# Patient Record
Sex: MALE | Race: OTHER | HISPANIC OR LATINO | URBAN - METROPOLITAN AREA
[De-identification: names, ages, dates, MRNs, and addresses within clinical notes are randomized per-mention and may not be internally consistent; named-entity substitution may affect disease eponyms.]

---

## 2018-03-20 VITALS
HEIGHT: 68 IN | WEIGHT: 212.08 LBS | HEART RATE: 93 BPM | OXYGEN SATURATION: 93 % | SYSTOLIC BLOOD PRESSURE: 97 MMHG | TEMPERATURE: 98 F | RESPIRATION RATE: 18 BRPM | DIASTOLIC BLOOD PRESSURE: 55 MMHG

## 2018-03-20 RX ORDER — LISINOPRIL 2.5 MG/1
1 TABLET ORAL
Qty: 0 | Refills: 0 | COMMUNITY

## 2018-03-20 RX ORDER — CHLORHEXIDINE GLUCONATE 213 G/1000ML
1 SOLUTION TOPICAL ONCE
Qty: 0 | Refills: 0 | Status: DISCONTINUED | OUTPATIENT
Start: 2018-03-22 | End: 2018-03-23

## 2018-03-20 NOTE — H&P ADULT - HISTORY OF PRESENT ILLNESS
*Review Meds*  58 y/o Male (CONTRAST ALLERGY, PREMEDICATED WITH DIPHENHYDRAMINE 50 mg PO x 1 NIGHT BEFORE PROCEDURE AND PREDNISONE 50 mg PO AT 6pm, 12am, 6am PRIOR TO PROCEDURE BY DR. WISDOM) with PMHx of GERD, ED, BPH, IDDM, HTN, Hyperlipidemia, EDMUND (not on CPAP), and known CAD with h/o multiple catheterizations and stents, most recently @ [place] on 6/25/14 revealing patent intervention sites of RCA (RPL1), LAD (Mid), LCx (distal), LCx (OM1), LCx (OM3), with two vessel CAD (RCA, LAD) and successful intervention of RCA branch RPDA with MEMO who presented to his cardiologist, Dr. Wisdom, c/o worsening GRAHAM with associated retrosternal tightness, relieved with rest. Pt notes symptoms are similar to chest pain experienced prior to previous PCI’s. He endorses occasional palpitations. He denies dizziness, syncope, LE edema, orthopnea/PND, N/V, diaphoresis, melena, BRBPR, or any other symptoms. Pt underwent subsequent ECHO on 3/1/18 revealing borderline concentric LV hypertrophy with normal systolic function, estimated EF 60-65%, normal mitral valve leaflets and closing motion, trileaflet aortic valve with mild sclerosis, aortic root dilatation, borderline left atrial enlargement, normal right atrium and right ventricle, Grade 1 diastolic dysfunction with trace MR, TR, and pulmonary insufficiency, Positive exercise stress test for myocardial ischemia with 1.5mm horizontal ST depressions in leads II, III, aVF, V4, V5, V6, Stress ECHO on 3/5/18, Pre-exercise ECHO findings: no resting LV segmental wall motion abnormalities, resting global LV systolic function appeared normal with estimated LVEF of 65%. Post-exercise ECHO findings: LV segmental wall motion abnormalities with inferior wall hypokinesis, with augmented contractility and estimated post LVEF of 25%. Pt reports strict compliance with Plavix 75 mg PO QD, but admits he has not taken ASA 81 mg PO in 8 days.  In light of pt’s risk factors, known CAD, and abnormal stress tests, pt is referred to [facility] for left cardiac catheterization with possible intervention if clinically indicated.    Cath Report @ [place] on 6/25/14: Prox RCA mild diffuse diease, RPDA 70-80% obstruction with successful intervention and post-stenosis 0-10%, RPL1 no obstruction with patent intervention site, Left Main mild diffuse disease, Prox LAD <30%, Mid LAD 30-50% with patent intervention site, D1 60-70% with stent jailed, Prox LCx <30%, Distal LCx no obstruction with patent intervention site, OM1 no obstruction with patent intervention site, OM2 with mild diffuse, OM3 no obstruction with patent intervention site, LCx small distal dCX fills via collaterals from RCA 58 y/o Male (CONTRAST ALLERGY, PREMEDICATED WITH DIPHENHYDRAMINE 50 mg PO x 1 NIGHT BEFORE PROCEDURE AND PREDNISONE 50 mg PO AT 6pm, 12am, 6am PRIOR TO PROCEDURE BY DR. WISDOM) with PMHx of GERD, ED, BPH, IDDM, HTN, Hyperlipidemia, EDMUND (not on CPAP), and known CAD with h/o multiple catheterizations and stents, most recently @ [place] on 6/25/14 revealing patent intervention sites of RCA (RPL1), LAD (Mid), LCx (distal), LCx (OM1), LCx (OM3), with two vessel CAD (RCA, LAD) and successful intervention of RCA branch RPDA with MEMO who presented to his cardiologist, Dr. Wisdom, c/o worsening GRAHAM with associated retrosternal tightness, relieved with rest. Pt notes symptoms are similar to chest pain experienced prior to previous PCI’s. He endorses occasional palpitations. He denies dizziness, syncope, LE edema, orthopnea/PND, N/V, diaphoresis, melena, BRBPR, or any other symptoms. Pt underwent subsequent ECHO on 3/1/18 revealing borderline concentric LV hypertrophy with normal systolic function, estimated EF 60-65%, normal mitral valve leaflets and closing motion, trileaflet aortic valve with mild sclerosis, aortic root dilatation, borderline left atrial enlargement, normal right atrium and right ventricle, Grade 1 diastolic dysfunction with trace MR, TR, and pulmonary insufficiency, Positive exercise stress test for myocardial ischemia with 1.5mm horizontal ST depressions in leads II, III, aVF, V4, V5, V6, Stress ECHO on 3/5/18, Pre-exercise ECHO findings: no resting LV segmental wall motion abnormalities, resting global LV systolic function appeared normal with estimated LVEF of 65%. Post-exercise ECHO findings: LV segmental wall motion abnormalities with inferior wall hypokinesis, with augmented contractility and estimated post LVEF of 25%. Pt reports strict compliance with Plavix 75 mg PO QD, but admits he has not taken ASA 81 mg PO in 8 days.  In light of pt’s risk factors, known CAD, and abnormal stress tests, pt is referred to [facility] for left cardiac catheterization with possible intervention if clinically indicated.    Cath Report @ [place] on 6/25/14: Prox RCA mild diffuse diease, RPDA 70-80% obstruction with successful intervention and post-stenosis 0-10%, RPL1 no obstruction with patent intervention site, Left Main mild diffuse disease, Prox LAD <30%, Mid LAD 30-50% with patent intervention site, D1 60-70% with stent jailed, Prox LCx <30%, Distal LCx no obstruction with patent intervention site, OM1 no obstruction with patent intervention site, OM2 with mild diffuse, OM3 no obstruction with patent intervention site, LCx small distal dCX fills via collaterals from RCA

## 2018-03-20 NOTE — H&P ADULT - ASSESSMENT
56 y/o Male (CONTRAST ALLERGY) with PMHx of GERD, ED, BPH, IDDM, HTN, Hyperlipidemia, EDMUND (not on CPAP), and known CAD with h/o multiple catheterizations and stents, most recently @ [place] on 6/25/14 revealing patent intervention sites of RCA (RPL1), LAD (Mid), LCx (distal), LCx (OM1), LCx (OM3), with two vessel CAD (RCA, LAD) and successful intervention of RCA branch RPDA with MEMO who presented to his cardiologist, Dr. Graves, c/o worsening GRAHAM with associated retrosternal tightness and was found to have an abnormal stress tests, pt is referred to [facility] for left cardiac catheterization with possible intervention if clinically indicated.  Of note, pt is on ASA 81mg/Plavix at home. Reports missed doses of ASA, but strict compliance on Plavix. Last dose of both was this AM. Additional ASA 243mg PO x 1 given to complete loading dose.     Pt had a contrast allergy and was given Rx for prednisone 50mg x3, however due to inclement weather he was unable to  the Rx on time and therefore only took prednisone 50mg x 1. Therefore, given SoluCortef 200mg x1 prior to cath and Benadryl 50mg IV ordered on call to cath lab. WBC 12.2, likely in the setting of steroid use as pt afebrile, denies any sx.     Cr today 1.23. Cr on outpt labs from 3/13 1.38. NS @ 125cc/hr. Euvolemic on exam.     Risks & benefits of procedure and alternative therapy have been explained to the patient including but not limited to: allergic reaction, bleeding w/possible need for blood transfusion, infection, renal and vascular compromise, limb damage, arrhythmia, stroke, vessel dissection/perforation, Myocardial infarction, emergent CABG. Informed consent obtained and in chart. Pt refused a  line.

## 2018-03-20 NOTE — H&P ADULT - PMH
CAD (coronary artery disease)    Diabetes mellitus    Heart burn    High cholesterol    Hypertension    Obesity (BMI 30.0-34.9)    Sleep apnea  Not using any Device for 5 years

## 2018-03-20 NOTE — H&P ADULT - NSHPSOCIALHISTORY_GEN_ALL_CORE
Denies tobacco or current illicit drug use. Quit using cocaine in 2010. Drinks socially 1-2 glasses of wine per week.

## 2018-03-20 NOTE — H&P ADULT - NSHPLABSRESULTS_GEN_ALL_CORE
EKG NSR @ 93 BPM with t-wave flattening in V3-V6                            15.0   12.2  )-----------( 211      ( 22 Mar 2018 09:15 )             44.5       03-22    132<L>  |  93<L>  |  22  ----------------------------<  269<H>  4.0   |  25  |  1.23    Ca    9.7      22 Mar 2018 09:15    TPro  7.4  /  Alb  3.9  /  TBili  0.4  /  DBili  x   /  AST  20  /  ALT  32  /  AlkPhos  65  03-22      PT/INR - ( 22 Mar 2018 09:15 )   PT: 12.2 sec;   INR: 1.10          PTT - ( 22 Mar 2018 09:15 )  PTT:30.8 sec    CARDIAC MARKERS ( 22 Mar 2018 09:15 )  x     / x     / 148 U/L / x     / 2.3 ng/mL

## 2018-03-20 NOTE — H&P ADULT - PSH
Finger injury  Left Ring Finger & had surgery ( 1996 )  H/O spinal fusion    S/P angioplasty with stent  2008 & 2014  total of 7 stents  S/P foot surgery  Right  ( 2013 )

## 2018-03-22 ENCOUNTER — INPATIENT (INPATIENT)
Facility: HOSPITAL | Age: 58
LOS: 0 days | Discharge: HOME | DRG: 247 | End: 2018-03-23
Attending: INTERNAL MEDICINE | Admitting: INTERNAL MEDICINE
Payer: COMMERCIAL

## 2018-03-22 DIAGNOSIS — Z98.1 ARTHRODESIS STATUS: Chronic | ICD-10-CM

## 2018-03-22 DIAGNOSIS — Z98.89 OTHER SPECIFIED POSTPROCEDURAL STATES: Chronic | ICD-10-CM

## 2018-03-22 DIAGNOSIS — S69.90XA UNSPECIFIED INJURY OF UNSPECIFIED WRIST, HAND AND FINGER(S), INITIAL ENCOUNTER: Chronic | ICD-10-CM

## 2018-03-22 LAB
ALBUMIN SERPL ELPH-MCNC: 3.9 G/DL — SIGNIFICANT CHANGE UP (ref 3.3–5)
ALP SERPL-CCNC: 65 U/L — SIGNIFICANT CHANGE UP (ref 40–120)
ALT FLD-CCNC: 32 U/L — SIGNIFICANT CHANGE UP (ref 10–45)
ANION GAP SERPL CALC-SCNC: 14 MMOL/L — SIGNIFICANT CHANGE UP (ref 5–17)
APTT BLD: 30.8 SEC — SIGNIFICANT CHANGE UP (ref 27.5–37.4)
AST SERPL-CCNC: 20 U/L — SIGNIFICANT CHANGE UP (ref 10–40)
BASOPHILS NFR BLD AUTO: 0.6 % — SIGNIFICANT CHANGE UP (ref 0–2)
BILIRUB SERPL-MCNC: 0.4 MG/DL — SIGNIFICANT CHANGE UP (ref 0.2–1.2)
BUN SERPL-MCNC: 22 MG/DL — SIGNIFICANT CHANGE UP (ref 7–23)
CALCIUM SERPL-MCNC: 9.7 MG/DL — SIGNIFICANT CHANGE UP (ref 8.4–10.5)
CHLORIDE SERPL-SCNC: 93 MMOL/L — LOW (ref 96–108)
CK MB CFR SERPL CALC: 2.3 NG/ML — SIGNIFICANT CHANGE UP (ref 0–6.7)
CK SERPL-CCNC: 148 U/L — SIGNIFICANT CHANGE UP (ref 30–200)
CO2 SERPL-SCNC: 25 MMOL/L — SIGNIFICANT CHANGE UP (ref 22–31)
CREAT SERPL-MCNC: 1.23 MG/DL — SIGNIFICANT CHANGE UP (ref 0.5–1.3)
EOSINOPHIL NFR BLD AUTO: 1 % — SIGNIFICANT CHANGE UP (ref 0–6)
GLUCOSE BLDC GLUCOMTR-MCNC: 221 MG/DL — HIGH (ref 70–99)
GLUCOSE SERPL-MCNC: 269 MG/DL — HIGH (ref 70–99)
HBA1C BLD-MCNC: 8.8 % — HIGH (ref 4–5.6)
HCT VFR BLD CALC: 44.5 % — SIGNIFICANT CHANGE UP (ref 39–50)
HGB BLD-MCNC: 15 G/DL — SIGNIFICANT CHANGE UP (ref 13–17)
INR BLD: 1.1 — SIGNIFICANT CHANGE UP (ref 0.88–1.16)
LYMPHOCYTES # BLD AUTO: 23.3 % — SIGNIFICANT CHANGE UP (ref 13–44)
MCHC RBC-ENTMCNC: 29.6 PG — SIGNIFICANT CHANGE UP (ref 27–34)
MCHC RBC-ENTMCNC: 33.7 G/DL — SIGNIFICANT CHANGE UP (ref 32–36)
MCV RBC AUTO: 87.8 FL — SIGNIFICANT CHANGE UP (ref 80–100)
MONOCYTES NFR BLD AUTO: 9.3 % — SIGNIFICANT CHANGE UP (ref 2–14)
NEUTROPHILS NFR BLD AUTO: 65.8 % — SIGNIFICANT CHANGE UP (ref 43–77)
PLATELET # BLD AUTO: 211 K/UL — SIGNIFICANT CHANGE UP (ref 150–400)
POTASSIUM SERPL-MCNC: 4 MMOL/L — SIGNIFICANT CHANGE UP (ref 3.5–5.3)
POTASSIUM SERPL-SCNC: 4 MMOL/L — SIGNIFICANT CHANGE UP (ref 3.5–5.3)
PROT SERPL-MCNC: 7.4 G/DL — SIGNIFICANT CHANGE UP (ref 6–8.3)
PROTHROM AB SERPL-ACNC: 12.2 SEC — SIGNIFICANT CHANGE UP (ref 9.8–12.7)
RBC # BLD: 5.07 M/UL — SIGNIFICANT CHANGE UP (ref 4.2–5.8)
RBC # FLD: 12.5 % — SIGNIFICANT CHANGE UP (ref 10.3–16.9)
SODIUM SERPL-SCNC: 132 MMOL/L — LOW (ref 135–145)
WBC # BLD: 12.2 K/UL — HIGH (ref 3.8–10.5)
WBC # FLD AUTO: 12.2 K/UL — HIGH (ref 3.8–10.5)

## 2018-03-22 PROCEDURE — 93458 L HRT ARTERY/VENTRICLE ANGIO: CPT | Mod: 26,XU

## 2018-03-22 PROCEDURE — 93571 IV DOP VEL&/PRESS C FLO 1ST: CPT | Mod: 26,LD

## 2018-03-22 PROCEDURE — 92928 PRQ TCAT PLMT NTRAC ST 1 LES: CPT | Mod: RC

## 2018-03-22 PROCEDURE — 93010 ELECTROCARDIOGRAM REPORT: CPT | Mod: 59

## 2018-03-22 RX ORDER — HYDROCORTISONE 20 MG
200 TABLET ORAL ONCE
Qty: 0 | Refills: 0 | Status: COMPLETED | OUTPATIENT
Start: 2018-03-22 | End: 2018-03-22

## 2018-03-22 RX ORDER — INSULIN GLARGINE 100 [IU]/ML
30 INJECTION, SOLUTION SUBCUTANEOUS AT BEDTIME
Qty: 0 | Refills: 0 | Status: DISCONTINUED | OUTPATIENT
Start: 2018-03-22 | End: 2018-03-23

## 2018-03-22 RX ORDER — INSULIN LISPRO 100/ML
VIAL (ML) SUBCUTANEOUS ONCE
Qty: 0 | Refills: 0 | Status: COMPLETED | OUTPATIENT
Start: 2018-03-22 | End: 2018-03-22

## 2018-03-22 RX ORDER — INSULIN GLARGINE 100 [IU]/ML
50 INJECTION, SOLUTION SUBCUTANEOUS
Qty: 0 | Refills: 0 | COMMUNITY

## 2018-03-22 RX ORDER — ACETAMINOPHEN 500 MG
650 TABLET ORAL EVERY 6 HOURS
Qty: 0 | Refills: 0 | Status: DISCONTINUED | OUTPATIENT
Start: 2018-03-22 | End: 2018-03-23

## 2018-03-22 RX ORDER — METOPROLOL TARTRATE 50 MG
50 TABLET ORAL DAILY
Qty: 0 | Refills: 0 | Status: DISCONTINUED | OUTPATIENT
Start: 2018-03-23 | End: 2018-03-23

## 2018-03-22 RX ORDER — METOPROLOL TARTRATE 50 MG
1 TABLET ORAL
Qty: 0 | Refills: 0 | COMMUNITY

## 2018-03-22 RX ORDER — GLUCAGON INJECTION, SOLUTION 0.5 MG/.1ML
1 INJECTION, SOLUTION SUBCUTANEOUS ONCE
Qty: 0 | Refills: 0 | Status: DISCONTINUED | OUTPATIENT
Start: 2018-03-22 | End: 2018-03-23

## 2018-03-22 RX ORDER — INSULIN ASPART 100 [IU]/ML
30 INJECTION, SOLUTION SUBCUTANEOUS
Qty: 0 | Refills: 0 | COMMUNITY

## 2018-03-22 RX ORDER — GABAPENTIN 400 MG/1
300 CAPSULE ORAL DAILY
Qty: 0 | Refills: 0 | Status: DISCONTINUED | OUTPATIENT
Start: 2018-03-22 | End: 2018-03-23

## 2018-03-22 RX ORDER — ASPIRIN/CALCIUM CARB/MAGNESIUM 324 MG
243 TABLET ORAL ONCE
Qty: 0 | Refills: 0 | Status: COMPLETED | OUTPATIENT
Start: 2018-03-22 | End: 2018-03-22

## 2018-03-22 RX ORDER — DEXTROSE 50 % IN WATER 50 %
25 SYRINGE (ML) INTRAVENOUS ONCE
Qty: 0 | Refills: 0 | Status: DISCONTINUED | OUTPATIENT
Start: 2018-03-22 | End: 2018-03-23

## 2018-03-22 RX ORDER — SITAGLIPTIN AND METFORMIN HYDROCHLORIDE 500; 50 MG/1; MG/1
1 TABLET, FILM COATED ORAL
Qty: 0 | Refills: 0 | COMMUNITY

## 2018-03-22 RX ORDER — OXYCODONE AND ACETAMINOPHEN 5; 325 MG/1; MG/1
1 TABLET ORAL EVERY 6 HOURS
Qty: 0 | Refills: 0 | Status: DISCONTINUED | OUTPATIENT
Start: 2018-03-22 | End: 2018-03-23

## 2018-03-22 RX ORDER — ASPIRIN/CALCIUM CARB/MAGNESIUM 324 MG
81 TABLET ORAL DAILY
Qty: 0 | Refills: 0 | Status: DISCONTINUED | OUTPATIENT
Start: 2018-03-22 | End: 2018-03-23

## 2018-03-22 RX ORDER — SODIUM CHLORIDE 9 MG/ML
1000 INJECTION INTRAMUSCULAR; INTRAVENOUS; SUBCUTANEOUS
Qty: 0 | Refills: 0 | Status: DISCONTINUED | OUTPATIENT
Start: 2018-03-22 | End: 2018-03-23

## 2018-03-22 RX ORDER — DIPHENHYDRAMINE HCL 50 MG
50 CAPSULE ORAL ONCE
Qty: 0 | Refills: 0 | Status: COMPLETED | OUTPATIENT
Start: 2018-03-22 | End: 2018-03-22

## 2018-03-22 RX ORDER — CLOPIDOGREL BISULFATE 75 MG/1
75 TABLET, FILM COATED ORAL DAILY
Qty: 0 | Refills: 0 | Status: DISCONTINUED | OUTPATIENT
Start: 2018-03-23 | End: 2018-03-23

## 2018-03-22 RX ORDER — DEXTROSE 50 % IN WATER 50 %
1 SYRINGE (ML) INTRAVENOUS ONCE
Qty: 0 | Refills: 0 | Status: DISCONTINUED | OUTPATIENT
Start: 2018-03-22 | End: 2018-03-23

## 2018-03-22 RX ORDER — INSULIN LISPRO 100/ML
VIAL (ML) SUBCUTANEOUS
Qty: 0 | Refills: 0 | Status: DISCONTINUED | OUTPATIENT
Start: 2018-03-22 | End: 2018-03-23

## 2018-03-22 RX ORDER — ATORVASTATIN CALCIUM 80 MG/1
40 TABLET, FILM COATED ORAL AT BEDTIME
Qty: 0 | Refills: 0 | Status: DISCONTINUED | OUTPATIENT
Start: 2018-03-22 | End: 2018-03-23

## 2018-03-22 RX ORDER — DEXTROSE 50 % IN WATER 50 %
12.5 SYRINGE (ML) INTRAVENOUS ONCE
Qty: 0 | Refills: 0 | Status: DISCONTINUED | OUTPATIENT
Start: 2018-03-22 | End: 2018-03-23

## 2018-03-22 RX ORDER — GABAPENTIN 400 MG/1
1 CAPSULE ORAL
Qty: 0 | Refills: 0 | COMMUNITY

## 2018-03-22 RX ORDER — INSULIN LISPRO 100/ML
30 VIAL (ML) SUBCUTANEOUS
Qty: 0 | Refills: 0 | Status: DISCONTINUED | OUTPATIENT
Start: 2018-03-22 | End: 2018-03-23

## 2018-03-22 RX ORDER — GABAPENTIN 400 MG/1
600 CAPSULE ORAL AT BEDTIME
Qty: 0 | Refills: 0 | Status: DISCONTINUED | OUTPATIENT
Start: 2018-03-22 | End: 2018-03-23

## 2018-03-22 RX ORDER — SODIUM CHLORIDE 9 MG/ML
1000 INJECTION, SOLUTION INTRAVENOUS
Qty: 0 | Refills: 0 | Status: DISCONTINUED | OUTPATIENT
Start: 2018-03-22 | End: 2018-03-23

## 2018-03-22 RX ORDER — LISINOPRIL 2.5 MG/1
2.5 TABLET ORAL DAILY
Qty: 0 | Refills: 0 | Status: DISCONTINUED | OUTPATIENT
Start: 2018-03-23 | End: 2018-03-23

## 2018-03-22 RX ORDER — LISINOPRIL 2.5 MG/1
1 TABLET ORAL
Qty: 0 | Refills: 0 | COMMUNITY

## 2018-03-22 RX ORDER — FENTANYL CITRATE 50 UG/ML
1 INJECTION INTRAVENOUS
Qty: 0 | Refills: 0 | COMMUNITY

## 2018-03-22 RX ORDER — FENTANYL CITRATE 50 UG/ML
1 INJECTION INTRAVENOUS
Qty: 0 | Refills: 0 | Status: DISCONTINUED | OUTPATIENT
Start: 2018-03-22 | End: 2018-03-23

## 2018-03-22 RX ADMIN — Medication 30 UNIT(S): at 16:57

## 2018-03-22 RX ADMIN — Medication 50 MILLIGRAM(S): at 12:16

## 2018-03-22 RX ADMIN — Medication 4: at 16:57

## 2018-03-22 RX ADMIN — ATORVASTATIN CALCIUM 40 MILLIGRAM(S): 80 TABLET, FILM COATED ORAL at 22:04

## 2018-03-22 RX ADMIN — OXYCODONE AND ACETAMINOPHEN 1 TABLET(S): 5; 325 TABLET ORAL at 14:24

## 2018-03-22 RX ADMIN — Medication 243 MILLIGRAM(S): at 12:16

## 2018-03-22 RX ADMIN — Medication 4: at 22:04

## 2018-03-22 RX ADMIN — Medication 200 MILLIGRAM(S): at 12:16

## 2018-03-22 RX ADMIN — GABAPENTIN 300 MILLIGRAM(S): 400 CAPSULE ORAL at 16:59

## 2018-03-22 RX ADMIN — FENTANYL CITRATE 1 PATCH: 50 INJECTION INTRAVENOUS at 19:08

## 2018-03-22 RX ADMIN — INSULIN GLARGINE 30 UNIT(S): 100 INJECTION, SOLUTION SUBCUTANEOUS at 22:05

## 2018-03-22 RX ADMIN — OXYCODONE AND ACETAMINOPHEN 1 TABLET(S): 5; 325 TABLET ORAL at 17:01

## 2018-03-22 RX ADMIN — SODIUM CHLORIDE 100 MILLILITER(S): 9 INJECTION INTRAMUSCULAR; INTRAVENOUS; SUBCUTANEOUS at 14:24

## 2018-03-22 RX ADMIN — GABAPENTIN 600 MILLIGRAM(S): 400 CAPSULE ORAL at 22:05

## 2018-03-22 NOTE — PROGRESS NOTE ADULT - SUBJECTIVE AND OBJECTIVE BOX
Interventional Cardiology PA Sheath Pull Note    Pt without complaints. VSS      Pre-Sheath Removal:    [X ] Right     [ ] Left          [X ] Groin    [ ] Brachial    [ ] 5Fr      [X ] 6Fr    [ ] 7Fr     [ ] 8Fr    [ ] Arterial  [ ] Venous sheath in place    [ ] Hematoma        [ ] Bleed    Pulses:    [X ] Right     [ ] Left       DP:  [ ]  Doppler   [ ]  Faint    [X ]   1+    [ ] 2+       Hemostasis Achieved with:        20         minutes manual pressure    Vasovagal Reaction: No    Meds Given: None      Post-Sheath Removal:    [X ] Right     [ ] Left          [X ] Groin    [ ] Brachial    [ ] Hematoma        [ ] Bleed       [ ] Bruit    Pulses:    [X ] Right     [ ] Left       DP:  [ ]  Doppler   [ ]  Faint    [X ]   1+    [ ] 2+     A/P:  s/p [ ] Dx Cath      [ ] IVUS/FFR     [X ] PTA       [ ] PTCA/STENT    -Continue bedrest (pt given instructions)  -Continue to monitor

## 2018-03-23 ENCOUNTER — TRANSCRIPTION ENCOUNTER (OUTPATIENT)
Age: 58
End: 2018-03-23

## 2018-03-23 VITALS — TEMPERATURE: 97 F

## 2018-03-23 LAB
ANION GAP SERPL CALC-SCNC: 9 MMOL/L — SIGNIFICANT CHANGE UP (ref 5–17)
BUN SERPL-MCNC: 21 MG/DL — SIGNIFICANT CHANGE UP (ref 7–23)
CALCIUM SERPL-MCNC: 9.3 MG/DL — SIGNIFICANT CHANGE UP (ref 8.4–10.5)
CHLORIDE SERPL-SCNC: 102 MMOL/L — SIGNIFICANT CHANGE UP (ref 96–108)
CO2 SERPL-SCNC: 28 MMOL/L — SIGNIFICANT CHANGE UP (ref 22–31)
CREAT SERPL-MCNC: 1.13 MG/DL — SIGNIFICANT CHANGE UP (ref 0.5–1.3)
GLUCOSE SERPL-MCNC: 164 MG/DL — HIGH (ref 70–99)
HCT VFR BLD CALC: 43.4 % — SIGNIFICANT CHANGE UP (ref 39–50)
HGB BLD-MCNC: 14.7 G/DL — SIGNIFICANT CHANGE UP (ref 13–17)
MAGNESIUM SERPL-MCNC: 1.9 MG/DL — SIGNIFICANT CHANGE UP (ref 1.6–2.6)
MCHC RBC-ENTMCNC: 30.8 PG — SIGNIFICANT CHANGE UP (ref 27–34)
MCHC RBC-ENTMCNC: 33.9 G/DL — SIGNIFICANT CHANGE UP (ref 32–36)
MCV RBC AUTO: 90.8 FL — SIGNIFICANT CHANGE UP (ref 80–100)
PLATELET # BLD AUTO: 171 K/UL — SIGNIFICANT CHANGE UP (ref 150–400)
POTASSIUM SERPL-MCNC: 3.6 MMOL/L — SIGNIFICANT CHANGE UP (ref 3.5–5.3)
POTASSIUM SERPL-SCNC: 3.6 MMOL/L — SIGNIFICANT CHANGE UP (ref 3.5–5.3)
RBC # BLD: 4.78 M/UL — SIGNIFICANT CHANGE UP (ref 4.2–5.8)
RBC # FLD: 12.8 % — SIGNIFICANT CHANGE UP (ref 10.3–16.9)
SODIUM SERPL-SCNC: 139 MMOL/L — SIGNIFICANT CHANGE UP (ref 135–145)
WBC # BLD: 8.7 K/UL — SIGNIFICANT CHANGE UP (ref 3.8–10.5)
WBC # FLD AUTO: 8.7 K/UL — SIGNIFICANT CHANGE UP (ref 3.8–10.5)

## 2018-03-23 PROCEDURE — 93010 ELECTROCARDIOGRAM REPORT: CPT

## 2018-03-23 RX ORDER — ATORVASTATIN CALCIUM 80 MG/1
1 TABLET, FILM COATED ORAL
Qty: 30 | Refills: 3 | OUTPATIENT
Start: 2018-03-23 | End: 2018-07-20

## 2018-03-23 RX ORDER — CLOPIDOGREL BISULFATE 75 MG/1
1 TABLET, FILM COATED ORAL
Qty: 0 | Refills: 0 | COMMUNITY

## 2018-03-23 RX ORDER — ACETAMINOPHEN 500 MG
2 TABLET ORAL
Qty: 0 | Refills: 0 | COMMUNITY

## 2018-03-23 RX ORDER — OXYCODONE AND ACETAMINOPHEN 5; 325 MG/1; MG/1
2 TABLET ORAL ONCE
Qty: 0 | Refills: 0 | Status: DISCONTINUED | OUTPATIENT
Start: 2018-03-23 | End: 2018-03-23

## 2018-03-23 RX ORDER — METOPROLOL TARTRATE 50 MG
1 TABLET ORAL
Qty: 0 | Refills: 11 | DISCHARGE
Start: 2018-03-23 | End: 2019-03-17

## 2018-03-23 RX ORDER — POTASSIUM CHLORIDE 20 MEQ
40 PACKET (EA) ORAL ONCE
Qty: 0 | Refills: 0 | Status: COMPLETED | OUTPATIENT
Start: 2018-03-23 | End: 2018-03-23

## 2018-03-23 RX ORDER — LISINOPRIL 2.5 MG/1
1 TABLET ORAL
Qty: 0 | Refills: 0 | COMMUNITY

## 2018-03-23 RX ORDER — ASPIRIN/CALCIUM CARB/MAGNESIUM 324 MG
1 TABLET ORAL
Qty: 30 | Refills: 11 | OUTPATIENT
Start: 2018-03-23 | End: 2019-03-17

## 2018-03-23 RX ORDER — METOPROLOL TARTRATE 50 MG
1 TABLET ORAL
Qty: 0 | Refills: 0 | COMMUNITY

## 2018-03-23 RX ORDER — ATORVASTATIN CALCIUM 80 MG/1
1 TABLET, FILM COATED ORAL
Qty: 0 | Refills: 0 | COMMUNITY

## 2018-03-23 RX ORDER — CLOPIDOGREL BISULFATE 75 MG/1
1 TABLET, FILM COATED ORAL
Qty: 30 | Refills: 11 | OUTPATIENT
Start: 2018-03-23 | End: 2019-03-17

## 2018-03-23 RX ORDER — METOPROLOL TARTRATE 50 MG
1 TABLET ORAL
Qty: 30 | Refills: 11
Start: 2018-03-23 | End: 2019-03-17

## 2018-03-23 RX ORDER — LISINOPRIL 2.5 MG/1
1 TABLET ORAL
Qty: 30 | Refills: 2 | OUTPATIENT
Start: 2018-03-23 | End: 2018-06-20

## 2018-03-23 RX ORDER — ASPIRIN/CALCIUM CARB/MAGNESIUM 324 MG
1 TABLET ORAL
Qty: 0 | Refills: 0 | COMMUNITY

## 2018-03-23 RX ORDER — SITAGLIPTIN AND METFORMIN HYDROCHLORIDE 500; 50 MG/1; MG/1
1 TABLET, FILM COATED ORAL
Qty: 0 | Refills: 0 | COMMUNITY

## 2018-03-23 RX ADMIN — Medication 40 MILLIEQUIVALENT(S): at 07:55

## 2018-03-23 RX ADMIN — Medication 81 MILLIGRAM(S): at 11:37

## 2018-03-23 RX ADMIN — GABAPENTIN 300 MILLIGRAM(S): 400 CAPSULE ORAL at 11:37

## 2018-03-23 RX ADMIN — CLOPIDOGREL BISULFATE 75 MILLIGRAM(S): 75 TABLET, FILM COATED ORAL at 11:37

## 2018-03-23 RX ADMIN — Medication 30 UNIT(S): at 11:37

## 2018-03-23 RX ADMIN — Medication 50 MILLIGRAM(S): at 06:28

## 2018-03-23 RX ADMIN — Medication 2: at 11:37

## 2018-03-23 RX ADMIN — Medication 30 UNIT(S): at 07:40

## 2018-03-23 RX ADMIN — LISINOPRIL 2.5 MILLIGRAM(S): 2.5 TABLET ORAL at 06:28

## 2018-03-23 NOTE — DISCHARGE NOTE ADULT - CARE PROVIDER_API CALL
Will Loyd), Cardiovascular Disease; Internal Medicine; Interventional Cardiology  [address] [place] [address]  Phone: [phone]  Fax: [phone]

## 2018-03-23 NOTE — DISCHARGE NOTE ADULT - PATIENT PORTAL LINK FT
You can access the [email or web address], offered by [facility], by registering with the following website: [email or web address]

## 2018-03-23 NOTE — DISCHARGE NOTE ADULT - PLAN OF CARE
Continue your home pain regimen and follow up with your outpatient pain specialist You had a Cardiac Catheterization and a stent placed to a blocked artery of your heart. YOU NEED TO CONTINUE ASPIRIN 81MG DAILY AS WELL AS PLAVIX 75MG DAILY AND DO NOT STOP THESE MEDICATIONS UNLESS INSTRUCTED BY YOUR CARDIOLOGIST ONLY You underwent a coronary angiogram and should wait 3 days before returning to ordinary activities. Do not drive for 2 days. Consult your doctor before returning to vigorous activity. You may return to work in 3-5 days. The catheter from your groin was removed. You may shower once the dressing is removed, but avoid baths, hot tubs, or swimming for 5 days to prevent infection. If you notice bleeding from the site, hardening and pain at the site, drainage or redness from the site, coolness/paleness of the extremity, swelling, or fever, please call [phone]. Please continue your aspirin and plavix as prescribed unless otherwise indicated by your cardiologist. All of your prescriptions have been sent to the pharmacy. Please follow up with Dr. Graves in 1-2 weeks. All of your needed prescriptions have been sent electronically to your pharmacy. Continue your Lisinopril 2.5mg daily as well as ToprolXL 50mg daily You have a history of elevated blood pressure and you should continue your blood pressure medications as prescribed. Continue Lipitor 40mg at bedtime Please continue your daily statin to ensure your cardiac stent remains open. Continue your Diabetes medications except for Janumet which you should hold and resume on Monday 3/26/18

## 2018-03-23 NOTE — DISCHARGE NOTE ADULT - CARE PLAN
Principal Discharge DX:	CAD (coronary artery disease)  Goal:	You had a Cardiac Catheterization and a stent placed to a blocked artery of your heart. YOU NEED TO CONTINUE ASPIRIN 81MG DAILY AS WELL AS PLAVIX 75MG DAILY AND DO NOT STOP THESE MEDICATIONS UNLESS INSTRUCTED BY YOUR CARDIOLOGIST ONLY  Assessment and plan of treatment:	You underwent a coronary angiogram and should wait 3 days before returning to ordinary activities. Do not drive for 2 days. Consult your doctor before returning to vigorous activity. You may return to work in 3-5 days. The catheter from your groin was removed. You may shower once the dressing is removed, but avoid baths, hot tubs, or swimming for 5 days to prevent infection. If you notice bleeding from the site, hardening and pain at the site, drainage or redness from the site, coolness/paleness of the extremity, swelling, or fever, please call [phone]. Please continue your aspirin and plavix as prescribed unless otherwise indicated by your cardiologist. All of your prescriptions have been sent to the pharmacy. Please follow up with Dr. Graves in 1-2 weeks. All of your needed prescriptions have been sent electronically to your pharmacy.  Secondary Diagnosis:	Hypertension  Goal:	Continue your Lisinopril 2.5mg daily as well as ToprolXL 50mg daily  Assessment and plan of treatment:	You have a history of elevated blood pressure and you should continue your blood pressure medications as prescribed.  Secondary Diagnosis:	High cholesterol  Goal:	Continue Lipitor 40mg at bedtime  Assessment and plan of treatment:	Please continue your daily statin to ensure your cardiac stent remains open.  Secondary Diagnosis:	Diabetes mellitus  Goal:	Continue your Diabetes medications except for Janumet which you should hold and resume on Monday 3/26/18  Secondary Diagnosis:	H/O spinal fusion  Goal:	Continue your home pain regimen and follow up with your outpatient pain specialist

## 2018-03-23 NOTE — DISCHARGE NOTE ADULT - HOSPITAL COURSE
58 y/o Male (CONTRAST ALLERGY, PREMEDICATED WITH DIPHENHYDRAMINE 50 mg PO x 1 NIGHT BEFORE PROCEDURE AND PREDNISONE 50 mg PO AT 6pm, 12am, 6am PRIOR TO PROCEDURE BY DR. WISDOM) with PMHx of GERD, ED, BPH, IDDM, HTN, Hyperlipidemia, EDMUND (not on CPAP), and known CAD with h/o multiple catheterizations and stents, most recently @ [place] on 6/25/14 revealing patent intervention sites of RCA (RPL1), LAD (Mid), LCx (distal), LCx (OM1), LCx (OM3), with two vessel CAD (RCA, LAD) and successful intervention of RCA branch RPDA with MEMO who presented to his cardiologist, Dr. Wisdom, c/o worsening GRAHAM with associated retrosternal tightness, relieved with rest. Pt notes symptoms are similar to chest pain experienced prior to previous PCI’s. He endorses occasional palpitations. He denies dizziness, syncope, LE edema, orthopnea/PND, N/V, diaphoresis, melena, BRBPR, or any other symptoms. Pt underwent subsequent ECHO on 3/1/18 revealing borderline concentric LV hypertrophy with normal systolic function, estimated EF 60-65%, normal mitral valve leaflets and closing motion, trileaflet aortic valve with mild sclerosis, aortic root dilatation, borderline left atrial enlargement, normal right atrium and right ventricle, Grade 1 diastolic dysfunction with trace MR, TR, and pulmonary insufficiency, Positive exercise stress test for myocardial ischemia with 1.5mm horizontal ST depressions in leads II, III, aVF, V4, V5, V6, Stress ECHO on 3/5/18, Pre-exercise ECHO findings: no resting LV segmental wall motion abnormalities, resting global LV systolic function appeared normal with estimated LVEF of 65%. Post-exercise ECHO findings: LV segmental wall motion abnormalities with inferior wall hypokinesis, with augmented contractility and estimated post LVEF of 25%. Pt reports strict compliance with Plavix 75 mg PO QD, but admits he has not taken ASA 81 mg PO in 8 days. s/p Cardiac Cath 3/22/18 with MEMO RPLS, patent LAD, patent mLAD with D1 70-80% jailed by stent, LCX and OM stents patent, OM1 prox 50-60%, patent PDA stent. R groin sheath removed without complications and stable this AM. Pt d/w Dr. Hamm, K+ 3.6 repleted with kdur 40. Pt to c/w current medication regimen and instructed to f/u with Dr. Wisdom in 1-2 weeks.

## 2018-03-23 NOTE — DISCHARGE NOTE ADULT - MEDICATION SUMMARY - MEDICATIONS TO TAKE
I will START or STAY ON the medications listed below when I get home from the hospital:    Endocet 10 mg-650 mg oral tablet  -- 1 tab(s) by mouth once a day, As Needed  -- Indication: For H/O spinal fusion    fentanyl topical  -- 12 mcg/hr by transdermal patch every 72 hours  -- Indication: For H/O spinal fusion    Aspirin Enteric Coated 81 mg oral delayed release tablet  -- 1 tab(s) by mouth once a day  -- Indication: For Coronary Artery Disease    lisinopril 2.5 mg oral tablet  -- 1 tab(s) by mouth once a day  -- Indication: For Hypertension    gabapentin 300 mg oral capsule  -- 1 cap(s) by mouth once a day  -- Indication: For H/O spinal fusion    Starlix 120 mg oral tablet  -- 1 tab(s) by mouth once a day  -- Indication: For Diabetes    Basaglar KwikPen 100 units/mL subcutaneous solution  -- 50 unit(s) subcutaneous once a day (at bedtime)  -- Indication: For Diabetes    HumaLOG KwikPen 100 units/mL injectable solution  -- 30 unit(s) injectable 3 times a day  -- Indication: For Diabetes    Janumet 50 mg-1000 mg oral tablet  -- 1 tab(s) by mouth once a day  RESUME ON MONDAY 3/26/18  -- Indication: For Diabetes    Lipitor 40 mg oral tablet  -- 1 tab(s) by mouth once a day  -- Indication: For Hyperlipidemia    Plavix 75 mg oral tablet  -- 1 tab(s) by mouth once a day  -- Indication: For Coronary Artery Disease    Toprol-XL 50 mg oral tablet, extended release  -- 1 tab(s) by mouth once a day  -- Indication: For Hypertension

## 2018-03-24 PROCEDURE — 82962 GLUCOSE BLOOD TEST: CPT

## 2018-03-24 PROCEDURE — 82553 CREATINE MB FRACTION: CPT

## 2018-03-24 PROCEDURE — C1889: CPT

## 2018-03-24 PROCEDURE — 80048 BASIC METABOLIC PNL TOTAL CA: CPT

## 2018-03-24 PROCEDURE — 85610 PROTHROMBIN TIME: CPT

## 2018-03-24 PROCEDURE — 80053 COMPREHEN METABOLIC PANEL: CPT

## 2018-03-24 PROCEDURE — C1769: CPT

## 2018-03-24 PROCEDURE — C1725: CPT

## 2018-03-24 PROCEDURE — C1887: CPT

## 2018-03-24 PROCEDURE — C1894: CPT

## 2018-03-24 PROCEDURE — 83735 ASSAY OF MAGNESIUM: CPT

## 2018-03-24 PROCEDURE — 85730 THROMBOPLASTIN TIME PARTIAL: CPT

## 2018-03-24 PROCEDURE — 83036 HEMOGLOBIN GLYCOSYLATED A1C: CPT

## 2018-03-24 PROCEDURE — 36415 COLL VENOUS BLD VENIPUNCTURE: CPT

## 2018-03-24 PROCEDURE — 85027 COMPLETE CBC AUTOMATED: CPT

## 2018-03-24 PROCEDURE — C1874: CPT

## 2018-03-24 PROCEDURE — 93005 ELECTROCARDIOGRAM TRACING: CPT

## 2018-03-24 PROCEDURE — 82550 ASSAY OF CK (CPK): CPT

## 2018-03-24 PROCEDURE — 85025 COMPLETE CBC W/AUTO DIFF WBC: CPT

## 2018-03-27 DIAGNOSIS — E66.9 OBESITY, UNSPECIFIED: ICD-10-CM

## 2018-03-27 DIAGNOSIS — I10 ESSENTIAL (PRIMARY) HYPERTENSION: ICD-10-CM

## 2018-03-27 DIAGNOSIS — N40.0 BENIGN PROSTATIC HYPERPLASIA WITHOUT LOWER URINARY TRACT SYMPTOMS: ICD-10-CM

## 2018-03-27 DIAGNOSIS — E78.5 HYPERLIPIDEMIA, UNSPECIFIED: ICD-10-CM

## 2018-03-27 DIAGNOSIS — K21.9 GASTRO-ESOPHAGEAL REFLUX DISEASE WITHOUT ESOPHAGITIS: ICD-10-CM

## 2018-03-27 DIAGNOSIS — I25.110 ATHEROSCLEROTIC HEART DISEASE OF NATIVE CORONARY ARTERY WITH UNSTABLE ANGINA PECTORIS: ICD-10-CM

## 2018-03-27 DIAGNOSIS — Z98.1 ARTHRODESIS STATUS: ICD-10-CM

## 2018-03-27 DIAGNOSIS — Z79.4 LONG TERM (CURRENT) USE OF INSULIN: ICD-10-CM

## 2018-03-27 DIAGNOSIS — Z91.041 RADIOGRAPHIC DYE ALLERGY STATUS: ICD-10-CM

## 2018-03-27 DIAGNOSIS — I25.10 ATHEROSCLEROTIC HEART DISEASE OF NATIVE CORONARY ARTERY WITHOUT ANGINA PECTORIS: ICD-10-CM

## 2018-03-27 DIAGNOSIS — Z79.02 LONG TERM (CURRENT) USE OF ANTITHROMBOTICS/ANTIPLATELETS: ICD-10-CM

## 2018-03-27 DIAGNOSIS — E11.9 TYPE 2 DIABETES MELLITUS WITHOUT COMPLICATIONS: ICD-10-CM

## 2018-03-27 DIAGNOSIS — G47.33 OBSTRUCTIVE SLEEP APNEA (ADULT) (PEDIATRIC): ICD-10-CM

## 2018-03-27 DIAGNOSIS — Z28.21 IMMUNIZATION NOT CARRIED OUT BECAUSE OF PATIENT REFUSAL: ICD-10-CM

## 2019-02-26 VITALS
OXYGEN SATURATION: 93 % | WEIGHT: 216.05 LBS | HEIGHT: 68 IN | HEART RATE: 70 BPM | DIASTOLIC BLOOD PRESSURE: 67 MMHG | TEMPERATURE: 98 F | SYSTOLIC BLOOD PRESSURE: 97 MMHG | RESPIRATION RATE: 15 BRPM

## 2019-02-26 RX ORDER — CHLORHEXIDINE GLUCONATE 213 G/1000ML
1 SOLUTION TOPICAL ONCE
Qty: 0 | Refills: 0 | Status: DISCONTINUED | OUTPATIENT
Start: 2019-03-07 | End: 2019-03-08

## 2019-02-26 NOTE — H&P ADULT - HISTORY OF PRESENT ILLNESS
**** PT TO BRING IN ALL MEDS     58 y.o Male with CONTRAST ALLERGY (PREMEDICATED WITH DIPHENHYDRAMINE 50 mg PO x 1 NIGHT BEFORE PROCEDURE AND PREDNISONE 50 mg PO AT 6pm, 12am, 6am PRIOR TO PROCEDURE BY DR. WISDOM) PMHx of HTN, Hyperlipidemia, IDDM, GERD, ED, BPH, EDMUND (not on CPAP), Known CAD with multiple prior PCIs, most recently MEMO dRPLS @ [facility] on 03/22/18,  who returned to his Cardiologist Dr. Wisdom c/o recurrent exertional CP and SOB over the past few months. Pt states he has been experiencing sub sternal chest pressure, 7/10 in severity, with accompanying SOB upon ambulation of 1.5 block resolving within minutes of rest.  He denies any palpitations, dizziness, syncope, recent PND/orthopnea, or LE edema.  No recent diagnostic cardiac studies performed.  Nuclear Stress test 02/11/19 was a normal study, demonstrating normal myocardial perfusion images, LVEF of 66%.    In light of pt’s risk factors, Known CAD, above CCS Anginal Class 3 symptoms, and abnormal NST, pt is now referred to [facility] for recommended Cardiac Cath with possible intervention if clinically indicated to r/o progressive CAD.      CATH HX:  Cardiac Cath @ [place] on 6/25/14: Prox RCA mild diffuse diease, RPDA 70-80% obstruction with successful intervention and post-stenosis 0-10%, RPL1 no obstruction with patent intervention site, Left Main mild diffuse disease, Prox LAD <30%, Mid LAD 30-50% with patent intervention site, D1 60-70% with stent jailed, Prox LCx <30%, Distal LCx no obstruction with patent intervention site, OM1 no obstruction with patent intervention site, OM2 with mild diffuse, OM3 no obstruction with patent intervention site, LCx small distal dCX fills via collaterals from RCA    Cardiac Cath @ [facility] on 03/22/18: Normal LM,  pLAD w/ mild diffuse disease, mild ISR mLAD stent, 70-80% Ostial Diag 1 stenosis jailed by stent, patent dLCx stent patent OM stents,  50-60% pOM1, rPDA stent patent, 85% dRPLS, LVEDP of 1 mmHg. No AS.  Successful MEMO dRPLS 58 y.o Male with CONTRAST ALLERGY,   ASPIRIN GI INTOLERANCE, PMHx of HTN, Hyperlipidemia, IDDM, GERD, ED, BPH, EDMUND (not on CPAP), Known CAD with multiple prior PCIs, most recently MEMO dRPLS @ [facility] on 03/22/18,  who returned to his Cardiologist Dr. Graves c/o recurrent exertional CP and SOB over the past few months. Pt states he has been experiencing sub sternal chest pressure, 7/10 in severity, with accompanying SOB upon ambulation of 1.5 block resolving within minutes of rest.  He denies any palpitations, dizziness, syncope, recent PND/orthopnea, or LE edema.  No recent diagnostic cardiac studies performed.  Nuclear Stress test 02/11/19 was a normal study, demonstrating normal myocardial perfusion images, LVEF of 66%.    In light of pt’s risk factors, Known CAD, above CCS Anginal Class 3 symptoms, and abnormal NST, pt is now referred to [facility] for recommended Cardiac Cath with possible intervention if clinically indicated to r/o progressive CAD.      CATH HX:  Cardiac Cath @ [place] on 6/25/14: Prox RCA mild diffuse diease, RPDA 70-80% obstruction with successful intervention and post-stenosis 0-10%, RPL1 no obstruction with patent intervention site, Left Main mild diffuse disease, Prox LAD <30%, Mid LAD 30-50% with patent intervention site, D1 60-70% with stent jailed, Prox LCx <30%, Distal LCx no obstruction with patent intervention site, OM1 no obstruction with patent intervention site, OM2 with mild diffuse, OM3 no obstruction with patent intervention site, LCx small distal dCX fills via collaterals from RCA    Cardiac Cath @ [facility] on 03/22/18: Normal LM,  pLAD w/ mild diffuse disease, mild ISR mLAD stent, 70-80% Ostial Diag 1 stenosis jailed by stent, patent dLCx stent patent OM stents,  50-60% pOM1, rPDA stent patent, 85% dRPLS, LVEDP of 1 mmHg. No AS.  Successful MEMO dRPLS 58 y.o Male with CONTRAST ALLERGY,   ASPIRIN GI INTOLERANCE, PMHx of HTN, Hyperlipidemia, IDDM, GERD, ED, BPH, EDMUND (not on CPAP), Known CAD with multiple prior PCIs, most recently MEMO dRPLS @ [facility] on 03/22/18,  who returned to his Cardiologist Dr. Graves c/o recurrent exertional CP and SOB over the past few months. Pt states he has been experiencing sub sternal chest pressure, 7/10 in severity, with accompanying SOB upon ambulation of 1.5 block resolving within minutes of rest.  He denies any palpitations, dizziness, syncope, recent PND/orthopnea, or LE edema.  No recent diagnostic cardiac studies performed.  Nuclear Stress test 02/11/19 was a normal study, demonstrating normal myocardial perfusion images, LVEF of 66%.    In light of pt’s risk factors, Known CAD, above CCS Anginal Class 3 symptoms, and abnormal NST, pt is now referred to [facility] for recommended Cardiac Cath with possible intervention if clinically indicated to r/o progressive CAD.      CATH HX:  Cardiac Cath @ [place] on 6/25/14: Prox RCA mild diffuse diease, RPDA 70-80% obstruction with successful intervention and post-stenosis 0-10%, RPL1 no obstruction with patent intervention site, Left Main mild diffuse disease, Prox LAD <30%, Mid LAD 30-50% with patent intervention site, D1 60-70% with stent jailed, Prox LCx <30%, Distal LCx no obstruction with patent intervention site, OM1 no obstruction with patent intervention site, OM2 with mild diffuse, OM3 no obstruction with patent intervention site, LCx small distal dCX fills via collaterals from RCA    Cardiac Cath @ [facility] on 03/22/18: Normal LM,  pLAD w/ mild diffuse disease, mild ISR mLAD stent, 70-80% Ostial Diag 1 stenosis jailed by stent, patent dLCx stent patent OM stents,  50-60% pOM1, rPDA stent patent, 85% dRPLS, LVEDP of 1 mmHg. No AS.  Successful MEMO dRPLS

## 2019-02-26 NOTE — H&P ADULT - BP NONINVASIVE DIASTOLIC (MM HG)
I will SWITCH the dose or number of times a day I take the medications listed below when I get home from the hospital:  None
67

## 2019-02-26 NOTE — H&P ADULT - ASSESSMENT
58 y.o Male with CONTRAST ALLERGY, ASPIRIN GI INTOLERANCE   PMHx of HTN, Hyperlipidemia, IDDM, GERD, ED, BPH, EDMUDN (not on CPAP), Known CAD with multiple prior PCIs, most recently MEMO dRPLS @ [facility] on 03/22/18,  who presents for recommended LHC with possible intervention if clinically indicated secondary to  CCS Anginal Class  3 symptoms  in the setting of an abnormal NST.      ASA: III and Mallampati: III    -Precath/Consented  -Contrast Dye Allergy- Solu-cortef 200mg IVP X 1 STAT and Benadryl 50mg IVP X 1 on call to the cath lab.    - IVF Hydration NS @ 75cc/hr.  -Pt reports compliance with his  Plavix, last taken today 03/07/19. Pt  reports GI intolerance with ASA Pt was stressed the importance of being on ASA and was educated on taking ASA on a full stomach. He demonstrated understanding and agreed to take ASA daily.  Pt received ASA 325mg PO X 1 pre-procedure. 58 y.o Male with CONTRAST ALLERGY, ASPIRIN GI INTOLERANCE   PMHx of HTN, Hyperlipidemia, IDDM, GERD, ED, BPH, EDMUND (not on CPAP), Known CAD with multiple prior PCIs, most recently MEMO dRFRANCKS @ [facility] on 03/22/18,  who presents for recommended LHC with possible intervention if clinically indicated secondary to  CCS Anginal Class  3 symptoms  in the setting of an abnormal NST.      ASA: III and Mallampati: III    -Precath/Consented  -Contrast Dye Allergy- Solu-cortef 200mg IVP X 1 STAT and Benadryl 50mg IVP X 1 on call to the cath lab.    - IVF Hydration NS @ 75cc/hr.  -Pt reports compliance with his  Plavix, last taken today 03/07/19. He states he gets GI intolerance with ASA.   Pt was stressed the importance of being on ASA and was educated on taking ASA on a full stomach. He demonstrated understanding and agreed to take ASA daily.  Pt received ASA 325mg PO X 1 pre-procedure.

## 2019-03-07 ENCOUNTER — INPATIENT (INPATIENT)
Facility: HOSPITAL | Age: 59
LOS: 0 days | Discharge: HOME | DRG: 247 | End: 2019-03-08
Attending: INTERNAL MEDICINE | Admitting: INTERNAL MEDICINE
Payer: COMMERCIAL

## 2019-03-07 DIAGNOSIS — Z98.89 OTHER SPECIFIED POSTPROCEDURAL STATES: Chronic | ICD-10-CM

## 2019-03-07 DIAGNOSIS — S69.90XA UNSPECIFIED INJURY OF UNSPECIFIED WRIST, HAND AND FINGER(S), INITIAL ENCOUNTER: Chronic | ICD-10-CM

## 2019-03-07 DIAGNOSIS — Z98.1 ARTHRODESIS STATUS: Chronic | ICD-10-CM

## 2019-03-07 LAB
ALBUMIN SERPL ELPH-MCNC: 4.3 G/DL — SIGNIFICANT CHANGE UP (ref 3.3–5)
ALP SERPL-CCNC: 82 U/L — SIGNIFICANT CHANGE UP (ref 40–120)
ALT FLD-CCNC: 34 U/L — SIGNIFICANT CHANGE UP (ref 10–45)
ANION GAP SERPL CALC-SCNC: 11 MMOL/L — SIGNIFICANT CHANGE UP (ref 5–17)
APTT BLD: 31.9 SEC — SIGNIFICANT CHANGE UP (ref 27.5–36.3)
AST SERPL-CCNC: 23 U/L — SIGNIFICANT CHANGE UP (ref 10–40)
BASOPHILS # BLD AUTO: 0.03 K/UL — SIGNIFICANT CHANGE UP (ref 0–0.2)
BASOPHILS NFR BLD AUTO: 0.3 % — SIGNIFICANT CHANGE UP (ref 0–2)
BILIRUB SERPL-MCNC: 0.6 MG/DL — SIGNIFICANT CHANGE UP (ref 0.2–1.2)
BUN SERPL-MCNC: 11 MG/DL — SIGNIFICANT CHANGE UP (ref 7–23)
CALCIUM SERPL-MCNC: 10.8 MG/DL — HIGH (ref 8.4–10.5)
CHLORIDE SERPL-SCNC: 101 MMOL/L — SIGNIFICANT CHANGE UP (ref 96–108)
CHOLEST SERPL-MCNC: 117 MG/DL — SIGNIFICANT CHANGE UP (ref 10–199)
CK MB CFR SERPL CALC: 1.6 NG/ML — SIGNIFICANT CHANGE UP (ref 0–6.7)
CK SERPL-CCNC: 163 U/L — SIGNIFICANT CHANGE UP (ref 30–200)
CO2 SERPL-SCNC: 28 MMOL/L — SIGNIFICANT CHANGE UP (ref 22–31)
CREAT SERPL-MCNC: 1.1 MG/DL — SIGNIFICANT CHANGE UP (ref 0.5–1.3)
CRP SERPL-MCNC: 0.1 MG/DL — SIGNIFICANT CHANGE UP (ref 0–0.4)
EOSINOPHIL # BLD AUTO: 0.03 K/UL — SIGNIFICANT CHANGE UP (ref 0–0.5)
EOSINOPHIL NFR BLD AUTO: 0.3 % — SIGNIFICANT CHANGE UP (ref 0–6)
GLUCOSE SERPL-MCNC: 192 MG/DL — HIGH (ref 70–99)
HBA1C BLD-MCNC: 8.5 % — HIGH (ref 4–5.6)
HCT VFR BLD CALC: 48.8 % — SIGNIFICANT CHANGE UP (ref 39–50)
HDLC SERPL-MCNC: 38 MG/DL — LOW
HGB BLD-MCNC: 16.5 G/DL — SIGNIFICANT CHANGE UP (ref 13–17)
IMM GRANULOCYTES NFR BLD AUTO: 0.4 % — SIGNIFICANT CHANGE UP (ref 0–1.5)
INR BLD: 1.03 — SIGNIFICANT CHANGE UP (ref 0.88–1.16)
LIPID PNL WITH DIRECT LDL SERPL: 59 MG/DL — SIGNIFICANT CHANGE UP
LYMPHOCYTES # BLD AUTO: 1.39 K/UL — SIGNIFICANT CHANGE UP (ref 1–3.3)
LYMPHOCYTES # BLD AUTO: 14.8 % — SIGNIFICANT CHANGE UP (ref 13–44)
MCHC RBC-ENTMCNC: 30.8 PG — SIGNIFICANT CHANGE UP (ref 27–34)
MCHC RBC-ENTMCNC: 33.8 GM/DL — SIGNIFICANT CHANGE UP (ref 32–36)
MCV RBC AUTO: 91.2 FL — SIGNIFICANT CHANGE UP (ref 80–100)
MONOCYTES # BLD AUTO: 0.17 K/UL — SIGNIFICANT CHANGE UP (ref 0–0.9)
MONOCYTES NFR BLD AUTO: 1.8 % — LOW (ref 2–14)
NEUTROPHILS # BLD AUTO: 7.71 K/UL — HIGH (ref 1.8–7.4)
NEUTROPHILS NFR BLD AUTO: 82.4 % — HIGH (ref 43–77)
NRBC # BLD: 0 /100 WBCS — SIGNIFICANT CHANGE UP (ref 0–0)
PLATELET # BLD AUTO: 190 K/UL — SIGNIFICANT CHANGE UP (ref 150–400)
POTASSIUM SERPL-MCNC: 4.1 MMOL/L — SIGNIFICANT CHANGE UP (ref 3.5–5.3)
POTASSIUM SERPL-SCNC: 4.1 MMOL/L — SIGNIFICANT CHANGE UP (ref 3.5–5.3)
PROT SERPL-MCNC: 8.9 G/DL — HIGH (ref 6–8.3)
PROTHROM AB SERPL-ACNC: 11.7 SEC — SIGNIFICANT CHANGE UP (ref 10–12.9)
RBC # BLD: 5.35 M/UL — SIGNIFICANT CHANGE UP (ref 4.2–5.8)
RBC # FLD: 12.3 % — SIGNIFICANT CHANGE UP (ref 10.3–14.5)
SODIUM SERPL-SCNC: 140 MMOL/L — SIGNIFICANT CHANGE UP (ref 135–145)
TOTAL CHOLESTEROL/HDL RATIO MEASUREMENT: 3.1 RATIO — LOW (ref 3.4–9.6)
TRIGL SERPL-MCNC: 102 MG/DL — SIGNIFICANT CHANGE UP (ref 10–149)
WBC # BLD: 9.37 K/UL — SIGNIFICANT CHANGE UP (ref 3.8–10.5)
WBC # FLD AUTO: 9.37 K/UL — SIGNIFICANT CHANGE UP (ref 3.8–10.5)

## 2019-03-07 PROCEDURE — 93010 ELECTROCARDIOGRAM REPORT: CPT

## 2019-03-07 PROCEDURE — 92928 PRQ TCAT PLMT NTRAC ST 1 LES: CPT | Mod: LD

## 2019-03-07 PROCEDURE — 93458 L HRT ARTERY/VENTRICLE ANGIO: CPT | Mod: 26,XU

## 2019-03-07 RX ORDER — DEXTROSE 50 % IN WATER 50 %
15 SYRINGE (ML) INTRAVENOUS ONCE
Qty: 0 | Refills: 0 | Status: DISCONTINUED | OUTPATIENT
Start: 2019-03-07 | End: 2019-03-08

## 2019-03-07 RX ORDER — ASPIRIN/CALCIUM CARB/MAGNESIUM 324 MG
81 TABLET ORAL DAILY
Qty: 0 | Refills: 0 | Status: DISCONTINUED | OUTPATIENT
Start: 2019-03-08 | End: 2019-03-08

## 2019-03-07 RX ORDER — SITAGLIPTIN AND METFORMIN HYDROCHLORIDE 500; 50 MG/1; MG/1
1 TABLET, FILM COATED ORAL
Qty: 0 | Refills: 0 | COMMUNITY

## 2019-03-07 RX ORDER — NATEGLINIDE 60 MG/1
1 TABLET, COATED ORAL
Qty: 0 | Refills: 0 | COMMUNITY

## 2019-03-07 RX ORDER — INSULIN LISPRO 100/ML
VIAL (ML) SUBCUTANEOUS ONCE
Qty: 0 | Refills: 0 | Status: DISCONTINUED | OUTPATIENT
Start: 2019-03-07 | End: 2019-03-07

## 2019-03-07 RX ORDER — LISINOPRIL 2.5 MG/1
20 TABLET ORAL DAILY
Qty: 0 | Refills: 0 | Status: DISCONTINUED | OUTPATIENT
Start: 2019-03-08 | End: 2019-03-08

## 2019-03-07 RX ORDER — HYDROCHLOROTHIAZIDE 25 MG
25 TABLET ORAL DAILY
Qty: 0 | Refills: 0 | Status: DISCONTINUED | OUTPATIENT
Start: 2019-03-08 | End: 2019-03-08

## 2019-03-07 RX ORDER — GLUCAGON INJECTION, SOLUTION 0.5 MG/.1ML
1 INJECTION, SOLUTION SUBCUTANEOUS ONCE
Qty: 0 | Refills: 0 | Status: DISCONTINUED | OUTPATIENT
Start: 2019-03-07 | End: 2019-03-08

## 2019-03-07 RX ORDER — GABAPENTIN 400 MG/1
300 CAPSULE ORAL
Qty: 0 | Refills: 0 | Status: DISCONTINUED | OUTPATIENT
Start: 2019-03-07 | End: 2019-03-08

## 2019-03-07 RX ORDER — SODIUM CHLORIDE 9 MG/ML
1000 INJECTION, SOLUTION INTRAVENOUS
Qty: 0 | Refills: 0 | Status: DISCONTINUED | OUTPATIENT
Start: 2019-03-07 | End: 2019-03-08

## 2019-03-07 RX ORDER — OXYCODONE AND ACETAMINOPHEN 5; 325 MG/1; MG/1
1 TABLET ORAL EVERY 6 HOURS
Qty: 0 | Refills: 0 | Status: DISCONTINUED | OUTPATIENT
Start: 2019-03-07 | End: 2019-03-08

## 2019-03-07 RX ORDER — INSULIN ASPART 100 [IU]/ML
30 INJECTION, SOLUTION SUBCUTANEOUS
Qty: 0 | Refills: 0 | COMMUNITY

## 2019-03-07 RX ORDER — ISOSORBIDE MONONITRATE 60 MG/1
30 TABLET, EXTENDED RELEASE ORAL DAILY
Qty: 0 | Refills: 0 | Status: DISCONTINUED | OUTPATIENT
Start: 2019-03-07 | End: 2019-03-08

## 2019-03-07 RX ORDER — DEXTROSE 50 % IN WATER 50 %
25 SYRINGE (ML) INTRAVENOUS ONCE
Qty: 0 | Refills: 0 | Status: DISCONTINUED | OUTPATIENT
Start: 2019-03-07 | End: 2019-03-08

## 2019-03-07 RX ORDER — INFLUENZA VIRUS VACCINE 15; 15; 15; 15 UG/.5ML; UG/.5ML; UG/.5ML; UG/.5ML
0.5 SUSPENSION INTRAMUSCULAR ONCE
Qty: 0 | Refills: 0 | Status: DISCONTINUED | OUTPATIENT
Start: 2019-03-07 | End: 2019-03-08

## 2019-03-07 RX ORDER — ASPIRIN/CALCIUM CARB/MAGNESIUM 324 MG
325 TABLET ORAL ONCE
Qty: 0 | Refills: 0 | Status: COMPLETED | OUTPATIENT
Start: 2019-03-07 | End: 2019-03-07

## 2019-03-07 RX ORDER — INSULIN LISPRO 100/ML
30 VIAL (ML) SUBCUTANEOUS
Qty: 0 | Refills: 0 | COMMUNITY

## 2019-03-07 RX ORDER — GABAPENTIN 400 MG/1
1 CAPSULE ORAL
Qty: 0 | Refills: 0 | COMMUNITY

## 2019-03-07 RX ORDER — DIPHENHYDRAMINE HCL 50 MG
50 CAPSULE ORAL ONCE
Qty: 0 | Refills: 0 | Status: COMPLETED | OUTPATIENT
Start: 2019-03-07 | End: 2019-03-07

## 2019-03-07 RX ORDER — HYDROCORTISONE 20 MG
200 TABLET ORAL ONCE
Qty: 0 | Refills: 0 | Status: COMPLETED | OUTPATIENT
Start: 2019-03-07 | End: 2019-03-07

## 2019-03-07 RX ORDER — SODIUM CHLORIDE 9 MG/ML
500 INJECTION INTRAMUSCULAR; INTRAVENOUS; SUBCUTANEOUS
Qty: 0 | Refills: 0 | Status: DISCONTINUED | OUTPATIENT
Start: 2019-03-07 | End: 2019-03-07

## 2019-03-07 RX ORDER — INSULIN ASPART 100 [IU]/ML
20 INJECTION, SOLUTION SUBCUTANEOUS
Qty: 0 | Refills: 0 | COMMUNITY

## 2019-03-07 RX ORDER — INSULIN GLARGINE 100 [IU]/ML
45 INJECTION, SOLUTION SUBCUTANEOUS AT BEDTIME
Qty: 0 | Refills: 0 | Status: DISCONTINUED | OUTPATIENT
Start: 2019-03-07 | End: 2019-03-08

## 2019-03-07 RX ORDER — INSULIN GLARGINE 100 [IU]/ML
50 INJECTION, SOLUTION SUBCUTANEOUS
Qty: 0 | Refills: 0 | COMMUNITY

## 2019-03-07 RX ORDER — CLOPIDOGREL BISULFATE 75 MG/1
75 TABLET, FILM COATED ORAL DAILY
Qty: 0 | Refills: 0 | Status: DISCONTINUED | OUTPATIENT
Start: 2019-03-08 | End: 2019-03-08

## 2019-03-07 RX ORDER — INSULIN LISPRO 100/ML
VIAL (ML) SUBCUTANEOUS
Qty: 0 | Refills: 0 | Status: DISCONTINUED | OUTPATIENT
Start: 2019-03-07 | End: 2019-03-08

## 2019-03-07 RX ORDER — SODIUM CHLORIDE 9 MG/ML
500 INJECTION INTRAMUSCULAR; INTRAVENOUS; SUBCUTANEOUS
Qty: 0 | Refills: 0 | Status: DISCONTINUED | OUTPATIENT
Start: 2019-03-07 | End: 2019-03-08

## 2019-03-07 RX ORDER — METOPROLOL TARTRATE 50 MG
50 TABLET ORAL DAILY
Qty: 0 | Refills: 0 | Status: DISCONTINUED | OUTPATIENT
Start: 2019-03-07 | End: 2019-03-08

## 2019-03-07 RX ORDER — DEXTROSE 50 % IN WATER 50 %
12.5 SYRINGE (ML) INTRAVENOUS ONCE
Qty: 0 | Refills: 0 | Status: DISCONTINUED | OUTPATIENT
Start: 2019-03-07 | End: 2019-03-08

## 2019-03-07 RX ORDER — PANTOPRAZOLE SODIUM 20 MG/1
40 TABLET, DELAYED RELEASE ORAL
Qty: 0 | Refills: 0 | Status: DISCONTINUED | OUTPATIENT
Start: 2019-03-07 | End: 2019-03-08

## 2019-03-07 RX ORDER — ATORVASTATIN CALCIUM 80 MG/1
40 TABLET, FILM COATED ORAL AT BEDTIME
Qty: 0 | Refills: 0 | Status: DISCONTINUED | OUTPATIENT
Start: 2019-03-07 | End: 2019-03-08

## 2019-03-07 RX ORDER — INSULIN ASPART 100 [IU]/ML
10 INJECTION, SOLUTION SUBCUTANEOUS
Qty: 0 | Refills: 0 | COMMUNITY

## 2019-03-07 RX ADMIN — SODIUM CHLORIDE 75 MILLILITER(S): 9 INJECTION INTRAMUSCULAR; INTRAVENOUS; SUBCUTANEOUS at 17:37

## 2019-03-07 RX ADMIN — GABAPENTIN 300 MILLIGRAM(S): 400 CAPSULE ORAL at 23:24

## 2019-03-07 RX ADMIN — Medication 200 MILLIGRAM(S): at 17:32

## 2019-03-07 RX ADMIN — ATORVASTATIN CALCIUM 40 MILLIGRAM(S): 80 TABLET, FILM COATED ORAL at 23:24

## 2019-03-07 RX ADMIN — SODIUM CHLORIDE 75 MILLILITER(S): 9 INJECTION INTRAMUSCULAR; INTRAVENOUS; SUBCUTANEOUS at 20:25

## 2019-03-07 RX ADMIN — Medication 325 MILLIGRAM(S): at 17:33

## 2019-03-07 RX ADMIN — Medication 2: at 20:24

## 2019-03-07 RX ADMIN — Medication 50 MILLIGRAM(S): at 19:46

## 2019-03-07 RX ADMIN — INSULIN GLARGINE 45 UNIT(S): 100 INJECTION, SOLUTION SUBCUTANEOUS at 23:24

## 2019-03-08 ENCOUNTER — TRANSCRIPTION ENCOUNTER (OUTPATIENT)
Age: 59
End: 2019-03-08

## 2019-03-08 VITALS — TEMPERATURE: 98 F

## 2019-03-08 LAB
ANION GAP SERPL CALC-SCNC: 9 MMOL/L — SIGNIFICANT CHANGE UP (ref 5–17)
BUN SERPL-MCNC: 20 MG/DL — SIGNIFICANT CHANGE UP (ref 7–23)
CALCIUM SERPL-MCNC: 9.5 MG/DL — SIGNIFICANT CHANGE UP (ref 8.4–10.5)
CHLORIDE SERPL-SCNC: 102 MMOL/L — SIGNIFICANT CHANGE UP (ref 96–108)
CO2 SERPL-SCNC: 27 MMOL/L — SIGNIFICANT CHANGE UP (ref 22–31)
CREAT SERPL-MCNC: 1.2 MG/DL — SIGNIFICANT CHANGE UP (ref 0.5–1.3)
GLUCOSE SERPL-MCNC: 246 MG/DL — HIGH (ref 70–99)
HCT VFR BLD CALC: 44.6 % — SIGNIFICANT CHANGE UP (ref 39–50)
HGB BLD-MCNC: 15.1 G/DL — SIGNIFICANT CHANGE UP (ref 13–17)
MAGNESIUM SERPL-MCNC: 1.7 MG/DL — SIGNIFICANT CHANGE UP (ref 1.6–2.6)
MCHC RBC-ENTMCNC: 30.7 PG — SIGNIFICANT CHANGE UP (ref 27–34)
MCHC RBC-ENTMCNC: 33.9 GM/DL — SIGNIFICANT CHANGE UP (ref 32–36)
MCV RBC AUTO: 90.7 FL — SIGNIFICANT CHANGE UP (ref 80–100)
NRBC # BLD: 0 /100 WBCS — SIGNIFICANT CHANGE UP (ref 0–0)
PLATELET # BLD AUTO: 167 K/UL — SIGNIFICANT CHANGE UP (ref 150–400)
POTASSIUM SERPL-MCNC: 3.9 MMOL/L — SIGNIFICANT CHANGE UP (ref 3.5–5.3)
POTASSIUM SERPL-SCNC: 3.9 MMOL/L — SIGNIFICANT CHANGE UP (ref 3.5–5.3)
RBC # BLD: 4.92 M/UL — SIGNIFICANT CHANGE UP (ref 4.2–5.8)
RBC # FLD: 12.2 % — SIGNIFICANT CHANGE UP (ref 10.3–14.5)
SODIUM SERPL-SCNC: 138 MMOL/L — SIGNIFICANT CHANGE UP (ref 135–145)
WBC # BLD: 15.23 K/UL — HIGH (ref 3.8–10.5)
WBC # FLD AUTO: 15.23 K/UL — HIGH (ref 3.8–10.5)

## 2019-03-08 PROCEDURE — 99232 SBSQ HOSP IP/OBS MODERATE 35: CPT | Mod: 25

## 2019-03-08 PROCEDURE — 99239 HOSP IP/OBS DSCHRG MGMT >30: CPT

## 2019-03-08 RX ORDER — METFORMIN HYDROCHLORIDE 850 MG/1
1 TABLET ORAL
Qty: 60 | Refills: 3
Start: 2019-03-08 | End: 2019-07-05

## 2019-03-08 RX ORDER — PANTOPRAZOLE SODIUM 20 MG/1
1 TABLET, DELAYED RELEASE ORAL
Qty: 30 | Refills: 3
Start: 2019-03-08 | End: 2019-07-05

## 2019-03-08 RX ORDER — INSULIN GLARGINE 100 [IU]/ML
45 INJECTION, SOLUTION SUBCUTANEOUS
Qty: 0 | Refills: 0 | COMMUNITY

## 2019-03-08 RX ORDER — POTASSIUM CHLORIDE 20 MEQ
20 PACKET (EA) ORAL ONCE
Qty: 0 | Refills: 0 | Status: COMPLETED | OUTPATIENT
Start: 2019-03-08 | End: 2019-03-08

## 2019-03-08 RX ORDER — ISOPROPYL ALCOHOL, BENZOCAINE .7; .06 ML/ML; ML/ML
1 SWAB TOPICAL
Qty: 100 | Refills: 1
Start: 2019-03-08 | End: 2019-04-26

## 2019-03-08 RX ORDER — INSULIN ASPART 100 [IU]/ML
10 INJECTION, SOLUTION SUBCUTANEOUS
Qty: 2 | Refills: 3
Start: 2019-03-08 | End: 2019-07-05

## 2019-03-08 RX ORDER — ATORVASTATIN CALCIUM 80 MG/1
1 TABLET, FILM COATED ORAL
Qty: 0 | Refills: 0 | DISCHARGE
Start: 2019-03-08

## 2019-03-08 RX ORDER — ASPIRIN/CALCIUM CARB/MAGNESIUM 324 MG
1 TABLET ORAL
Qty: 30 | Refills: 11
Start: 2019-03-08 | End: 2020-03-01

## 2019-03-08 RX ORDER — INSULIN ASPART 100 [IU]/ML
10 INJECTION, SUSPENSION SUBCUTANEOUS
Qty: 0 | Refills: 0 | COMMUNITY

## 2019-03-08 RX ORDER — INSULIN ASPART 100 [IU]/ML
20 INJECTION, SUSPENSION SUBCUTANEOUS
Qty: 0 | Refills: 0 | COMMUNITY

## 2019-03-08 RX ORDER — INSULIN LISPRO 100/ML
10 VIAL (ML) SUBCUTANEOUS
Qty: 2 | Refills: 3
Start: 2019-03-08 | End: 2019-07-05

## 2019-03-08 RX ORDER — ENOXAPARIN SODIUM 100 MG/ML
38 INJECTION SUBCUTANEOUS
Qty: 0 | Refills: 3 | DISCHARGE
Start: 2019-03-08 | End: 2019-07-05

## 2019-03-08 RX ORDER — SITAGLIPTIN AND METFORMIN HYDROCHLORIDE 500; 50 MG/1; MG/1
1 TABLET, FILM COATED ORAL
Qty: 0 | Refills: 0 | COMMUNITY

## 2019-03-08 RX ORDER — ENOXAPARIN SODIUM 100 MG/ML
32 INJECTION SUBCUTANEOUS
Qty: 2 | Refills: 3
Start: 2019-03-08 | End: 2019-07-05

## 2019-03-08 RX ORDER — CLOPIDOGREL BISULFATE 75 MG/1
1 TABLET, FILM COATED ORAL
Qty: 30 | Refills: 11
Start: 2019-03-08 | End: 2020-03-01

## 2019-03-08 RX ORDER — ERTUGLIFLOZIN 5 MG/1
1 TABLET, FILM COATED ORAL
Qty: 30 | Refills: 3
Start: 2019-03-08 | End: 2019-07-05

## 2019-03-08 RX ORDER — EMPAGLIFLOZIN 10 MG/1
1 TABLET, FILM COATED ORAL
Qty: 30 | Refills: 3
Start: 2019-03-08 | End: 2019-07-05

## 2019-03-08 RX ORDER — MAGNESIUM OXIDE 400 MG ORAL TABLET 241.3 MG
800 TABLET ORAL ONCE
Qty: 0 | Refills: 0 | Status: COMPLETED | OUTPATIENT
Start: 2019-03-08 | End: 2019-03-08

## 2019-03-08 RX ADMIN — MAGNESIUM OXIDE 400 MG ORAL TABLET 800 MILLIGRAM(S): 241.3 TABLET ORAL at 11:57

## 2019-03-08 RX ADMIN — Medication 2: at 12:07

## 2019-03-08 RX ADMIN — Medication 2: at 08:31

## 2019-03-08 RX ADMIN — OXYCODONE AND ACETAMINOPHEN 1 TABLET(S): 5; 325 TABLET ORAL at 08:31

## 2019-03-08 RX ADMIN — PANTOPRAZOLE SODIUM 40 MILLIGRAM(S): 20 TABLET, DELAYED RELEASE ORAL at 06:02

## 2019-03-08 RX ADMIN — CLOPIDOGREL BISULFATE 75 MILLIGRAM(S): 75 TABLET, FILM COATED ORAL at 12:07

## 2019-03-08 RX ADMIN — Medication 50 MILLIGRAM(S): at 06:02

## 2019-03-08 RX ADMIN — ISOSORBIDE MONONITRATE 30 MILLIGRAM(S): 60 TABLET, EXTENDED RELEASE ORAL at 11:57

## 2019-03-08 RX ADMIN — GABAPENTIN 300 MILLIGRAM(S): 400 CAPSULE ORAL at 06:02

## 2019-03-08 RX ADMIN — Medication 20 MILLIEQUIVALENT(S): at 11:56

## 2019-03-08 RX ADMIN — Medication 81 MILLIGRAM(S): at 12:07

## 2019-03-08 NOTE — DISCHARGE NOTE PROVIDER - HOSPITAL COURSE
58 y.o Male with CONTRAST ALLERGY,   ASPIRIN GI INTOLERANCE, PMHx of HTN, Hyperlipidemia, IDDM, GERD, ED, BPH, EDMUND (not on CPAP), Known CAD with multiple prior PCIs, most recently MEMO dRPLS @ [facility] on 03/22/18,  who returned to his Cardiologist Dr. Graves c/o recurrent exertional CP and SOB over the past few months. Pt states he has been experiencing sub sternal chest pressure, 7/10 in severity, with accompanying SOB upon ambulation of 1.5 block resolving within minutes of rest.  He denies any palpitations, dizziness, syncope, recent PND/orthopnea, or LE edema.  No recent diagnostic cardiac studies performed.  Nuclear Stress test 02/11/19 was a normal study, demonstrating normal myocardial perfusion images, LVEF of 66%. Pt now s/p cardiac cath 3/7/19: PTCA/MEMO dLAD; LMCA: widely patent; LAD: mid LAD stent with 30-50% in stent restenosis, distal LAD with 99% in stent restenosis; LCx: mild intimal hyperplasia of distal LCx stent, OM1 with 60-70% stenosis; RCA: dominant, PDA stent with 30-50% stent; LVEDP: 15 mmHg, LVEF: 60%, mild inferobasal hypokinesis, no Aortic stenosis/mitral regurgitation; L groin PC. Pt. reports ASA intolerance ( mild GI upset); started DAPT with pantoprazole. HgA1c 8.5; pt on lantus 40 units at bedtime; novolog 70/30 20 units at  morning and 10 units at bedtime; as per pharmacy; pt. should not be on lantus with Novolog 70/30; pt. ordered for lantus 40 at bedtime with SSI. Dr Cage consulted for better insulin control. Pt's labs and VSS. 58 y.o Male with CONTRAST ALLERGY,   ASPIRIN GI INTOLERANCE, PMHx of HTN, Hyperlipidemia, IDDM, GERD, ED, BPH, EDMUND (not on CPAP), Known CAD with multiple prior PCIs, most recently MEMO Chapis @ [facility] on 03/22/18,  who returned to his Cardiologist Dr. Graves c/o recurrent exertional CP and SOB over the past few months. Pt states he has been experiencing sub sternal chest pressure, 7/10 in severity, with accompanying SOB upon ambulation of 1.5 block resolving within minutes of rest.  He denies any palpitations, dizziness, syncope, recent PND/orthopnea, or LE edema.  No recent diagnostic cardiac studies performed.  Nuclear Stress test 02/11/19 was a normal study, demonstrating normal myocardial perfusion images, LVEF of 66%. Pt now s/p cardiac cath 3/7/19: PTCA/MEMO dLAD; LMCA: widely patent; LAD: mid LAD stent with 30-50% in stent restenosis, distal LAD with 99% in stent restenosis; LCx: mild intimal hyperplasia of distal LCx stent, OM1 with 60-70% stenosis; RCA: dominant, PDA stent with 30-50% stent; LVEDP: 15 mmHg, LVEF: 60%, mild inferobasal hypokinesis, no Aortic stenosis/mitral regurgitation; L groin PC. Pt. reports ASA intolerance ( mild GI upset); started DAPT with pantoprazole. HgA1c 8.5; pt on lantus 40 units at bedtime; novolog 70/30 20 units at  morning and 10 units at bedtime; as per pharmacy; pt. should not be on lantus with Novolog 70/30; pt. ordered for lantus 40 at bedtime with SSI. Dr Cage consulted for better insulin control. Pt's labs and VSS. Pt had sinus tachycardia to 120s overnight but in the 80s on 3/8/19 AM. Pt endorsing slight shortness of breath but improved since receiving his stent. Pt stable for discharge as per Dr Piper. Pt will follow up with Dr Graves in 1-2 weeks. 58 y.o Male with CONTRAST ALLERGY,   ASPIRIN GI INTOLERANCE, PMHx of HTN, Hyperlipidemia, IDDM, GERD, ED, BPH, EDMUND (not on CPAP), Known CAD with multiple prior PCIs, most recently MEMO Chapis @ [facility] on 03/22/18,  who returned to his Cardiologist Dr. Graves c/o recurrent exertional CP and SOB over the past few months. Pt states he has been experiencing sub sternal chest pressure, 7/10 in severity, with accompanying SOB upon ambulation of 1.5 block resolving within minutes of rest.  He denies any palpitations, dizziness, syncope, recent PND/orthopnea, or LE edema.  No recent diagnostic cardiac studies performed.  Nuclear Stress test 02/11/19 was a normal study, demonstrating normal myocardial perfusion images, LVEF of 66%. Pt now s/p cardiac cath 3/7/19: PTCA/MEMO dLAD; LMCA: widely patent; LAD: mid LAD stent with 30-50% in stent restenosis, distal LAD with 99% in stent restenosis; LCx: mild intimal hyperplasia of distal LCx stent, OM1 with 60-70% stenosis; RCA: dominant, PDA stent with 30-50% stent; LVEDP: 15 mmHg, LVEF: 60%, mild inferobasal hypokinesis, no Aortic stenosis/mitral regurgitation; L groin PC. Pt. reports ASA intolerance ( mild GI upset); started DAPT with pantoprazole. HgA1c 8.5; pt on lantus 40 units at bedtime; novolog 70/30 20 units at  morning and 10 units at bedtime; as per pharmacy; pt. should not be on lantus with Novolog 70/30; pt. ordered for lantus 40 at bedtime with SSI. Dr Cage consulted for better insulin control. Pt's labs and VSS. Pt had sinus tachycardia to 120s overnight but in the 80s on 3/8/19 AM. Pt endorsing slight shortness of breath but improved since receiving his stent. Pt's WBC 15 but pt afebrile, denies cough, malaise, fever/chills. Pt stable for discharge as per Dr Piper. Pt will follow up with Dr Graves in 1-2 weeks. 58 y.o Male with CONTRAST ALLERGY,   ASPIRIN GI INTOLERANCE, PMHx of HTN, Hyperlipidemia, IDDM, GERD, ED, BPH, EDMUND (not on CPAP), Known CAD with multiple prior PCIs, most recently MEMO Chapis @ [facility] on 03/22/18,  who returned to his Cardiologist Dr. Graves c/o recurrent exertional CP and SOB over the past few months. Pt states he has been experiencing sub sternal chest pressure, 7/10 in severity, with accompanying SOB upon ambulation of 1.5 block resolving within minutes of rest.  He denies any palpitations, dizziness, syncope, recent PND/orthopnea, or LE edema.  No recent diagnostic cardiac studies performed.  Nuclear Stress test 02/11/19 was a normal study, demonstrating normal myocardial perfusion images, LVEF of 66%. Pt now s/p cardiac cath 3/7/19: PTCA/MEMO dLAD; LMCA: widely patent; LAD: mid LAD stent with 30-50% in stent restenosis, distal LAD with 99% in stent restenosis; LCx: mild intimal hyperplasia of distal LCx stent, OM1 with 60-70% stenosis; RCA: dominant, PDA stent with 30-50% stent; LVEDP: 15 mmHg, LVEF: 60%, mild inferobasal hypokinesis, no Aortic stenosis/mitral regurgitation; L groin PC. Pt. reports ASA intolerance ( mild GI upset); started DAPT with pantoprazole. HgA1c 8.5; pt on lantus 40 units at bedtime; novolog 70/30 20 units at  morning and 10 units at bedtime; as per pharmacy; pt. should not be on lantus with Novolog 70/30; pt. ordered for lantus 40 at bedtime with SSI. Dr Cage consulted for better insulin control. Pt's labs and VSS. Pt had sinus tachycardia to 120s overnight but in the 80s on 3/8/19 AM. Pt endorsing slight shortness of breath but improved since receiving his stent. Pt's WBC 15 but pt afebrile, denies cough, malaise, fever/chills. Pt stable for discharge as per Dr Piper. Pt will follow up with Dr Graves in 1-2 weeks.  58 y.o Male with CONTRAST ALLERGY,   ASPIRIN GI INTOLERANCE, PMHx of HTN, Hyperlipidemia, IDDM, GERD, ED, BPH, EDMUND (not on CPAP), Known CAD with multiple prior PCIs, most recently MEMO Chapis @ [facility] on 03/22/18,  who returned to his Cardiologist Dr. Graves c/o recurrent exertional CP and SOB over the past few months. Pt states he has been experiencing sub sternal chest pressure, 7/10 in severity, with accompanying SOB upon ambulation of 1.5 block resolving within minutes of rest.  He denies any palpitations, dizziness, syncope, recent PND/orthopnea, or LE edema.  No recent diagnostic cardiac studies performed.  Nuclear Stress test 02/11/19 was a normal study, demonstrating normal myocardial perfusion images, LVEF of 66%. Pt now s/p cardiac cath 3/7/19: PTCA/MEMO dLAD; LMCA: widely patent; LAD: mid LAD stent with 30-50% in stent restenosis, distal LAD with 99% in stent restenosis; LCx: mild intimal hyperplasia of distal LCx stent, OM1 with 60-70% stenosis; RCA: dominant, PDA stent with 30-50% stent; LVEDP: 15 mmHg, LVEF: 60%, mild inferobasal hypokinesis, no Aortic stenosis/mitral regurgitation; L clairein PC. Pt. reports ASA intolerance ( mild GI upset); started DAPT with pantoprazole. HgA1c 8.5; pt on lantus 40 units at bedtime; novolog 70/30 20 units at  morning and 10 units at bedtime; as per pharmacy; pt. should not be on lantus with Novolog 70/30; pt. ordered for lantus 40 at bedtime with SSI. Dr Cage consulted for better insulin control. Pt's labs and VSS. Pt had sinus tachycardia to 120s overnight but in the 80s on 3/8/19 AM. Pt endorsing slight shortness of breath but improved since receiving his stent. Pt's WBC 15 but pt afebrile, denies cough, malaise, fever/chills. Dr Cage consulted and recommends Lantus 32 units daily at bedtime, Admelog 10 units TID (covered by the pt's insurance), Stegatro 5mg daily (covered by insurance, Jardiance not covered), and Metformin 1000mg twice a day. Pt stable for discharge as per Dr Piper. Pt will follow up with Dr Graves in 1-2 weeks.

## 2019-03-08 NOTE — CONSULT NOTE ADULT - ATTENDING COMMENTS
A/P:58y Male with hx of type 2 DM presenting for elective cardiac cath, with mild hyperglycemia.     1.  DM - type 2, uncontrolled, but not severe, complicated.   Pt can continue 32 units lantus at bedtime, 10 units lispro TID with meals, metformin 1000mg twice daily, jardiance 10mg once daily at discharge.   He is advised to d/w initiation of insulin pump therapy and continuous glucose monitoring and GLP-1 use with his outpatient endocrinologist.     Please dilute all medications in NS, not in D5, including infusions when possible.   Please continue consistent carbohydrate diet while pt is eating.  Please obtain nutrition consult  Please ensure pt has correct FSG and insulin injection technique    Pt's fingerstick glucose goal is 100-180    2.  Hyperlipidemia - LDL at goal of < 70, continue atorvastatin and fish oil    3.  Obesity - would consider bariatric referal and GLP-1 agonist therapy.     Will continue to monitor     Pt is advised to follow up with me at discharge.  Please call  to make an appointment.

## 2019-03-08 NOTE — PROGRESS NOTE ADULT - SUBJECTIVE AND OBJECTIVE BOX
INTERVENTIONAL CARDIOLOGY FOLLOW UP NOTE    -Patient seen and examined this am    -No events overnight    -No complaints this am    VASCULAR ACCESS EXAM:    FEMORAL:    2+ right/left femoral pulse    2+ DP/PT pulses.    -left femoral Access site clean, non-tender, without ecchymosis or hematoma.    A/P: 58 y.o Male with CONTRAST ALLERGY,   ASPIRIN GI INTOLERANCE, PMHx of HTN, Hyperlipidemia, IDDM, GERD, ED, BPH, EDMUND (not on CPAP), Known CAD with multiple prior PCIs, most recently MEMO Chapis @ [facility] on 03/22/18 now s/p PCI of dLAD with MEMO via left femoral approach with no evidence of vascular complications post procedure.    -continue with current medications including dual antiplatelet therapy    -discharge instructions as per protocol    -outpatient follow up with primary cardiologist

## 2019-03-08 NOTE — DISCHARGE NOTE PROVIDER - CARE PROVIDER_API CALL
Will Loyd)  Cardiovascular Disease; Internal Medicine; Interventional Cardiology  [address] [place] [address]  Phone: [phone]  Fax: [phone]  Follow Up Time:

## 2019-03-08 NOTE — CONSULT NOTE ADULT - MINUTES
----- Message from Evin Zarate sent at 1/23/2017 10:16 AM EST -----  Regarding: Joseline Mon: [phone]  Mom called to discuss diet changes. Please advise [phone]. 46

## 2019-03-08 NOTE — DISCHARGE NOTE PROVIDER - NSDCCPCAREPLAN_GEN_ALL_CORE_FT
PRINCIPAL DISCHARGE DIAGNOSIS  Problem: Coronary artery disease  Assessment and Plan of Treatment: You have blockages in the blood vessels that give blood and oxygen to your heart. You had a procedure called a CARDIAC CATHETERIZATION and got a drug eluting stent to distal LEFT ANTERIOR DESCENDING coronary artery. IT IS VERY IMPORTANT that you take ASPIRIN 81mg daily and START taking PLAVIX (CLOPIDOGREL) 75mg daily. These medications will help avoid blood clots forming in your stents that could give you a heart attack. Left groin wound care: do not lift objects heavier than 5 lbs for 5 days. You may shower today but do not soak in water for 5 days. Observe for signs of bleeding including bleeding/sudden swelling to your left groin site, new/worsening back pain, or numbness/tingling/pain/coolness to your left leg/foot/toes. If you experience these symptoms call your doctor and go to the nearest ED immediately. Follow up with your cardiologist Dr Graves in 1-2 weeks.      SECONDARY DISCHARGE DIAGNOSES  Problem: Hyperlipidemia  Assessment and Plan of Treatment: Continue taking ATORVASTATIN 40mg daily for cholesterol control.    Problem: Hypertension  Assessment and Plan of Treatment: Continue taking medications for blood pressure    Problem: Diabetes mellitus  Assessment and Plan of Treatment: PRINCIPAL DISCHARGE DIAGNOSIS  Problem: Coronary artery disease  Assessment and Plan of Treatment: You have blockages in the blood vessels that give blood and oxygen to your heart. You had a procedure called a CARDIAC CATHETERIZATION and got a drug eluting stent to distal LEFT ANTERIOR DESCENDING coronary artery. IT IS VERY IMPORTANT that you take ASPIRIN 81mg daily and START taking PLAVIX (CLOPIDOGREL) 75mg daily. These medications will help avoid blood clots forming in your stents that could give you a heart attack. Left groin wound care: do not lift objects heavier than 5 lbs for 5 days. You may shower today but do not soak in water for 5 days. Observe for signs of bleeding including bleeding/sudden swelling to your left groin site, new/worsening back pain, or numbness/tingling/pain/coolness to your left leg/foot/toes. If you experience these symptoms call your doctor and go to the nearest ED immediately. Follow up with your cardiologist Dr Graves in 1-2 weeks.      SECONDARY DISCHARGE DIAGNOSES  Problem: Hyperlipidemia  Assessment and Plan of Treatment: Continue taking ATORVASTATIN 40mg daily for cholesterol control.    Problem: Hypertension  Assessment and Plan of Treatment: Continue taking medications for blood pressure    Problem: Diabetes mellitus  Assessment and Plan of Treatment: THESE ARE THE DIABETES MEDICATIONS YOU SHOULD BE TAKING: LANTUS (long acting insulin) 32 units once a day at bedtime. START taking NOVOLOG (short acting) 10 units three times a day before meals. START taking JARDIANCE 10mg daily. THESE ARE MEDICATIONS that you can start today. START taking METFORMIN 1000mg twice a day on EDGARDO 3/10/19. STOP TAKING JANUMET. STOP taking NOVOLOG 70/30. PRINCIPAL DISCHARGE DIAGNOSIS  Problem: Coronary artery disease  Assessment and Plan of Treatment: You have blockages in the blood vessels that give blood and oxygen to your heart. You had a procedure called a CARDIAC CATHETERIZATION and got a drug eluting stent to distal LEFT ANTERIOR DESCENDING coronary artery. IT IS VERY IMPORTANT that you take ASPIRIN 81mg daily and START taking PLAVIX (CLOPIDOGREL) 75mg daily. These medications will help avoid blood clots forming in your stents that could give you a heart attack. Left groin wound care: do not lift objects heavier than 5 lbs for 5 days. You may shower today but do not soak in water for 5 days. Observe for signs of bleeding including bleeding/sudden swelling to your left groin site, new/worsening back pain, or numbness/tingling/pain/coolness to your left leg/foot/toes. If you experience these symptoms call your doctor and go to the nearest ED immediately. Follow up with your cardiologist Dr Graves in 1-2 weeks.      SECONDARY DISCHARGE DIAGNOSES  Problem: Hyperlipidemia  Assessment and Plan of Treatment: Continue taking ATORVASTATIN 40mg daily for cholesterol control.    Problem: Hypertension  Assessment and Plan of Treatment: Continue taking medications for blood pressure    Problem: Diabetes mellitus  Assessment and Plan of Treatment: THESE ARE THE DIABETES MEDICATIONS YOU SHOULD BE TAKING: LANTUS (long acting insulin) 32 units once a day at bedtime. START taking ADEMELOG (short acting) 10 units three times a day before meals. START taking STELGATRO 5mg daily. THESE ARE MEDICATIONS that you can start today. START taking METFORMIN 1000mg twice a day on EDGARDO 3/10/19. STOP TAKING JANUMET. STOP taking NOVOLOG 70/30.

## 2019-03-08 NOTE — CONSULT NOTE ADULT - SUBJECTIVE AND OBJECTIVE BOX
HPI: 58yMale with hx of CAD presenting for cardiac cath with stent placement.  He endorses 20 year hx of type 2 DM, has been on insulin for three years, most recently basaglar 45 units at bedtime.  About 6 weeks ago, his outpatient endocrinologist added novolog 70/30 20 units AM and 10 units PM. He takes janumet twice daily. He checks his glucose once daily with readings of 110-140.  He does not adhere to a diabetic diet, and he does not exercise.   He denies symptoms of retinopathy, neuropathy, does endorse erectile dysfunction, denies gastroparesis.  he endorses claudication when walking.    He denies hx of DKA/HHS and reports infrequent hypogycemic episodes.   On presentation, his glucose was 192, he was given 45 units lantus at bedtime last night, no pre-meal insulin this morning, and has rec'd moderate dose coverage as needed.    His home regimen for hyperlipidemia includes both atorvastatin and fish oil.     PMH & Surgical Hx:  CAD (coronary artery disease)  Obesity (BMI 30.0-34.9)  High cholesterol  Diabetes mellitus  Hypertension  Sleep apnea    FH:   DM: yes in his father  Thyroid: denies  Autoimmune: denies  Other: denies    SH:  Smoking: denies  Etoh: former  Recreational Drugs: former cocaine user  Social Life: lives w wife, is active in the house but does not work outside the home.     Current Meds:  aspirin enteric coated 81 milliGRAM(s) Oral daily  atorvastatin 40 milliGRAM(s) Oral at bedtime  chlorhexidine 4% Liquid 1 Application(s) Topical once  clopidogrel Tablet 75 milliGRAM(s) Oral daily  dextrose 40% Gel 15 Gram(s) Oral once PRN  dextrose 5%. 1000 milliLiter(s) IV Continuous <Continuous>  dextrose 50% Injectable 12.5 Gram(s) IV Push once  dextrose 50% Injectable 25 Gram(s) IV Push once  dextrose 50% Injectable 25 Gram(s) IV Push once  gabapentin 300 milliGRAM(s) Oral two times a day  glucagon  Injectable 1 milliGRAM(s) IntraMuscular once PRN  hydrochlorothiazide 25 milliGRAM(s) Oral daily  influenza   Vaccine 0.5 milliLiter(s) IntraMuscular once  insulin glargine Injectable (LANTUS) 45 Unit(s) SubCutaneous at bedtime  insulin lispro (HumaLOG) corrective regimen sliding scale   SubCutaneous Before meals and at bedtime  isosorbide   mononitrate ER Tablet (IMDUR) 30 milliGRAM(s) Oral daily  lisinopril 20 milliGRAM(s) Oral daily  metoprolol succinate ER 50 milliGRAM(s) Oral daily  oxyCODONE    5 mG/acetaminophen 325 mG 1 Tablet(s) Oral every 6 hours PRN  pantoprazole    Tablet 40 milliGRAM(s) Oral before breakfast  sodium chloride 0.9%. 500 milliLiter(s) IV Continuous <Continuous>      Allergies:  ibuprofen (Other)  IV Contrast (Other; Rash)      ROS:  Denies the following except as indicated.    General: weight loss/weight gain, decreased appetite, fatigue  Eyes: Blurry vision, double vision, visual changes  ENT: Throat pain, changes in voice,   CV: palpitations, SOB, CP, cough  GI: NVD, difficulty swallowing, abdominal pain  : polyuria, dysuria  Endo: abnormal menses, temperature intolerance, decreased libido  MSK: weakness, joint pain  Skin: rash, dryness, diaphoresis  Heme: Easy bruising,bleeding  Neuro: HA, dizziness, lightheadedness, numbness tingling  Psych: Anxiety, Depression    Vital Signs Last 24 Hrs  T(C): 36.8 (08 Mar 2019 14:37), Max: 37 (08 Mar 2019 10:32)  T(F): 98.3 (08 Mar 2019 14:37), Max: 98.6 (08 Mar 2019 10:32)  HR: 92 (08 Mar 2019 08:30) (79 - 92)  BP: 118/74 (08 Mar 2019 08:30) (110/64 - 118/74)  BP(mean): --  RR: 18 (08 Mar 2019 08:30) (18 - 18)  SpO2: 98% (08 Mar 2019 08:30) (98% - 100%)  Height (cm): 172.72 (03-07 @ 16:49)  Weight (kg): 98 (03-07 @ 16:49)  BMI (kg/m2): 32.9 (03-07 @ 16:49)    Constitutional: wn/wd in NAD.   HEENT: NCAT, MMM, OP clear, EOMI, , no proptosis or lid retraction  Neck: no thyromegaly or palpable thyroid nodules   Respiratory: lungs CTAB.  Cardiovascular: regular rhythm, normal S1 and S2, no audible murmurs, no peripheral edema  GI: soft, NT/ND, no masses/HSM appreciated.  Neurology: no tremors, DTR 2+  Skin: no visible rashes/lesions  Psychiatric: AAO x 3, normal affect/mood.  Ext: radial pulses intact, DP pulses intact, extremities warm, no cyanosis, clubbing or edema.       LABS:                        15.1   15.23 )-----------( 167      ( 08 Mar 2019 05:39 )             44.6     03-08    138  |  102  |  20  ----------------------------<  246<H>  3.9   |  27  |  1.20    Ca    9.5      08 Mar 2019 05:38  Mg     1.7     03-08    TPro  8.9<H>  /  Alb  4.3  /  TBili  0.6  /  DBili  x   /  AST  23  /  ALT  34  /  AlkPhos  82  03-07    PT/INR - ( 07 Mar 2019 16:23 )   PT: 11.7 sec;   INR: 1.03          PTT - ( 07 Mar 2019 16:23 )  PTT:31.9 sec    Hemoglobin A1C, Whole Blood: 8.5 (03-07 @ 16:23)      Cholesterol, Serum: 117 mg/dL (03-07-19 @ 16:23)  HDL Cholesterol, Serum: 38 mg/dL (03-07-19 @ 16:23)  Triglycerides, Serum: 102 mg/dL (03-07-19 @ 16:23)  Direct LDL: 59 mg/dL (03-07-19 @ 16:23)        CAPILLARY BLOOD GLUCOSE      POCT Blood Glucose.: 155 mg/dL (08 Mar 2019 11:29)  POCT Blood Glucose.: 168 mg/dL (08 Mar 2019 08:18)  POCT Blood Glucose.: 200 mg/dL (08 Mar 2019 06:54)  POCT Blood Glucose.: 162 mg/dL (07 Mar 2019 21:29)  POCT Blood Glucose.: 170 mg/dL (07 Mar 2019 19:31)

## 2019-03-14 DIAGNOSIS — I25.110 ATHEROSCLEROTIC HEART DISEASE OF NATIVE CORONARY ARTERY WITH UNSTABLE ANGINA PECTORIS: ICD-10-CM

## 2019-03-14 DIAGNOSIS — Z95.5 PRESENCE OF CORONARY ANGIOPLASTY IMPLANT AND GRAFT: ICD-10-CM

## 2019-03-14 DIAGNOSIS — E66.9 OBESITY, UNSPECIFIED: ICD-10-CM
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
## 2019-03-14 DIAGNOSIS — Z79.4 LONG TERM (CURRENT) USE OF INSULIN: ICD-10-CM

## 2019-03-14 DIAGNOSIS — I10 ESSENTIAL (PRIMARY) HYPERTENSION: ICD-10-CM

## 2019-03-14 DIAGNOSIS — K21.9 GASTRO-ESOPHAGEAL REFLUX DISEASE WITHOUT ESOPHAGITIS: ICD-10-CM

## 2019-03-14 DIAGNOSIS — T82.855A STENOSIS OF CORONARY ARTERY STENT, INITIAL ENCOUNTER: ICD-10-CM

## 2019-03-14 DIAGNOSIS — E11.65 TYPE 2 DIABETES MELLITUS WITH HYPERGLYCEMIA: ICD-10-CM

## 2019-03-14 DIAGNOSIS — Z91.041 RADIOGRAPHIC DYE ALLERGY STATUS: ICD-10-CM

## 2019-03-14 DIAGNOSIS — G47.33 OBSTRUCTIVE SLEEP APNEA (ADULT) (PEDIATRIC): ICD-10-CM

## 2019-03-14 DIAGNOSIS — E78.5 HYPERLIPIDEMIA, UNSPECIFIED: ICD-10-CM

## 2019-03-14 DIAGNOSIS — N40.0 BENIGN PROSTATIC HYPERPLASIA WITHOUT LOWER URINARY TRACT SYMPTOMS: ICD-10-CM

## 2019-08-06 VITALS
HEIGHT: 68 IN | TEMPERATURE: 97 F | RESPIRATION RATE: 16 BRPM | HEART RATE: 88 BPM | SYSTOLIC BLOOD PRESSURE: 103 MMHG | OXYGEN SATURATION: 98 % | DIASTOLIC BLOOD PRESSURE: 62 MMHG | WEIGHT: 216.49 LBS

## 2019-08-06 RX ORDER — CHLORHEXIDINE GLUCONATE 213 G/1000ML
1 SOLUTION TOPICAL ONCE
Refills: 0 | Status: DISCONTINUED | OUTPATIENT
Start: 2019-08-08 | End: 2019-08-09

## 2019-08-06 NOTE — H&P ADULT - ASSESSMENT
59 year old male with CONTRAST ALLERGY with PMHx DMII, HTN, HLD, EDMUND (on CPAP), BPH, hiatal hernia, fatty liver, constipation, CAD (s/p multiple prior PCIs, most recently PTCA/MEMO dLAD 3/7/19; not on ASA 2/2 GI intolerance), who presented to [facility] for recommended cardiac cath with possible intervention in light of pt's risk factors, CCS 3 anginal symptoms and prior history of CAD.    ASA III and Mallampati III    OF NOTE: Pt took his Plavix today 75 mg PO x1. Pt usually does not take ASA 81 at home due to nausea, Dr. Graves is aware but he is agreeable to take it prior to cath.     Pt took 3 doses of Prednisone, the last one at 2 pm.    Risks & benefits of procedure and alternative therapy have been explained to the patient including but not limited to: allergic reaction, bleeding w/possible need for blood transfusion, infection, renal and vascular compromise, limb damage, arrhythmia, stroke, vessel dissection/perforation, Myocardial infarction, emergent CABG. Informed consent obtained and in chart. 59 year old male with CONTRAST ALLERGY with PMHx DMII, HTN, HLD, EDMUND (on CPAP), BPH, hiatal hernia, fatty liver, constipation, CAD (s/p multiple prior PCIs, most recently PTCA/MEMO dLAD 3/7/19; not on ASA 2/2 GI intolerance), who presented to [facility] for recommended cardiac cath with possible intervention in light of pt's risk factors, CCS 3 anginal symptoms and prior history of CAD.    ASA III and Mallampati III    OF NOTE: Pt took his Plavix today 75 mg PO x1. Pt usually does not take ASA 81 at home due to nausea, Dr. Graves is aware but he is agreeable to take it prior to cath.     Pt took 3 doses of Prednisone, the last one at 2 pm.    Pt has leukocytosis, afebrile, no signs of infections, denies fever/chills, no urgency/frequency, probably due to Prednisone intake, Dr. Graves was notified.     Risks & benefits of procedure and alternative therapy have been explained to the patient including but not limited to: allergic reaction, bleeding w/possible need for blood transfusion, infection, renal and vascular compromise, limb damage, arrhythmia, stroke, vessel dissection/perforation, Myocardial infarction, emergent CABG. Informed consent obtained and in chart.

## 2019-08-06 NOTE — H&P ADULT - NSHPSOCIALHISTORY_GEN_ALL_CORE
never smoker, former cocaine abuse (abstinent x9 years), former EtOH abuse (now only 2 beers per month)

## 2019-08-06 NOTE — H&P ADULT - NSICDXFAMILYHX_GEN_ALL_CORE_FT
FAMILY HISTORY:  FH: coronary artery disease, father s/p CABG  FH: type 2 diabetes, father  FHx: myocardial infarction, mother

## 2019-08-06 NOTE — H&P ADULT - HISTORY OF PRESENT ILLNESS
59 year old male with CONTRAST ALLERGY, ASPIRIN GI INTOLERANCE, with PMHx DMII, HTN, HLD,  EDMUND (not on CPAP), BPH, GERD, CAD (s/p multiple prior PCIs, most recently PTCA/MEMO dLAD 3/7/19), who presented to cardiologist Dr. Graves with c/o recurrent retrosternal chest tightness on exertion after walking one block or less, relieved by rest. Also endorses mild leg swelling.     No dizziness, syncope, chest pain at rest, palpitations, orthopnea, PND    Echo 8/4/19: EF 60-65%, G2DD, moderate AV sclerosis, mild LAE, trace MR/TR  Cath 3/7/19:  PTCA/MEMO dLAD; LMCA: widely patent; LAD: mid LAD stent with 30-50% in stent restenosis, distal LAD with 99% in stent restenosis; LCx: mild intimal hyperplasia of distal LCx stent, OM1 with 60-70% stenosis; RCA: dominant, PDA stent with 30-50% stent    In light of pt's risk factors, multiple PCIs, recent stent of distal LAD, CCS class III anginal symptoms, patient is referred for cardiac catheterization with possible intervention if clinically indicated. 59 year old male with CONTRAST ALLERGY, ?ASPIRIN GI INTOLERANCE, with PMHx DMII, HTN, HLD,  EDMUND (not on CPAP), BPH, GERD, CAD (s/p multiple prior PCIs, most recently PTCA/MEMO dLAD 3/7/19), who presented to cardiologist Dr. Graves with c/o recurrent retrosternal chest tightness on exertion after walking one block or less, relieved by rest. Also endorses mild leg swelling.     No dizziness, syncope, chest pain at rest, palpitations, orthopnea, PND    Echo 8/4/19: EF 60-65%, G2DD, moderate AV sclerosis, mild LAE, trace MR/TR  Cath 3/7/19:  PTCA/MEMO dLAD; LMCA: widely patent; LAD: mid LAD stent with 30-50% in stent restenosis, distal LAD with 99% in stent restenosis; LCx: mild intimal hyperplasia of distal LCx stent, OM1 with 60-70% stenosis; RCA: dominant, PDA stent with 30-50% stent    In light of pt's risk factors, multiple PCIs, recent stent of distal LAD, CCS class III anginal symptoms, patient is referred for cardiac catheterization with possible intervention if clinically indicated. 59 year old male with CONTRAST ALLERGY with PMHx DMII, HTN, HLD, EDMUND (on CPAP), BPH, hiatal hernia, fatty liver, constipation, CAD (s/p multiple prior PCIs, most recently PTCA/MEMO dLAD 3/7/19; not on ASA 2/2 GI intolerance), who presented to cardiologist Dr. Graves with c/o recurrent chest discomfort, described as "gas" pain/pressure, on exertion after walking one block or less, associated with fatigue and shortness of breath, relieved by rest after about 2 minutes, for about 1 month. No dizziness, syncope, chest pain at rest, palpitations, orthopnea, PND, leg swelling. Echo 8/4/19: EF 60-65%, G2DD, moderate AV sclerosis, mild LAE, trace MR/TR, Cath 3/7/19:  PTCA/MEMO dLAD; LMCA: widely patent; LAD: mid LAD stent with 30-50% in stent restenosis, distal LAD with 99% in stent restenosis; LCx: mild intimal hyperplasia of distal LCx stent, OM1 with 60-70% stenosis; RCA: dominant, PDA stent with 30-50% stent    In light of pt's risk factors, multiple PCIs, recent stent of distal LAD, and CCS class III anginal symptoms, patient is referred for cardiac catheterization with possible intervention if clinically indicated.     Of note, pt was prescribed prednisone and benadryl by Dr. Graves. He was instructed to take prednisone at 6pm, midnight, and 6AM prior to procedure. ****Of note, pt was prescribed prednisone and benadryl by Dr. Graves. He was instructed to take prednisone at 6pm, midnight, and 6AM prior to procedure.   ****Patient also states he does not take aspirin daily due to GI upset, but would take full dose aspirin prior to cath    59 year old male with CONTRAST ALLERGY with PMHx DMII, HTN, HLD, EDMUND (on CPAP), BPH, hiatal hernia, fatty liver, constipation, CAD (s/p multiple prior PCIs, most recently PTCA/MEMO dLAD 3/7/19; not on ASA 2/2 GI intolerance), who presented to cardiologist Dr. Graves with c/o recurrent chest discomfort, described as "gas" pain/pressure, on exertion after walking one block or less, associated with fatigue and shortness of breath, relieved by rest after about 2 minutes, for about 1 month. No dizziness, syncope, chest pain at rest, palpitations, orthopnea, PND, leg swelling. Echo 8/4/19: EF 60-65%, G2DD, moderate AV sclerosis, mild LAE, trace MR/TR, Cath 3/7/19:  PTCA/MEMO dLAD; LMCA: widely patent; LAD: mid LAD stent with 30-50% in stent restenosis, distal LAD with 99% in stent restenosis; LCx: mild intimal hyperplasia of distal LCx stent, OM1 with 60-70% stenosis; RCA: dominant, PDA stent with 30-50% stent    In light of pt's risk factors, multiple PCIs, recent stent of distal LAD, and CCS class III anginal symptoms, patient is referred for cardiac catheterization with possible intervention if clinically indicated.

## 2019-08-06 NOTE — H&P ADULT - PSYCHIATRIC
Mom reports patent having dry cough with running nose with intermittent wheezing since last week Thursday. Per mom patient  had one vomiting episode on Saturday. No reoccurring episodes of vomiting since Saturday. Denies sob, body aches or chills. Tried mucinex still no relief.   Low grade fever last few days. Mom do not have thermometer. Mom reports patient appetite is normal with normal urine output along with normal bowel movements. Mom advise to bring patient into clinic today for evaluation.  Patient is scheduled at 2pm.  Mom advise if wheezing becomes worsening patient will need to be seen sooner at walk in for evaluation. Mom agree with care plans.     Affect and characteristics of appearance, verbalizations, behaviors are appropriate

## 2019-08-06 NOTE — H&P ADULT - NSICDXPASTSURGICALHX_GEN_ALL_CORE_FT
PAST SURGICAL HISTORY:  Finger injury Left Ring Finger & had surgery ( 1996 )    H/O spinal fusion     S/P angioplasty with stent 2008, 2014, 03/2019    S/P foot surgery Right  ( 2013 )

## 2019-08-07 RX ORDER — ISOSORBIDE MONONITRATE 60 MG/1
1 TABLET, EXTENDED RELEASE ORAL
Qty: 0 | Refills: 0 | DISCHARGE

## 2019-08-07 RX ORDER — INSULIN ASPART 100 [IU]/ML
10 INJECTION, SOLUTION SUBCUTANEOUS
Qty: 0 | Refills: 0 | DISCHARGE

## 2019-08-07 RX ORDER — GABAPENTIN 400 MG/1
1 CAPSULE ORAL
Qty: 0 | Refills: 0 | DISCHARGE

## 2019-08-07 RX ORDER — OMEGA-3 ACID ETHYL ESTERS 1 G
1 CAPSULE ORAL
Qty: 0 | Refills: 0 | DISCHARGE

## 2019-08-08 ENCOUNTER — INPATIENT (INPATIENT)
Facility: HOSPITAL | Age: 59
LOS: 0 days | Discharge: HOME | DRG: 251 | End: 2019-08-09
Attending: INTERNAL MEDICINE | Admitting: INTERNAL MEDICINE
Payer: COMMERCIAL

## 2019-08-08 DIAGNOSIS — Z98.1 ARTHRODESIS STATUS: Chronic | ICD-10-CM

## 2019-08-08 DIAGNOSIS — Z98.89 OTHER SPECIFIED POSTPROCEDURAL STATES: Chronic | ICD-10-CM

## 2019-08-08 DIAGNOSIS — S69.90XA UNSPECIFIED INJURY OF UNSPECIFIED WRIST, HAND AND FINGER(S), INITIAL ENCOUNTER: Chronic | ICD-10-CM

## 2019-08-08 LAB
ALBUMIN SERPL ELPH-MCNC: 4.6 G/DL — SIGNIFICANT CHANGE UP (ref 3.3–5)
ALP SERPL-CCNC: 84 U/L — SIGNIFICANT CHANGE UP (ref 40–120)
ALT FLD-CCNC: 26 U/L — SIGNIFICANT CHANGE UP (ref 10–45)
ANION GAP SERPL CALC-SCNC: 12 MMOL/L — SIGNIFICANT CHANGE UP (ref 5–17)
APTT BLD: 31.1 SEC — SIGNIFICANT CHANGE UP (ref 27.5–36.3)
AST SERPL-CCNC: 20 U/L — SIGNIFICANT CHANGE UP (ref 10–40)
BASOPHILS # BLD AUTO: 0.02 K/UL — SIGNIFICANT CHANGE UP (ref 0–0.2)
BASOPHILS NFR BLD AUTO: 0.1 % — SIGNIFICANT CHANGE UP (ref 0–2)
BILIRUB SERPL-MCNC: 0.5 MG/DL — SIGNIFICANT CHANGE UP (ref 0.2–1.2)
BUN SERPL-MCNC: 20 MG/DL — SIGNIFICANT CHANGE UP (ref 7–23)
CALCIUM SERPL-MCNC: 10.2 MG/DL — SIGNIFICANT CHANGE UP (ref 8.4–10.5)
CHLORIDE SERPL-SCNC: 98 MMOL/L — SIGNIFICANT CHANGE UP (ref 96–108)
CHOLEST SERPL-MCNC: 130 MG/DL — SIGNIFICANT CHANGE UP (ref 10–199)
CK MB CFR SERPL CALC: 3.8 NG/ML — SIGNIFICANT CHANGE UP (ref 0–6.7)
CK SERPL-CCNC: 255 U/L — HIGH (ref 30–200)
CO2 SERPL-SCNC: 26 MMOL/L — SIGNIFICANT CHANGE UP (ref 22–31)
CREAT SERPL-MCNC: 1.09 MG/DL — SIGNIFICANT CHANGE UP (ref 0.5–1.3)
EOSINOPHIL # BLD AUTO: 0 K/UL — SIGNIFICANT CHANGE UP (ref 0–0.5)
EOSINOPHIL NFR BLD AUTO: 0 % — SIGNIFICANT CHANGE UP (ref 0–6)
GLUCOSE BLDC GLUCOMTR-MCNC: 211 MG/DL — HIGH (ref 70–99)
GLUCOSE SERPL-MCNC: 250 MG/DL — HIGH (ref 70–99)
HBA1C BLD-MCNC: 8.4 % — HIGH (ref 4–5.6)
HCT VFR BLD CALC: 48.3 % — SIGNIFICANT CHANGE UP (ref 39–50)
HDLC SERPL-MCNC: 47 MG/DL — SIGNIFICANT CHANGE UP
HGB BLD-MCNC: 16.1 G/DL — SIGNIFICANT CHANGE UP (ref 13–17)
IMM GRANULOCYTES NFR BLD AUTO: 0.3 % — SIGNIFICANT CHANGE UP (ref 0–1.5)
INR BLD: 1.11 — SIGNIFICANT CHANGE UP (ref 0.88–1.16)
LIPID PNL WITH DIRECT LDL SERPL: 73 MG/DL — SIGNIFICANT CHANGE UP
LYMPHOCYTES # BLD AUTO: 1.39 K/UL — SIGNIFICANT CHANGE UP (ref 1–3.3)
LYMPHOCYTES # BLD AUTO: 8.9 % — LOW (ref 13–44)
MCHC RBC-ENTMCNC: 30.5 PG — SIGNIFICANT CHANGE UP (ref 27–34)
MCHC RBC-ENTMCNC: 33.3 GM/DL — SIGNIFICANT CHANGE UP (ref 32–36)
MCV RBC AUTO: 91.5 FL — SIGNIFICANT CHANGE UP (ref 80–100)
MONOCYTES # BLD AUTO: 0.18 K/UL — SIGNIFICANT CHANGE UP (ref 0–0.9)
MONOCYTES NFR BLD AUTO: 1.2 % — LOW (ref 2–14)
NEUTROPHILS # BLD AUTO: 13.92 K/UL — HIGH (ref 1.8–7.4)
NEUTROPHILS NFR BLD AUTO: 89.5 % — HIGH (ref 43–77)
NRBC # BLD: 0 /100 WBCS — SIGNIFICANT CHANGE UP (ref 0–0)
PLATELET # BLD AUTO: 232 K/UL — SIGNIFICANT CHANGE UP (ref 150–400)
POTASSIUM SERPL-MCNC: 3.8 MMOL/L — SIGNIFICANT CHANGE UP (ref 3.5–5.3)
POTASSIUM SERPL-SCNC: 3.8 MMOL/L — SIGNIFICANT CHANGE UP (ref 3.5–5.3)
PROT SERPL-MCNC: 8.9 G/DL — HIGH (ref 6–8.3)
PROTHROM AB SERPL-ACNC: 12.6 SEC — SIGNIFICANT CHANGE UP (ref 10–12.9)
RBC # BLD: 5.28 M/UL — SIGNIFICANT CHANGE UP (ref 4.2–5.8)
RBC # FLD: 12.1 % — SIGNIFICANT CHANGE UP (ref 10.3–14.5)
SODIUM SERPL-SCNC: 136 MMOL/L — SIGNIFICANT CHANGE UP (ref 135–145)
TOTAL CHOLESTEROL/HDL RATIO MEASUREMENT: 2.8 RATIO — LOW (ref 3.4–9.6)
TRIGL SERPL-MCNC: 49 MG/DL — SIGNIFICANT CHANGE UP (ref 10–149)
WBC # BLD: 15.56 K/UL — HIGH (ref 3.8–10.5)
WBC # FLD AUTO: 15.56 K/UL — HIGH (ref 3.8–10.5)

## 2019-08-08 PROCEDURE — 92978 ENDOLUMINL IVUS OCT C 1ST: CPT | Mod: 26,LD

## 2019-08-08 PROCEDURE — 93458 L HRT ARTERY/VENTRICLE ANGIO: CPT | Mod: 26,59

## 2019-08-08 PROCEDURE — 92920 PRQ TRLUML C ANGIOP 1ART&/BR: CPT | Mod: LD

## 2019-08-08 PROCEDURE — 93010 ELECTROCARDIOGRAM REPORT: CPT | Mod: 76

## 2019-08-08 RX ORDER — POTASSIUM CHLORIDE 20 MEQ
40 PACKET (EA) ORAL ONCE
Refills: 0 | Status: COMPLETED | OUTPATIENT
Start: 2019-08-08 | End: 2019-08-08

## 2019-08-08 RX ORDER — GLUCAGON INJECTION, SOLUTION 0.5 MG/.1ML
1 INJECTION, SOLUTION SUBCUTANEOUS ONCE
Refills: 0 | Status: DISCONTINUED | OUTPATIENT
Start: 2019-08-08 | End: 2019-08-08

## 2019-08-08 RX ORDER — SODIUM CHLORIDE 9 MG/ML
250 INJECTION INTRAMUSCULAR; INTRAVENOUS; SUBCUTANEOUS ONCE
Refills: 0 | Status: COMPLETED | OUTPATIENT
Start: 2019-08-08 | End: 2019-08-08

## 2019-08-08 RX ORDER — DEXTROSE 50 % IN WATER 50 %
12.5 SYRINGE (ML) INTRAVENOUS ONCE
Refills: 0 | Status: DISCONTINUED | OUTPATIENT
Start: 2019-08-08 | End: 2019-08-08

## 2019-08-08 RX ORDER — GLUCAGON INJECTION, SOLUTION 0.5 MG/.1ML
1 INJECTION, SOLUTION SUBCUTANEOUS ONCE
Refills: 0 | Status: DISCONTINUED | OUTPATIENT
Start: 2019-08-08 | End: 2019-08-09

## 2019-08-08 RX ORDER — INSULIN LISPRO 100/ML
VIAL (ML) SUBCUTANEOUS ONCE
Refills: 0 | Status: COMPLETED | OUTPATIENT
Start: 2019-08-08 | End: 2019-08-08

## 2019-08-08 RX ORDER — SODIUM CHLORIDE 9 MG/ML
1000 INJECTION, SOLUTION INTRAVENOUS
Refills: 0 | Status: DISCONTINUED | OUTPATIENT
Start: 2019-08-08 | End: 2019-08-08

## 2019-08-08 RX ORDER — INSULIN LISPRO 100/ML
5 VIAL (ML) SUBCUTANEOUS
Refills: 0 | Status: DISCONTINUED | OUTPATIENT
Start: 2019-08-08 | End: 2019-08-09

## 2019-08-08 RX ORDER — DEXTROSE 50 % IN WATER 50 %
25 SYRINGE (ML) INTRAVENOUS ONCE
Refills: 0 | Status: DISCONTINUED | OUTPATIENT
Start: 2019-08-08 | End: 2019-08-09

## 2019-08-08 RX ORDER — INSULIN GLARGINE 100 [IU]/ML
20 INJECTION, SOLUTION SUBCUTANEOUS AT BEDTIME
Refills: 0 | Status: DISCONTINUED | OUTPATIENT
Start: 2019-08-08 | End: 2019-08-09

## 2019-08-08 RX ORDER — DEXTROSE 50 % IN WATER 50 %
15 SYRINGE (ML) INTRAVENOUS ONCE
Refills: 0 | Status: DISCONTINUED | OUTPATIENT
Start: 2019-08-08 | End: 2019-08-08

## 2019-08-08 RX ORDER — LISINOPRIL 2.5 MG/1
20 TABLET ORAL DAILY
Refills: 0 | Status: DISCONTINUED | OUTPATIENT
Start: 2019-08-09 | End: 2019-08-09

## 2019-08-08 RX ORDER — ATORVASTATIN CALCIUM 80 MG/1
40 TABLET, FILM COATED ORAL AT BEDTIME
Refills: 0 | Status: DISCONTINUED | OUTPATIENT
Start: 2019-08-08 | End: 2019-08-09

## 2019-08-08 RX ORDER — SODIUM CHLORIDE 9 MG/ML
1000 INJECTION, SOLUTION INTRAVENOUS
Refills: 0 | Status: DISCONTINUED | OUTPATIENT
Start: 2019-08-08 | End: 2019-08-09

## 2019-08-08 RX ORDER — DEXTROSE 50 % IN WATER 50 %
12.5 SYRINGE (ML) INTRAVENOUS ONCE
Refills: 0 | Status: DISCONTINUED | OUTPATIENT
Start: 2019-08-08 | End: 2019-08-09

## 2019-08-08 RX ORDER — ASPIRIN/CALCIUM CARB/MAGNESIUM 324 MG
81 TABLET ORAL DAILY
Refills: 0 | Status: DISCONTINUED | OUTPATIENT
Start: 2019-08-09 | End: 2019-08-09

## 2019-08-08 RX ORDER — DEXTROSE 50 % IN WATER 50 %
25 SYRINGE (ML) INTRAVENOUS ONCE
Refills: 0 | Status: DISCONTINUED | OUTPATIENT
Start: 2019-08-08 | End: 2019-08-08

## 2019-08-08 RX ORDER — RANOLAZINE 500 MG/1
500 TABLET, FILM COATED, EXTENDED RELEASE ORAL
Refills: 0 | Status: DISCONTINUED | OUTPATIENT
Start: 2019-08-08 | End: 2019-08-09

## 2019-08-08 RX ORDER — ASPIRIN/CALCIUM CARB/MAGNESIUM 324 MG
325 TABLET ORAL ONCE
Refills: 0 | Status: COMPLETED | OUTPATIENT
Start: 2019-08-08 | End: 2019-08-08

## 2019-08-08 RX ORDER — INSULIN LISPRO 100/ML
VIAL (ML) SUBCUTANEOUS
Refills: 0 | Status: DISCONTINUED | OUTPATIENT
Start: 2019-08-08 | End: 2019-08-09

## 2019-08-08 RX ORDER — MORPHINE SULFATE 50 MG/1
2 CAPSULE, EXTENDED RELEASE ORAL ONCE
Refills: 0 | Status: COMPLETED | OUTPATIENT
Start: 2019-08-08 | End: 2019-08-08

## 2019-08-08 RX ORDER — DIPHENHYDRAMINE HCL 50 MG
50 CAPSULE ORAL ONCE
Refills: 0 | Status: DISCONTINUED | OUTPATIENT
Start: 2019-08-08 | End: 2019-08-08

## 2019-08-08 RX ORDER — METOPROLOL TARTRATE 50 MG
50 TABLET ORAL
Refills: 0 | Status: DISCONTINUED | OUTPATIENT
Start: 2019-08-08 | End: 2019-08-09

## 2019-08-08 RX ORDER — SODIUM CHLORIDE 9 MG/ML
500 INJECTION INTRAMUSCULAR; INTRAVENOUS; SUBCUTANEOUS
Refills: 0 | Status: DISCONTINUED | OUTPATIENT
Start: 2019-08-08 | End: 2019-08-08

## 2019-08-08 RX ORDER — SENNA PLUS 8.6 MG/1
1 TABLET ORAL AT BEDTIME
Refills: 0 | Status: DISCONTINUED | OUTPATIENT
Start: 2019-08-08 | End: 2019-08-09

## 2019-08-08 RX ORDER — DEXTROSE 50 % IN WATER 50 %
15 SYRINGE (ML) INTRAVENOUS ONCE
Refills: 0 | Status: DISCONTINUED | OUTPATIENT
Start: 2019-08-08 | End: 2019-08-09

## 2019-08-08 RX ORDER — CLOPIDOGREL BISULFATE 75 MG/1
75 TABLET, FILM COATED ORAL DAILY
Refills: 0 | Status: DISCONTINUED | OUTPATIENT
Start: 2019-08-09 | End: 2019-08-09

## 2019-08-08 RX ORDER — GABAPENTIN 400 MG/1
300 CAPSULE ORAL AT BEDTIME
Refills: 0 | Status: DISCONTINUED | OUTPATIENT
Start: 2019-08-08 | End: 2019-08-09

## 2019-08-08 RX ORDER — SODIUM CHLORIDE 9 MG/ML
500 INJECTION INTRAMUSCULAR; INTRAVENOUS; SUBCUTANEOUS
Refills: 0 | Status: DISCONTINUED | OUTPATIENT
Start: 2019-08-08 | End: 2019-08-09

## 2019-08-08 RX ADMIN — SODIUM CHLORIDE 250 MILLILITER(S): 9 INJECTION INTRAMUSCULAR; INTRAVENOUS; SUBCUTANEOUS at 21:35

## 2019-08-08 RX ADMIN — GABAPENTIN 300 MILLIGRAM(S): 400 CAPSULE ORAL at 22:01

## 2019-08-08 RX ADMIN — Medication 6: at 22:28

## 2019-08-08 RX ADMIN — ATORVASTATIN CALCIUM 40 MILLIGRAM(S): 80 TABLET, FILM COATED ORAL at 22:01

## 2019-08-08 RX ADMIN — SODIUM CHLORIDE 75 MILLILITER(S): 9 INJECTION INTRAMUSCULAR; INTRAVENOUS; SUBCUTANEOUS at 14:20

## 2019-08-08 RX ADMIN — Medication 40 MILLIEQUIVALENT(S): at 14:27

## 2019-08-08 RX ADMIN — SENNA PLUS 1 TABLET(S): 8.6 TABLET ORAL at 22:01

## 2019-08-08 RX ADMIN — Medication 4: at 14:28

## 2019-08-08 RX ADMIN — Medication 5 UNIT(S): at 18:42

## 2019-08-08 RX ADMIN — RANOLAZINE 500 MILLIGRAM(S): 500 TABLET, FILM COATED, EXTENDED RELEASE ORAL at 22:01

## 2019-08-08 RX ADMIN — Medication 325 MILLIGRAM(S): at 14:27

## 2019-08-08 NOTE — PATIENT PROFILE ADULT - INTERPRETER NAME
Doing well. No complaints.  No signs and symptoms of PIH.     07/19/17  0929 07/19/17  0955 07/19/17  1202 07/19/17  1700   BP: 151/94 (!) 139/101 132/96 (!) 133/102   Pulse: 83 78 78 75   Resp: 18      Temp: 98.1 °F (36.7 °C)      TempSrc: Oral      SpO2: Sabi Cochran RN

## 2019-08-08 NOTE — CHART NOTE - NSCHARTNOTEFT_GEN_A_CORE
During procedure pt endorsing 7/10 SSCP, post procedure in holding CP 4/10. VSS, no acute distress, currently sleeping. Repeat EKG non ischemic. Pt given morphine 2 mg IV x1. Will continue to monitor.

## 2019-08-08 NOTE — PROGRESS NOTE ADULT - SUBJECTIVE AND OBJECTIVE BOX
CORONARY ANGIOGRAPHY  8/9/19    Conclusion  1. Coronary Angiography  LM: normal  LAD: mild diffuse disease in proximal, mid and distal stent with ISR subtotaled mid-distal LAD 99% stenosis, severely diffuse LAD disease distal with small vessel, s/p successful PTCA with several balloon inflations of 2.5x10 Angiosculpt balloon followed by 2.5x12 mm NC balloon with residual 20% stenosis and improved CORNEL III flow, with follow up IVUS post PTCA with excellent angiographic results  LCx: patent distal LCx stent with mild ISR, OM1 with 30% ISR mid  RCA: dominant, mild diffuse disease, PDA stent with 50% ISR    2. Left heart cath  LVEF 65%, normal wall motion, LVEDP normal 8 mmHg  no AS, no MR    3. PTCA  s/p successful PTCA of subtotal ISR mid/distal LAD with 2.5x10 mm Angiosculpt balloon with residual 20% stenosis and IVUS guided excellent angiographic results     Recommendations  No recommendations -> Normal findings  Admit to telemetry unit  cont ASA 81 mg po daily, clopidogrel 75 mg po daily  NS at 100 cc/hr x 10 hours  Plan for LAD brachytherapy in 4 weeks  Right radial artery, Terumo band closure

## 2019-08-08 NOTE — CHART NOTE - NSCHARTNOTEFT_GEN_A_CORE
Patient c/o nonradiating midsternal chest pressure 6/10 in severity since intervention, states improved with Morphine IV a few hours ago and has now returned. BP:102/62, HR:88, RR:18, O2 sat: 99% RA. Repeat EKG revealed NSR@86BPM with no acute changes. (unchanged from prior post EKG) Patient given NS 250cc/hr fluid bolus and Morphine 2mg IV x 1 dose. Patient c/o nonradiating midsternal chest pressure 6/10 in severity since intervention, states improved with Morphine IV a few hours ago and has now returned. BP:102/62, HR:88, RR:18, O2 sat: 99% RA. Repeat EKG revealed NSR@86BPM with no acute changes. (unchanged from prior post EKG) Patient given NS 250cc/hr fluid bolus and Morphine 2mg IV x 1 dose. Will continue to monitor closely.

## 2019-08-09 ENCOUNTER — TRANSCRIPTION ENCOUNTER (OUTPATIENT)
Age: 59
End: 2019-08-09

## 2019-08-09 VITALS — TEMPERATURE: 98 F

## 2019-08-09 LAB
ANION GAP SERPL CALC-SCNC: 12 MMOL/L — SIGNIFICANT CHANGE UP (ref 5–17)
BUN SERPL-MCNC: 23 MG/DL — SIGNIFICANT CHANGE UP (ref 7–23)
CALCIUM SERPL-MCNC: 9.6 MG/DL — SIGNIFICANT CHANGE UP (ref 8.4–10.5)
CHLORIDE SERPL-SCNC: 103 MMOL/L — SIGNIFICANT CHANGE UP (ref 96–108)
CO2 SERPL-SCNC: 23 MMOL/L — SIGNIFICANT CHANGE UP (ref 22–31)
CREAT SERPL-MCNC: 1.04 MG/DL — SIGNIFICANT CHANGE UP (ref 0.5–1.3)
GLUCOSE SERPL-MCNC: 215 MG/DL — HIGH (ref 70–99)
HCT VFR BLD CALC: 44.3 % — SIGNIFICANT CHANGE UP (ref 39–50)
HGB BLD-MCNC: 14.7 G/DL — SIGNIFICANT CHANGE UP (ref 13–17)
MAGNESIUM SERPL-MCNC: 2.1 MG/DL — SIGNIFICANT CHANGE UP (ref 1.6–2.6)
MCHC RBC-ENTMCNC: 30.6 PG — SIGNIFICANT CHANGE UP (ref 27–34)
MCHC RBC-ENTMCNC: 33.2 GM/DL — SIGNIFICANT CHANGE UP (ref 32–36)
MCV RBC AUTO: 92.1 FL — SIGNIFICANT CHANGE UP (ref 80–100)
NRBC # BLD: 0 /100 WBCS — SIGNIFICANT CHANGE UP (ref 0–0)
PLATELET # BLD AUTO: 207 K/UL — SIGNIFICANT CHANGE UP (ref 150–400)
POTASSIUM SERPL-MCNC: 4 MMOL/L — SIGNIFICANT CHANGE UP (ref 3.5–5.3)
POTASSIUM SERPL-SCNC: 4 MMOL/L — SIGNIFICANT CHANGE UP (ref 3.5–5.3)
RBC # BLD: 4.81 M/UL — SIGNIFICANT CHANGE UP (ref 4.2–5.8)
RBC # FLD: 12.1 % — SIGNIFICANT CHANGE UP (ref 10.3–14.5)
SODIUM SERPL-SCNC: 138 MMOL/L — SIGNIFICANT CHANGE UP (ref 135–145)
WBC # BLD: 16.02 K/UL — HIGH (ref 3.8–10.5)
WBC # FLD AUTO: 16.02 K/UL — HIGH (ref 3.8–10.5)

## 2019-08-09 PROCEDURE — 99239 HOSP IP/OBS DSCHRG MGMT >30: CPT

## 2019-08-09 RX ORDER — INSULIN GLARGINE 100 [IU]/ML
36 INJECTION, SOLUTION SUBCUTANEOUS
Qty: 0 | Refills: 0 | DISCHARGE
Start: 2019-08-09 | End: 2019-09-07

## 2019-08-09 RX ORDER — EMPAGLIFLOZIN 10 MG/1
1 TABLET, FILM COATED ORAL
Qty: 30 | Refills: 0
Start: 2019-08-09 | End: 2019-09-07

## 2019-08-09 RX ORDER — METOPROLOL TARTRATE 50 MG
25 TABLET ORAL
Refills: 0 | Status: DISCONTINUED | OUTPATIENT
Start: 2019-08-09 | End: 2019-08-09

## 2019-08-09 RX ORDER — INSULIN LISPRO 100/ML
12 VIAL (ML) SUBCUTANEOUS
Qty: 0 | Refills: 0 | DISCHARGE
Start: 2019-08-09 | End: 2019-09-07

## 2019-08-09 RX ORDER — INSULIN LISPRO 100/ML
10 VIAL (ML) SUBCUTANEOUS
Qty: 3 | Refills: 0
Start: 2019-08-09 | End: 2019-09-07

## 2019-08-09 RX ORDER — ASPIRIN/CALCIUM CARB/MAGNESIUM 324 MG
1 TABLET ORAL
Qty: 30 | Refills: 11
Start: 2019-08-09 | End: 2020-08-02

## 2019-08-09 RX ORDER — METOPROLOL TARTRATE 50 MG
1 TABLET ORAL
Qty: 60 | Refills: 3
Start: 2019-08-09 | End: 2019-12-06

## 2019-08-09 RX ORDER — ERTUGLIFLOZIN 5 MG/1
1 TABLET, FILM COATED ORAL
Qty: 30 | Refills: 0
Start: 2019-08-09 | End: 2019-09-07

## 2019-08-09 RX ORDER — ATORVASTATIN CALCIUM 80 MG/1
1 TABLET, FILM COATED ORAL
Qty: 30 | Refills: 2
Start: 2019-08-09 | End: 2019-11-06

## 2019-08-09 RX ORDER — CLOPIDOGREL BISULFATE 75 MG/1
1 TABLET, FILM COATED ORAL
Qty: 30 | Refills: 11
Start: 2019-08-09 | End: 2020-08-02

## 2019-08-09 RX ORDER — INSULIN GLARGINE 100 [IU]/ML
28 INJECTION, SOLUTION SUBCUTANEOUS
Refills: 0 | DISCHARGE
Start: 2019-08-09 | End: 2019-09-07

## 2019-08-09 RX ORDER — INSULIN GLARGINE 100 [IU]/ML
30 INJECTION, SOLUTION SUBCUTANEOUS
Qty: 3 | Refills: 0
Start: 2019-08-09 | End: 2019-09-07

## 2019-08-09 RX ORDER — SITAGLIPTIN AND METFORMIN HYDROCHLORIDE 500; 50 MG/1; MG/1
1 TABLET, FILM COATED ORAL
Qty: 0 | Refills: 0 | DISCHARGE

## 2019-08-09 RX ADMIN — Medication 5 UNIT(S): at 07:39

## 2019-08-09 RX ADMIN — Medication 4: at 07:38

## 2019-08-09 RX ADMIN — RANOLAZINE 500 MILLIGRAM(S): 500 TABLET, FILM COATED, EXTENDED RELEASE ORAL at 06:41

## 2019-08-09 RX ADMIN — INSULIN GLARGINE 20 UNIT(S): 100 INJECTION, SOLUTION SUBCUTANEOUS at 00:29

## 2019-08-09 RX ADMIN — Medication 5 UNIT(S): at 12:23

## 2019-08-09 RX ADMIN — Medication 2: at 12:24

## 2019-08-09 RX ADMIN — CLOPIDOGREL BISULFATE 75 MILLIGRAM(S): 75 TABLET, FILM COATED ORAL at 12:24

## 2019-08-09 RX ADMIN — Medication 81 MILLIGRAM(S): at 12:24

## 2019-08-09 RX ADMIN — Medication 50 MILLIGRAM(S): at 06:41

## 2019-08-09 NOTE — CONSULT NOTE ADULT - SUBJECTIVE AND OBJECTIVE BOX
HPI: 59yMale    PMH & Surgical Hx:CAD I25.10  FH: type 2 diabetes  FH: coronary artery disease  FHx: myocardial infarction  No pertinent family history  Handoff  MEWS Score  Constipation  BPH (benign prostatic hyperplasia)  Fatty liver  Hiatal hernia  Heart burn  CAD (coronary artery disease)  Obesity (BMI 30.0-34.9)  High cholesterol  Diabetes mellitus  Hypertension  Sleep apnea  Coronary artery disease  H/O spinal fusion  S/P foot surgery  Finger injury  S/P angioplasty with stent  Diabetes mellitus      FH:  DM:  Thyroid:  Autoimmune:  Other:    SH:  Smoking:  Etoh:  Recreational Drugs:  Social Life:    Current Meds:  aspirin enteric coated 81 milliGRAM(s) Oral daily  atorvastatin 40 milliGRAM(s) Oral at bedtime  chlorhexidine 4% Liquid 1 Application(s) Topical once  clopidogrel Tablet 75 milliGRAM(s) Oral daily  dextrose 40% Gel 15 Gram(s) Oral once PRN  dextrose 5%. 1000 milliLiter(s) IV Continuous <Continuous>  dextrose 50% Injectable 12.5 Gram(s) IV Push once  dextrose 50% Injectable 25 Gram(s) IV Push once  dextrose 50% Injectable 25 Gram(s) IV Push once  gabapentin 300 milliGRAM(s) Oral at bedtime  glucagon  Injectable 1 milliGRAM(s) IntraMuscular once PRN  insulin glargine Injectable (LANTUS) 20 Unit(s) SubCutaneous at bedtime  insulin lispro (HumaLOG) corrective regimen sliding scale   SubCutaneous Before meals and at bedtime  insulin lispro Injectable (HumaLOG) 5 Unit(s) SubCutaneous three times a day before meals  lisinopril 20 milliGRAM(s) Oral daily  metoprolol tartrate 25 milliGRAM(s) Oral two times a day  ranolazine 500 milliGRAM(s) Oral two times a day  senna 1 Tablet(s) Oral at bedtime  sodium chloride 0.9%. 500 milliLiter(s) IV Continuous <Continuous>      Allergies:  ibuprofen (Other)  IV Contrast (Other; Rash)      ROS:  Denies the following except as indicated.    General: weight loss/weight gain, decreased appetite, fatigue  Eyes: Blurry vision, double vision, visual changes  ENT: Throat pain, changes in voice,   CV: palpitations, SOB, CP, cough  GI: NVD, difficulty swallowing, abdominal pain  : polyuria, dysuria  Endo: abnormal menses, temperature intolerance, decreased libido  MSK: weakness, joint pain  Skin: rash, dryness, diaphoresis  Heme: Easy bruising,bleeding  Neuro: HA, dizziness, lightheadedness, numbness tingling  Psych: Anxiety, Depression    Vital Signs Last 24 Hrs  T(C): 36.8 (09 Aug 2019 13:51), Max: 37.2 (08 Aug 2019 21:39)  T(F): 98.2 (09 Aug 2019 13:51), Max: 98.9 (08 Aug 2019 21:39)  HR: 83 (09 Aug 2019 09:50) (82 - 94)  BP: 113/67 (09 Aug 2019 09:50) (98/62 - 115/64)  BP(mean): --  RR: 18 (09 Aug 2019 09:50) (17 - 20)  SpO2: 98% (09 Aug 2019 06:42) (95% - 100%)  Height (cm): 172.72 (08-08 @ 14:22)  Weight (kg): 98.2 (08-08 @ 14:22)  BMI (kg/m2): 32.9 (08-08 @ 14:22)    Constitutional: wn/wd in NAD.   HEENT: NCAT, MMM, OP clear, EOMI, , no proptosis or lid retraction  Neck: no thyromegaly or palpable thyroid nodules   Respiratory: lungs CTAB.  Cardiovascular: regular rhythm, normal S1 and S2, no audible murmurs  GI: soft, NT/ND, no masses/HSM appreciated.  Neurology: no tremors, DTR 2+  Skin: no visible rashes/lesions  Psychiatric: AAO x 3, normal affect/mood.  Ext: radial pulses intact, DP pulses intact, extremities warm, no cyanosis, clubbing or edema.       LABS:                        14.7   16.02 )-----------( 207      ( 09 Aug 2019 06:29 )             44.3     08-09    138  |  103  |  23  ----------------------------<  215<H>  4.0   |  23  |  1.04    Ca    9.6      09 Aug 2019 06:29  Mg     2.1     08-09    TPro  8.9<H>  /  Alb  4.6  /  TBili  0.5  /  DBili  x   /  AST  20  /  ALT  26  /  AlkPhos  84  08-08    PT/INR - ( 08 Aug 2019 13:38 )   PT: 12.6 sec;   INR: 1.11          PTT - ( 08 Aug 2019 13:38 )  PTT:31.1 sec    Hemoglobin A1C, Whole Blood: 8.4 (08-08 @ 13:38)      Cholesterol, Serum: 130 mg/dL (08-08-19 @ 13:38)  HDL Cholesterol, Serum: 47 mg/dL (08-08-19 @ 13:38)  Triglycerides, Serum: 49 mg/dL (08-08-19 @ 13:38)  Direct LDL: 73 mg/dL (08-08-19 @ 13:38)  Cholesterol, Serum: 117 mg/dL (03-07-19 @ 16:23)  HDL Cholesterol, Serum: 38 mg/dL (03-07-19 @ 16:23)  Triglycerides, Serum: 102 mg/dL (03-07-19 @ 16:23)  Direct LDL: 59 mg/dL (03-07-19 @ 16:23)      CAPILLARY BLOOD GLUCOSE      POCT Blood Glucose.: 200 mg/dL (09 Aug 2019 12:11)  POCT Blood Glucose.: 246 mg/dL (09 Aug 2019 10:46)  POCT Blood Glucose.: 209 mg/dL (09 Aug 2019 06:58)  POCT Blood Glucose.: 286 mg/dL (08 Aug 2019 22:13)

## 2019-08-09 NOTE — DISCHARGE NOTE PROVIDER - HOSPITAL COURSE
Patient is a 59 year old M with contrast allergy, PMH CAD s/p multiple PCIs (most recent 3/7/2019 PTCA/MEMO to dLAD, not on ASA due to GI intolerance), T2DM, HTN, HLD, EDMUND on CPAP, BPH, hiatal hernia, fatty liver, constipation, who presented to his outpatient cardiologist Dr. Graves with recurrent chest discomfort of one month duration, which he described as pressure with "gas" pain, worse with exertion and associated with fatigue and shortness of breath. The pain was relieved by rest. He had a recent echo on 8/4/2019 which showed EF 60-65%, G2DD, moderate AV sclerosis, mild LAE, trace MR/TR. He underwent catheterization due to history of multiple PCIs, recent dLAD stenting, CCS class III anginal symptoms. Catheterization was significant for LAD with 99% stenosis, severe diffuse LAD diease distally, patient underwent successful PTCA of subtotal ISR mid/distal LAD with ballooning, residual 20% stenosis. He is planned for further LAD brachytherapy in 4 weeks. He is discharged on aspirin and plavix with instruction to continue these medications unless told otherwise by cardiologist. He had some chest pain post cath which resolved with small fluid bolus. Of note, due to uncontrolled blood sugars, he was seen by endocrinology and insulin regimen titrated with instruction to follow up outpatient with Dr. Cage after discharge. Patient is a 59 year old M with contrast allergy, PMH CAD s/p multiple PCIs (most recent 3/7/2019 PTCA/MEMO to dLAD, not on ASA due to GI intolerance), T2DM, HTN, HLD, EDMUND on CPAP, BPH, hiatal hernia, fatty liver, constipation, who presented to his outpatient cardiologist Dr. Graves with recurrent chest discomfort of one month duration, which he described as pressure with "gas" pain, worse with exertion and associated with fatigue and shortness of breath. The pain was relieved by rest. He had a recent echo on 8/4/2019 which showed EF 60-65%, G2DD, moderate AV sclerosis, mild LAE, trace MR/TR. He underwent catheterization due to history of multiple PCIs, recent dLAD stenting, CCS class III anginal symptoms. Catheterization was significant for LAD with 99% stenosis, severe diffuse LAD diease distally, patient underwent successful PTCA of subtotal ISR mid/distal LAD with ballooning, residual 20% stenosis. He is planned for further LAD brachytherapy in 4 weeks. He is discharged on aspirin and plavix with instruction to continue these medications unless told otherwise by cardiologist. He had some chest pain post cath which resolved with small fluid bolus. Of note, due to uncontrolled blood sugars, he was seen by endocrinology and insulin regimen titrated with instruction to follow up outpatient with Dr. Cage after discharge.         Patient is planned for return to [facility] for LAD brachytherapy in 5 weeks

## 2019-08-09 NOTE — PROGRESS NOTE ADULT - SUBJECTIVE AND OBJECTIVE BOX
INTERVENTIONAL CARDIOLOGY FOLLOW UP NOTE    -Patient seen and examined this am  -No events overnight  -No complaints this am    T(C): 36.6 (08-09-19 @ 08:53), Max: 37.2 (08-08-19 @ 21:39)  HR: 85 (08-09-19 @ 06:42) (82 - 94)  BP: 113/71 (08-09-19 @ 06:42) (98/62 - 115/64)  RR: 18 (08-09-19 @ 06:42) (17 - 20)  SpO2: 98% (08-09-19 @ 06:42) (95% - 100%)    VASCULAR ACCESS EXAM:     RADIAL:   2+ right radial pulse   Normal capillary refill. No evidence of motor or sensory deficits of digits, hand, wrist or forearm.   -Access site clean, non-tender, without ecchymosis or hematoma.    A/P  S/p PCI via radial approach with no evidence of vascular complications post procedure.     -continue with current medications including dual antiplatelet therapy   -discharge instructions as per protocol   -outpatient follow up with primary cardiologist

## 2019-08-09 NOTE — CONSULT NOTE ADULT - ATTENDING COMMENTS
A/P: 59y Male with hx of DM presenting for    1.  DM    Lispro moderate dose scale with meals and at bedtime.   Continue consistent carb diet, Nutrition consult  dilute all medications in NS instead of D5 when possible.   Ensure correct injection and FSG technique    2.  Hyperlipidemia - LDL goal < 70    3.  Obesity - outpatient medical management.     Pt is advised to follow up with me at discharge.  Please call  to schedule an appointment.

## 2019-08-09 NOTE — DISCHARGE NOTE PROVIDER - NSDCFUADDINST_GEN_ALL_CORE_FT
You have a diagnosis of coronary artery disease and received a ballooning procedure to your left anterior descending coronary artery. You have been started on Aspirin 81mg daily and should continue your Plavix (Clopidogrel) 75mg daily. You MUST continue taking the daily Aspirin and Plavix to ensure your prior stents do not close. DO NOT STOP THESE MEDICATIONS FOR ANY REASON UNLESS OTHERWISE INDICATED BY YOUR CARDIOLOGIST BECAUSE THIS WILL PUT YOU AT RISK FOR A HEART ATTACK. Please make a follow up appointment with your cardiologist within 1-2 weeks of your discharge. All of your prescriptions have been sent electronically to your pharmacy.    You underwent a coronary angiogram and should wait 3 days before returning to ordinary activities. Do not drive for 2 days. Do not lift more than 5 pounds with affected arm for 3 days or more than 10 pounds if groin access for 5 days. After 24hours you may take off the dressing and shower. Wash the site with soap and water. There is no need to put on another bandage. Avoid tub baths, hot tubs or swimming for 5 days to prevent infection.    If you notice Bleeding or hematoma formation (collection of blood under the skin), drainage or redness at the puncture site, acute pain, numbness, decrease in strength, coolness or pale coloration of skin of the leg or hand, please call [phone]. Consult your doctor before returning to vigorous activity.    · If you are a diabetic and you take Metformin: DO NOT TAKE your Metformin for two days. This medication can interact with the contrast used during your procedure therefore we want to ensure the contrast has left your body prior to you restarting your Metformin.    o Make sure you monitor your finger sticks and diet while you are not taking your Metformin!

## 2019-08-09 NOTE — DISCHARGE NOTE PROVIDER - CARE PROVIDER_API CALL
Consuelo Cage; PhD)  Internal Medicine  [address], [address]  [place] [address]  Phone: [phone]  Fax: [phone]  Follow Up Time:     Will Loyd (MD)  Cardiovascular Disease; Internal Medicine; Interventional Cardiology  [address] [place] [id number]  Phone: [phone]  Fax: [phone]  Follow Up Time:

## 2019-08-09 NOTE — DISCHARGE NOTE PROVIDER - NSDCCPCAREPLAN_GEN_ALL_CORE_FT
PRINCIPAL DISCHARGE DIAGNOSIS  Diagnosis: Coronary artery disease  Assessment and Plan of Treatment: You were admitted for chest pain and had a cardiac catheterization, which looks at the arteries of your heart. One of your arteries, the left anterior descending artery, had a blockage. The blockage was removed during the catheterization and you had a ballooning procedure which helped to open up the artery and provide more blood flow to the muscle in your heart. It is very important that you continue to take your medications to keep your stents open that were previously placed in the arteries in your heart. Please follow up with your cardiologist. If you have any chest pain, shortness of breath, dizziness, lightheadedness, or loss of consciousness, please return immediately to the emergency department.      SECONDARY DISCHARGE DIAGNOSES  Diagnosis: Diabetes mellitus  Assessment and Plan of Treatment: You have diabetes, which can make the arteries in your heart build up plaque quicker. it is important to control your blood sugars to help prevent complications of diabetes. Your insulin was adjusted during this hospitalization and you should take your medications as prescribed. Please take 30 units of Basaglar every night, and 10 units of Admelog before each meal. Please take your Jardiance and your Janumet as prescribed ***please do NOT start your Janumet until 8/11***  Please follow up with Dr. Cage, your endocrinologist, within the next 2 weeks. PRINCIPAL DISCHARGE DIAGNOSIS  Diagnosis: Coronary artery disease  Assessment and Plan of Treatment: You were admitted for chest pain and had a cardiac catheterization, which looks at the arteries of your heart. One of your arteries, the left anterior descending artery, had a blockage. The blockage was removed during the catheterization and you had a ballooning procedure which helped to open up the artery and provide more blood flow to the muscle in your heart. It is very important that you continue to take your medications to keep your stents open that were previously placed in the arteries in your heart. Please follow up with your cardiologist. If you have any chest pain, shortness of breath, dizziness, lightheadedness, or loss of consciousness, please return immediately to the emergency department.      SECONDARY DISCHARGE DIAGNOSES  Diagnosis: Diabetes mellitus  Assessment and Plan of Treatment: You have diabetes, which can make the arteries in your heart build up plaque quicker. it is important to control your blood sugars to help prevent complications of diabetes. Your insulin was adjusted during this hospitalization and you should take your medications as prescribed. Please take 30 units of Basaglar every night, and 10 units of Admelog before each meal. Please take your Steglatro and your Janumet as prescribed ***please do NOT start your Janumet until 8/11***  Please follow up with Dr. Cage, your endocrinologist, within the next 2 weeks.

## 2019-08-09 NOTE — DISCHARGE NOTE PROVIDER - NSDCFUADDAPPT_GEN_ALL_CORE_FT
Please schedule an appointment with your cardiologist, Dr. Graves, within the next 1-2 weeks.    Please schedule an appointment with your endocrinologist, Dr. Cage, within the next 2 weeks    You are planned to return to the hospital for further treatment, called brachytherapy, to your coronary arteries in 5 weeks. Please have your cardiologist schedule this further treatment.

## 2019-08-09 NOTE — DISCHARGE NOTE NURSING/CASE MANAGEMENT/SOCIAL WORK - NSDCDPATPORTLINK_GEN_ALL_CORE
You can access the [facility], offered by [facility], by registering with the following website: [email or web address]

## 2019-08-11 RX ORDER — SITAGLIPTIN AND METFORMIN HYDROCHLORIDE 500; 50 MG/1; MG/1
1 TABLET, FILM COATED ORAL
Qty: 60 | Refills: 0
Start: 2019-08-11 | End: 2019-09-09

## 2019-08-14 DIAGNOSIS — E78.5 HYPERLIPIDEMIA, UNSPECIFIED: ICD-10-CM

## 2019-08-14 DIAGNOSIS — Z99.89 DEPENDENCE ON OTHER ENABLING MACHINES AND DEVICES: ICD-10-CM

## 2019-08-14 DIAGNOSIS — Z95.5 PRESENCE OF CORONARY ANGIOPLASTY IMPLANT AND GRAFT: ICD-10-CM

## 2019-08-14 DIAGNOSIS — Z98.1 ARTHRODESIS STATUS: ICD-10-CM

## 2019-08-14 DIAGNOSIS — E78.00 PURE HYPERCHOLESTEROLEMIA, UNSPECIFIED: ICD-10-CM

## 2019-08-14 DIAGNOSIS — Z79.4 LONG TERM (CURRENT) USE OF INSULIN: ICD-10-CM

## 2019-08-14 DIAGNOSIS — Z79.02 LONG TERM (CURRENT) USE OF ANTITHROMBOTICS/ANTIPLATELETS: ICD-10-CM

## 2019-08-14 DIAGNOSIS — T38.0X5A ADVERSE EFFECT OF GLUCOCORTICOIDS AND SYNTHETIC ANALOGUES, INITIAL ENCOUNTER: ICD-10-CM

## 2019-08-14 DIAGNOSIS — E11.9 TYPE 2 DIABETES MELLITUS WITHOUT COMPLICATIONS: ICD-10-CM

## 2019-08-14 DIAGNOSIS — I25.110 ATHEROSCLEROTIC HEART DISEASE OF NATIVE CORONARY ARTERY WITH UNSTABLE ANGINA PECTORIS: ICD-10-CM

## 2019-08-14 DIAGNOSIS — E66.9 OBESITY, UNSPECIFIED: ICD-10-CM

## 2019-08-14 DIAGNOSIS — I10 ESSENTIAL (PRIMARY) HYPERTENSION: ICD-10-CM

## 2019-08-14 DIAGNOSIS — D72.829 ELEVATED WHITE BLOOD CELL COUNT, UNSPECIFIED: ICD-10-CM

## 2019-08-14 DIAGNOSIS — N40.0 BENIGN PROSTATIC HYPERPLASIA WITHOUT LOWER URINARY TRACT SYMPTOMS: ICD-10-CM

## 2019-08-14 DIAGNOSIS — G47.33 OBSTRUCTIVE SLEEP APNEA (ADULT) (PEDIATRIC): ICD-10-CM

## 2019-08-14 DIAGNOSIS — Z91.041 RADIOGRAPHIC DYE ALLERGY STATUS: ICD-10-CM

## 2019-09-04 PROCEDURE — 80048 BASIC METABOLIC PNL TOTAL CA: CPT

## 2019-09-04 PROCEDURE — 36415 COLL VENOUS BLD VENIPUNCTURE: CPT

## 2019-09-04 PROCEDURE — 85025 COMPLETE CBC W/AUTO DIFF WBC: CPT

## 2019-09-04 PROCEDURE — C1769: CPT

## 2019-09-04 PROCEDURE — 93005 ELECTROCARDIOGRAM TRACING: CPT

## 2019-09-04 PROCEDURE — 82962 GLUCOSE BLOOD TEST: CPT

## 2019-09-04 PROCEDURE — 80053 COMPREHEN METABOLIC PANEL: CPT

## 2019-09-04 PROCEDURE — 82553 CREATINE MB FRACTION: CPT

## 2019-09-04 PROCEDURE — 80061 LIPID PANEL: CPT

## 2019-09-04 PROCEDURE — C1887: CPT

## 2019-09-04 PROCEDURE — 83036 HEMOGLOBIN GLYCOSYLATED A1C: CPT

## 2019-09-04 PROCEDURE — C1725: CPT

## 2019-09-04 PROCEDURE — C1894: CPT

## 2019-09-04 PROCEDURE — C1753: CPT

## 2019-09-04 PROCEDURE — 85610 PROTHROMBIN TIME: CPT

## 2019-09-04 PROCEDURE — 85027 COMPLETE CBC AUTOMATED: CPT

## 2019-09-04 PROCEDURE — 83735 ASSAY OF MAGNESIUM: CPT

## 2019-09-04 PROCEDURE — 85730 THROMBOPLASTIN TIME PARTIAL: CPT

## 2019-09-04 PROCEDURE — 94660 CPAP INITIATION&MGMT: CPT

## 2019-09-04 PROCEDURE — 82550 ASSAY OF CK (CPK): CPT

## 2019-09-10 PROBLEM — K44.9 DIAPHRAGMATIC HERNIA WITHOUT OBSTRUCTION OR GANGRENE: Chronic | Status: ACTIVE | Noted: 2019-08-07

## 2019-09-10 PROBLEM — N40.0 BENIGN PROSTATIC HYPERPLASIA WITHOUT LOWER URINARY TRACT SYMPTOMS: Chronic | Status: ACTIVE | Noted: 2019-08-07

## 2019-09-10 PROBLEM — K59.00 CONSTIPATION, UNSPECIFIED: Chronic | Status: ACTIVE | Noted: 2019-08-07

## 2019-09-10 PROBLEM — K76.0 FATTY (CHANGE OF) LIVER, NOT ELSEWHERE CLASSIFIED: Chronic | Status: ACTIVE | Noted: 2019-08-07

## 2019-09-11 RX ORDER — CHLORHEXIDINE GLUCONATE 213 G/1000ML
1 SOLUTION TOPICAL ONCE
Refills: 0 | Status: DISCONTINUED | OUTPATIENT
Start: 2019-09-12 | End: 2019-09-12

## 2019-09-11 NOTE — H&P ADULT - NSHPPHYSICALEXAM_GEN_ALL_CORE
V/S 	BP: 122/75    HR: 90s   RR: 16     T: 97.2   O2: 99% RA   	  GEN: NAD  PULM:  CTA B/L  HEENT: No JVD  CARD: RRR, S1 and S2   ABD: +BS, NT, soft/ND	  EXT: No Edema B/L LE  NEURO: A+Ox3, no focal deficit  PSYCH: Mood appropriate   PULSES: 2+ Radial b/l, 2+ Femoral b/l (no bruit b/l), DP 2+ b/l, PT 1+ B/L  ASA III, Mallampati III

## 2019-09-11 NOTE — H&P ADULT - HISTORY OF PRESENT ILLNESS
SKELETON    60 yo Male with IV contrast allergy, PMHx HTN, Hyperlipidemia, DM type 2 (was referred to Dr Cage after recent admission), EDMUND on CPAP, BPH, hiatial hernia, fatty liver, constipation, CAD s/p multiple PCIs (most recent cardiac cath on .... PTCA of subtotal ISR mid/distal LAD with ballooning, residual 20% stenosis) presents for staged brachytherapy. Prior to most recent cardiac catheterization patient endorsed pressure with "gas" pain, worse with exertion and associated with fatigue and shortness of breath. The pain was relieved by rest. Since recent procedure pt endorsing..... Denies....    Echo 8/4/2019 which showed EF 60-65%, G2DD, moderate AV sclerosis, mild LAE, trace MR/TR.     In light of pt's risk factors, CCS Class 3 anginal symptoms, and known recurrent stenosis pt presents for recommended cardiac catheterization with brachytherapy. Dr Graves prescribed Prednisone 50mg x 3 to be given 6pm, 12am and 6am prior to procedure as well as Benadryl    58 yo Male with IV contrast allergy, PMHx HTN, Hyperlipidemia, DM type 2 (was referred to Dr Cage after recent admission), EDMUND on CPAP, BPH, hiatial hernia, fatty liver, constipation, CAD s/p multiple PCIs (most recent cardiac cath on .... PTCA of subtotal ISR mid/distal LAD with ballooning, residual 20% stenosis) presents for staged brachytherapy. Prior to most recent cardiac catheterization patient endorsed pressure with "gas" pain, worse with exertion and associated with fatigue and shortness of breath. The pain was relieved by rest. Since recent procedure pt endorsing..... Denies....    Echo 8/4/2019 which showed EF 60-65%, G2DD, moderate AV sclerosis, mild LAE, trace MR/TR.     In light of pt's risk factors, CCS Class 3 anginal symptoms, and known recurrent stenosis pt presents for recommended cardiac catheterization with brachytherapy. Dr. Graves prescribed Prednisone 50mg for 3 doses, on arrival reports taking a dose at 6AM and 6PM on 9/11/18 and a dose at 7:30AM on 9/12/19     60 y/o M Ex-Cocaine User (20 yrs ago), Ambulates w/ Cane w/ IV contrast allergy (reports delayed dermatitis/rash/desquamation specifically to hands), w/ PMHX HTN, HLD, DM (seen by Dr. Cage on prior admission), EDMUND on CPAP, Hiatal, CAD s/p several PCIs and h/o ISR, last cardiac cath at [facility] on 8/8/19 PCTA mLAD ISR (residual 20% stenosis), patent dLCX stent, OM1 70-80% ISR, PDA 50% ISR, EF 65%. Since his last procedure pt reports 4-5/10 SS "pressure" like C/P radiating to the back on climbing 2 flights of stairs (CCS Angina Class 3 Sx). Associated symptoms includes dizziness. No diaphoresis, N/V, syncope, palpitations, orthopnea, PND, LE edema. No current SOB. Echo in 8/2019 which showed EF 60-65%.     Due to pts risk factors, CCS Angina  Class 3 Sx, h/o CAD, recurrent ISR, pt is referred for cardiac catheterization w/ brachytherapy.

## 2019-09-11 NOTE — H&P ADULT - ASSESSMENT
60 y/o M Ex-Cocaine User, Ambulates w/ Cane w/ IV contrast allergy w/ PMHX HTN, HLD, DM (seen by Dr. Cage last admission), EDMUND on CPAP, Hiatal, CAD s/p several PCIs and h/o ISR, last cardiac cath at [facility] on 8/8/19 PCTA mLAD ISR (residual 20% stenosis), patent dLCX stent, OM1 70-80% ISR, PDA 50% ISR, EF 65%, w/ CCS Angina Equivalent Class 3 Sx.     Due to pts risk factors, CCS Angina Equivalent Class 3 Sx, h/o CAD, recurrent ISR, pt is referred for cardiac catheterization w/ brachytherapy.     Pt reports Plavix compliance and no missed doses in last 2 weeks. Plavix 75mg taken this AM.  ASA 81mg taken this AM. Additional ASA 243mg PO pre-cath. Pt reports intermittently skipping ASA previously 2nd to GERD. Informed he cannot skip after today, Dr. Graves aware. Protonix 40mg PO pre-cath.   Dr. Graves prescribed Prednisone 50mg for 3 doses, on arrival reports taking a dose at 6AM and 6PM on 9/11/18 and a dose at 7:30AM on 9/12/19     As per Dr. Graves additional Solucortef 100mg IV pre-cath and Benadryl 50mg IV on call to cath lab procedure room.     IV NS@ 75cc/hr pre-cath.  Type of Sedation: Moderate  Candidate for Sedation: Yes  Risks & benefits of procedure and alternative therapy have been explained to the patient including but not limited to: allergic reaction, bleeding w/possible need for blood transfusion, infection, renal and vascular compromise, limb damage, arrhythmia, stroke, vessel dissection/perforation, Myocardial infarction, emergent CABG. Informed consent obtained and in chart

## 2019-09-11 NOTE — H&P ADULT - NSHPLABSRESULTS_GEN_ALL_CORE
15.9   12.55 )-----------( 198      ( 12 Sep 2019 09:47 )             47.3       09-12    136  |  97  |  22  ----------------------------<  273<H>  3.9   |  27  |  1.09    Ca    9.7      12 Sep 2019 09:47    TPro  7.7  /  Alb  4.2  /  TBili  0.5  /  DBili  x   /  AST  21  /  ALT  27  /  AlkPhos  72  09-12      PT/INR - ( 12 Sep 2019 09:47 )   PT: 12.7 sec;   INR: 1.12          PTT - ( 12 Sep 2019 09:47 )  PTT:32.8 sec    CARDIAC MARKERS ( 12 Sep 2019 09:47 )  x     / x     / 193 U/L / x     / 2.2 ng/mL

## 2019-09-11 NOTE — H&P ADULT - NSICDXPASTMEDICALHX_GEN_ALL_CORE_FT
PAST MEDICAL HISTORY:  BPH (benign prostatic hyperplasia)     CAD (coronary artery disease)     Constipation     Diabetes mellitus     Fatty liver     Hiatal hernia with possible GERD    High cholesterol     Hypertension     Obesity (BMI 30.0-34.9)     Sleep apnea on CPAP

## 2019-09-12 ENCOUNTER — INPATIENT (INPATIENT)
Facility: HOSPITAL | Age: 59
LOS: 0 days | Discharge: HOME | DRG: 251 | End: 2019-09-13
Attending: SPECIALIST/TECHNOLOGIST CARDIOVASCULAR | Admitting: SPECIALIST/TECHNOLOGIST CARDIOVASCULAR
Payer: COMMERCIAL

## 2019-09-12 VITALS
TEMPERATURE: 97 F | SYSTOLIC BLOOD PRESSURE: 130 MMHG | OXYGEN SATURATION: 99 % | RESPIRATION RATE: 18 BRPM | HEART RATE: 97 BPM | DIASTOLIC BLOOD PRESSURE: 83 MMHG

## 2019-09-12 DIAGNOSIS — Z98.89 OTHER SPECIFIED POSTPROCEDURAL STATES: Chronic | ICD-10-CM

## 2019-09-12 DIAGNOSIS — Z98.1 ARTHRODESIS STATUS: Chronic | ICD-10-CM

## 2019-09-12 DIAGNOSIS — S69.90XA UNSPECIFIED INJURY OF UNSPECIFIED WRIST, HAND AND FINGER(S), INITIAL ENCOUNTER: Chronic | ICD-10-CM

## 2019-09-12 LAB
ALBUMIN SERPL ELPH-MCNC: 4.2 G/DL — SIGNIFICANT CHANGE UP (ref 3.3–5)
ALP SERPL-CCNC: 72 U/L — SIGNIFICANT CHANGE UP (ref 40–120)
ALT FLD-CCNC: 27 U/L — SIGNIFICANT CHANGE UP (ref 10–45)
ANION GAP SERPL CALC-SCNC: 12 MMOL/L — SIGNIFICANT CHANGE UP (ref 5–17)
APTT BLD: 32.8 SEC — SIGNIFICANT CHANGE UP (ref 27.5–36.3)
AST SERPL-CCNC: 21 U/L — SIGNIFICANT CHANGE UP (ref 10–40)
BASOPHILS # BLD AUTO: 0.01 K/UL — SIGNIFICANT CHANGE UP (ref 0–0.2)
BASOPHILS NFR BLD AUTO: 0.1 % — SIGNIFICANT CHANGE UP (ref 0–2)
BILIRUB SERPL-MCNC: 0.5 MG/DL — SIGNIFICANT CHANGE UP (ref 0.2–1.2)
BUN SERPL-MCNC: 22 MG/DL — SIGNIFICANT CHANGE UP (ref 7–23)
CALCIUM SERPL-MCNC: 9.7 MG/DL — SIGNIFICANT CHANGE UP (ref 8.4–10.5)
CHLORIDE SERPL-SCNC: 97 MMOL/L — SIGNIFICANT CHANGE UP (ref 96–108)
CHOLEST SERPL-MCNC: 100 MG/DL — SIGNIFICANT CHANGE UP (ref 10–199)
CK MB CFR SERPL CALC: 2.2 NG/ML — SIGNIFICANT CHANGE UP (ref 0–6.7)
CK SERPL-CCNC: 193 U/L — SIGNIFICANT CHANGE UP (ref 30–200)
CO2 SERPL-SCNC: 27 MMOL/L — SIGNIFICANT CHANGE UP (ref 22–31)
CREAT SERPL-MCNC: 1.09 MG/DL — SIGNIFICANT CHANGE UP (ref 0.5–1.3)
EOSINOPHIL # BLD AUTO: 0 K/UL — SIGNIFICANT CHANGE UP (ref 0–0.5)
EOSINOPHIL NFR BLD AUTO: 0 % — SIGNIFICANT CHANGE UP (ref 0–6)
GLUCOSE SERPL-MCNC: 273 MG/DL — HIGH (ref 70–99)
HBA1C BLD-MCNC: 7.8 % — HIGH (ref 4–5.6)
HCT VFR BLD CALC: 47.3 % — SIGNIFICANT CHANGE UP (ref 39–50)
HDLC SERPL-MCNC: 40 MG/DL — SIGNIFICANT CHANGE UP
HGB BLD-MCNC: 15.9 G/DL — SIGNIFICANT CHANGE UP (ref 13–17)
IMM GRANULOCYTES NFR BLD AUTO: 0.3 % — SIGNIFICANT CHANGE UP (ref 0–1.5)
INR BLD: 1.12 — SIGNIFICANT CHANGE UP (ref 0.88–1.16)
LIPID PNL WITH DIRECT LDL SERPL: 52 MG/DL — SIGNIFICANT CHANGE UP
LYMPHOCYTES # BLD AUTO: 1.08 K/UL — SIGNIFICANT CHANGE UP (ref 1–3.3)
LYMPHOCYTES # BLD AUTO: 8.6 % — LOW (ref 13–44)
MCHC RBC-ENTMCNC: 30.8 PG — SIGNIFICANT CHANGE UP (ref 27–34)
MCHC RBC-ENTMCNC: 33.6 GM/DL — SIGNIFICANT CHANGE UP (ref 32–36)
MCV RBC AUTO: 91.5 FL — SIGNIFICANT CHANGE UP (ref 80–100)
MONOCYTES # BLD AUTO: 0.32 K/UL — SIGNIFICANT CHANGE UP (ref 0–0.9)
MONOCYTES NFR BLD AUTO: 2.5 % — SIGNIFICANT CHANGE UP (ref 2–14)
NEUTROPHILS # BLD AUTO: 11.1 K/UL — HIGH (ref 1.8–7.4)
NEUTROPHILS NFR BLD AUTO: 88.5 % — HIGH (ref 43–77)
NRBC # BLD: 0 /100 WBCS — SIGNIFICANT CHANGE UP (ref 0–0)
PLATELET # BLD AUTO: 198 K/UL — SIGNIFICANT CHANGE UP (ref 150–400)
POTASSIUM SERPL-MCNC: 3.9 MMOL/L — SIGNIFICANT CHANGE UP (ref 3.5–5.3)
POTASSIUM SERPL-SCNC: 3.9 MMOL/L — SIGNIFICANT CHANGE UP (ref 3.5–5.3)
PROT SERPL-MCNC: 7.7 G/DL — SIGNIFICANT CHANGE UP (ref 6–8.3)
PROTHROM AB SERPL-ACNC: 12.7 SEC — SIGNIFICANT CHANGE UP (ref 10–12.9)
RBC # BLD: 5.17 M/UL — SIGNIFICANT CHANGE UP (ref 4.2–5.8)
RBC # FLD: 12.2 % — SIGNIFICANT CHANGE UP (ref 10.3–14.5)
SODIUM SERPL-SCNC: 136 MMOL/L — SIGNIFICANT CHANGE UP (ref 135–145)
TOTAL CHOLESTEROL/HDL RATIO MEASUREMENT: 2.5 RATIO — LOW (ref 3.4–9.6)
TRIGL SERPL-MCNC: 42 MG/DL — SIGNIFICANT CHANGE UP (ref 10–149)
WBC # BLD: 12.55 K/UL — HIGH (ref 3.8–10.5)
WBC # FLD AUTO: 12.55 K/UL — HIGH (ref 3.8–10.5)

## 2019-09-12 PROCEDURE — 93454 CORONARY ARTERY ANGIO S&I: CPT | Mod: 26,59

## 2019-09-12 PROCEDURE — 77770 HDR RDNCL NTRSTL/ICAV BRCHTX: CPT | Mod: 26

## 2019-09-12 PROCEDURE — 93010 ELECTROCARDIOGRAM REPORT: CPT

## 2019-09-12 PROCEDURE — 77263 THER RADIOLOGY TX PLNG CPLX: CPT

## 2019-09-12 PROCEDURE — 92978 ENDOLUMINL IVUS OCT C 1ST: CPT | Mod: 26,LD

## 2019-09-12 PROCEDURE — 77316 BRACHYTX ISODOSE PLAN SIMPLE: CPT | Mod: 26

## 2019-09-12 PROCEDURE — 77290 THER RAD SIMULAJ FIELD CPLX: CPT | Mod: 26

## 2019-09-12 PROCEDURE — 92924 PRQ TRLUML C ATHRC 1 ART&/BR: CPT | Mod: LD

## 2019-09-12 PROCEDURE — 77470 SPECIAL RADIATION TREATMENT: CPT | Mod: 26

## 2019-09-12 PROCEDURE — 92974 CATH PLACE CARDIO BRACHYTX: CPT

## 2019-09-12 PROCEDURE — 99221 1ST HOSP IP/OBS SF/LOW 40: CPT | Mod: 25

## 2019-09-12 RX ORDER — HYDROCORTISONE 20 MG
100 TABLET ORAL ONCE
Refills: 0 | Status: COMPLETED | OUTPATIENT
Start: 2019-09-12 | End: 2019-09-12

## 2019-09-12 RX ORDER — SODIUM CHLORIDE 9 MG/ML
500 INJECTION INTRAMUSCULAR; INTRAVENOUS; SUBCUTANEOUS
Refills: 0 | Status: DISCONTINUED | OUTPATIENT
Start: 2019-09-12 | End: 2019-09-12

## 2019-09-12 RX ORDER — ASPIRIN/CALCIUM CARB/MAGNESIUM 324 MG
81 TABLET ORAL DAILY
Refills: 0 | Status: DISCONTINUED | OUTPATIENT
Start: 2019-09-13 | End: 2019-09-13

## 2019-09-12 RX ORDER — DEXTROSE 50 % IN WATER 50 %
25 SYRINGE (ML) INTRAVENOUS ONCE
Refills: 0 | Status: DISCONTINUED | OUTPATIENT
Start: 2019-09-12 | End: 2019-09-13

## 2019-09-12 RX ORDER — METOPROLOL TARTRATE 50 MG
5 TABLET ORAL ONCE
Refills: 0 | Status: COMPLETED | OUTPATIENT
Start: 2019-09-12 | End: 2019-09-12

## 2019-09-12 RX ORDER — METOPROLOL TARTRATE 50 MG
25 TABLET ORAL
Refills: 0 | Status: DISCONTINUED | OUTPATIENT
Start: 2019-09-12 | End: 2019-09-13

## 2019-09-12 RX ORDER — DIPHENHYDRAMINE HCL 50 MG
50 CAPSULE ORAL ONCE
Refills: 0 | Status: DISCONTINUED | OUTPATIENT
Start: 2019-09-12 | End: 2019-09-13

## 2019-09-12 RX ORDER — LISINOPRIL 2.5 MG/1
20 TABLET ORAL DAILY
Refills: 0 | Status: DISCONTINUED | OUTPATIENT
Start: 2019-09-13 | End: 2019-09-13

## 2019-09-12 RX ORDER — DEXTROSE 50 % IN WATER 50 %
15 SYRINGE (ML) INTRAVENOUS ONCE
Refills: 0 | Status: DISCONTINUED | OUTPATIENT
Start: 2019-09-12 | End: 2019-09-13

## 2019-09-12 RX ORDER — GLUCAGON INJECTION, SOLUTION 0.5 MG/.1ML
1 INJECTION, SOLUTION SUBCUTANEOUS ONCE
Refills: 0 | Status: DISCONTINUED | OUTPATIENT
Start: 2019-09-12 | End: 2019-09-13

## 2019-09-12 RX ORDER — SODIUM CHLORIDE 9 MG/ML
1000 INJECTION, SOLUTION INTRAVENOUS
Refills: 0 | Status: DISCONTINUED | OUTPATIENT
Start: 2019-09-12 | End: 2019-09-13

## 2019-09-12 RX ORDER — ASPIRIN/CALCIUM CARB/MAGNESIUM 324 MG
243 TABLET ORAL ONCE
Refills: 0 | Status: COMPLETED | OUTPATIENT
Start: 2019-09-12 | End: 2019-09-12

## 2019-09-12 RX ORDER — GABAPENTIN 400 MG/1
300 CAPSULE ORAL AT BEDTIME
Refills: 0 | Status: DISCONTINUED | OUTPATIENT
Start: 2019-09-12 | End: 2019-09-13

## 2019-09-12 RX ORDER — PANTOPRAZOLE SODIUM 20 MG/1
40 TABLET, DELAYED RELEASE ORAL ONCE
Refills: 0 | Status: COMPLETED | OUTPATIENT
Start: 2019-09-12 | End: 2019-09-12

## 2019-09-12 RX ORDER — CLOPIDOGREL BISULFATE 75 MG/1
75 TABLET, FILM COATED ORAL DAILY
Refills: 0 | Status: DISCONTINUED | OUTPATIENT
Start: 2019-09-13 | End: 2019-09-13

## 2019-09-12 RX ORDER — INSULIN LISPRO 100/ML
VIAL (ML) SUBCUTANEOUS
Refills: 0 | Status: DISCONTINUED | OUTPATIENT
Start: 2019-09-12 | End: 2019-09-13

## 2019-09-12 RX ORDER — RANOLAZINE 500 MG/1
500 TABLET, FILM COATED, EXTENDED RELEASE ORAL
Refills: 0 | Status: DISCONTINUED | OUTPATIENT
Start: 2019-09-12 | End: 2019-09-13

## 2019-09-12 RX ORDER — ATORVASTATIN CALCIUM 80 MG/1
40 TABLET, FILM COATED ORAL AT BEDTIME
Refills: 0 | Status: DISCONTINUED | OUTPATIENT
Start: 2019-09-12 | End: 2019-09-13

## 2019-09-12 RX ORDER — SENNA PLUS 8.6 MG/1
1 TABLET ORAL AT BEDTIME
Refills: 0 | Status: DISCONTINUED | OUTPATIENT
Start: 2019-09-12 | End: 2019-09-13

## 2019-09-12 RX ORDER — DEXTROSE 50 % IN WATER 50 %
12.5 SYRINGE (ML) INTRAVENOUS ONCE
Refills: 0 | Status: DISCONTINUED | OUTPATIENT
Start: 2019-09-12 | End: 2019-09-13

## 2019-09-12 RX ORDER — SODIUM CHLORIDE 9 MG/ML
500 INJECTION INTRAMUSCULAR; INTRAVENOUS; SUBCUTANEOUS
Refills: 0 | Status: DISCONTINUED | OUTPATIENT
Start: 2019-09-12 | End: 2019-09-13

## 2019-09-12 RX ORDER — INSULIN LISPRO 100/ML
VIAL (ML) SUBCUTANEOUS ONCE
Refills: 0 | Status: COMPLETED | OUTPATIENT
Start: 2019-09-12 | End: 2019-09-12

## 2019-09-12 RX ORDER — INSULIN GLARGINE 100 [IU]/ML
36 INJECTION, SOLUTION SUBCUTANEOUS AT BEDTIME
Refills: 0 | Status: DISCONTINUED | OUTPATIENT
Start: 2019-09-12 | End: 2019-09-13

## 2019-09-12 RX ORDER — OXYCODONE AND ACETAMINOPHEN 5; 325 MG/1; MG/1
1 TABLET ORAL EVERY 6 HOURS
Refills: 0 | Status: DISCONTINUED | OUTPATIENT
Start: 2019-09-12 | End: 2019-09-13

## 2019-09-12 RX ORDER — INFLUENZA VIRUS VACCINE 15; 15; 15; 15 UG/.5ML; UG/.5ML; UG/.5ML; UG/.5ML
0.5 SUSPENSION INTRAMUSCULAR ONCE
Refills: 0 | Status: DISCONTINUED | OUTPATIENT
Start: 2019-09-12 | End: 2019-09-13

## 2019-09-12 RX ADMIN — Medication 243 MILLIGRAM(S): at 10:26

## 2019-09-12 RX ADMIN — Medication 100 MILLIGRAM(S): at 10:43

## 2019-09-12 RX ADMIN — PANTOPRAZOLE SODIUM 40 MILLIGRAM(S): 20 TABLET, DELAYED RELEASE ORAL at 10:49

## 2019-09-12 RX ADMIN — GABAPENTIN 300 MILLIGRAM(S): 400 CAPSULE ORAL at 21:17

## 2019-09-12 RX ADMIN — Medication 6: at 10:44

## 2019-09-12 RX ADMIN — ATORVASTATIN CALCIUM 40 MILLIGRAM(S): 80 TABLET, FILM COATED ORAL at 21:17

## 2019-09-12 RX ADMIN — INSULIN GLARGINE 36 UNIT(S): 100 INJECTION, SOLUTION SUBCUTANEOUS at 22:10

## 2019-09-12 RX ADMIN — SODIUM CHLORIDE 75 MILLILITER(S): 9 INJECTION INTRAMUSCULAR; INTRAVENOUS; SUBCUTANEOUS at 10:27

## 2019-09-12 RX ADMIN — SODIUM CHLORIDE 100 MILLILITER(S): 9 INJECTION INTRAMUSCULAR; INTRAVENOUS; SUBCUTANEOUS at 17:17

## 2019-09-12 RX ADMIN — Medication 25 MILLIGRAM(S): at 21:18

## 2019-09-12 RX ADMIN — OXYCODONE AND ACETAMINOPHEN 1 TABLET(S): 5; 325 TABLET ORAL at 16:38

## 2019-09-12 RX ADMIN — SENNA PLUS 1 TABLET(S): 8.6 TABLET ORAL at 21:17

## 2019-09-12 RX ADMIN — OXYCODONE AND ACETAMINOPHEN 1 TABLET(S): 5; 325 TABLET ORAL at 17:17

## 2019-09-12 RX ADMIN — Medication 1: at 22:10

## 2019-09-12 RX ADMIN — RANOLAZINE 500 MILLIGRAM(S): 500 TABLET, FILM COATED, EXTENDED RELEASE ORAL at 21:17

## 2019-09-12 RX ADMIN — Medication 5 MILLIGRAM(S): at 17:13

## 2019-09-13 ENCOUNTER — TRANSCRIPTION ENCOUNTER (OUTPATIENT)
Age: 59
End: 2019-09-13

## 2019-09-13 VITALS
DIASTOLIC BLOOD PRESSURE: 66 MMHG | SYSTOLIC BLOOD PRESSURE: 116 MMHG | RESPIRATION RATE: 15 BRPM | HEART RATE: 75 BPM | OXYGEN SATURATION: 98 %

## 2019-09-13 LAB
ANION GAP SERPL CALC-SCNC: 8 MMOL/L — SIGNIFICANT CHANGE UP (ref 5–17)
BUN SERPL-MCNC: 25 MG/DL — HIGH (ref 7–23)
CALCIUM SERPL-MCNC: 8.7 MG/DL — SIGNIFICANT CHANGE UP (ref 8.4–10.5)
CHLORIDE SERPL-SCNC: 105 MMOL/L — SIGNIFICANT CHANGE UP (ref 96–108)
CO2 SERPL-SCNC: 25 MMOL/L — SIGNIFICANT CHANGE UP (ref 22–31)
CREAT SERPL-MCNC: 1.09 MG/DL — SIGNIFICANT CHANGE UP (ref 0.5–1.3)
GLUCOSE SERPL-MCNC: 141 MG/DL — HIGH (ref 70–99)
HCT VFR BLD CALC: 42.4 % — SIGNIFICANT CHANGE UP (ref 39–50)
HGB BLD-MCNC: 14.1 G/DL — SIGNIFICANT CHANGE UP (ref 13–17)
MAGNESIUM SERPL-MCNC: 2.2 MG/DL — SIGNIFICANT CHANGE UP (ref 1.6–2.6)
MCHC RBC-ENTMCNC: 30.5 PG — SIGNIFICANT CHANGE UP (ref 27–34)
MCHC RBC-ENTMCNC: 33.3 GM/DL — SIGNIFICANT CHANGE UP (ref 32–36)
MCV RBC AUTO: 91.6 FL — SIGNIFICANT CHANGE UP (ref 80–100)
NRBC # BLD: 0 /100 WBCS — SIGNIFICANT CHANGE UP (ref 0–0)
PLATELET # BLD AUTO: 179 K/UL — SIGNIFICANT CHANGE UP (ref 150–400)
POTASSIUM SERPL-MCNC: 3.7 MMOL/L — SIGNIFICANT CHANGE UP (ref 3.5–5.3)
POTASSIUM SERPL-SCNC: 3.7 MMOL/L — SIGNIFICANT CHANGE UP (ref 3.5–5.3)
RBC # BLD: 4.63 M/UL — SIGNIFICANT CHANGE UP (ref 4.2–5.8)
RBC # FLD: 12.5 % — SIGNIFICANT CHANGE UP (ref 10.3–14.5)
SODIUM SERPL-SCNC: 138 MMOL/L — SIGNIFICANT CHANGE UP (ref 135–145)
WBC # BLD: 13.18 K/UL — HIGH (ref 3.8–10.5)
WBC # FLD AUTO: 13.18 K/UL — HIGH (ref 3.8–10.5)

## 2019-09-13 PROCEDURE — 99239 HOSP IP/OBS DSCHRG MGMT >30: CPT

## 2019-09-13 PROCEDURE — 93010 ELECTROCARDIOGRAM REPORT: CPT

## 2019-09-13 RX ORDER — POTASSIUM CHLORIDE 20 MEQ
40 PACKET (EA) ORAL ONCE
Refills: 0 | Status: COMPLETED | OUTPATIENT
Start: 2019-09-13 | End: 2019-09-13

## 2019-09-13 RX ORDER — ISOSORBIDE MONONITRATE 60 MG/1
30 TABLET, EXTENDED RELEASE ORAL DAILY
Refills: 0 | Status: DISCONTINUED | OUTPATIENT
Start: 2019-09-13 | End: 2019-09-13

## 2019-09-13 RX ORDER — ISOSORBIDE MONONITRATE 60 MG/1
1 TABLET, EXTENDED RELEASE ORAL
Qty: 30 | Refills: 3
Start: 2019-09-13 | End: 2020-01-10

## 2019-09-13 RX ADMIN — ISOSORBIDE MONONITRATE 30 MILLIGRAM(S): 60 TABLET, EXTENDED RELEASE ORAL at 11:45

## 2019-09-13 RX ADMIN — Medication 40 MILLIEQUIVALENT(S): at 07:58

## 2019-09-13 RX ADMIN — CLOPIDOGREL BISULFATE 75 MILLIGRAM(S): 75 TABLET, FILM COATED ORAL at 11:45

## 2019-09-13 RX ADMIN — Medication 1: at 11:45

## 2019-09-13 RX ADMIN — Medication 81 MILLIGRAM(S): at 11:45

## 2019-09-13 RX ADMIN — Medication 25 MILLIGRAM(S): at 06:54

## 2019-09-13 RX ADMIN — RANOLAZINE 500 MILLIGRAM(S): 500 TABLET, FILM COATED, EXTENDED RELEASE ORAL at 06:54

## 2019-09-13 NOTE — DISCHARGE NOTE NURSING/CASE MANAGEMENT/SOCIAL WORK - PATIENT PORTAL LINK FT
You can access the FollowMyHealth Patient Portal offered by [facility] by registering at the following website: [email or web address]. By joining [employer]’s FollowMyHealth portal, you will also be able to view your health information using other applications (apps) compatible with our system.

## 2019-09-13 NOTE — DISCHARGE NOTE PROVIDER - HOSPITAL COURSE
58 y/o M Ex-Cocaine User (20 yrs ago), Ambulates w/ Cane w/ IV contrast allergy (reports delayed dermatitis/rash/desquamation specifically to hands), w/ PMHX HTN, HLD, DM (seen by Dr. Cage on prior admission), EDMUND on CPAP, Hiatal, CAD s/p several PCIs and h/o ISR, last cardiac cath at [facility] on 8/8/19 PCTA mLAD ISR (residual 20% stenosis), patent dLCX stent, OM1 70-80% ISR, PDA 50% ISR, EF 65%. Since his last procedure pt reports 4-5/10 SS "pressure" like C/P radiating to the back on climbing 2 flights of stairs (CCS Angina Class 3 Sx). Associated symptoms includes dizziness. No diaphoresis, N/V, syncope, palpitations, orthopnea, PND, LE edema. No current SOB. Echo in 8/2019 which showed EF 60-65%. Due to pts risk factors, CCS Angina  Class 3 Sx, h/o CAD, recurrent ISR, pt is referred for cardiac catheterization w/ brachytherapy. Pt now s/p Cardiac Cath 9/12/19 Laser/Brachytherapy/PTCA m/d LAD, EF 65% ECHO,  Angiomax, Failed right groin access site, + hematoma, compressed, Left groin 8F sheath removed without complications. Of note: 10/10 CP intra procedure, given Versed 1mg and Fentanyl 75mg (total). Post procedure endorsing 8/10 pain with Sinus tachycardia 100s treated with Lopressor 5mg IV. EKG unchanged from prior. CP improved. 58 y/o M Ex-Cocaine User (20 yrs ago), Ambulates w/ Cane w/ IV contrast allergy (reports delayed dermatitis/rash/desquamation specifically to hands), w/ PMHX HTN, HLD, DM (seen by Dr. Cage on prior admission), EDMUND on CPAP, Hiatal, CAD s/p several PCIs and h/o ISR, last cardiac cath at [facility] on 8/8/19 PCTA mLAD ISR (residual 20% stenosis), patent dLCX stent, OM1 70-80% ISR, PDA 50% ISR, EF 65%. Since his last procedure pt reports 4-5/10 SS "pressure" like C/P radiating to the back on climbing 2 flights of stairs (CCS Angina Class 3 Sx). Associated symptoms includes dizziness. No diaphoresis, N/V, syncope, palpitations, orthopnea, PND, LE edema. No current SOB. Echo in 8/2019 which showed EF 60-65%. Due to pts risk factors, CCS Angina  Class 3 Sx, h/o CAD, recurrent ISR, pt is referred for cardiac catheterization w/ brachytherapy. Pt now s/p Cardiac Cath 9/12/19 Laser/Brachytherapy/PTCA m/d LAD, EF 65% ECHO,  Angiomax, Failed right groin access site, + hematoma, compressed, Left groin 8F sheath removed without complications. Of note: 10/10 CP intra procedure, given Versed 1mg and Fentanyl 75mg (total). Post procedure endorsing 8/10 pain with Sinus tachycardia 100s treated with Lopressor 5mg IV. EKG unchanged from prior. CP improved to 4/10 on 9/13/19 AM but pt states it could be because he has back pain and did not sleep well overnight. Vital signs are stable. Labs stable. Bilateral groin sites stable. Pulses +2 b/l DPs and PTs. Physical exam otherwise wnl. Pt started on IMDUR (Isosorbide mononitrate) 30mg daily for additional antianginal support. Pt to continue taking Atorvastatin 40mg daily at bedtime for cholesterol control. Pt stable for discharge as per Dr Osman. Pt will follow up with Dr Graves in 1-2 weeks. Pt has 10 refills of Aspirin 81mg daily and Plavix 75mg daily at his [facility]

## 2019-09-13 NOTE — DISCHARGE NOTE PROVIDER - NSDCCPCAREPLAN_GEN_ALL_CORE_FT
PRINCIPAL DISCHARGE DIAGNOSIS  Diagnosis: Coronary artery disease  Assessment and Plan of Treatment: You have blockages in the arteries that give blood and oxygen to your heart. This is called CORONARY ARTERY DISEASE.      SECONDARY DISCHARGE DIAGNOSES  Diagnosis: Diabetes  Assessment and Plan of Treatment:     Diagnosis: Hyperlipidemia  Assessment and Plan of Treatment:     Diagnosis: Hypertension  Assessment and Plan of Treatment: PRINCIPAL DISCHARGE DIAGNOSIS  Diagnosis: Coronary artery disease  Assessment and Plan of Treatment: You have blockages in the arteries that give blood and oxygen to your heart. This is called CORONARY ARTERY DISEASE. You had a procedure called a CARDIAC CATHETERIZATION and received BRACHYTHERAPY to your mid/distal LEFT ANTERIOR DESCENDING heart artery. It is VERY IMPORTANT that you take ASPIRIN 81mg daily and PLAVIX (CLOPIDOGREL) 75mg daily every single day to avoid blood clots forming in your heart arteries that could give you a heart attack. RIGHT AND LEFT GROIN WOUND CARE: do not lift objects heavier than 5 pounds for 5 days. You may shower today but do not soak in water for 5 days. Observe for signs of bleeding including bleeding/sudden swelling to your right or left groin sites, new/worsening lower back pain, or numbness/tingling/pain/coolness to your legs/feet/toes. If you experience these symptoms call your doctor and go to the nearest ED immediately.      SECONDARY DISCHARGE DIAGNOSES  Diagnosis: Diabetes  Assessment and Plan of Treatment: DO NOT take METFORMIN today (Friday 9/13/19) or tomorrow (Saturday 9/14/19). RESTART your METFORMIN on EDGARDO 9/15/19. Take all of your other diabetes medications today.    Diagnosis: Hyperlipidemia  Assessment and Plan of Treatment: Continue taking your ATORVASATIN 40mg daily at bedtime for cholesterol control at home dose    Diagnosis: Hypertension  Assessment and Plan of Treatment: Continue taking your medication for blood pressure control at home dose

## 2019-09-13 NOTE — DISCHARGE NOTE PROVIDER - CARE PROVIDER_API CALL
Will Loyd)  Cardiovascular Disease; Internal Medicine; Interventional Cardiology  [address] [place] [address]  Phone: [phone]  Fax: [phone]  Follow Up Time:

## 2019-09-18 DIAGNOSIS — Z79.82 LONG TERM (CURRENT) USE OF ASPIRIN: ICD-10-CM

## 2019-09-18 DIAGNOSIS — I10 ESSENTIAL (PRIMARY) HYPERTENSION: ICD-10-CM

## 2019-09-18 DIAGNOSIS — E78.5 HYPERLIPIDEMIA, UNSPECIFIED: ICD-10-CM

## 2019-09-18 DIAGNOSIS — G47.33 OBSTRUCTIVE SLEEP APNEA (ADULT) (PEDIATRIC): ICD-10-CM

## 2019-09-18 DIAGNOSIS — Z98.1 ARTHRODESIS STATUS: ICD-10-CM

## 2019-09-18 DIAGNOSIS — E11.9 TYPE 2 DIABETES MELLITUS WITHOUT COMPLICATIONS: ICD-10-CM

## 2019-09-18 DIAGNOSIS — I25.110 ATHEROSCLEROTIC HEART DISEASE OF NATIVE CORONARY ARTERY WITH UNSTABLE ANGINA PECTORIS: ICD-10-CM

## 2019-09-18 DIAGNOSIS — Z95.5 PRESENCE OF CORONARY ANGIOPLASTY IMPLANT AND GRAFT: ICD-10-CM

## 2019-09-18 DIAGNOSIS — Z79.84 LONG TERM (CURRENT) USE OF ORAL HYPOGLYCEMIC DRUGS: ICD-10-CM

## 2019-09-18 DIAGNOSIS — N40.0 BENIGN PROSTATIC HYPERPLASIA WITHOUT LOWER URINARY TRACT SYMPTOMS: ICD-10-CM

## 2019-09-18 DIAGNOSIS — E66.9 OBESITY, UNSPECIFIED: ICD-10-CM

## 2019-10-31 PROCEDURE — 93005 ELECTROCARDIOGRAM TRACING: CPT

## 2019-10-31 PROCEDURE — 77290 THER RAD SIMULAJ FIELD CPLX: CPT

## 2019-10-31 PROCEDURE — 86140 C-REACTIVE PROTEIN: CPT

## 2019-10-31 PROCEDURE — 85610 PROTHROMBIN TIME: CPT

## 2019-10-31 PROCEDURE — 77470 SPECIAL RADIATION TREATMENT: CPT

## 2019-10-31 PROCEDURE — 83735 ASSAY OF MAGNESIUM: CPT

## 2019-10-31 PROCEDURE — 36415 COLL VENOUS BLD VENIPUNCTURE: CPT

## 2019-10-31 PROCEDURE — 80048 BASIC METABOLIC PNL TOTAL CA: CPT

## 2019-10-31 PROCEDURE — C1887: CPT

## 2019-10-31 PROCEDURE — C1717: CPT

## 2019-10-31 PROCEDURE — C1725: CPT

## 2019-10-31 PROCEDURE — 85730 THROMBOPLASTIN TIME PARTIAL: CPT

## 2019-10-31 PROCEDURE — 85025 COMPLETE CBC W/AUTO DIFF WBC: CPT

## 2019-10-31 PROCEDURE — C1769: CPT

## 2019-10-31 PROCEDURE — 85027 COMPLETE CBC AUTOMATED: CPT

## 2019-10-31 PROCEDURE — 82553 CREATINE MB FRACTION: CPT

## 2019-10-31 PROCEDURE — C1894: CPT

## 2019-10-31 PROCEDURE — 80053 COMPREHEN METABOLIC PANEL: CPT

## 2019-10-31 PROCEDURE — C1889: CPT

## 2019-10-31 PROCEDURE — 77770 HDR RDNCL NTRSTL/ICAV BRCHTX: CPT

## 2019-10-31 PROCEDURE — 82550 ASSAY OF CK (CPK): CPT

## 2019-10-31 PROCEDURE — 82962 GLUCOSE BLOOD TEST: CPT

## 2019-10-31 PROCEDURE — C1728: CPT

## 2019-10-31 PROCEDURE — C1760: CPT

## 2019-10-31 PROCEDURE — C1885: CPT

## 2019-10-31 PROCEDURE — C1753: CPT

## 2019-10-31 PROCEDURE — 80061 LIPID PANEL: CPT

## 2019-10-31 PROCEDURE — 83036 HEMOGLOBIN GLYCOSYLATED A1C: CPT

## 2019-10-31 PROCEDURE — 77316 BRACHYTX ISODOSE PLAN SIMPLE: CPT

## 2020-02-17 NOTE — PATIENT PROFILE ADULT. - SOURCE OF INFORMATION, PROFILE
Mr. Karina Gonzales is a 62y/o male with a PMH of ESRD, CKD V, type II DM, CAD, chronic systolic HF, dilated cardiomyopathy, PNA, renal cell carcinoma, Gastroenteritis, HLD, HTN, pancreatic pseudocyst, pleural effusion, nephrectomy, and AV fistula formation; According to the OSH, the patient c/o feeling weak and unwell. Manuela Cantu was recently dx w/ PNA and on Abx therapy.  Per OSH ED note, the wife went to get the patient something to eat and when she returned she found him slumped over his walker and called Leonie Roth presented to the ED at [facility] via EMS after diversion d/t acute AMS and HTN crisis, concern for ICH.  The patient developed SZ like activity then became pulseless in PEA/Asystole.  CPR was initiated and the patient was given 1mg Epinephrine w/ ROSC PTA to the ED.  
 
2/17- still intubated on mechanical ventilation Patient Vitals for the past 24 hrs: 
 Temp Pulse Resp BP SpO2  
02/17/20 1115  98 16 155/82 97 % 02/17/20 1112    122/72   
02/17/20 1108    94/59   
02/17/20 1105    (!) 88/59   
02/17/20 1100  (!) 115 18 (!) 74/60 97 % 02/17/20 1045  (!) 110 15 123/73 98 % 02/17/20 1030  (!) 104 16 132/80 97 % 02/17/20 1015  99 16 145/66 98 % 02/17/20 1010 98.1 °F (36.7 °C) 98 17 155/81 98 % 02/17/20 1002  (!) 102 26  94 % 02/17/20 1000  100 20 111/63 95 % 02/17/20 0900  92 12 142/81 98 % 02/17/20 0800 (!) 101 °F (38.3 °C) 93 14 143/66 97 % 02/17/20 0630  96 16 129/69 97 % 02/17/20 0600  92 15 131/67 97 % 02/17/20 0530  88 15 133/65 97 % 02/17/20 0500  83 15 119/62 96 % 02/17/20 0430  85 13 123/65 97 % 02/17/20 0400 99.6 °F (37.6 °C) 91 15 128/69 96 % 02/17/20 0330  89 15 127/73 97 % 02/17/20 0310  86 15  98 % 02/17/20 0300  84 15 121/67 97 % 02/17/20 0230  79 15 123/61 99 % 02/17/20 0200  79 15 116/62 99 % 02/17/20 0130  84 15 109/64 99 % 02/17/20 0100  85 15 118/60 98 % 02/17/20 0030  83 15 112/60 99 % 02/17/20 0000 100 °F (37.8 °C) 78 15 101/59 100 % 02/16/20 2325  95 15  (!) 82 % 02/16/20 2300  95 15 116/61 96 % 02/16/20 2200  97 15 132/63 99 % 02/16/20 2115  95 15 119/63 98 % 02/16/20 2027  (!) 104 15  98 % 02/16/20 2000 100.4 °F (38 °C) (!) 103 15 105/67 97 % 02/16/20 1800  93 15 114/70 97 % 02/16/20 1700  97 15 142/82 97 % 02/16/20 1600 99 °F (37.2 °C) (!) 109 15 108/56 95 % 02/16/20 1544  (!) 110 15  95 % 02/16/20 1526  (!) 113  97/63   
02/16/20 1500  (!) 122 16 (!) 82/56 95 % 02/16/20 1400  (!) 116 21 (!) 138/97 95 % 02/16/20 1300  (!) 102 15 120/67 96 % 02/16/20 1200 97.7 °F (36.5 °C) 96 17 132/74 94 % Physical exam 
General-intubated, NAD, off sedation Neuro-initiates breaths, weak cough on deep suctioning, pupils reactive, does not withdraw to noxious stimuli Cardiac-RRR Lungs-clear Abdomen-soft, distended, and seemingly nontender Extremities-warm Recent Results (from the past 24 hour(s)) POC EG7 Collection Time: 02/16/20 12:48 PM  
Result Value Ref Range Calcium, ionized (POC) 1.11 (L) 1.12 - 1.32 mmol/L  
 FIO2 (POC) 50 % pH (POC) 7.442 7.35 - 7.45    
 pCO2 (POC) 36.9 35.0 - 45.0 MMHG  
 pO2 (POC) 86 80 - 100 MMHG  
 HCO3 (POC) 25.2 22 - 26 MMOL/L Base excess (POC) 1 mmol/L  
 sO2 (POC) 97 92 - 97 % Site RIGHT RADIAL Device: VENT Mode ASSIST CONTROL Set Rate 15 bpm  
 PEEP/CPAP (POC) 5 cmH2O Allens test (POC) N/A Specimen type (POC) ARTERIAL Total resp. rate 15 GLUCOSE, POC Collection Time: 02/16/20  5:50 PM  
Result Value Ref Range Glucose (POC) 209 (H) 65 - 100 mg/dL Performed by [address] [place] [address], POC Collection Time: 02/16/20 11:59 PM  
Result Value Ref Range Glucose (POC) 201 (H) 65 - 100 mg/dL Performed by Aiden Post METABOLIC PANEL, BASIC Collection Time: 02/17/20  5:06 AM  
Result Value Ref Range  Sodium 130 (L) 136 - 145 mmol/L  
 Potassium 5.7 (H) 3.5 - 5.1 mmol/L Chloride 98 97 - 108 mmol/L  
 CO2 24 21 - 32 mmol/L Anion gap 8 5 - 15 mmol/L Glucose 191 (H) 65 - 100 mg/dL BUN 64 (H) 6 - 20 MG/DL Creatinine 5.55 (H) 0.70 - 1.30 MG/DL  
 BUN/Creatinine ratio 12 12 - 20 GFR est AA 13 (L) >60 ml/min/1.73m2 GFR est non-AA 11 (L) >60 ml/min/1.73m2 Calcium 8.1 (L) 8.5 - 10.1 MG/DL MAGNESIUM Collection Time: 02/17/20  5:06 AM  
Result Value Ref Range Magnesium 2.6 (H) 1.6 - 2.4 mg/dL PHOSPHORUS Collection Time: 02/17/20  5:06 AM  
Result Value Ref Range Phosphorus 3.5 2.6 - 4.7 MG/DL  
CBC W/O DIFF Collection Time: 02/17/20  5:06 AM  
Result Value Ref Range WBC 13.0 (H) 4.1 - 11.1 K/uL  
 RBC 3.23 (L) 4.10 - 5.70 M/uL HGB 7.7 (L) 12.1 - 17.0 g/dL HCT 23.4 (L) 36.6 - 50.3 % MCV 72.4 (L) 80.0 - 99.0 FL  
 MCH 23.8 (L) 26.0 - 34.0 PG  
 MCHC 32.9 30.0 - 36.5 g/dL  
 RDW 16.4 (H) 11.5 - 14.5 % PLATELET 361 609 - 286 K/uL MPV 11.2 8.9 - 12.9 FL  
 NRBC 0.0 0  WBC ABSOLUTE NRBC 0.00 0.00 - 0.01 K/uL HEPATIC FUNCTION PANEL Collection Time: 02/17/20  5:06 AM  
Result Value Ref Range Protein, total 8.7 (H) 6.4 - 8.2 g/dL Albumin 1.6 (L) 3.5 - 5.0 g/dL Globulin 7.1 (H) 2.0 - 4.0 g/dL A-G Ratio 0.2 (L) 1.1 - 2.2 Bilirubin, total 0.5 0.2 - 1.0 MG/DL Bilirubin, direct 0.2 0.0 - 0.2 MG/DL Alk. phosphatase 113 45 - 117 U/L  
 AST (SGOT) 11 (L) 15 - 37 U/L  
 ALT (SGPT) 14 12 - 78 U/L  
GLUCOSE, POC Collection Time: 02/17/20  5:58 AM  
Result Value Ref Range Glucose (POC) 207 (H) 65 - 100 mg/dL Performed by [facility] reviewed Plan Pain control as needed, avoid benzodiazepines, other delirium prevention strategies, MRI done on 8 February showed multiple small bilateral symmetrical infarcts in the deep white matter of the cerebral hemispheres and in the cerebellum consistent with recent hypoxic ischemic injury 
  
 Continue hemodynamic monitoring, map goal greater than 65, SBP goal less than 150, continue midodrine therapy 
  
Continue lung protective strategies on the ventilator, serial chest x-rays and ABGs as indicated, daily breathing trials-at this point in time mental status precludes extubation, if family wants to continue with aggressive medical therapy will need a tracheostomy 
  
Continue tube feeds, chronic diarrhea-getting better per reports, continue Creon, will need a PEG if continuing with aggressive medical therapy 
  
ESRD on HD-getting a session today, f/u renal recommendations, daily BMPs, correct electrolyte derangements as needed 
  
Daily CBCs, continue epoetin 
  
Continue vancomycin for MRSA PNA- d/c cefepime for now- keep trending PCT levels, WBC better, still spiking temps 
  
Keep glucose less than 180 with subcutaneous insulin/dextrose as needed Lines-peripheral IVs 
  
DVT prophylaxis-SCDs, heparin GI prophylaxis-lansoprazole 
  
Critical care time-40-minute patient

## 2020-09-01 ENCOUNTER — OUTPATIENT (OUTPATIENT)
Dept: OUTPATIENT SERVICES | Facility: HOSPITAL | Age: 60
LOS: 1 days | End: 2020-09-01
Payer: MEDICAID

## 2020-09-01 DIAGNOSIS — Z98.89 OTHER SPECIFIED POSTPROCEDURAL STATES: Chronic | ICD-10-CM

## 2020-09-01 DIAGNOSIS — S69.90XA UNSPECIFIED INJURY OF UNSPECIFIED WRIST, HAND AND FINGER(S), INITIAL ENCOUNTER: Chronic | ICD-10-CM

## 2020-09-01 DIAGNOSIS — Z98.1 ARTHRODESIS STATUS: Chronic | ICD-10-CM

## 2020-09-17 ENCOUNTER — INPATIENT (INPATIENT)
Facility: HOSPITAL | Age: 60
LOS: 0 days | Discharge: HOME | DRG: 251 | End: 2020-09-18
Attending: HOSPITALIST | Admitting: HOSPITALIST
Payer: COMMERCIAL

## 2020-09-17 ENCOUNTER — TRANSCRIPTION ENCOUNTER (OUTPATIENT)
Age: 60
End: 2020-09-17

## 2020-09-17 VITALS
HEART RATE: 91 BPM | DIASTOLIC BLOOD PRESSURE: 84 MMHG | SYSTOLIC BLOOD PRESSURE: 136 MMHG | OXYGEN SATURATION: 100 % | TEMPERATURE: 98 F | HEIGHT: 68 IN | RESPIRATION RATE: 16 BRPM | WEIGHT: 199.96 LBS

## 2020-09-17 DIAGNOSIS — Z98.1 ARTHRODESIS STATUS: Chronic | ICD-10-CM

## 2020-09-17 DIAGNOSIS — S69.90XA UNSPECIFIED INJURY OF UNSPECIFIED WRIST, HAND AND FINGER(S), INITIAL ENCOUNTER: Chronic | ICD-10-CM

## 2020-09-17 DIAGNOSIS — Z98.89 OTHER SPECIFIED POSTPROCEDURAL STATES: Chronic | ICD-10-CM

## 2020-09-17 LAB
GLUCOSE BLDC GLUCOMTR-MCNC: 154 MG/DL — HIGH (ref 70–99)
GLUCOSE BLDC GLUCOMTR-MCNC: 187 MG/DL — HIGH (ref 70–99)
GLUCOSE BLDC GLUCOMTR-MCNC: 296 MG/DL — HIGH (ref 70–99)

## 2020-09-17 PROCEDURE — 92978 ENDOLUMINL IVUS OCT C 1ST: CPT | Mod: 26,LD

## 2020-09-17 PROCEDURE — 93010 ELECTROCARDIOGRAM REPORT: CPT

## 2020-09-17 PROCEDURE — 92920 PRQ TRLUML C ANGIOP 1ART&/BR: CPT | Mod: LD

## 2020-09-17 PROCEDURE — 93458 L HRT ARTERY/VENTRICLE ANGIO: CPT | Mod: 26,59

## 2020-09-17 PROCEDURE — 99223 1ST HOSP IP/OBS HIGH 75: CPT

## 2020-09-17 RX ORDER — DIPHENHYDRAMINE HCL 50 MG
25 CAPSULE ORAL ONCE
Refills: 0 | Status: DISCONTINUED | OUTPATIENT
Start: 2020-09-17 | End: 2020-09-18

## 2020-09-17 RX ORDER — HYDROCORTISONE 20 MG
200 TABLET ORAL ONCE
Refills: 0 | Status: COMPLETED | OUTPATIENT
Start: 2020-09-17 | End: 2020-09-17

## 2020-09-17 RX ORDER — INSULIN GLARGINE 100 [IU]/ML
15 INJECTION, SOLUTION SUBCUTANEOUS AT BEDTIME
Refills: 0 | Status: DISCONTINUED | OUTPATIENT
Start: 2020-09-17 | End: 2020-09-18

## 2020-09-17 RX ORDER — METOPROLOL TARTRATE 50 MG
25 TABLET ORAL
Refills: 0 | Status: DISCONTINUED | OUTPATIENT
Start: 2020-09-17 | End: 2020-09-18

## 2020-09-17 RX ORDER — LISINOPRIL 2.5 MG/1
20 TABLET ORAL DAILY
Refills: 0 | Status: DISCONTINUED | OUTPATIENT
Start: 2020-09-18 | End: 2020-09-18

## 2020-09-17 RX ORDER — GLUCAGON INJECTION, SOLUTION 0.5 MG/.1ML
1 INJECTION, SOLUTION SUBCUTANEOUS ONCE
Refills: 0 | Status: DISCONTINUED | OUTPATIENT
Start: 2020-09-17 | End: 2020-09-18

## 2020-09-17 RX ORDER — PANTOPRAZOLE SODIUM 20 MG/1
40 TABLET, DELAYED RELEASE ORAL
Refills: 0 | Status: DISCONTINUED | OUTPATIENT
Start: 2020-09-17 | End: 2020-09-18

## 2020-09-17 RX ORDER — ASPIRIN/CALCIUM CARB/MAGNESIUM 324 MG
81 TABLET ORAL DAILY
Refills: 0 | Status: DISCONTINUED | OUTPATIENT
Start: 2020-09-18 | End: 2020-09-18

## 2020-09-17 RX ORDER — TRAZODONE HCL 50 MG
50 TABLET ORAL AT BEDTIME
Refills: 0 | Status: DISCONTINUED | OUTPATIENT
Start: 2020-09-17 | End: 2020-09-18

## 2020-09-17 RX ORDER — SENNA PLUS 8.6 MG/1
1 TABLET ORAL
Qty: 0 | Refills: 0 | DISCHARGE

## 2020-09-17 RX ORDER — ATORVASTATIN CALCIUM 80 MG/1
40 TABLET, FILM COATED ORAL AT BEDTIME
Refills: 0 | Status: DISCONTINUED | OUTPATIENT
Start: 2020-09-17 | End: 2020-09-18

## 2020-09-17 RX ORDER — RANOLAZINE 500 MG/1
1 TABLET, FILM COATED, EXTENDED RELEASE ORAL
Qty: 0 | Refills: 0 | DISCHARGE

## 2020-09-17 RX ORDER — INSULIN LISPRO 100/ML
VIAL (ML) SUBCUTANEOUS
Refills: 0 | Status: DISCONTINUED | OUTPATIENT
Start: 2020-09-17 | End: 2020-09-18

## 2020-09-17 RX ORDER — DEXTROSE 50 % IN WATER 50 %
15 SYRINGE (ML) INTRAVENOUS ONCE
Refills: 0 | Status: DISCONTINUED | OUTPATIENT
Start: 2020-09-17 | End: 2020-09-18

## 2020-09-17 RX ORDER — INSULIN GLARGINE 100 [IU]/ML
25 INJECTION, SOLUTION SUBCUTANEOUS AT BEDTIME
Refills: 0 | Status: DISCONTINUED | OUTPATIENT
Start: 2020-09-17 | End: 2020-09-17

## 2020-09-17 RX ORDER — CHLORHEXIDINE GLUCONATE 213 G/1000ML
1 SOLUTION TOPICAL ONCE
Refills: 0 | Status: DISCONTINUED | OUTPATIENT
Start: 2020-09-17 | End: 2020-09-18

## 2020-09-17 RX ORDER — POTASSIUM CHLORIDE 20 MEQ
20 PACKET (EA) ORAL ONCE
Refills: 0 | Status: COMPLETED | OUTPATIENT
Start: 2020-09-17 | End: 2020-09-17

## 2020-09-17 RX ORDER — DEXTROSE 50 % IN WATER 50 %
12.5 SYRINGE (ML) INTRAVENOUS ONCE
Refills: 0 | Status: DISCONTINUED | OUTPATIENT
Start: 2020-09-17 | End: 2020-09-18

## 2020-09-17 RX ORDER — SODIUM CHLORIDE 9 MG/ML
1000 INJECTION, SOLUTION INTRAVENOUS
Refills: 0 | Status: DISCONTINUED | OUTPATIENT
Start: 2020-09-17 | End: 2020-09-18

## 2020-09-17 RX ORDER — DEXTROSE 50 % IN WATER 50 %
25 SYRINGE (ML) INTRAVENOUS ONCE
Refills: 0 | Status: DISCONTINUED | OUTPATIENT
Start: 2020-09-17 | End: 2020-09-18

## 2020-09-17 RX ORDER — INSULIN LISPRO 100/ML
8 VIAL (ML) SUBCUTANEOUS
Refills: 0 | Status: DISCONTINUED | OUTPATIENT
Start: 2020-09-17 | End: 2020-09-18

## 2020-09-17 RX ORDER — SODIUM CHLORIDE 9 MG/ML
500 INJECTION INTRAMUSCULAR; INTRAVENOUS; SUBCUTANEOUS
Refills: 0 | Status: DISCONTINUED | OUTPATIENT
Start: 2020-09-17 | End: 2020-09-18

## 2020-09-17 RX ORDER — GABAPENTIN 400 MG/1
300 CAPSULE ORAL AT BEDTIME
Refills: 0 | Status: DISCONTINUED | OUTPATIENT
Start: 2020-09-17 | End: 2020-09-18

## 2020-09-17 RX ORDER — SODIUM CHLORIDE 9 MG/ML
500 INJECTION INTRAMUSCULAR; INTRAVENOUS; SUBCUTANEOUS
Refills: 0 | Status: DISCONTINUED | OUTPATIENT
Start: 2020-09-17 | End: 2020-09-17

## 2020-09-17 RX ORDER — INFLUENZA VIRUS VACCINE 15; 15; 15; 15 UG/.5ML; UG/.5ML; UG/.5ML; UG/.5ML
0.5 SUSPENSION INTRAMUSCULAR ONCE
Refills: 0 | Status: DISCONTINUED | OUTPATIENT
Start: 2020-09-17 | End: 2020-09-18

## 2020-09-17 RX ORDER — CLOPIDOGREL BISULFATE 75 MG/1
75 TABLET, FILM COATED ORAL DAILY
Refills: 0 | Status: DISCONTINUED | OUTPATIENT
Start: 2020-09-18 | End: 2020-09-18

## 2020-09-17 RX ORDER — MORPHINE SULFATE 50 MG/1
2 CAPSULE, EXTENDED RELEASE ORAL ONCE
Refills: 0 | Status: DISCONTINUED | OUTPATIENT
Start: 2020-09-17 | End: 2020-09-17

## 2020-09-17 RX ORDER — LISINOPRIL 2.5 MG/1
1 TABLET ORAL
Qty: 0 | Refills: 0 | DISCHARGE

## 2020-09-17 RX ORDER — HYDROCHLOROTHIAZIDE 25 MG
25 TABLET ORAL DAILY
Refills: 0 | Status: DISCONTINUED | OUTPATIENT
Start: 2020-09-18 | End: 2020-09-18

## 2020-09-17 RX ORDER — ASPIRIN/CALCIUM CARB/MAGNESIUM 324 MG
325 TABLET ORAL ONCE
Refills: 0 | Status: COMPLETED | OUTPATIENT
Start: 2020-09-17 | End: 2020-09-17

## 2020-09-17 RX ADMIN — INSULIN GLARGINE 15 UNIT(S): 100 INJECTION, SOLUTION SUBCUTANEOUS at 21:40

## 2020-09-17 RX ADMIN — GABAPENTIN 300 MILLIGRAM(S): 400 CAPSULE ORAL at 21:31

## 2020-09-17 RX ADMIN — Medication 325 MILLIGRAM(S): at 13:36

## 2020-09-17 RX ADMIN — Medication 10 MILLIGRAM(S): at 21:31

## 2020-09-17 RX ADMIN — Medication 6: at 21:38

## 2020-09-17 RX ADMIN — Medication 20 MILLIEQUIVALENT(S): at 13:40

## 2020-09-17 RX ADMIN — ATORVASTATIN CALCIUM 40 MILLIGRAM(S): 80 TABLET, FILM COATED ORAL at 21:31

## 2020-09-17 RX ADMIN — Medication 25 MILLIGRAM(S): at 21:31

## 2020-09-17 RX ADMIN — MORPHINE SULFATE 2 MILLIGRAM(S): 50 CAPSULE, EXTENDED RELEASE ORAL at 16:32

## 2020-09-17 RX ADMIN — MORPHINE SULFATE 2 MILLIGRAM(S): 50 CAPSULE, EXTENDED RELEASE ORAL at 16:33

## 2020-09-17 RX ADMIN — Medication 50 MILLIGRAM(S): at 21:31

## 2020-09-17 RX ADMIN — SODIUM CHLORIDE 75 MILLILITER(S): 9 INJECTION INTRAMUSCULAR; INTRAVENOUS; SUBCUTANEOUS at 13:36

## 2020-09-17 RX ADMIN — SODIUM CHLORIDE 100 MILLILITER(S): 9 INJECTION INTRAMUSCULAR; INTRAVENOUS; SUBCUTANEOUS at 16:33

## 2020-09-17 NOTE — H&P ADULT - ASSESSMENT
59 y/o male with PMHx of HTN, HLD, BPH, fatty liver disease,  IDDM  EDMUND on CPAP, CAD (multiple PCIs w/ brachytherapy in 9/2019) who In light of patients risk factors, CCS class IV and history of known CAD with ISR in the past the patient is now reffered for cardiac catheterization with possible intervention    ASA:   Mallampati:     H/H 16.3/ 48.2  WBC 14.5  Cr: 0.99  K: 3.9    Patient is a suitable candidate for moderate sediation   Risks & benefits of procedure and alternative therapy have been explained to the patient including but not limited to: allergic reaction, bleeding w/possible need for blood transfusion, infection, renal and vascular compromise, limb damage, arrhythmia, stroke, vessel dissection/perforation, Myocardial infarction, emergent CABG. Informed consent obtained and in chart. 61 y/o male CONTRAST DYE ALLERGY  with PMHx of HTN, HLD, BPH, fatty liver disease,  IDDM  EDMUND on CPAP, CAD (multiple PCIs w/ brachytherapy in 9/2019) who In light of patients risk factors, CCS class IV and history of known CAD with ISR in the past the patient is now reffered for cardiac catheterization with possible intervention    ASA: III  Mallampati: III  EKG: NSR at 91bpm with TWI in II, III, and AVF     H/H 16.3/ 48.2  WBC 14.5  Cr: 0.99  K: 3.9    Patient took prednisone 50mg x 3 dose, 11AM 9/16, 11pm 9/16 and 12pm on 9/17  Patient has also taken 3 doses of benadryl 25mg PO, last dose was at 12:30 pm today      Patient takes plavix 75mg daily, last dose this AM patient not on Aspirin at home, loaded with Aspirin 325mg x 1 prior to procedure    Patient is a suitable candidate for moderate sediation   Risks & benefits of procedure and alternative therapy have been explained to the patient including but not limited to: allergic reaction, bleeding w/possible need for blood transfusion, infection, renal and vascular compromise, limb damage, arrhythmia, stroke, vessel dissection/perforation, Myocardial infarction, emergent CABG. Informed consent obtained and in chart.

## 2020-09-17 NOTE — H&P ADULT - HISTORY OF PRESENT ILLNESS
Pharmacy:  COVID NEG in [facility]    59 y/o male with PMHx of HTN, HLD, BPH, CKD fatty liver disease,  IDDM  EDMUND on CPAP, CAD (multiple PCIs w/ brachytherapy in 9/2019) Who presented to his cardiologist Dr Graves endorsing worsening 8/10 diffuse chest pain with exertion with 1 block associated with dyspnea and epigastric discomfort. The pain started 2 months is described as tightness and radiates into b/l arm heaviness. The pain resolves at rest. The patient endorses the pain has been signicantly worse over the last 2 days, which was concerning to him. The pain is associated with shortness of breath, dizziness, palpitations and fatigue. Patient denies diaphoresis, LE edema, orthopnea, and syncope.  Echocardiogram from MD note: EF 55-60%, Grade 2 diastolic dysfunction, mild aortic sclerosis, mild LA enlargement, trace MR, TR. In light of patients risk factors, CCS class IV and history of known CAD with ISR in the past the patient is now reffered for cardiac catheterization with possible intervention.   Cardiac Cath 9/12/19 Laser/Brachytherapy/PTCA m/d LAD, EF 65%  Cardiac Cath on 8/8/19 PCTA mLAD ISR (residual 20% stenosis), patent dLCX stent, OM1 70-80% ISR, PDA 50% ISR, EF 65% with plan for return for brachytherapy.    Pharmacy:  COVID NEG in [facility]    61 y/o male with PMHx of HTN, HLD, BPH, fatty liver disease,  IDDM  EDMUND on CPAP, CAD (multiple PCIs w/ brachytherapy in 9/2019) Who presented to his cardiologist Dr Graves endorsing worsening 8/10 diffuse chest pain with exertion with 1 block associated with dyspnea and epigastric discomfort. The pain started 2 months is described as tightness and radiates into b/l arm heaviness. The pain resolves at rest. The patient endorses the pain has been signicantly worse over the last 2 days, which was concerning to him. The pain is associated with shortness of breath, dizziness, palpitations and fatigue. Patient denies diaphoresis, LE edema, orthopnea, and syncope.  Echocardiogram from MD note: EF 55-60%, Grade 2 diastolic dysfunction, mild aortic sclerosis, mild LA enlargement, trace MR, TR. In light of patients risk factors, CCS class IV and history of known CAD with ISR in the past the patient is now reffered for cardiac catheterization with possible intervention.   Cardiac Cath 9/12/19 Laser/Brachytherapy/PTCA m/d LAD, EF 65%  Cardiac Cath on 8/8/19 PCTA mLAD ISR (residual 20% stenosis), patent dLCX stent, OM1 70-80% ISR, PDA 50% ISR, EF 65% with plan for return for brachytherapy.    Pharmacy: [facility] [place] [facility]   COVID NEG in [facility]  Cardiologist: Dr. Graves  59 y/o male with CONTRAST DYE ALLERGY with PMHx of HTN, HLD, BPH, fatty liver disease,  IDDM  EDMUND on CPAP, CAD (multiple PCIs w/ brachytherapy in 9/2019) Who presented to his cardiologist Dr Graves endorsing worsening 8/10 diffuse chest pain with exertion with 1 block associated with dyspnea and epigastric discomfort. The pain started 2 months is described as tightness and radiates into b/l arm heaviness. The pain resolves at rest. The patient endorses the pain has been signicantly worse over the last 2 days, which was concerning to him. The pain is associated with shortness of breath, dizziness, palpitations and fatigue. Patient denies diaphoresis, LE edema, orthopnea, and syncope.  Echocardiogram from MD note: EF 55-60%, Grade 2 diastolic dysfunction, mild aortic sclerosis, mild LA enlargement, trace MR, TR. In light of patients risk factors, CCS class IV and history of known CAD with ISR in the past the patient is now reffered for cardiac catheterization with possible intervention.   Cardiac Cath 9/12/19 Laser/Brachytherapy/PTCA m/d LAD, EF 65%  Cardiac Cath on 8/8/19 PCTA mLAD ISR (residual 20% stenosis), patent dLCX stent, OM1 70-80% ISR, PDA 50% ISR, EF 65% with plan for return for brachytherapy.

## 2020-09-18 ENCOUNTER — TRANSCRIPTION ENCOUNTER (OUTPATIENT)
Age: 60
End: 2020-09-18

## 2020-09-18 VITALS — TEMPERATURE: 97 F

## 2020-09-18 LAB
ANION GAP SERPL CALC-SCNC: 10 MMOL/L — SIGNIFICANT CHANGE UP (ref 5–17)
BASOPHILS # BLD AUTO: 0.03 K/UL — SIGNIFICANT CHANGE UP (ref 0–0.2)
BASOPHILS NFR BLD AUTO: 0.3 % — SIGNIFICANT CHANGE UP (ref 0–2)
BUN SERPL-MCNC: 20 MG/DL — SIGNIFICANT CHANGE UP (ref 7–23)
CALCIUM SERPL-MCNC: 9.2 MG/DL — SIGNIFICANT CHANGE UP (ref 8.4–10.5)
CHLORIDE SERPL-SCNC: 104 MMOL/L — SIGNIFICANT CHANGE UP (ref 96–108)
CO2 SERPL-SCNC: 26 MMOL/L — SIGNIFICANT CHANGE UP (ref 22–31)
CREAT SERPL-MCNC: 1.11 MG/DL — SIGNIFICANT CHANGE UP (ref 0.5–1.3)
EOSINOPHIL # BLD AUTO: 0.02 K/UL — SIGNIFICANT CHANGE UP (ref 0–0.5)
EOSINOPHIL NFR BLD AUTO: 0.2 % — SIGNIFICANT CHANGE UP (ref 0–6)
GLUCOSE BLDC GLUCOMTR-MCNC: 127 MG/DL — HIGH (ref 70–99)
GLUCOSE BLDC GLUCOMTR-MCNC: 226 MG/DL — HIGH (ref 70–99)
GLUCOSE SERPL-MCNC: 152 MG/DL — HIGH (ref 70–99)
HCT VFR BLD CALC: 43.5 % — SIGNIFICANT CHANGE UP (ref 39–50)
HGB BLD-MCNC: 14.4 G/DL — SIGNIFICANT CHANGE UP (ref 13–17)
IMM GRANULOCYTES NFR BLD AUTO: 0.5 % — SIGNIFICANT CHANGE UP (ref 0–1.5)
LYMPHOCYTES # BLD AUTO: 1.84 K/UL — SIGNIFICANT CHANGE UP (ref 1–3.3)
LYMPHOCYTES # BLD AUTO: 16.1 % — SIGNIFICANT CHANGE UP (ref 13–44)
MAGNESIUM SERPL-MCNC: 2.3 MG/DL — SIGNIFICANT CHANGE UP (ref 1.6–2.6)
MCHC RBC-ENTMCNC: 30.5 PG — SIGNIFICANT CHANGE UP (ref 27–34)
MCHC RBC-ENTMCNC: 33.1 GM/DL — SIGNIFICANT CHANGE UP (ref 32–36)
MCV RBC AUTO: 92.2 FL — SIGNIFICANT CHANGE UP (ref 80–100)
MONOCYTES # BLD AUTO: 1.13 K/UL — HIGH (ref 0–0.9)
MONOCYTES NFR BLD AUTO: 9.9 % — SIGNIFICANT CHANGE UP (ref 2–14)
NEUTROPHILS # BLD AUTO: 8.37 K/UL — HIGH (ref 1.8–7.4)
NEUTROPHILS NFR BLD AUTO: 73 % — SIGNIFICANT CHANGE UP (ref 43–77)
NRBC # BLD: 0 /100 WBCS — SIGNIFICANT CHANGE UP (ref 0–0)
PLATELET # BLD AUTO: 163 K/UL — SIGNIFICANT CHANGE UP (ref 150–400)
POTASSIUM SERPL-MCNC: 3.7 MMOL/L — SIGNIFICANT CHANGE UP (ref 3.5–5.3)
POTASSIUM SERPL-SCNC: 3.7 MMOL/L — SIGNIFICANT CHANGE UP (ref 3.5–5.3)
RBC # BLD: 4.72 M/UL — SIGNIFICANT CHANGE UP (ref 4.2–5.8)
RBC # FLD: 12.8 % — SIGNIFICANT CHANGE UP (ref 10.3–14.5)
SODIUM SERPL-SCNC: 140 MMOL/L — SIGNIFICANT CHANGE UP (ref 135–145)
WBC # BLD: 11.45 K/UL — HIGH (ref 3.8–10.5)
WBC # FLD AUTO: 11.45 K/UL — HIGH (ref 3.8–10.5)

## 2020-09-18 PROCEDURE — 93010 ELECTROCARDIOGRAM REPORT: CPT

## 2020-09-18 PROCEDURE — 99239 HOSP IP/OBS DSCHRG MGMT >30: CPT

## 2020-09-18 RX ORDER — POTASSIUM CHLORIDE 20 MEQ
40 PACKET (EA) ORAL ONCE
Refills: 0 | Status: COMPLETED | OUTPATIENT
Start: 2020-09-18 | End: 2020-09-18

## 2020-09-18 RX ORDER — ATORVASTATIN CALCIUM 80 MG/1
1 TABLET, FILM COATED ORAL
Qty: 30 | Refills: 6
Start: 2020-09-18 | End: 2021-04-15

## 2020-09-18 RX ORDER — METOPROLOL TARTRATE 50 MG
50 TABLET ORAL DAILY
Refills: 0 | Status: DISCONTINUED | OUTPATIENT
Start: 2020-09-18 | End: 2020-09-18

## 2020-09-18 RX ORDER — ASPIRIN/CALCIUM CARB/MAGNESIUM 324 MG
1 TABLET ORAL
Qty: 30 | Refills: 11
Start: 2020-09-18 | End: 2021-09-12

## 2020-09-18 RX ORDER — CLOPIDOGREL BISULFATE 75 MG/1
1 TABLET, FILM COATED ORAL
Qty: 30 | Refills: 11
Start: 2020-09-18 | End: 2021-09-12

## 2020-09-18 RX ORDER — MORPHINE SULFATE 50 MG/1
1 CAPSULE, EXTENDED RELEASE ORAL ONCE
Refills: 0 | Status: DISCONTINUED | OUTPATIENT
Start: 2020-09-18 | End: 2020-09-18

## 2020-09-18 RX ORDER — METOPROLOL TARTRATE 50 MG
1 TABLET ORAL
Qty: 30 | Refills: 6
Start: 2020-09-18 | End: 2021-04-15

## 2020-09-18 RX ADMIN — Medication 8 UNIT(S): at 08:18

## 2020-09-18 RX ADMIN — PANTOPRAZOLE SODIUM 40 MILLIGRAM(S): 20 TABLET, DELAYED RELEASE ORAL at 05:45

## 2020-09-18 RX ADMIN — MORPHINE SULFATE 1 MILLIGRAM(S): 50 CAPSULE, EXTENDED RELEASE ORAL at 06:19

## 2020-09-18 RX ADMIN — Medication 4: at 12:26

## 2020-09-18 RX ADMIN — Medication 25 MILLIGRAM(S): at 11:17

## 2020-09-18 RX ADMIN — Medication 40 MILLIEQUIVALENT(S): at 12:16

## 2020-09-18 RX ADMIN — Medication 8 UNIT(S): at 12:25

## 2020-09-18 RX ADMIN — CLOPIDOGREL BISULFATE 75 MILLIGRAM(S): 75 TABLET, FILM COATED ORAL at 11:17

## 2020-09-18 RX ADMIN — MORPHINE SULFATE 1 MILLIGRAM(S): 50 CAPSULE, EXTENDED RELEASE ORAL at 06:40

## 2020-09-18 RX ADMIN — Medication 81 MILLIGRAM(S): at 11:17

## 2020-09-18 RX ADMIN — Medication 10 MILLIGRAM(S): at 11:17

## 2020-09-18 NOTE — DISCHARGE NOTE PROVIDER - CARE PROVIDER_API CALL
Will Loyd J  CARDIOVASCULAR DISEASE  [address] [place] [address]  Phone: [phone]  Fax: [phone]  Follow Up Time: 2 weeks

## 2020-09-18 NOTE — PROVIDER CONTACT NOTE (CHANGE IN STATUS NOTIFICATION) - ASSESSMENT
Pt c/o of 3/10 midsternal chest pain, pt describes as "tightness". Pt states that tightness is decreasing. Pt states it is not worse upon palpitation, no radiation, no associated s/sx. Pt's VSS, pt in NSR. R groin site c/d/i

## 2020-09-18 NOTE — PROVIDER CONTACT NOTE (CHANGE IN STATUS NOTIFICATION) - ACTION/TREATMENT ORDERED:
EKG performed, ADEOLA Wright made aware, see new orders, pt given IVP Morphine 1 mg, pt verbalized understanding of plan of care. RN to reassess pain in 15 minutes

## 2020-09-18 NOTE — DISCHARGE NOTE PROVIDER - NSDCCPCAREPLAN_GEN_ALL_CORE_FT
PRINCIPAL DISCHARGE DIAGNOSIS  Diagnosis: CAD (coronary artery disease)  Assessment and Plan of Treatment:       SECONDARY DISCHARGE DIAGNOSES  Diagnosis: HTN (hypertension)  Assessment and Plan of Treatment:     Diagnosis: Hyperlipidemia  Assessment and Plan of Treatment:     Diagnosis: History of BPH  Assessment and Plan of Treatment:     Diagnosis: Diabetes  Assessment and Plan of Treatment:      PRINCIPAL DISCHARGE DIAGNOSIS  Diagnosis: CAD (coronary artery disease)  Assessment and Plan of Treatment: You had successful percutaneous intervention with angiosculpt to your left anterior descending artery in stent restenosis.  --You are recommended to return for brachytherapy of your left anterior descending artery in 5 weeks.  --Continue ASPIRIN 81MG BY MOUTH DAILY AND PLAVIX 75MG BY MOUTH DAILY.  --DO NOT STOP TAKING ASPIRIN OR PLAVIX UNLESS INSTRUCTED BY YOUR CARDIOLOGIST, TO PREVENT STENT CLOSURE AND HEART ATTACK.  -- The catheter from your groin was removed and you should remove the dressing in 24 hours.  -- You may shower once the dressing is removed, but avoid baths, hot tubs, or swimming for 5 days to prevent infection.   -- If you notice bleeding from the site, hardening and pain at the site, drainage or redness from the site, coolness/paleness of the extremity, swelling, or fever, please call [phone].   -- All of your needed prescriptions have been sent electronically to your pharmacy.        SECONDARY DISCHARGE DIAGNOSES  Diagnosis: HTN (hypertension)  Assessment and Plan of Treatment: Continue Metoprolol Tartrate 25mg by mouth twice daily and Lisinopril HCTZ 20/25mg by mouth daily    Diagnosis: Hyperlipidemia  Assessment and Plan of Treatment: Continue Atorvastatin 40mg by mouth daily    Diagnosis: Diabetes  Assessment and Plan of Treatment: Continue home regimen of insulin and Steglatro 5mg by mouth daily. PLEASE HOLD METFORMIN FOR 48 HOURS, RESUME ON SUNDAY 9/20/20.     PRINCIPAL DISCHARGE DIAGNOSIS  Diagnosis: CAD (coronary artery disease)  Assessment and Plan of Treatment: You had successful percutaneous intervention with angiosculpt to your left anterior descending artery in stent restenosis.  --You are recommended to return for brachytherapy of your left anterior descending artery in 5 weeks.  --Continue ASPIRIN 81MG BY MOUTH DAILY AND PLAVIX 75MG BY MOUTH DAILY.  --DO NOT STOP TAKING ASPIRIN OR PLAVIX UNLESS INSTRUCTED BY YOUR CARDIOLOGIST, TO PREVENT STENT CLOSURE AND HEART ATTACK.  -- The catheter from your groin was removed and you should remove the dressing in 24 hours.  -- You may shower once the dressing is removed, but avoid baths, hot tubs, or swimming for 5 days to prevent infection.   -- If you notice bleeding from the site, hardening and pain at the site, drainage or redness from the site, coolness/paleness of the extremity, swelling, or fever, please call [phone].   -- All of your needed prescriptions have been sent electronically to your pharmacy.        SECONDARY DISCHARGE DIAGNOSES  Diagnosis: Diabetes  Assessment and Plan of Treatment: Continue home regimen of insulin and Steglatro 5mg by mouth daily. PLEASE HOLD METFORMIN FOR 48 HOURS, RESUME ON SUNDAY 9/20/20.    Diagnosis: Hyperlipidemia  Assessment and Plan of Treatment: Continue Atorvastatin 40mg by mouth daily    Diagnosis: HTN (hypertension)  Assessment and Plan of Treatment: Your Metoprolol Tartrate 25mg  was changed to Metoprolol Succinate 50mg by mouth daily and Lisinopril HCTZ 20/25mg by mouth daily

## 2020-09-18 NOTE — DISCHARGE NOTE PROVIDER - NSDCMRMEDTOKEN_GEN_ALL_CORE_FT
Admelog 100 units/mL injectable solution: 12 unit(s) injectable 3 times a day  atorvastatin 40 mg oral tablet: 1 tab(s) orally once a day (at bedtime)  Basaglar KwikPen 100 units/mL subcutaneous solution: 22 unit(s) subcutaneous once a day (at bedtime)  busPIRone 10 mg oral tablet: 1 tab(s) orally 2 times a day  clopidogrel 75 mg oral tablet: 1 tab(s) orally once a day  Endocet 5/325 oral tablet: 1 tab(s) orally every 6 hours, As Needed  gabapentin 300 mg oral capsule: 1 cap(s) orally once a day (at bedtime)  lisinopril-hydrochlorothiazide 20 mg-25 mg oral tablet: 1 tab(s) orally once a day  metFORMIN 1000 mg oral tablet: 1 tab(s) orally 2 times a day  metoprolol tartrate 25 mg oral tablet: 1 tab(s) orally 2 times a day  omeprazole 40 mg oral delayed release capsule: 1 cap(s) orally once a day  Steglatro 5 mg oral tablet: 1 tab(s) orally once a day   traZODone 50 mg oral tablet: 1 tab(s) orally once a day (at bedtime)   Admelog 100 units/mL injectable solution: 12 unit(s) injectable 3 times a day  aspirin 81 mg oral delayed release tablet: 1 tab(s) orally once a day  atorvastatin 40 mg oral tablet: 1 tab(s) orally once a day (at bedtime)  Basaglar KwikPen 100 units/mL subcutaneous solution: 22 unit(s) subcutaneous once a day (at bedtime)  busPIRone 10 mg oral tablet: 1 tab(s) orally 2 times a day  clopidogrel 75 mg oral tablet: 1 tab(s) orally once a day  Endocet 5/325 oral tablet: 1 tab(s) orally every 6 hours, As Needed  gabapentin 300 mg oral capsule: 1 cap(s) orally once a day (at bedtime)  lisinopril-hydrochlorothiazide 20 mg-25 mg oral tablet: 1 tab(s) orally once a day  metFORMIN 1000 mg oral tablet: 1 tab(s) orally 2 times a day  omeprazole 40 mg oral delayed release capsule: 1 cap(s) orally once a day  Steglatro 5 mg oral tablet: 1 tab(s) orally once a day   Toprol-XL 50 mg oral tablet, extended release: 1 tab(s) orally once a day   traZODone 50 mg oral tablet: 1 tab(s) orally once a day (at bedtime)

## 2020-09-18 NOTE — DISCHARGE NOTE NURSING/CASE MANAGEMENT/SOCIAL WORK - PATIENT PORTAL LINK FT
You can access the FollowMyHealth Patient Portal offered by [facility] by registering at the following website: [email or web address]. By joining [employer]’s FollowMyHealth portal, you will also be able to view your health information using other applications (apps) compatible with our system.

## 2020-09-18 NOTE — DISCHARGE NOTE PROVIDER - HOSPITAL COURSE
60 yr old M PMHx of HTN, hyperlipidemia, BPH, IDDM, EDMUND on CPAP, CAD (multiple PCIs w/ brachytherapy in 9/2019) who initially presented to his cardiologist Dr Graves endorsing worsening 8/10 diffuse chest pain with exertion with 1 block associated with dyspnea and epigastric discomfort x 2 months.  Echocardiogram from MD note: EF 55-60%, Grade 2 diastolic dysfunction, mild aortic sclerosis, mild LA enlargement, trace MR, TR.   Patient subsequently referred to [facility] and underwent cardiac catheterization 9/17/20 with IVUS guided/Angiosculpt to LAD ISR, patent also noted to have patent LM, diffuse D2 stenosis, 70-80% OM1 lesion, patent pLCx stent, patent pRCA stent, 50% pRPDA lesion, EF:70%, EDP 22mmHg, Right groin PC. Patient recommended to return for brachytherapy of LAD in 5 weeks.    [facility] post procedure. Patient currently asymptomatic, VSS, right groin access site soft, NT, no hematoma, distal DP/PT pulse unchanged from baseline. Labs and telemetry reviewed.   Patient deemed stable for discharge as per Dr. Burgos and to follow-up with Dr. Graves in 1-2 weeks.     61 y/o old M PMHx of HTN, hyperlipidemia, BPH, IDDM, EDMUND on CPAP, CAD (multiple PCIs w/ brachytherapy in 9/2019) who initially presented to his cardiologist Dr Graves endorsing worsening 8/10 diffuse chest pain with exertion with 1 block associated with dyspnea and epigastric discomfort x 2 months.  Echocardiogram from MD note: EF 55-60%, Grade 2 diastolic dysfunction, mild aortic sclerosis, mild LA enlargement, trace MR, TR.   Patient subsequently referred to [facility] and underwent cardiac catheterization 9/17/20 with IVUS guided/Angiosculpt to LAD ISR, patent also noted to have patent LM, diffuse D2 stenosis, 70-80% OM1 lesion, patent pLCx stent, patent pRCA stent, 50% pRPDA lesion, EF:70%, EDP 22mmHg, Right groin PC. Patient recommended to return for brachytherapy of LAD in 5 weeks.  Patient admitted to [facility] for monitoring post procedure. Patient currently asymptomatic, VSS, right groin access site soft, NT, no hematoma, distal DP/PT pulse unchanged from baseline. Labs and telemetry reviewed.  Pt noted with low Potassium 3.7, repleted with Kdur 40 babita orally stat x 1 dose. He is to continue ASA 81mg daily, Plavix 75mg daily, Atorvastatin 40mg daily, HCTZ 25mg daily, Lisinopril 20mg daily,  Lopressor interchanged to Toprol XL 50mg daily.  Patient deemed stable for discharge as per Dr. Burgos and to follow-up with Dr. Graves in 1-2 weeks.

## 2020-09-23 DIAGNOSIS — Z79.84 LONG TERM (CURRENT) USE OF ORAL HYPOGLYCEMIC DRUGS: ICD-10-CM

## 2020-09-23 DIAGNOSIS — Z91.041 RADIOGRAPHIC DYE ALLERGY STATUS: ICD-10-CM

## 2020-09-23 DIAGNOSIS — T82.855A STENOSIS OF CORONARY ARTERY STENT, INITIAL ENCOUNTER: ICD-10-CM

## 2020-09-23 DIAGNOSIS — Z98.1 ARTHRODESIS STATUS: ICD-10-CM

## 2020-09-23 DIAGNOSIS — Z95.5 PRESENCE OF CORONARY ANGIOPLASTY IMPLANT AND GRAFT: ICD-10-CM

## 2020-09-23 DIAGNOSIS — Z82.49 FAMILY HISTORY OF ISCHEMIC HEART DISEASE AND OTHER DISEASES OF THE CIRCULATORY SYSTEM: ICD-10-CM

## 2020-09-23 DIAGNOSIS — I10 ESSENTIAL (PRIMARY) HYPERTENSION: ICD-10-CM

## 2020-09-23 DIAGNOSIS — Z99.89 DEPENDENCE ON OTHER ENABLING MACHINES AND DEVICES: ICD-10-CM
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
## 2020-09-23 DIAGNOSIS — K76.0 FATTY (CHANGE OF) LIVER, NOT ELSEWHERE CLASSIFIED: ICD-10-CM

## 2020-09-23 DIAGNOSIS — E78.5 HYPERLIPIDEMIA, UNSPECIFIED: ICD-10-CM

## 2020-09-23 DIAGNOSIS — Z88.8 ALLERGY STATUS TO OTHER DRUGS, MEDICAMENTS AND BIOLOGICAL SUBSTANCES: ICD-10-CM

## 2020-09-23 DIAGNOSIS — E11.9 TYPE 2 DIABETES MELLITUS WITHOUT COMPLICATIONS: ICD-10-CM

## 2020-09-23 DIAGNOSIS — N40.0 BENIGN PROSTATIC HYPERPLASIA WITHOUT LOWER URINARY TRACT SYMPTOMS: ICD-10-CM

## 2020-09-23 DIAGNOSIS — G47.33 OBSTRUCTIVE SLEEP APNEA (ADULT) (PEDIATRIC): ICD-10-CM

## 2020-09-23 DIAGNOSIS — E66.9 OBESITY, UNSPECIFIED: ICD-10-CM
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
## 2020-09-23 DIAGNOSIS — I25.10 ATHEROSCLEROTIC HEART DISEASE OF NATIVE CORONARY ARTERY WITHOUT ANGINA PECTORIS: ICD-10-CM

## 2020-10-07 PROCEDURE — 83735 ASSAY OF MAGNESIUM: CPT

## 2020-10-07 PROCEDURE — C1725: CPT

## 2020-10-07 PROCEDURE — 80053 COMPREHEN METABOLIC PANEL: CPT

## 2020-10-07 PROCEDURE — 82962 GLUCOSE BLOOD TEST: CPT

## 2020-10-07 PROCEDURE — C1887: CPT

## 2020-10-07 PROCEDURE — C1894: CPT

## 2020-10-07 PROCEDURE — 36415 COLL VENOUS BLD VENIPUNCTURE: CPT

## 2020-10-07 PROCEDURE — 93005 ELECTROCARDIOGRAM TRACING: CPT

## 2020-10-07 PROCEDURE — 83036 HEMOGLOBIN GLYCOSYLATED A1C: CPT

## 2020-10-07 PROCEDURE — 80061 LIPID PANEL: CPT

## 2020-10-07 PROCEDURE — 85025 COMPLETE CBC W/AUTO DIFF WBC: CPT

## 2020-10-07 PROCEDURE — C1760: CPT

## 2020-10-07 PROCEDURE — 85610 PROTHROMBIN TIME: CPT

## 2020-10-07 PROCEDURE — C1753: CPT

## 2020-10-07 PROCEDURE — C1769: CPT

## 2020-10-07 PROCEDURE — 82553 CREATINE MB FRACTION: CPT

## 2020-10-07 PROCEDURE — 80048 BASIC METABOLIC PNL TOTAL CA: CPT

## 2020-10-07 PROCEDURE — 85730 THROMBOPLASTIN TIME PARTIAL: CPT

## 2020-10-07 PROCEDURE — 82550 ASSAY OF CK (CPK): CPT

## 2020-10-15 DIAGNOSIS — Z71.89 OTHER SPECIFIED COUNSELING: ICD-10-CM

## 2020-11-01 ENCOUNTER — OUTPATIENT (OUTPATIENT)
Dept: OUTPATIENT SERVICES | Facility: HOSPITAL | Age: 60
LOS: 1 days | End: 2020-11-01
Payer: MEDICAID

## 2020-11-01 DIAGNOSIS — Z98.1 ARTHRODESIS STATUS: Chronic | ICD-10-CM

## 2020-11-01 DIAGNOSIS — Z98.89 OTHER SPECIFIED POSTPROCEDURAL STATES: Chronic | ICD-10-CM

## 2020-11-01 DIAGNOSIS — S69.90XA UNSPECIFIED INJURY OF UNSPECIFIED WRIST, HAND AND FINGER(S), INITIAL ENCOUNTER: Chronic | ICD-10-CM

## 2020-11-01 PROCEDURE — H0002: CPT

## 2020-12-01 NOTE — PATIENT PROFILE ADULT - BRADEN SENSORY
3 faxed pages of forms was received from Narendra Alexi requesting rationale for Prozac 40 mg (total mg per day) caps. The forms are being held in Nurse Triage awaiting a providers signature.Nataliya Doyle LPN    
Signed forms were returned to this writer and faxed to [facility] at [phone]. The original copies were sent to scanning and copies are held in Psych until scanning is complete.Nataliya Doyle LPN    
(4) no impairment

## 2020-12-16 VITALS
SYSTOLIC BLOOD PRESSURE: 108 MMHG | OXYGEN SATURATION: 98 % | DIASTOLIC BLOOD PRESSURE: 48 MMHG | HEART RATE: 91 BPM | RESPIRATION RATE: 18 BRPM | HEIGHT: 68 IN | TEMPERATURE: 97 F | WEIGHT: 199.96 LBS

## 2020-12-16 NOTE — H&P ADULT - HISTORY OF PRESENT ILLNESS
COVID:   Pharmacy: [facility]   Cardiologist: Dr Graves  Escort:     59 y/o old M CONTRAST ALLERY (PREMEDICATED WITH….) PMHx of HTN, HLD, BPH, DM II, EDMUND on CPAP, CAD (multiple PCIs and brachytherapy last @ [facility] 9/17/2020 with angiosculpt to LAD ISR) who initially presented to his cardiologist Dr Graves 9/2020 endorsing worsening 8/10 diffuse chest pain with exertion with 1 block associated with dyspnea and epigastric discomfort x 2 months.  Echocardiogram from MD note: EF 55-60%, Grade 2 diastolic dysfunction, mild aortic sclerosis, mild LA enlargement, trace MR, TR.   Since the last cath pt endorses feeling….  Pt denies….  Pt reports compliance with aspirin 81 mg QD and plavix 75 mg QD???    In light of patient’s risk factors, known CAD, CCS Angina Class ___ Symptoms patient referred to [facility] for brachytherapy of LAD     cardiac catheterization @ [facility] 9/17/20: IVUS guided/Angiosculpt to LAD ISR, patent also noted to have patent LM, diffuse D2 stenosis, 70-80% OM1 lesion, patent pLCx stent, patent pRCA stent, 50% pRPDA lesion, EF:70%, EDP 22mmHg, Right groin PC. Patient recommended to return for brachytherapy of LAD in 5 weeks.   COVID: 12/14 Negative (results in [facility])   Pharmacy: [facility]   Cardiologist: Dr Graves  Escort:     59 y/o old M CONTRAST ALLERY (PREMEDICATED WITH….) PMHx of HTN, HLD, BPH, DM II, EDMUND on CPAP, CAD (multiple PCIs and brachytherapy last @ [facility] 9/17/2020 with angiosculpt to LAD ISR) who initially presented to his cardiologist Dr Graves 9/2020 endorsing worsening 8/10 diffuse chest pain with exertion with 1 block associated with dyspnea and epigastric discomfort x 2 months.  Echocardiogram from MD note: EF 55-60%, Grade 2 diastolic dysfunction, mild aortic sclerosis, mild LA enlargement, trace MR, TR.   Since the last cath pt endorses feeling….  Pt denies….  Pt reports compliance with aspirin 81 mg QD and plavix 75 mg QD???    In light of patient’s risk factors, known CAD, CCS Angina Class ___ Symptoms patient referred to [facility] for brachytherapy of LAD     cardiac catheterization @ [facility] 9/17/20: IVUS guided/Angiosculpt to LAD ISR, patent also noted to have patent LM, diffuse D2 stenosis, 70-80% OM1 lesion, patent pLCx stent, patent pRCA stent, 50% pRPDA lesion, EF:70%, EDP 22mmHg, Right groin PC. Patient recommended to return for brachytherapy of LAD in 5 weeks.   COVID: 12/14 Negative (results in HIE)   Pharmacy: [facility], confirm meds   Cardiologist: Dr Graves    PREDNISONE 50 MG PO X 3 DOSES E-PRESCRIBED TO PHARMACY, CONFIRM COMPLETED ALL DOSES   59 y/o old M CONTRAST ALLERGY  with PMHx of HTN, HLD, BPH, DM II, EMDUND on CPAP, CAD (multiple PCIs and brachytherapy last @ [facility] 9/17/2020 with angiosculpt to LAD ISR) who initially presented to his cardiologist Dr Graves 9/2020 endorsing worsening 8/10 diffuse chest pain with exertion with 1 block associated with dyspnea and epigastric discomfort x 2 months.  Echocardiogram from MD note: EF 55-60%, Grade 2 diastolic dysfunction, mild aortic sclerosis, mild LA enlargement, trace MR, TR.   Since the last cath pt endorses feeling well and denies CP, SOB, N/V/D, dizziness, syncope, palpitations, LE edema, melena, hematuria, fever, chills, cough, sick contact. Pt reports compliance with aspirin 81 mg QD and plavix 75 mg QD.   In light of patient’s risk factors, known CAD, CCS Angina Class II Symptoms patient referred to [facility] for brachytherapy of LAD   Of note:  pt  spend 25 days and returned from the [place] on 12/11/20.  cardiac catheterization @ [facility] 9/17/20: IVUS guided/Angiosculpt to LAD ISR, patent also noted to have patent LM, diffuse D2 stenosis, 70-80% OM1 lesion, patent pLCx stent, patent pRCA stent, 50% pRPDA lesion, EF:70%, EDP 22mmHg, Right groin PC. Patient recommended to return for brachytherapy of LAD in 5 weeks.   COVID: 12/14 Negative (results in HIE)   Pharmacy: [facility]   Cardiologist: Dr Graves  61 y/o old Male with hx of CONTRAST ALLERGY and PMHx of HTN, HLD, BPH, DM II, EDMUND on CPAP, CAD (multiple PCIs and brachytherapy last @ [facility] 9/17/2020 with angiosculpt to LAD ISR) who initially presented to his cardiologist Dr Graves 9/20 endorsing worsening 8/10 diffuse chest pain with exertion with 1 block associated with dyspnea and epigastric discomfort x 2 months. Echo from MD note: EF 55-60%, Grade 2 diastolic dysfunction, mild aortic sclerosis, mild LA enlargement, trace MR, TR. Since the last cath pt endorses feeling well and denies CP, SOB, N/V/D, dizziness, syncope, palpitations, LE edema, melena, hematuria, fever, chills, cough, sick contact. Pt reports compliance with aspirin 81 mg QD and plavix 75 mg QD.   In light of patient’s risk factors, known CAD, CCS Angina Class II Symptoms patient referred to [facility] for brachytherapy of LAD   Of note:  pt  spend 25 days and returned from the [place] on 12/11/20.  cardiac catheterization @ [facility] 9/17/20: IVUS guided/Angiosculpt to LAD ISR, patent also noted to have patent LM, diffuse D2 stenosis, 70-80% OM1 lesion, patent pLCx stent, patent pRCA stent, 50% pRPDA lesion, EF:70%, EDP 22mmHg, Right groin PC. Patient recommended to return for brachytherapy of LAD in 5 weeks.

## 2020-12-16 NOTE — H&P ADULT - ASSESSMENT
59 y/o old Male with hx of CONTRAST ALLERGY and PMHx of HTN, HLD, BPH, DM II, EDMUND on CPAP, CAD (multiple PCIs and brachytherapy last @ [facility] 9/17/2020 with angiosculpt to LAD ISR) who presents to [facility] for recommended brachytherapy LAD.    -ASA 3, Mallampati 3  -Pt compliant with home ASA 81mg, Plavix 61 y/o old Male with hx of CONTRAST ALLERGY and PMHx of HTN, HLD, BPH, DM II, EDMUND on CPAP, CAD (multiple PCIs and brachytherapy last @ [facility] 9/17/2020 with angiosculpt to LAD ISR) who presents to [facility] for recommended brachytherapy LAD.    -ASA 3, Mallampati 3  -Pt compliant with home ASA 81mg, Plavix 75mg, last dose yesterday. Will give home dose prior to cath  -Pt with hx of Contrast allergy, he is now s/p Prednisone 50mg x 3 (@ 6pm, midnight and 6am) prior to coming to the hospital. Will give Benadryl 50mg IVP on call to cath lab  -IVF Hydration NS @ 75cc/hr  -pre cath consented    Risks & benefits of procedure and alternative therapy have been explained to the patient including but not limited to: allergic reaction, bleeding w/possible need for blood transfusion, infection, renal and vascular compromise, limb damage, arrhythmia, stroke, vessel dissection/perforation, Myocardial infarction, emergent CABG. Informed consent obtained and in chart.    61 y/o old Male with hx of CONTRAST ALLERGY and PMHx of HTN, HLD, BPH, DM II, EDMUND on CPAP, CAD (multiple PCIs and brachytherapy last @ [facility] 9/17/2020 with angiosculpt to LAD ISR) who presents to [facility] for recommended brachytherapy LAD.    -ASA 3, Mallampati 3  -Pt compliant with home ASA 81mg and Plavix 75mg, last dose yesterday. Will give home dose prior to cath  -Pt with hx of Contrast allergy, he is now s/p Prednisone 50mg x 3 (@ 6pm, midnight and 6am) prior to coming to the hospital. Will give Benadryl 50mg IVP on call to cath lab  -IVF Hydration NS @ 75cc/hr  -pre cath consented    Risks & benefits of procedure and alternative therapy have been explained to the patient including but not limited to: allergic reaction, bleeding w/possible need for blood transfusion, infection, renal and vascular compromise, limb damage, arrhythmia, stroke, vessel dissection/perforation, Myocardial infarction, emergent CABG. Informed consent obtained and in chart.

## 2020-12-16 NOTE — H&P ADULT - NSHPSOCIALHISTORY_GEN_ALL_CORE
denies tobacco   former cocaine abuse (last used> 10 years ago), former EtOH abuse (now 2 beers per month)

## 2020-12-16 NOTE — H&P ADULT - NSHPLABSRESULTS_GEN_ALL_CORE
16.5   11.19 )-----------( 204      ( 17 Dec 2020 09:07 )             49.7     137  |  96  |  23  ----------------------------<  234<H>  3.8   |  26  |  1.08    Ca    10.2      17 Dec 2020 09:07    TPro  8.2  /  Alb  4.6  /  TBili  0.5  /  DBili  x   /  AST  21  /  ALT  28  /  AlkPhos  87  12-17      PT/INR - ( 17 Dec 2020 09:07 )   PT: 12.5 sec;   INR: 1.04          PTT - ( 17 Dec 2020 09:07 )  PTT:34.7 sec    CARDIAC MARKERS ( 17 Dec 2020 09:07 )  x     / x     / 169 U/L / x     / x

## 2020-12-17 ENCOUNTER — TRANSCRIPTION ENCOUNTER (OUTPATIENT)
Age: 60
End: 2020-12-17

## 2020-12-17 ENCOUNTER — INPATIENT (INPATIENT)
Facility: HOSPITAL | Age: 60
LOS: 0 days | Discharge: HOME | DRG: 251 | End: 2020-12-18
Attending: INTERNAL MEDICINE | Admitting: INTERNAL MEDICINE
Payer: COMMERCIAL

## 2020-12-17 DIAGNOSIS — Z98.1 ARTHRODESIS STATUS: Chronic | ICD-10-CM

## 2020-12-17 DIAGNOSIS — Z98.89 OTHER SPECIFIED POSTPROCEDURAL STATES: Chronic | ICD-10-CM

## 2020-12-17 DIAGNOSIS — S69.90XA UNSPECIFIED INJURY OF UNSPECIFIED WRIST, HAND AND FINGER(S), INITIAL ENCOUNTER: Chronic | ICD-10-CM

## 2020-12-17 LAB
A1C WITH ESTIMATED AVERAGE GLUCOSE RESULT: 7.2 % — HIGH (ref 4–5.6)
ALBUMIN SERPL ELPH-MCNC: 4.6 G/DL — SIGNIFICANT CHANGE UP (ref 3.3–5)
ALP SERPL-CCNC: 87 U/L — SIGNIFICANT CHANGE UP (ref 40–120)
ALT FLD-CCNC: 28 U/L — SIGNIFICANT CHANGE UP (ref 10–45)
ANION GAP SERPL CALC-SCNC: 15 MMOL/L — SIGNIFICANT CHANGE UP (ref 5–17)
APTT BLD: 34.7 SEC — SIGNIFICANT CHANGE UP (ref 27.5–35.5)
AST SERPL-CCNC: 21 U/L — SIGNIFICANT CHANGE UP (ref 10–40)
BASOPHILS # BLD AUTO: 0.01 K/UL — SIGNIFICANT CHANGE UP (ref 0–0.2)
BASOPHILS NFR BLD AUTO: 0.1 % — SIGNIFICANT CHANGE UP (ref 0–2)
BILIRUB SERPL-MCNC: 0.5 MG/DL — SIGNIFICANT CHANGE UP (ref 0.2–1.2)
BUN SERPL-MCNC: 23 MG/DL — SIGNIFICANT CHANGE UP (ref 7–23)
CALCIUM SERPL-MCNC: 10.2 MG/DL — SIGNIFICANT CHANGE UP (ref 8.4–10.5)
CHLORIDE SERPL-SCNC: 96 MMOL/L — SIGNIFICANT CHANGE UP (ref 96–108)
CHOLEST SERPL-MCNC: 119 MG/DL — SIGNIFICANT CHANGE UP
CK SERPL-CCNC: 169 U/L — SIGNIFICANT CHANGE UP (ref 30–200)
CO2 SERPL-SCNC: 26 MMOL/L — SIGNIFICANT CHANGE UP (ref 22–31)
CREAT SERPL-MCNC: 1.08 MG/DL — SIGNIFICANT CHANGE UP (ref 0.5–1.3)
EOSINOPHIL # BLD AUTO: 0.01 K/UL — SIGNIFICANT CHANGE UP (ref 0–0.5)
EOSINOPHIL NFR BLD AUTO: 0.1 % — SIGNIFICANT CHANGE UP (ref 0–6)
ESTIMATED AVERAGE GLUCOSE: 160 MG/DL — HIGH (ref 68–114)
GLUCOSE BLDC GLUCOMTR-MCNC: 140 MG/DL — HIGH (ref 70–99)
GLUCOSE BLDC GLUCOMTR-MCNC: 185 MG/DL — HIGH (ref 70–99)
GLUCOSE BLDC GLUCOMTR-MCNC: 191 MG/DL — HIGH (ref 70–99)
GLUCOSE BLDC GLUCOMTR-MCNC: 230 MG/DL — HIGH (ref 70–99)
GLUCOSE SERPL-MCNC: 234 MG/DL — HIGH (ref 70–99)
HCT VFR BLD CALC: 49.7 % — SIGNIFICANT CHANGE UP (ref 39–50)
HDLC SERPL-MCNC: 48 MG/DL — SIGNIFICANT CHANGE UP
HGB BLD-MCNC: 16.5 G/DL — SIGNIFICANT CHANGE UP (ref 13–17)
IMM GRANULOCYTES NFR BLD AUTO: 0.6 % — SIGNIFICANT CHANGE UP (ref 0–1.5)
INR BLD: 1.04 — SIGNIFICANT CHANGE UP (ref 0.88–1.16)
LIPID PNL WITH DIRECT LDL SERPL: 64 MG/DL — SIGNIFICANT CHANGE UP
LYMPHOCYTES # BLD AUTO: 0.83 K/UL — LOW (ref 1–3.3)
LYMPHOCYTES # BLD AUTO: 7.4 % — LOW (ref 13–44)
MCHC RBC-ENTMCNC: 30.4 PG — SIGNIFICANT CHANGE UP (ref 27–34)
MCHC RBC-ENTMCNC: 33.2 GM/DL — SIGNIFICANT CHANGE UP (ref 32–36)
MCV RBC AUTO: 91.7 FL — SIGNIFICANT CHANGE UP (ref 80–100)
MONOCYTES # BLD AUTO: 0.12 K/UL — SIGNIFICANT CHANGE UP (ref 0–0.9)
MONOCYTES NFR BLD AUTO: 1.1 % — LOW (ref 2–14)
NEUTROPHILS # BLD AUTO: 10.15 K/UL — HIGH (ref 1.8–7.4)
NEUTROPHILS NFR BLD AUTO: 90.7 % — HIGH (ref 43–77)
NON HDL CHOLESTEROL: 71 MG/DL — SIGNIFICANT CHANGE UP
NRBC # BLD: 0 /100 WBCS — SIGNIFICANT CHANGE UP (ref 0–0)
PLATELET # BLD AUTO: 204 K/UL — SIGNIFICANT CHANGE UP (ref 150–400)
POTASSIUM SERPL-MCNC: 3.8 MMOL/L — SIGNIFICANT CHANGE UP (ref 3.5–5.3)
POTASSIUM SERPL-SCNC: 3.8 MMOL/L — SIGNIFICANT CHANGE UP (ref 3.5–5.3)
PROT SERPL-MCNC: 8.2 G/DL — SIGNIFICANT CHANGE UP (ref 6–8.3)
PROTHROM AB SERPL-ACNC: 12.5 SEC — SIGNIFICANT CHANGE UP (ref 10.6–13.6)
RBC # BLD: 5.42 M/UL — SIGNIFICANT CHANGE UP (ref 4.2–5.8)
RBC # FLD: 12.5 % — SIGNIFICANT CHANGE UP (ref 10.3–14.5)
SODIUM SERPL-SCNC: 137 MMOL/L — SIGNIFICANT CHANGE UP (ref 135–145)
TRIGL SERPL-MCNC: 37 MG/DL — SIGNIFICANT CHANGE UP
WBC # BLD: 11.19 K/UL — HIGH (ref 3.8–10.5)
WBC # FLD AUTO: 11.19 K/UL — HIGH (ref 3.8–10.5)

## 2020-12-17 PROCEDURE — 77470 SPECIAL RADIATION TREATMENT: CPT | Mod: 26

## 2020-12-17 PROCEDURE — 77263 THER RADIOLOGY TX PLNG CPLX: CPT

## 2020-12-17 PROCEDURE — 93454 CORONARY ARTERY ANGIO S&I: CPT | Mod: 26,59

## 2020-12-17 PROCEDURE — 77290 THER RAD SIMULAJ FIELD CPLX: CPT | Mod: 26

## 2020-12-17 PROCEDURE — 92933 PRQ TRLML C ATHRC ST ANGIOP1: CPT | Mod: LD

## 2020-12-17 PROCEDURE — 77770 HDR RDNCL NTRSTL/ICAV BRCHTX: CPT | Mod: 26

## 2020-12-17 PROCEDURE — 99221 1ST HOSP IP/OBS SF/LOW 40: CPT | Mod: 25

## 2020-12-17 PROCEDURE — 77316 BRACHYTX ISODOSE PLAN SIMPLE: CPT | Mod: 26

## 2020-12-17 RX ORDER — INSULIN LISPRO 100/ML
VIAL (ML) SUBCUTANEOUS
Refills: 0 | Status: DISCONTINUED | OUTPATIENT
Start: 2020-12-17 | End: 2020-12-18

## 2020-12-17 RX ORDER — DEXTROSE 50 % IN WATER 50 %
15 SYRINGE (ML) INTRAVENOUS ONCE
Refills: 0 | Status: DISCONTINUED | OUTPATIENT
Start: 2020-12-17 | End: 2020-12-18

## 2020-12-17 RX ORDER — SODIUM CHLORIDE 9 MG/ML
500 INJECTION INTRAMUSCULAR; INTRAVENOUS; SUBCUTANEOUS
Refills: 0 | Status: DISCONTINUED | OUTPATIENT
Start: 2020-12-17 | End: 2020-12-17

## 2020-12-17 RX ORDER — CLOPIDOGREL BISULFATE 75 MG/1
75 TABLET, FILM COATED ORAL DAILY
Refills: 0 | Status: DISCONTINUED | OUTPATIENT
Start: 2020-12-18 | End: 2020-12-18

## 2020-12-17 RX ORDER — DEXTROSE 50 % IN WATER 50 %
25 SYRINGE (ML) INTRAVENOUS ONCE
Refills: 0 | Status: DISCONTINUED | OUTPATIENT
Start: 2020-12-17 | End: 2020-12-18

## 2020-12-17 RX ORDER — ASPIRIN/CALCIUM CARB/MAGNESIUM 324 MG
81 TABLET ORAL ONCE
Refills: 0 | Status: COMPLETED | OUTPATIENT
Start: 2020-12-17 | End: 2020-12-17

## 2020-12-17 RX ORDER — GLUCAGON INJECTION, SOLUTION 0.5 MG/.1ML
1 INJECTION, SOLUTION SUBCUTANEOUS ONCE
Refills: 0 | Status: DISCONTINUED | OUTPATIENT
Start: 2020-12-17 | End: 2020-12-18

## 2020-12-17 RX ORDER — SODIUM CHLORIDE 9 MG/ML
500 INJECTION INTRAMUSCULAR; INTRAVENOUS; SUBCUTANEOUS
Refills: 0 | Status: DISCONTINUED | OUTPATIENT
Start: 2020-12-17 | End: 2020-12-18

## 2020-12-17 RX ORDER — GABAPENTIN 400 MG/1
300 CAPSULE ORAL AT BEDTIME
Refills: 0 | Status: DISCONTINUED | OUTPATIENT
Start: 2020-12-17 | End: 2020-12-18

## 2020-12-17 RX ORDER — INSULIN LISPRO 100/ML
7 VIAL (ML) SUBCUTANEOUS
Refills: 0 | Status: DISCONTINUED | OUTPATIENT
Start: 2020-12-17 | End: 2020-12-18

## 2020-12-17 RX ORDER — OMEPRAZOLE 10 MG/1
1 CAPSULE, DELAYED RELEASE ORAL
Qty: 0 | Refills: 0 | DISCHARGE

## 2020-12-17 RX ORDER — LISINOPRIL 2.5 MG/1
20 TABLET ORAL DAILY
Refills: 0 | Status: DISCONTINUED | OUTPATIENT
Start: 2020-12-18 | End: 2020-12-18

## 2020-12-17 RX ORDER — INFLUENZA VIRUS VACCINE 15; 15; 15; 15 UG/.5ML; UG/.5ML; UG/.5ML; UG/.5ML
0.5 SUSPENSION INTRAMUSCULAR ONCE
Refills: 0 | Status: COMPLETED | OUTPATIENT
Start: 2020-12-17 | End: 2020-12-17

## 2020-12-17 RX ORDER — SODIUM CHLORIDE 9 MG/ML
1000 INJECTION, SOLUTION INTRAVENOUS
Refills: 0 | Status: DISCONTINUED | OUTPATIENT
Start: 2020-12-17 | End: 2020-12-18

## 2020-12-17 RX ORDER — CLOPIDOGREL BISULFATE 75 MG/1
75 TABLET, FILM COATED ORAL ONCE
Refills: 0 | Status: COMPLETED | OUTPATIENT
Start: 2020-12-17 | End: 2020-12-17

## 2020-12-17 RX ORDER — TRAZODONE HCL 50 MG
1 TABLET ORAL
Qty: 0 | Refills: 0 | DISCHARGE

## 2020-12-17 RX ORDER — INSULIN GLARGINE 100 [IU]/ML
20 INJECTION, SOLUTION SUBCUTANEOUS AT BEDTIME
Refills: 0 | Status: DISCONTINUED | OUTPATIENT
Start: 2020-12-17 | End: 2020-12-18

## 2020-12-17 RX ORDER — ASPIRIN/CALCIUM CARB/MAGNESIUM 324 MG
81 TABLET ORAL DAILY
Refills: 0 | Status: DISCONTINUED | OUTPATIENT
Start: 2020-12-18 | End: 2020-12-18

## 2020-12-17 RX ORDER — INSULIN LISPRO 100/ML
7 VIAL (ML) SUBCUTANEOUS
Refills: 0 | Status: DISCONTINUED | OUTPATIENT
Start: 2020-12-18 | End: 2020-12-18

## 2020-12-17 RX ORDER — CHLORHEXIDINE GLUCONATE 213 G/1000ML
1 SOLUTION TOPICAL ONCE
Refills: 0 | Status: DISCONTINUED | OUTPATIENT
Start: 2020-12-17 | End: 2020-12-17

## 2020-12-17 RX ORDER — HYDROCHLOROTHIAZIDE 25 MG
25 TABLET ORAL DAILY
Refills: 0 | Status: DISCONTINUED | OUTPATIENT
Start: 2020-12-18 | End: 2020-12-18

## 2020-12-17 RX ORDER — METOPROLOL TARTRATE 50 MG
50 TABLET ORAL DAILY
Refills: 0 | Status: DISCONTINUED | OUTPATIENT
Start: 2020-12-17 | End: 2020-12-18

## 2020-12-17 RX ORDER — ATORVASTATIN CALCIUM 80 MG/1
40 TABLET, FILM COATED ORAL AT BEDTIME
Refills: 0 | Status: DISCONTINUED | OUTPATIENT
Start: 2020-12-17 | End: 2020-12-18

## 2020-12-17 RX ORDER — DEXTROSE 50 % IN WATER 50 %
12.5 SYRINGE (ML) INTRAVENOUS ONCE
Refills: 0 | Status: DISCONTINUED | OUTPATIENT
Start: 2020-12-17 | End: 2020-12-18

## 2020-12-17 RX ORDER — DIPHENHYDRAMINE HCL 50 MG
50 CAPSULE ORAL ONCE
Refills: 0 | Status: COMPLETED | OUTPATIENT
Start: 2020-12-17 | End: 2020-12-17

## 2020-12-17 RX ADMIN — Medication 50 MILLIGRAM(S): at 18:10

## 2020-12-17 RX ADMIN — SODIUM CHLORIDE 75 MILLILITER(S): 9 INJECTION INTRAMUSCULAR; INTRAVENOUS; SUBCUTANEOUS at 12:05

## 2020-12-17 RX ADMIN — Medication 4: at 18:10

## 2020-12-17 RX ADMIN — SODIUM CHLORIDE 75 MILLILITER(S): 9 INJECTION INTRAMUSCULAR; INTRAVENOUS; SUBCUTANEOUS at 10:18

## 2020-12-17 RX ADMIN — Medication 10 MILLIGRAM(S): at 22:43

## 2020-12-17 RX ADMIN — Medication 81 MILLIGRAM(S): at 09:57

## 2020-12-17 RX ADMIN — ATORVASTATIN CALCIUM 40 MILLIGRAM(S): 80 TABLET, FILM COATED ORAL at 22:43

## 2020-12-17 RX ADMIN — Medication 50 MILLIGRAM(S): at 10:18

## 2020-12-17 RX ADMIN — Medication 2: at 22:43

## 2020-12-17 RX ADMIN — CLOPIDOGREL BISULFATE 75 MILLIGRAM(S): 75 TABLET, FILM COATED ORAL at 09:57

## 2020-12-17 RX ADMIN — GABAPENTIN 300 MILLIGRAM(S): 400 CAPSULE ORAL at 22:43

## 2020-12-17 RX ADMIN — Medication 7 UNIT(S): at 18:10

## 2020-12-17 RX ADMIN — Medication 7 UNIT(S): at 14:28

## 2020-12-17 RX ADMIN — INSULIN GLARGINE 20 UNIT(S): 100 INJECTION, SOLUTION SUBCUTANEOUS at 22:43

## 2020-12-17 NOTE — CHART NOTE - NSCHARTNOTEFT_GEN_A_CORE
Pt complaining of 7/10 substernal chest pain described as "tightness", non-reproducible, non-radiating, which he states started during the procedure as 10/10 and has since improved to 7/10. Of note, pt appears to be resting comfortably in no acute distress. VS HR 92, /72, RR 18, SpO2 97% on 2L NC. Pt denies SOB, palpitations, dizziness, LOC, N/V, and diaphoresis. EKG reveals NSR @96 with no ischemic changes, unchanged from pre-cath EKG. Will continue to monitor. [facility] staff and RN's made aware.

## 2020-12-17 NOTE — DISCHARGE NOTE PROVIDER - NSDCFUADDINST_GEN_ALL_CORE_FT
You underwent a coronary angiogram and should wait 3 days before returning to ordinary activities. Do not drive for 2 days. Consult your doctor before returning to vigorous activity. You may return to work in 3-5 days. The catheter from your groin was removed and you should remove the dressing in 24 hours. You may shower once the dressing is removed, but avoid baths, hot tubs, or swimming for 5 days to prevent infection. If you notice bleeding from the site, hardening and pain at the site, drainage or redness from the site, coolness/paleness of the extremity, swelling, or fever, please call [phone]. Please continue your aspirin and Plavix as prescribed unless otherwise indicated by your cardiologist. All of your prescriptions have been sent to the pharmacy. Please follow up with Dr. Graves in 1-2 weeks. All of your needed prescriptions have been sent

## 2020-12-17 NOTE — DISCHARGE NOTE PROVIDER - NSDCMRMEDTOKEN_GEN_ALL_CORE_FT
Admelog 100 units/mL injectable solution: 12 unit(s) injectable 3 times a day  alfuzosin 10 mg oral tablet, extended release: 1 tab(s) orally once a day  aspirin 81 mg oral delayed release tablet: 1 tab(s) orally once a day  atorvastatin 40 mg oral tablet: 1 tab(s) orally once a day (at bedtime)  Basaglar KwikPen 100 units/mL subcutaneous solution: 22 unit(s) subcutaneous once a day (at bedtime)  busPIRone 10 mg oral tablet: 1 tab(s) orally 2 times a day  clopidogrel 75 mg oral tablet: 1 tab(s) orally once a day  gabapentin 300 mg oral capsule: 1 cap(s) orally once a day (at bedtime)  lisinopril-hydrochlorothiazide 20 mg-25 mg oral tablet: 1 tab(s) orally once a day  metFORMIN 1000 mg oral tablet: 1 tab(s) orally 2 times a day  oxyCODONE 30 mg oral tablet: 1 tab(s) orally every 6 hours, As Needed  predniSONE 50 mg oral tablet: 1 tab(s) orally every 6 hours, first dose 12/16/20 @ 6PM, second dose 12/17/20 @ midnight, 3rd dose 12/17/20 @ 6 AM   Steglatro 5 mg oral tablet: 1 tab(s) orally once a day   Toprol-XL 50 mg oral tablet, extended release: 1 tab(s) orally once a day    Admelog 100 units/mL injectable solution: 12 unit(s) injectable 3 times a day  alfuzosin 10 mg oral tablet, extended release: 1 tab(s) orally once a day  Basaglar KwikPen 100 units/mL subcutaneous solution: 22 unit(s) subcutaneous once a day (at bedtime)  busPIRone 10 mg oral tablet: 1 tab(s) orally 2 times a day  gabapentin 300 mg oral capsule: 1 cap(s) orally once a day (at bedtime)  lisinopril-hydrochlorothiazide 20 mg-25 mg oral tablet: 1 tab(s) orally once a day  metFORMIN 1000 mg oral tablet: 1 tab(s) orally 2 times a day  oxyCODONE 30 mg oral tablet: 1 tab(s) orally every 6 hours, As Needed  Steglatro 5 mg oral tablet: 1 tab(s) orally once a day    Admelog 100 units/mL injectable solution: 12 unit(s) injectable 3 times a day  alfuzosin 10 mg oral tablet, extended release: 1 tab(s) orally once a day  aspirin 81 mg oral delayed release tablet: 1 tab(s) orally once a day  atorvastatin 40 mg oral tablet: 1 tab(s) orally once a day (at bedtime)  Basaglar KwikPen 100 units/mL subcutaneous solution: 22 unit(s) subcutaneous once a day (at bedtime)  busPIRone 10 mg oral tablet: 1 tab(s) orally 2 times a day  clopidogrel 75 mg oral tablet: 1 tab(s) orally once a day  gabapentin 300 mg oral capsule: 1 cap(s) orally once a day (at bedtime)  lisinopril-hydrochlorothiazide 20 mg-25 mg oral tablet: 1 tab(s) orally once a day  metFORMIN 1000 mg oral tablet: 1 tab(s) orally 2 times a day  oxyCODONE 30 mg oral tablet: 1 tab(s) orally every 6 hours, As Needed  Steglatro 5 mg oral tablet: 1 tab(s) orally once a day   Toprol-XL 50 mg oral tablet, extended release: 1 tab(s) orally once a day

## 2020-12-17 NOTE — DISCHARGE NOTE PROVIDER - HOSPITAL COURSE
61 y/o old Male with hx of CONTRAST ALLERGY and PMHx of HTN, HLD, BPH, DM II, EDMUND on CPAP, CAD (multiple PCIs and brachytherapy last @ [facility] 9/17/2020 with angiosculpt to LAD ISR) who initially presented to his cardiologist Dr Graves 9/20 endorsing worsening 8/10 diffuse chest pain with exertion with 1 block associated with dyspnea and epigastric discomfort x 2 months. Echo from MD note: EF 55-60%, Grade 2 diastolic dysfunction, mild aortic sclerosis, mild LA enlargement, trace MR, TR. Since the last cath pt endorses feeling well and denies CP, SOB, N/V/D, dizziness, syncope, palpitations, LE edema, melena, hematuria, fever, chills, cough, sick contact. Pt reports compliance with aspirin 81 mg QD and Plavix 75 mg QD. In light of patient’s risk factors, known CAD, CCS Angina Class II Symptoms patient referred to [facility] for brachytherapy of LAD. Of note:  pt  spend 25 days and returned from the [place] on 12/11/20. cardiac catheterization @ [facility] 9/17/20: IVUS guided/Angiosculpt to LAD ISR, patent also noted to have patent LM, diffuse D2 stenosis, 70-80% OM1 lesion, patent pLCx stent, patent pRCA stent, 50% pRPDA lesion, EF:70%, EDP 22mmHg, Right groin PC. Patient recommended to return for brachytherapy   of LAD in 5 weeks.    Pt is s/p cath 12/17 received brachytherapy to pLAD. R groin angiosealed. Pt s/p cath complaining of 7/10 substernal chest pain described as "tightness", non-reproducible, non-radiating, which he states started during the procedure as 10/10 and has since improved to 7/10. VSS remained stable and EKG unchanged. Overnight, no events. This AM, feels well, denies CP, SOB, n/v/d, LE edema. VSS, no events on tele, labs unremarkable. PE unremarkable. Patient was seen and examined by attending who agrees with above d/c plan. All meds rx to pharmacy and explained to patient. Patient instructed to return if sx worsen including not limited to chest pain, shortness of breath.         61 y/o old Male with hx of CONTRAST ALLERGY and PMHx of HTN, HLD, BPH, DM II, EDMUND on CPAP, CAD (multiple PCIs and brachytherapy last @ [facility] 9/17/2020 with angiosculpt to LAD ISR) who initially presented to his cardiologist Dr Graves 9/20 endorsing worsening 8/10 diffuse chest pain with exertion with 1 block associated with dyspnea and epigastric discomfort x 2 months. Echo from MD note: EF 55-60%, Grade 2 diastolic dysfunction, mild aortic sclerosis, mild LA enlargement, trace MR, TR. Since the last cath pt endorses feeling well and denies CP, SOB, N/V/D, dizziness, syncope, palpitations, LE edema, melena, hematuria, fever, chills, cough, sick contact. Pt reports compliance with aspirin 81 mg QD and Plavix 75 mg QD. In light of patient’s risk factors, known CAD, CCS Angina Class II Symptoms patient referred to [facility] for brachytherapy of LAD. Of note:  pt  spend 25 days and returned from the [place] on 12/11/20. cardiac catheterization @ [facility] 9/17/20: IVUS guided/Angiosculpt to LAD ISR, patent also noted to have patent LM, diffuse D2 stenosis, 70-80% OM1 lesion, patent pLCx stent, patent pRCA stent, 50% pRPDA lesion, EF:70%, EDP 22mmHg, Right groin PC. Patient recommended to return for brachytherapy   of LAD in 5 weeks.    Pt is s/p cath 12/17 received brachytherapy to pLAD. R groin angiosealed. Pt s/p cath complaining of 7/10 substernal chest pain described as "tightness", non-reproducible, non-radiating, which he states started during the procedure as 10/10 and has since improved to 7/10. VSS remained stable and EKG unchanged. Overnight, no events. This AM, feels well, denies CP, SOB, n/v/d, LE edema. VSS, no events on tele, labs K 3.6 repleted with KCL 40meq POx1. PE unremarkable. Patient was seen and examined by attending who agrees with above d/c plan. All meds rx to pharmacy and explained to patient. Patient instructed to return if sx worsen including not limited to chest pain, shortness of breath.         59 y/o old Male with hx of CONTRAST ALLERGY and PMHx of HTN, HLD, BPH, DM II, EDMUND on CPAP, CAD (multiple PCIs and brachytherapy last @ [facility] 9/17/2020 with angiosculpt to LAD ISR) who initially presented to his cardiologist Dr Graves 9/20 endorsing worsening 8/10 diffuse chest pain with exertion with 1 block associated with dyspnea and epigastric discomfort x 2 months. Echo from MD note: EF 55-60%, Grade 2 diastolic dysfunction, mild aortic sclerosis, mild LA enlargement, trace MR, TR. Since the last cath pt endorses feeling well and denies CP, SOB, N/V/D, dizziness, syncope, palpitations, LE edema, melena, hematuria, fever, chills, cough, sick contact. Pt reports compliance with aspirin 81 mg QD and Plavix 75 mg QD. In light of patient’s risk factors, known CAD, CCS Angina Class II Symptoms patient referred to [facility] for brachytherapy of LAD. Of note:  pt  spend 25 days and returned from the [place] on 12/11/20. cardiac catheterization @ [facility] 9/17/20: IVUS guided/Angiosculpt to LAD ISR, patent also noted to have patent LM, diffuse D2 stenosis, 70-80% OM1 lesion, patent pLCx stent, patent pRCA stent, 50% pRPDA lesion, EF:70%, EDP 22mmHg, Right groin PC. Patient recommended to return for brachytherapy   of LAD in 5 weeks.    Pt is s/p cath 12/17 received brachytherapy to pLAD. R groin angiosealed. Pt s/p cath complaining of 7/10 substernal chest pain described as "tightness", non-reproducible, non-radiating, which he states started during the procedure as 10/10 and has since improved to 7/10. VSS remained stable and EKG unchanged. Overnight, no events. This AM, feels well, denies CP, SOB, n/v/d, LE edema. VSS, no events on tele, labs K 3.6 repleted with KCL 40meq POx1, WBC 11.95 in the setting steroid premedication prior to cath for contrast allergy. PE unremarkable. Patient was seen and examined by attending who agrees with above d/c plan. All meds rx to pharmacy and explained to patient. Patient instructed to return if sx worsen including not limited to chest pain, shortness of breath.

## 2020-12-17 NOTE — DISCHARGE NOTE PROVIDER - CARE PROVIDER_API CALL
Will Loyd)  Cardiovascular Disease; Internal Medicine; Interventional Cardiology  [address] [place] [address]  Phone: [phone]  Fax: [phone]  Follow Up Time:

## 2020-12-17 NOTE — DISCHARGE NOTE PROVIDER - NSDCCPCAREPLAN_GEN_ALL_CORE_FT
PRINCIPAL DISCHARGE DIAGNOSIS  Diagnosis: Coronary artery disease  Assessment and Plan of Treatment: You have a diagnosis of coronary artery disease and have been started on daily aspirin and plavix. You have a diagnosis of coronary artery disease and received a stent to your coronary artery. You should continue daily aspirin and Plavix to ensure your stent does not close. DO NOT STOP THESE MEDICATIONS FOR ANY REASON UNLESS OTHERWISE INDICATED BY YOUR CARDIOLOGIST BECAUSE THIS WILL PUT YOU AT RISK FOR A HEART ATTACK. You should refrain from strenuous activity and heavy lifting for 1 week. Please make a follow up appointment with your cardiologist within 1-2 weeks of your discharge. All of your prescriptions have been sent electronically to your pharmacy.   -Please follow up with cardiologist in 1-2 weeks      SECONDARY DISCHARGE DIAGNOSES  Diagnosis: Diabetes  Assessment and Plan of Treatment: You have a diagnosis of diabetes and should continue all of your diabetic medications as prescribed. Please do not take your metformin for 2 days, you may restart on 12/20/2020 to prevent interaction with the contrast you recieved.   -monitor blood sugar with daily finger sticks  -you may continue your other medications as prescribed    Diagnosis: Hyperlipidemia  Assessment and Plan of Treatment: Please continue your daily statin to ensure your cardiac stent remains open.    Diagnosis: Hypertension  Assessment and Plan of Treatment: You have a history of elevated blood pressure and you should continue your blood pressure medications as prescribed.   -monitor blood pressure daily with goal <140/90

## 2020-12-18 ENCOUNTER — TRANSCRIPTION ENCOUNTER (OUTPATIENT)
Age: 60
End: 2020-12-18

## 2020-12-18 VITALS — TEMPERATURE: 98 F

## 2020-12-18 LAB
ANION GAP SERPL CALC-SCNC: 12 MMOL/L — SIGNIFICANT CHANGE UP (ref 5–17)
BASOPHILS # BLD AUTO: 0.04 K/UL — SIGNIFICANT CHANGE UP (ref 0–0.2)
BASOPHILS NFR BLD AUTO: 0.3 % — SIGNIFICANT CHANGE UP (ref 0–2)
BUN SERPL-MCNC: 25 MG/DL — HIGH (ref 7–23)
CALCIUM SERPL-MCNC: 9.7 MG/DL — SIGNIFICANT CHANGE UP (ref 8.4–10.5)
CHLORIDE SERPL-SCNC: 98 MMOL/L — SIGNIFICANT CHANGE UP (ref 96–108)
CO2 SERPL-SCNC: 26 MMOL/L — SIGNIFICANT CHANGE UP (ref 22–31)
CREAT SERPL-MCNC: 1.1 MG/DL — SIGNIFICANT CHANGE UP (ref 0.5–1.3)
EOSINOPHIL # BLD AUTO: 0.14 K/UL — SIGNIFICANT CHANGE UP (ref 0–0.5)
EOSINOPHIL NFR BLD AUTO: 1.2 % — SIGNIFICANT CHANGE UP (ref 0–6)
GLUCOSE BLDC GLUCOMTR-MCNC: 125 MG/DL — HIGH (ref 70–99)
GLUCOSE SERPL-MCNC: 123 MG/DL — HIGH (ref 70–99)
HCT VFR BLD CALC: 48.3 % — SIGNIFICANT CHANGE UP (ref 39–50)
HGB BLD-MCNC: 15.9 G/DL — SIGNIFICANT CHANGE UP (ref 13–17)
IMM GRANULOCYTES NFR BLD AUTO: 0.3 % — SIGNIFICANT CHANGE UP (ref 0–1.5)
LYMPHOCYTES # BLD AUTO: 2.5 K/UL — SIGNIFICANT CHANGE UP (ref 1–3.3)
LYMPHOCYTES # BLD AUTO: 20.9 % — SIGNIFICANT CHANGE UP (ref 13–44)
MAGNESIUM SERPL-MCNC: 2.2 MG/DL — SIGNIFICANT CHANGE UP (ref 1.6–2.6)
MCHC RBC-ENTMCNC: 30.2 PG — SIGNIFICANT CHANGE UP (ref 27–34)
MCHC RBC-ENTMCNC: 32.9 GM/DL — SIGNIFICANT CHANGE UP (ref 32–36)
MCV RBC AUTO: 91.8 FL — SIGNIFICANT CHANGE UP (ref 80–100)
MONOCYTES # BLD AUTO: 1.05 K/UL — HIGH (ref 0–0.9)
MONOCYTES NFR BLD AUTO: 8.8 % — SIGNIFICANT CHANGE UP (ref 2–14)
NEUTROPHILS # BLD AUTO: 8.18 K/UL — HIGH (ref 1.8–7.4)
NEUTROPHILS NFR BLD AUTO: 68.5 % — SIGNIFICANT CHANGE UP (ref 43–77)
NRBC # BLD: 0 /100 WBCS — SIGNIFICANT CHANGE UP (ref 0–0)
PLATELET # BLD AUTO: 186 K/UL — SIGNIFICANT CHANGE UP (ref 150–400)
POTASSIUM SERPL-MCNC: 3.6 MMOL/L — SIGNIFICANT CHANGE UP (ref 3.5–5.3)
POTASSIUM SERPL-SCNC: 3.6 MMOL/L — SIGNIFICANT CHANGE UP (ref 3.5–5.3)
RBC # BLD: 5.26 M/UL — SIGNIFICANT CHANGE UP (ref 4.2–5.8)
RBC # FLD: 12.7 % — SIGNIFICANT CHANGE UP (ref 10.3–14.5)
SODIUM SERPL-SCNC: 136 MMOL/L — SIGNIFICANT CHANGE UP (ref 135–145)
WBC # BLD: 11.95 K/UL — HIGH (ref 3.8–10.5)
WBC # FLD AUTO: 11.95 K/UL — HIGH (ref 3.8–10.5)

## 2020-12-18 PROCEDURE — 99239 HOSP IP/OBS DSCHRG MGMT >30: CPT

## 2020-12-18 RX ORDER — POTASSIUM CHLORIDE 20 MEQ
40 PACKET (EA) ORAL ONCE
Refills: 0 | Status: DISCONTINUED | OUTPATIENT
Start: 2020-12-18 | End: 2020-12-18

## 2020-12-18 RX ORDER — POTASSIUM CHLORIDE 20 MEQ
40 PACKET (EA) ORAL ONCE
Refills: 0 | Status: COMPLETED | OUTPATIENT
Start: 2020-12-18 | End: 2020-12-18

## 2020-12-18 RX ORDER — ASPIRIN/CALCIUM CARB/MAGNESIUM 324 MG
1 TABLET ORAL
Qty: 30 | Refills: 11
Start: 2020-12-18 | End: 2021-12-12

## 2020-12-18 RX ORDER — ATORVASTATIN CALCIUM 80 MG/1
1 TABLET, FILM COATED ORAL
Qty: 30 | Refills: 6
Start: 2020-12-18 | End: 2021-07-15

## 2020-12-18 RX ORDER — METOPROLOL TARTRATE 50 MG
1 TABLET ORAL
Qty: 30 | Refills: 2
Start: 2020-12-18 | End: 2021-03-17

## 2020-12-18 RX ORDER — CLOPIDOGREL BISULFATE 75 MG/1
1 TABLET, FILM COATED ORAL
Qty: 30 | Refills: 11
Start: 2020-12-18 | End: 2021-12-12

## 2020-12-18 RX ADMIN — Medication 7 UNIT(S): at 08:29

## 2020-12-18 RX ADMIN — CLOPIDOGREL BISULFATE 75 MILLIGRAM(S): 75 TABLET, FILM COATED ORAL at 11:25

## 2020-12-18 RX ADMIN — Medication 10 MILLIGRAM(S): at 10:02

## 2020-12-18 RX ADMIN — Medication 40 MILLIEQUIVALENT(S): at 08:43

## 2020-12-18 RX ADMIN — Medication 81 MILLIGRAM(S): at 11:25

## 2020-12-18 RX ADMIN — Medication 50 MILLIGRAM(S): at 05:34

## 2020-12-18 NOTE — DISCHARGE NOTE NURSING/CASE MANAGEMENT/SOCIAL WORK - PATIENT PORTAL LINK FT
You can access the FollowMyHealth Patient Portal offered by [facility] by registering at the following website: [email or web address]. By joining [facility]’s FollowMyHealth portal, you will also be able to view your health information using other applications (apps) compatible with our system.

## 2020-12-18 NOTE — CHART NOTE - NSCHARTNOTEFT_GEN_A_CORE
Indication for admission: Significant Hypertension during procedure requiring nitroglycerin    Conclusions:  One Vessel Coronary Artery Disease  Syntax score low  Frailty score 5    Coronary Angiogram  LMCA: Normal  LAD: 95% instent restenosis of pLAD  LCx: Patent stent in OM1  RCA: Not injected    Left Heart Catheterization deferred    Intervention  - Successful Laser Atherectomy (1.4), Brachytherapy and PTCA of % stenosis with a 3.0 x 15 mm Marion Balloon. 0% residual stenosis and excellent angiographic result with CORNEL 3 flow post intervention.    Rt Common Femoral Access site closed using Perclose closure device achieving adequate hemostasis prior to leaving the cath lab  No procedural complications      Recommendations:  Dual anti-platelet therapy with Aspirin 81mg daily and Plavix 75mg daily for at least 1 year after which Aspirin 81mg daily should be continued indefinitely  Optimal medical therapy as tolerated, including appropriate intensity statin, beta blocker and ACE/ARB if indicated  Risk factor modification and risk reduction strategies with lifestyle changes  Follow up with Dr Graves post discharge

## 2020-12-24 DIAGNOSIS — E66.9 OBESITY, UNSPECIFIED: ICD-10-CM

## 2020-12-24 DIAGNOSIS — K76.0 FATTY (CHANGE OF) LIVER, NOT ELSEWHERE CLASSIFIED: ICD-10-CM

## 2020-12-24 DIAGNOSIS — N40.0 BENIGN PROSTATIC HYPERPLASIA WITHOUT LOWER URINARY TRACT SYMPTOMS: ICD-10-CM

## 2020-12-24 DIAGNOSIS — R07.89 OTHER CHEST PAIN: ICD-10-CM

## 2020-12-24 DIAGNOSIS — Z79.02 LONG TERM (CURRENT) USE OF ANTITHROMBOTICS/ANTIPLATELETS: ICD-10-CM

## 2020-12-24 DIAGNOSIS — I25.10 ATHEROSCLEROTIC HEART DISEASE OF NATIVE CORONARY ARTERY WITHOUT ANGINA PECTORIS: ICD-10-CM

## 2020-12-24 DIAGNOSIS — E78.5 HYPERLIPIDEMIA, UNSPECIFIED: ICD-10-CM

## 2020-12-24 DIAGNOSIS — Z79.52 LONG TERM (CURRENT) USE OF SYSTEMIC STEROIDS: ICD-10-CM

## 2020-12-24 DIAGNOSIS — I97.3 POSTPROCEDURAL HYPERTENSION: ICD-10-CM

## 2020-12-24 DIAGNOSIS — Z91.041 RADIOGRAPHIC DYE ALLERGY STATUS: ICD-10-CM
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
## 2020-12-24 DIAGNOSIS — Z79.4 LONG TERM (CURRENT) USE OF INSULIN: ICD-10-CM

## 2020-12-24 DIAGNOSIS — G47.33 OBSTRUCTIVE SLEEP APNEA (ADULT) (PEDIATRIC): ICD-10-CM
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
## 2020-12-24 DIAGNOSIS — Z79.82 LONG TERM (CURRENT) USE OF ASPIRIN: ICD-10-CM

## 2020-12-24 DIAGNOSIS — T82.855A STENOSIS OF CORONARY ARTERY STENT, INITIAL ENCOUNTER: ICD-10-CM

## 2020-12-24 DIAGNOSIS — E11.9 TYPE 2 DIABETES MELLITUS WITHOUT COMPLICATIONS: ICD-10-CM

## 2020-12-24 DIAGNOSIS — Z98.1 ARTHRODESIS STATUS: ICD-10-CM

## 2020-12-24 DIAGNOSIS — G89.18 OTHER ACUTE POSTPROCEDURAL PAIN: ICD-10-CM

## 2020-12-24 DIAGNOSIS — Z95.5 PRESENCE OF CORONARY ANGIOPLASTY IMPLANT AND GRAFT: ICD-10-CM

## 2020-12-24 DIAGNOSIS — I10 ESSENTIAL (PRIMARY) HYPERTENSION: ICD-10-CM

## 2021-01-15 DIAGNOSIS — Z71.89 OTHER SPECIFIED COUNSELING: ICD-10-CM

## 2021-01-25 PROCEDURE — C1725: CPT

## 2021-01-25 PROCEDURE — 83036 HEMOGLOBIN GLYCOSYLATED A1C: CPT

## 2021-01-25 PROCEDURE — 77290 THER RAD SIMULAJ FIELD CPLX: CPT

## 2021-01-25 PROCEDURE — 77316 BRACHYTX ISODOSE PLAN SIMPLE: CPT

## 2021-01-25 PROCEDURE — 82962 GLUCOSE BLOOD TEST: CPT

## 2021-01-25 PROCEDURE — 83735 ASSAY OF MAGNESIUM: CPT

## 2021-01-25 PROCEDURE — 80053 COMPREHEN METABOLIC PANEL: CPT

## 2021-01-25 PROCEDURE — C1769: CPT

## 2021-01-25 PROCEDURE — C1753: CPT

## 2021-01-25 PROCEDURE — C1885: CPT

## 2021-01-25 PROCEDURE — C1717: CPT

## 2021-01-25 PROCEDURE — C1728: CPT

## 2021-01-25 PROCEDURE — C1894: CPT

## 2021-01-25 PROCEDURE — 36415 COLL VENOUS BLD VENIPUNCTURE: CPT

## 2021-01-25 PROCEDURE — C1760: CPT

## 2021-01-25 PROCEDURE — 85610 PROTHROMBIN TIME: CPT

## 2021-01-25 PROCEDURE — 77770 HDR RDNCL NTRSTL/ICAV BRCHTX: CPT

## 2021-01-25 PROCEDURE — 85025 COMPLETE CBC W/AUTO DIFF WBC: CPT

## 2021-01-25 PROCEDURE — 85730 THROMBOPLASTIN TIME PARTIAL: CPT

## 2021-01-25 PROCEDURE — 80048 BASIC METABOLIC PNL TOTAL CA: CPT

## 2021-01-25 PROCEDURE — 82550 ASSAY OF CK (CPK): CPT

## 2021-01-25 PROCEDURE — 77470 SPECIAL RADIATION TREATMENT: CPT

## 2021-01-25 PROCEDURE — C1887: CPT

## 2021-01-25 PROCEDURE — 77370 RADIATION PHYSICS CONSULT: CPT

## 2021-01-25 PROCEDURE — 80061 LIPID PANEL: CPT

## 2021-05-31 NOTE — H&P ADULT - NECK DETAILS
"[facility] up appointment with Dr. Gan.  She states no concerns.  She follows with Dr. Dee for Med Onc.  /82 (Patient Site: Right Arm, Patient Position: Sitting)   Pulse 84   Resp 16   Ht 5' 1\" (1.549 m)   Wt 164 lb (74.4 kg)   SpO2 97%   BMI 30.99 kg/m  .  Patient met with Dr. Gan, see dictation for details.  Plan, patient will follow up with Dr. Gan in one year.  Invited calls with questions or concerns.  RN time 12 mins  " supple/no JVD

## 2021-08-18 NOTE — PATIENT PROFILE ADULT - NSPROMEDSADMININFO_GEN_A_NUR
no concerns Valtrex Counseling: I discussed with the patient the risks of valacyclovir including but not limited to kidney damage, nausea, vomiting and severe allergy.  The patient understands that if the infection seems to be worsening or is not improving, they are to call.

## 2021-12-01 PROCEDURE — G9005: CPT

## 2022-06-20 NOTE — H&P ADULT - RESPIRATORY
We are committed to providing you the best care possible. If you receive a survey after visiting one of our offices, please take time to share your experience concerning your physician office visit. These surveys are confidential and no health information about you is shared. We are eager to improve for you and we are counting on your feedback to help make that happen. detailed exam

## 2022-08-07 NOTE — DISCHARGE NOTE ADULT - NURSING SECTION COMPLETE
Airway patent, TM normal bilaterally, normal appearing mouth, nose, throat, neck supple with full range of motion, no cervical adenopathy.  no tongue/throat swelling
Patient/Caregiver provided printed discharge information.

## 2022-08-09 ENCOUNTER — TRANSCRIPTION ENCOUNTER (OUTPATIENT)
Age: 62
End: 2022-08-09

## 2022-08-09 PROBLEM — Z00.00 ENCOUNTER FOR PREVENTIVE HEALTH EXAMINATION: Status: ACTIVE | Noted: 2022-08-09

## 2022-08-16 ENCOUNTER — OUTPATIENT (OUTPATIENT)
Dept: OUTPATIENT SERVICES | Facility: HOSPITAL | Age: 62
LOS: 1 days | End: 2022-08-16
Payer: COMMERCIAL

## 2022-08-16 ENCOUNTER — APPOINTMENT (OUTPATIENT)
Dept: CT IMAGING | Facility: HOSPITAL | Age: 62
End: 2022-08-16

## 2022-08-16 DIAGNOSIS — Z98.1 ARTHRODESIS STATUS: Chronic | ICD-10-CM

## 2022-08-16 DIAGNOSIS — S69.90XA UNSPECIFIED INJURY OF UNSPECIFIED WRIST, HAND AND FINGER(S), INITIAL ENCOUNTER: Chronic | ICD-10-CM

## 2022-08-16 DIAGNOSIS — Z98.89 OTHER SPECIFIED POSTPROCEDURAL STATES: Chronic | ICD-10-CM

## 2022-08-16 PROCEDURE — 75574 CT ANGIO HRT W/3D IMAGE: CPT | Mod: 26

## 2022-08-16 PROCEDURE — 82565 ASSAY OF CREATININE: CPT

## 2022-08-16 PROCEDURE — 75574 CT ANGIO HRT W/3D IMAGE: CPT

## 2022-08-23 NOTE — DISCHARGE NOTE NURSING/CASE MANAGEMENT/SOCIAL WORK - NSDCDPATPORTLINK_GEN_ALL_CORE
You can access the [email or web address] Patient Portal, offered by [facility], by registering with the following website: [email or web address] Oris Muscle Flap Text: The defect edges were debeveled with a #15 scalpel blade.  Given that the defect affected the competency of the oral sphincter an obicularis oris muscle flap was deemed most appropriate to restore this competency and normal muscle function.  Using a sterile surgical marker, an appropriate flap was drawn incorporating the defect. The area thus outlined was incised with a #15 scalpel blade.

## 2022-10-18 NOTE — H&P ADULT - RS GEN PE MLT RESP DETAILS PC
respirations non-labored/no rales/no rhonchi/no wheezes Oxybutynin Pregnancy And Lactation Text: This medication is Pregnancy Category B and is considered safe during pregnancy. It is unknown if it is excreted in breast milk.

## 2022-11-04 ENCOUNTER — EMERGENCY (EMERGENCY)
Facility: HOSPITAL | Age: 62
LOS: 1 days | Discharge: HOME | End: 2022-11-04
Attending: EMERGENCY MEDICINE | Admitting: EMERGENCY MEDICINE
Payer: COMMERCIAL

## 2022-11-04 VITALS
HEART RATE: 82 BPM | RESPIRATION RATE: 18 BRPM | TEMPERATURE: 98 F | SYSTOLIC BLOOD PRESSURE: 136 MMHG | DIASTOLIC BLOOD PRESSURE: 84 MMHG | OXYGEN SATURATION: 97 %

## 2022-11-04 VITALS
WEIGHT: 197.98 LBS | HEIGHT: 68 IN | RESPIRATION RATE: 18 BRPM | DIASTOLIC BLOOD PRESSURE: 80 MMHG | TEMPERATURE: 98 F | HEART RATE: 82 BPM | SYSTOLIC BLOOD PRESSURE: 115 MMHG | OXYGEN SATURATION: 97 %

## 2022-11-04 DIAGNOSIS — M79.609 PAIN IN UNSPECIFIED LIMB: ICD-10-CM

## 2022-11-04 DIAGNOSIS — R07.89 OTHER CHEST PAIN: ICD-10-CM

## 2022-11-04 DIAGNOSIS — Z79.82 LONG TERM (CURRENT) USE OF ASPIRIN: ICD-10-CM

## 2022-11-04 DIAGNOSIS — Z20.822 CONTACT WITH AND (SUSPECTED) EXPOSURE TO COVID-19: ICD-10-CM

## 2022-11-04 DIAGNOSIS — I10 ESSENTIAL (PRIMARY) HYPERTENSION: ICD-10-CM

## 2022-11-04 DIAGNOSIS — M54.6 PAIN IN THORACIC SPINE: ICD-10-CM

## 2022-11-04 DIAGNOSIS — N40.0 BENIGN PROSTATIC HYPERPLASIA WITHOUT LOWER URINARY TRACT SYMPTOMS: ICD-10-CM

## 2022-11-04 DIAGNOSIS — Z98.89 OTHER SPECIFIED POSTPROCEDURAL STATES: Chronic | ICD-10-CM

## 2022-11-04 DIAGNOSIS — Z91.041 RADIOGRAPHIC DYE ALLERGY STATUS: ICD-10-CM

## 2022-11-04 DIAGNOSIS — E78.5 HYPERLIPIDEMIA, UNSPECIFIED: ICD-10-CM

## 2022-11-04 DIAGNOSIS — Z79.4 LONG TERM (CURRENT) USE OF INSULIN: ICD-10-CM

## 2022-11-04 DIAGNOSIS — S69.90XA UNSPECIFIED INJURY OF UNSPECIFIED WRIST, HAND AND FINGER(S), INITIAL ENCOUNTER: Chronic | ICD-10-CM

## 2022-11-04 DIAGNOSIS — Z88.6 ALLERGY STATUS TO ANALGESIC AGENT: ICD-10-CM

## 2022-11-04 DIAGNOSIS — Z99.89 DEPENDENCE ON OTHER ENABLING MACHINES AND DEVICES: ICD-10-CM

## 2022-11-04 DIAGNOSIS — E11.9 TYPE 2 DIABETES MELLITUS WITHOUT COMPLICATIONS: ICD-10-CM

## 2022-11-04 DIAGNOSIS — Z79.84 LONG TERM (CURRENT) USE OF ORAL HYPOGLYCEMIC DRUGS: ICD-10-CM

## 2022-11-04 DIAGNOSIS — R06.02 SHORTNESS OF BREATH: ICD-10-CM

## 2022-11-04 DIAGNOSIS — G47.33 OBSTRUCTIVE SLEEP APNEA (ADULT) (PEDIATRIC): ICD-10-CM

## 2022-11-04 DIAGNOSIS — Z98.1 ARTHRODESIS STATUS: Chronic | ICD-10-CM

## 2022-11-04 DIAGNOSIS — I25.10 ATHEROSCLEROTIC HEART DISEASE OF NATIVE CORONARY ARTERY WITHOUT ANGINA PECTORIS: ICD-10-CM

## 2022-11-04 DIAGNOSIS — Z79.02 LONG TERM (CURRENT) USE OF ANTITHROMBOTICS/ANTIPLATELETS: ICD-10-CM

## 2022-11-04 LAB
ALBUMIN SERPL ELPH-MCNC: 4.4 G/DL — SIGNIFICANT CHANGE UP (ref 3.3–5)
ALP SERPL-CCNC: 73 U/L — SIGNIFICANT CHANGE UP (ref 40–120)
ALT FLD-CCNC: 21 U/L — SIGNIFICANT CHANGE UP (ref 10–45)
ANION GAP SERPL CALC-SCNC: 9 MMOL/L — SIGNIFICANT CHANGE UP (ref 5–17)
AST SERPL-CCNC: 20 U/L — SIGNIFICANT CHANGE UP (ref 10–40)
BASOPHILS # BLD AUTO: 0.06 K/UL — SIGNIFICANT CHANGE UP (ref 0–0.2)
BASOPHILS NFR BLD AUTO: 0.8 % — SIGNIFICANT CHANGE UP (ref 0–2)
BILIRUB SERPL-MCNC: 0.5 MG/DL — SIGNIFICANT CHANGE UP (ref 0.2–1.2)
BUN SERPL-MCNC: 19 MG/DL — SIGNIFICANT CHANGE UP (ref 7–23)
CALCIUM SERPL-MCNC: 9.5 MG/DL — SIGNIFICANT CHANGE UP (ref 8.4–10.5)
CHLORIDE SERPL-SCNC: 100 MMOL/L — SIGNIFICANT CHANGE UP (ref 96–108)
CO2 SERPL-SCNC: 28 MMOL/L — SIGNIFICANT CHANGE UP (ref 22–31)
CREAT SERPL-MCNC: 1.23 MG/DL — SIGNIFICANT CHANGE UP (ref 0.5–1.3)
EGFR: 67 ML/MIN/1.73M2 — SIGNIFICANT CHANGE UP
EOSINOPHIL # BLD AUTO: 0.11 K/UL — SIGNIFICANT CHANGE UP (ref 0–0.5)
EOSINOPHIL NFR BLD AUTO: 1.5 % — SIGNIFICANT CHANGE UP (ref 0–6)
FLUAV AG NPH QL: SIGNIFICANT CHANGE UP
FLUBV AG NPH QL: SIGNIFICANT CHANGE UP
GLUCOSE SERPL-MCNC: 131 MG/DL — HIGH (ref 70–99)
HCT VFR BLD CALC: 45.2 % — SIGNIFICANT CHANGE UP (ref 39–50)
HGB BLD-MCNC: 15.2 G/DL — SIGNIFICANT CHANGE UP (ref 13–17)
IMM GRANULOCYTES NFR BLD AUTO: 0.3 % — SIGNIFICANT CHANGE UP (ref 0–0.9)
LYMPHOCYTES # BLD AUTO: 2.07 K/UL — SIGNIFICANT CHANGE UP (ref 1–3.3)
LYMPHOCYTES # BLD AUTO: 28.5 % — SIGNIFICANT CHANGE UP (ref 13–44)
MCHC RBC-ENTMCNC: 30.6 PG — SIGNIFICANT CHANGE UP (ref 27–34)
MCHC RBC-ENTMCNC: 33.6 GM/DL — SIGNIFICANT CHANGE UP (ref 32–36)
MCV RBC AUTO: 91.1 FL — SIGNIFICANT CHANGE UP (ref 80–100)
MONOCYTES # BLD AUTO: 0.74 K/UL — SIGNIFICANT CHANGE UP (ref 0–0.9)
MONOCYTES NFR BLD AUTO: 10.2 % — SIGNIFICANT CHANGE UP (ref 2–14)
NEUTROPHILS # BLD AUTO: 4.26 K/UL — SIGNIFICANT CHANGE UP (ref 1.8–7.4)
NEUTROPHILS NFR BLD AUTO: 58.7 % — SIGNIFICANT CHANGE UP (ref 43–77)
NRBC # BLD: 0 /100 WBCS — SIGNIFICANT CHANGE UP (ref 0–0)
PLATELET # BLD AUTO: 196 K/UL — SIGNIFICANT CHANGE UP (ref 150–400)
POTASSIUM SERPL-MCNC: 3.6 MMOL/L — SIGNIFICANT CHANGE UP (ref 3.5–5.3)
POTASSIUM SERPL-SCNC: 3.6 MMOL/L — SIGNIFICANT CHANGE UP (ref 3.5–5.3)
PROT SERPL-MCNC: 7.7 G/DL — SIGNIFICANT CHANGE UP (ref 6–8.3)
RBC # BLD: 4.96 M/UL — SIGNIFICANT CHANGE UP (ref 4.2–5.8)
RBC # FLD: 12.7 % — SIGNIFICANT CHANGE UP (ref 10.3–14.5)
RSV RNA NPH QL NAA+NON-PROBE: SIGNIFICANT CHANGE UP
SARS-COV-2 RNA SPEC QL NAA+PROBE: NEGATIVE — SIGNIFICANT CHANGE UP
SARS-COV-2 RNA SPEC QL NAA+PROBE: SIGNIFICANT CHANGE UP
SODIUM SERPL-SCNC: 137 MMOL/L — SIGNIFICANT CHANGE UP (ref 135–145)
TROPONIN T SERPL-MCNC: 0.01 NG/ML — SIGNIFICANT CHANGE UP (ref 0–0.01)
TROPONIN T SERPL-MCNC: <0.01 NG/ML — SIGNIFICANT CHANGE UP (ref 0–0.01)
WBC # BLD: 7.26 K/UL — SIGNIFICANT CHANGE UP (ref 3.8–10.5)
WBC # FLD AUTO: 7.26 K/UL — SIGNIFICANT CHANGE UP (ref 3.8–10.5)

## 2022-11-04 PROCEDURE — 71045 X-RAY EXAM CHEST 1 VIEW: CPT | Mod: 26

## 2022-11-04 PROCEDURE — 36415 COLL VENOUS BLD VENIPUNCTURE: CPT

## 2022-11-04 PROCEDURE — 84484 ASSAY OF TROPONIN QUANT: CPT

## 2022-11-04 PROCEDURE — 85025 COMPLETE CBC W/AUTO DIFF WBC: CPT

## 2022-11-04 PROCEDURE — 99285 EMERGENCY DEPT VISIT HI MDM: CPT

## 2022-11-04 PROCEDURE — 99234 HOSP IP/OBS SM DT SF/LOW 45: CPT

## 2022-11-04 PROCEDURE — 80053 COMPREHEN METABOLIC PANEL: CPT

## 2022-11-04 PROCEDURE — 99284 EMERGENCY DEPT VISIT MOD MDM: CPT | Mod: 25

## 2022-11-04 PROCEDURE — 87635 SARS-COV-2 COVID-19 AMP PRB: CPT

## 2022-11-04 PROCEDURE — 87637 SARSCOV2&INF A&B&RSV AMP PRB: CPT

## 2022-11-04 PROCEDURE — 71045 X-RAY EXAM CHEST 1 VIEW: CPT

## 2022-11-04 PROCEDURE — 85379 FIBRIN DEGRADATION QUANT: CPT

## 2022-11-04 RX ORDER — TRAMADOL HYDROCHLORIDE 50 MG/1
50 TABLET ORAL ONCE
Refills: 0 | Status: DISCONTINUED | OUTPATIENT
Start: 2022-11-04 | End: 2022-11-04

## 2022-11-04 RX ADMIN — TRAMADOL HYDROCHLORIDE 50 MILLIGRAM(S): 50 TABLET ORAL at 12:56

## 2022-11-04 RX ADMIN — TRAMADOL HYDROCHLORIDE 50 MILLIGRAM(S): 50 TABLET ORAL at 13:32

## 2022-11-04 NOTE — ED PROVIDER NOTE - PATIENT PORTAL LINK FT
You can access the FollowMyHealth Patient Portal offered by [facility] by registering at the following website: [email or web address]. By joining [facility]’s FollowMyHealth portal, you will also be able to view your health information using other applications (apps) compatible with our system.

## 2022-11-04 NOTE — CONSULT NOTE ADULT - ASSESSMENT
59 y/o old Male with hx of CONTRAST ALLERGY and PMHx of HTN, HLD, BPH, DM II, EDMUND on CPAP, CAD (multiple PCIs and brachytherapy last @ [facility] 12/2020 with brachytherapy to pLAD), who presents with chest pain and back pain.    #Chest pain   Extensive CAD hx, recent CCTA in august 2022 with patent stents (the ones that were able to be visualized). ECG - NSR with non-specific ST-T changes, trops negative x 2.   - unlikely to be an ACS event, does not need further cardiac procedures such as LHC  - given SOB and back pain, consider r/o PE and dissection with CT chest   - c/w DAPT (ASA/plavix), toprol and statin if above unremarkable    Spoke to patient's outpatient cardiologist and attending cardiologist over the phone who agrees with the plan above. Please call back cardiology with any questions or concern.

## 2022-11-04 NOTE — CONSULT NOTE ADULT - ATTENDING COMMENTS
Initial attending contact date   11/4/22   . See fellow note written above for details. I reviewed the fellow documentation. I have personally seen and examined this patient. I reviewed vitals, labs, medications, cardiac studies, and additional imaging. I agree with the above fellow's findings and plans as written above with the following additions/statements.

## 2022-11-04 NOTE — ED PROVIDER NOTE - IV ALTEPLASE EXCL ABS HIDDEN
AMI Week 1 Survey    Flowsheet Row Responses   [facility] facility patient discharged from? [place]   Does the patient have one of the following disease processes/diagnoses(primary or secondary)? Acute MI (STEMI,NSTEMI)   Week 1 attempt successful? No   Unsuccessful attempts Attempt 1          ALONDRA FERNANDEZ - Registered Nurse  
show

## 2022-11-04 NOTE — ED ADULT TRIAGE NOTE - CHIEF COMPLAINT QUOTE
Pt presents to ED c/o chest pain since yesterday. Reports midsternal chest pain, radiating to the back and shortness of breath. hx of 10 stents in the past, 14 cardiac catheterizations. Bilateral BPs done- 115/80 on the R arm and 137/89 on the L arm. 12 lead EKG done.

## 2022-11-04 NOTE — ED PROVIDER NOTE - OBJECTIVE STATEMENT
59 y/o old Male with hx of CONTRAST ALLERGY and PMHx of HTN, HLD, BPH, DM II, EDMUND on CPAP, CAD (multiple PCIs and brachytherapy last @ [facility] 9/17/2020 with angiosculpt to LAD ISR) who initially presented to his cardiologist Dr Graves 9/20 endorsing worsening 8/10 diffuse chest pain with exertion with 1 block associated with dyspnea and epigastric discomfort x 2 months. Echo from MD note: EF 55-60%, Grade 2 diastolic dysfunction, mild aortic sclerosis, mild LA enlargement, trace MR, TR. Since the last cath pt endorses feeling well and denies CP, SOB, N/V/D, dizziness, syncope, palpitations, LE edema, melena, hematuria, fever, chills, cough, sick contact. Pt reports compliance with aspirin 81 mg QD and Plavix 75 mg QD. In light of patient’s risk factors, known CAD, CCS Angina Class II Symptoms patient referred to [facility] for brachytherapy of LAD. Of note:  pt  spend 25 days and returned from the [place] on 12/11/20. cardiac catheterization @ [facility] 9/17/20: IVUS guided/Angiosculpt to LAD ISR, patent also noted to have patent LM, diffuse D2 stenosis, 70-80% OM1 lesion, patent pLCx stent, patent pRCA stent, 50% pRPDA lesion, EF:70%, EDP 22mmHg, Right groin PC. Patient recommended to return for brachytherapy   of LAD in 5 weeks.    Pt is s/p cath 12/17 received brachytherapy to pLAD. R groin angiosealed. Pt s/p cath complaining of 7/10 substernal chest pain described as "tightness", non-reproducible, non-radiating, which he states started during the procedure as 10/10 and has since improved to 7/10. VSS remained stable and EKG unchanged. Overnight, no events. This AM, feels well, denies CP, SOB, n/v/d, LE edema. VSS, no events on tele, labs K 3.6 repleted with KCL 40meq POx1, WBC 11.95 in the setting steroid premedication prior to cath for contrast allergy. PE unremarkable. Patient was seen and examined by attending who agrees with above d/c plan. All meds rx to pharmacy and explained to patient. Patient instructed to return if sx worsen including not limited to chest pain, shortness of breath 59 y/o old Male with hx of CONTRAST ALLERGY and PMHx of HTN, HLD, BPH, DM II, EDMUND on CPAP, CAD (multiple PCIs and brachytherapy last @ [facility] 9/17/2020 with angiosculpt to LAD ISR) presents with upper back pain since yesterday.  Began after his normal 40 minute work, sharp/pressure like and without radiation.  Pain has been waxing and waning and endorses mild SOB (difficulty catching his breath).  Also endorses leg and whole body pain.  Leg pain worse with rest.  Took oxycodone with mild relief of symptoms.  He had concerns due to his cardiac history and presented to the ED.  Reviewed EMR, last visit in 2020 was for chest pain with subsequent cath.  Follows with Dr. Graves in [place], no notes available in [facility]

## 2022-11-04 NOTE — CONSULT NOTE ADULT - SUBJECTIVE AND OBJECTIVE BOX
HPI:  59 y/o old Male with hx of CONTRAST ALLERGY and PMHx of HTN, HLD, BPH, DM II, EDMUND on CPAP, CAD (multiple PCIs and brachytherapy last @ [facility] 12/2020 with brachytherapy to pLAD), who presents with chest pain and back pain. He describes the back pain as the more severe etiology between the two, and that it is unlike his previous times when he's needed a stent (in the past he describes it as a pressure feeling, someone sitting on his chest). Mild SOB, needs to take deep inspiration at times. No radiation to any extremities. Otherwise feels well. His only complaint now is the back pain, the chest pain subsided this morning.       PAST MEDICAL & SURGICAL HISTORY:  Sleep apnea  on CPAP      Hypertension      Diabetes mellitus      High cholesterol      Obesity (BMI 30.0-34.9)      CAD (coronary artery disease)      Hiatal hernia  with possible GERD      Fatty liver      BPH (benign prostatic hyperplasia)      Constipation      S/P angioplasty with stent  2008, 2014, 03/2019      Finger injury  Left Ring Finger &amp; had surgery ( 1996 )      S/P foot surgery  Right  ( 2013 )      H/O spinal fusion      PREVIOUS DIAGNOSTIC TESTING:      FAMILY HISTORY:  FHx: myocardial infarction  mother    FH: coronary artery disease  father s/p CABG    FH: type 2 diabetes  father        Social History:      ALLERGIES/INTOLERANCES:  ibuprofen (Other)  IV Contrast (Other; Rash)    HOME MEDICATIONS:    INPATIENT MEDICATIONS:        REVIEW OF SYSTEMS: See HPI     PHYSICAL EXAM:  Gen: NAD   HEENT: EOMI   Neck: Flat JVP   Cor: Normal s1, s2. RRR.   Abd: soft, non-tender, non-distended  Ext: no edema  Neuro: alert and attentive    T(C): 36.6 (11-04-22 @ 15:44), Max: 36.7 (11-04-22 @ 11:06)  HR: 70 (11-04-22 @ 15:44) (70 - 82)  BP: 109/70 (11-04-22 @ 15:44) (109/70 - 115/80)  RR: 18 (11-04-22 @ 15:44) (18 - 18)  SpO2: 97% (11-04-22 @ 15:44) (97% - 97%)  Wt(kg): --    I&O's Summary      ECG - NSR with non-specific ST-T changes,  	  LABS:                        15.2   7.26  )-----------( 196      ( 04 Nov 2022 11:30 )             45.2     11-04    137  |  100  |  19  ----------------------------<  131<H>  3.6   |  28  |  1.23    Ca    9.5      04 Nov 2022 11:30    TPro  7.7  /  Alb  4.4  /  TBili  0.5  /  DBili  x   /  AST  20  /  ALT  21  /  AlkPhos  73  11-04      Lipid Profile:   HgA1c:   TSH:     CARDIAC MARKERS:          proBNP:     RADIOLOGY:

## 2022-11-04 NOTE — ED ADULT NURSE NOTE - MODE OF DISCHARGE
Ambulatory Purse String (Simple) Text: Given the location of the defect and the characteristics of the surrounding skin a pursestring closure was deemed most appropriate.  Undermining was performed circumfirentially around the surgical defect.  A purstring suture was then placed and tightened.

## 2022-11-04 NOTE — ED PROVIDER NOTE - CLINICAL SUMMARY MEDICAL DECISION MAKING FREE TEXT BOX
hsTnT negative x 2.  Pain improved.  CXR reviewed.  Also reviewed with cardiology.  Not likely acs.  D-dimer ordered and negative.  Conservative management discussed with the patient in detail.  Close PMD follow up encouraged.  Strict ED return instructions discussed in detail and patient given the opportunity to ask any questions about their discharge diagnosis and instructions

## 2023-12-29 NOTE — ED PROVIDER NOTE - NSTIMEPROVIDERCAREINITIATE_GEN_ER
04-Nov-2022 11:18
ICU Vital Signs Last 24 Hrs  T(C): 37 (29 Dec 2023 10:54), Max: 37 (29 Dec 2023 10:54)  T(F): 98.6 (29 Dec 2023 10:54), Max: 98.6 (29 Dec 2023 10:54)  HR: 94 (29 Dec 2023 10:54) (69 - 94)  BP: 112/59 (29 Dec 2023 10:54) (112/59 - 135/55)  BP(mean): --  ABP: --  ABP(mean): --  RR: 18 (29 Dec 2023 10:54) (18 - 18)  SpO2: 99% (29 Dec 2023 10:54) (98% - 99%)    O2 Parameters below as of 29 Dec 2023 10:54  Patient On (Oxygen Delivery Method): room air        T(C): 37 (12-29-23 @ 10:54), Max: 37 (12-29-23 @ 10:54)  HR: 94 (12-29-23 @ 10:54) (69 - 94)  BP: 112/59 (12-29-23 @ 10:54) (112/59 - 135/55)  RR: 18 (12-29-23 @ 10:54) (18 - 18)  SpO2: 99% (12-29-23 @ 10:54) (98% - 99%)    PHYSICAL EXAM:    CONSTITUTIONAL: lying comfortably in bed, no apparent distress  EYES: PERRLA and symmetric, No conjunctival or scleral injection, non-icteric  RESP: CTA b/l, no WRR  CV: RRR, no murmurs  GI: Soft, nontender, nondistended   NEURO: sensation intact in upper and lower extremities b/l to light touch   PSYCH: A+O x 3, mood and affect appropriate
Yes

## 2024-01-29 ENCOUNTER — APPOINTMENT (OUTPATIENT)
Dept: CT IMAGING | Facility: HOSPITAL | Age: 64
End: 2024-01-29

## 2024-01-29 ENCOUNTER — OUTPATIENT (OUTPATIENT)
Dept: OUTPATIENT SERVICES | Facility: HOSPITAL | Age: 64
LOS: 1 days | End: 2024-01-29
Payer: COMMERCIAL

## 2024-01-29 DIAGNOSIS — Z98.89 OTHER SPECIFIED POSTPROCEDURAL STATES: Chronic | ICD-10-CM

## 2024-01-29 DIAGNOSIS — S69.90XA UNSPECIFIED INJURY OF UNSPECIFIED WRIST, HAND AND FINGER(S), INITIAL ENCOUNTER: Chronic | ICD-10-CM

## 2024-01-29 DIAGNOSIS — Z98.1 ARTHRODESIS STATUS: Chronic | ICD-10-CM

## 2024-01-29 PROCEDURE — 71250 CT THORAX DX C-: CPT

## 2024-01-29 PROCEDURE — 71250 CT THORAX DX C-: CPT | Mod: 26

## 2024-04-02 VITALS
OXYGEN SATURATION: 95 % | HEART RATE: 78 BPM | DIASTOLIC BLOOD PRESSURE: 68 MMHG | HEIGHT: 68 IN | SYSTOLIC BLOOD PRESSURE: 101 MMHG | RESPIRATION RATE: 78 BRPM | TEMPERATURE: 98 F | WEIGHT: 203.05 LBS

## 2024-04-02 RX ORDER — DEXTROSE 50 % IN WATER 50 %
25 SYRINGE (ML) INTRAVENOUS ONCE
Refills: 0 | Status: DISCONTINUED | OUTPATIENT
Start: 2024-04-04 | End: 2024-04-05

## 2024-04-02 RX ORDER — DEXTROSE 50 % IN WATER 50 %
12.5 SYRINGE (ML) INTRAVENOUS ONCE
Refills: 0 | Status: DISCONTINUED | OUTPATIENT
Start: 2024-04-04 | End: 2024-04-05

## 2024-04-02 RX ORDER — DEXTROSE 50 % IN WATER 50 %
15 SYRINGE (ML) INTRAVENOUS ONCE
Refills: 0 | Status: DISCONTINUED | OUTPATIENT
Start: 2024-04-04 | End: 2024-04-05

## 2024-04-02 RX ORDER — SODIUM CHLORIDE 9 MG/ML
1000 INJECTION, SOLUTION INTRAVENOUS
Refills: 0 | Status: DISCONTINUED | OUTPATIENT
Start: 2024-04-04 | End: 2024-04-05

## 2024-04-02 RX ORDER — OXYCODONE HYDROCHLORIDE 5 MG/1
1 TABLET ORAL
Qty: 0 | Refills: 0 | DISCHARGE

## 2024-04-02 RX ORDER — ALFUZOSIN HYDROCHLORIDE 10 MG/1
1 TABLET, EXTENDED RELEASE ORAL
Qty: 0 | Refills: 0 | DISCHARGE

## 2024-04-02 RX ORDER — SODIUM CHLORIDE 9 MG/ML
1000 INJECTION INTRAMUSCULAR; INTRAVENOUS; SUBCUTANEOUS
Refills: 0 | Status: DISCONTINUED | OUTPATIENT
Start: 2024-04-04 | End: 2024-04-05

## 2024-04-02 RX ORDER — LISINOPRIL/HYDROCHLOROTHIAZIDE 10-12.5 MG
1 TABLET ORAL
Qty: 0 | Refills: 0 | DISCHARGE

## 2024-04-02 RX ORDER — CHLORHEXIDINE GLUCONATE 213 G/1000ML
1 SOLUTION TOPICAL ONCE
Refills: 0 | Status: DISCONTINUED | OUTPATIENT
Start: 2024-04-04 | End: 2024-04-05

## 2024-04-02 RX ORDER — METFORMIN HYDROCHLORIDE 850 MG/1
1 TABLET ORAL
Qty: 0 | Refills: 0 | DISCHARGE

## 2024-04-02 RX ORDER — GLUCAGON INJECTION, SOLUTION 0.5 MG/.1ML
1 INJECTION, SOLUTION SUBCUTANEOUS ONCE
Refills: 0 | Status: DISCONTINUED | OUTPATIENT
Start: 2024-04-04 | End: 2024-04-05

## 2024-04-02 RX ORDER — GABAPENTIN 400 MG/1
1 CAPSULE ORAL
Qty: 0 | Refills: 0 | DISCHARGE

## 2024-04-02 NOTE — H&P ADULT - ASSESSMENT
63M with hx of CONTRAST ALLERGY and PMHx of HTN, HLD, BPH, DM II, EDMNUD on CPAP, CAD (multiple PCIs and brachytherapy) who initially presented to outpatient cardiologist, Star Sanchez, with complaints of GRAHAM walking 3 blocks which is relieved by rest and worse w/ inclines. Patient also endorses B/L leg swelling, prolong standing, B/L calf discomfort on exertion, relieved by rest and decreased on ambulation, and brief palpitations. Denies CP, SOB at rest, orthopnea, PND, abd pain, N/V/D, hematuria, hematochezia, BRBPR, melena, fever, chills, cough, recent travel, or sick contacts. EST 03/25/24: ischemia at rich-septal wall TTE 02/01/24: LVEF 65%, mild DD, mild TR, trace [place].       - EKG:  NSR @ 77 bpm   - ASA:   II        Mallampati: III   - H/H stable: 16.4/48.1        Platelets/Coags stable. Cr: 1.12  - Patient denies hematochieza, hematuria, easy bruising, or signs of bleeding.   - Patientgiven Plavix 75 mg PO x 1 (states compliance with Plavix 75 mg daily). Patient to be loaded with Aspirin 325 mg PO x 1 on the table if needed   - Patient started on IV NS @  250 cc x 1 and started on IV NS 75 cc/hr x 2 hours   - Hgb a1c:      Started on: MISS   - Patient has a contrast allergy, given Solucortef 200 mg IVP x 1 and benadryl 50 mg IVP x 1 on call to cath lab   - Patient is a suitable candidate for moderate sedation.     Risks & benefits of procedure and alternative therapy have been explained to the patient including but not limited to: allergic reaction, bleeding w/possible need for blood transfusion, infection, renal and vascular compromise, limb damage, arrhythmia, stroke, vessel dissection/perforation, Myocardial infarction, emergent CABG. Informed consent obtained and in chart.    63M with hx of CONTRAST ALLERGY and PMHx of HTN, HLD, BPH, DM II, EDMUND on CPAP, CAD (multiple PCIs and brachytherapy) who initially presented to outpatient cardiologist, Star Sanchez, with complaints of GRAHAM walking 3 blocks which is relieved by rest and worse w/ inclines. Patient also endorses B/L leg swelling, prolong standing, B/L calf discomfort on exertion, relieved by rest and decreased on ambulation, and brief palpitations. Denies CP, SOB at rest, orthopnea, PND, abd pain, N/V/D, hematuria, hematochezia, BRBPR, melena, fever, chills, cough, recent travel, or sick contacts. EST 03/25/24: ischemia at rich-septal wall TTE 02/01/24: LVEF 65%, mild DD, mild TR, trace [place].       - EKG:  NSR @ 77 bpm   - ASA:   II        Mallampati: III   - H/H stable: 16.4/48.1        Platelets/Coags stable. Cr: 1.12  - Patient denies hematochieza, hematuria, easy bruising, or signs of bleeding.   - Patientgiven Plavix 75 mg PO x 1 (states compliance with Plavix 75 mg daily). Patient to be loaded with Aspirin 325 mg IVP x 1 with Pantoprazole 40 mg IVP x 1   - K: 3.5, supplemented with KCL 60 mEq PO x 1  - Patient started on IV NS @  250 cc x 1 and started on IV NS 75 cc/hr x 2 hours   - Hgb a1c: 9.7     Started on: MISS   - Patient has a contrast allergy, given Solu-Cortef 200 mg IVP x 1 and benadryl 50 mg IVP x 1 on call to cath lab   - Patient is a suitable candidate for moderate sedation.     Risks & benefits of procedure and alternative therapy have been explained to the patient including but not limited to: allergic reaction, bleeding w/possible need for blood transfusion, infection, renal and vascular compromise, limb damage, arrhythmia, stroke, vessel dissection/perforation, Myocardial infarction, emergent CABG. Informed consent obtained and in chart.    63M with hx of CONTRAST ALLERGY and PMHx of HTN, HLD, BPH, DM II, EDMUND on CPAP, CAD (multiple PCIs and brachytherapy) who initially presented to outpatient cardiologist, Star Sanchez, with complaints of GRAHAM walking 3 blocks which is relieved by rest and worse w/ inclines. Patient also endorses B/L leg swelling, prolong standing, B/L calf discomfort on exertion, relieved by rest and decreased on ambulation, and brief palpitations. Denies CP, SOB at rest, orthopnea, PND, abd pain, N/V/D, hematuria, hematochezia, BRBPR, melena, fever, chills, cough, recent travel, or sick contacts. EST 03/25/24: ischemia at rich-septal wall TTE 02/01/24: LVEF 65%, mild DD, mild TR, trace [place].       - EKG:  NSR @ 77 bpm   - ASA:   II        Mallampati: III   - H/H stable: 16.4/48.1        Platelets/Coags stable. Cr: 1.12  - Patient denies hematochieza, hematuria, easy bruising, or signs of bleeding.   - Patientgiven Plavix 75 mg PO x 1 (states compliance with Plavix 75 mg daily). Patient to be loaded with Aspirin 325 mg IVP x 1 with Pantoprazole 40 mg IVP x 1   - K: 3.5, supplemented with KCL 60 mEq PO x 1  - Patient started on IV NS @  250 cc x 1 and started on IV NS 75 cc/hr x 2 hours   - Hgb a1c: 7.7     Started on: MISS   - Patient has a contrast allergy, given Solu-Cortef 200 mg IVP x 1 and benadryl 50 mg IVP x 1 on call to cath lab   - Patient is a suitable candidate for moderate sedation.     Risks & benefits of procedure and alternative therapy have been explained to the patient including but not limited to: allergic reaction, bleeding w/possible need for blood transfusion, infection, renal and vascular compromise, limb damage, arrhythmia, stroke, vessel dissection/perforation, Myocardial infarction, emergent CABG. Informed consent obtained and in chart.

## 2024-04-02 NOTE — H&P ADULT - HISTORY OF PRESENT ILLNESS
Cardiologist: Dr. Graves   Pharmacy:   Escort:     63M with hx of CONTRAST ALLERGY and PMHx of HTN, HLD, BPH, DM II, EDMUND on CPAP, CAD (multiple PCIs and brachytherapy) who initially presented to outpatient cardiologist, Star Sanchez, with complaints of GRAHAM walking 3 blocks which is relieved by rest and worse w/ inclines. Patient also endorses B/L leg swelling, prolong standing, B/L calf discomfort on exertion, relieved by rest and decreased on ambulation, and brief palpitations. Denies CP, SOB at rest, orthopnea, PND, abd pain, N/V/D, hematuria, hematochezia, BRBPR, melena, fever, chills, cough, recent travel, or sick contacts. EST 03/25/24: ischemia at rich-septal wall TTE 02/01/24: LVEF 65%, mild DD, mild TR, trace [place].     light of patient’s risk factors, known CAD, CCS Angina Class _____ symptoms patient referred to [facility] for recommended cardiac catheterization with possible intervention if clinically indicated.     Prior Cardiac Studies   [facility] 12/17/20: laser arthrectomy/brachytherapy/PTCA pLAD 95%, LM normal, OM1 patent stent,     Cardiologist: Dr. Graves   Pharmacy:   Escort:     63M with hx of CONTRAST ALLERGY and PMHx of HTN, HLD, BPH, DM II, EDMUND on CPAP, CAD (multiple PCIs and brachytherapy) who initially presented to outpatient cardiologist, Star Sanchez, with complaints of GRAHAM walking 3 blocks which is relieved by rest and worse w/ inclines. Patient also endorses B/L leg swelling, prolong standing, B/L calf discomfort on exertion, relieved by rest and decreased on ambulation, and brief palpitations. Denies CP, SOB at rest, orthopnea, PND, abd pain, N/V/D, hematuria, hematochezia, BRBPR, melena, fever, chills, cough, recent travel, or sick contacts. EST 03/25/24: ischemia at rich-septal wall TTE 02/01/24: LVEF 65%, mild DD, mild TR, trace [place].     light of patient’s risk factors, known CAD, CCS Angina Class _____ symptoms patient referred to [facility] for recommended cardiac catheterization with possible intervention if clinically indicated.     Prior Cardiac Studies   [facility] 08/16/22: LM <25%, pLAD, OM1, and RPL stents cannot be evaluated due to motion, mild-moderate D1, and prox-distal RCA diffusely diseased w/ mild-moderate stenoses   [facility] 12/17/20: laser arthrectomy/brachytherapy/PTCA pLAD 95%, LM normal, OM1 patent stent, RCA not injected    Cardiologist: Dr. Graves   Pharmacy:   Escort:     , Plavix 75, /75  Contrast allergy - solucortef 200 mg IVP and benadryl 50 mg IVP ordered    63M with hx of CONTRAST ALLERGY and PMHx of HTN, HLD, BPH, DM II, EDMUND on CPAP, CAD (multiple PCIs and brachytherapy) who initially presented to outpatient cardiologist, Star Sanchez, with complaints of GRAHAM walking 3 blocks which is relieved by rest and worse w/ inclines. Patient also endorses B/L leg swelling, prolong standing, B/L calf discomfort on exertion, relieved by rest and decreased on ambulation, and brief palpitations. Denies CP, SOB at rest, orthopnea, PND, abd pain, N/V/D, hematuria, hematochezia, BRBPR, melena, fever, chills, cough, recent travel, or sick contacts. EST 03/25/24: ischemia at rich-septal wall TTE 02/01/24: LVEF 65%, mild DD, mild TR, trace [place].     light of patient’s risk factors, known CAD, CCS Angina Class _____ symptoms patient referred to [facility] for recommended cardiac catheterization with possible intervention if clinically indicated.     Prior Cardiac Studies   [facility] 08/16/22: LM <25%, pLAD, OM1, and RPL stents cannot be evaluated due to motion, mild-moderate D1, and prox-distal RCA diffusely diseased w/ mild-moderate stenoses   [facility] 12/17/20: laser arthrectomy/brachytherapy/PTCA pLAD 95%, LM normal, OM1 patent stent, RCA not injected    Cardiologist: Dr. Graves   Pharmacy: [facility]   Escort:  sister- in- law     63M with hx of CONTRAST ALLERGY and PMHx of HTN, HLD, BPH, DM II, EDMUND on CPAP, CAD (multiple PCIs and brachytherapy) who initially presented to outpatient cardiologist, Star Sanchez, with complaints of GRAHAM walking 3 blocks which is relieved by rest and worse w/ inclines. Patient also endorses B/L leg swelling, prolong standing, B/L calf discomfort on exertion, relieved by rest and decreased on ambulation, and brief palpitations. Denies CP, SOB at rest, orthopnea, PND, abd pain, N/V/D, hematuria, hematochezia, BRBPR, melena, fever, chills, cough, recent travel, or sick contacts. EST 03/25/24: ischemia at rich-septal wall TTE 02/01/24: LVEF 65%, mild DD, mild TR, trace [place].     light of patient’s risk factors, known CAD, CCS Angina Class III symptoms patient referred to [facility] for recommended cardiac catheterization with possible intervention if clinically indicated.     Prior Cardiac Studies   [facility] 08/16/22: LM <25%, pLAD, OM1, and RPL stents cannot be evaluated due to motion, mild-moderate D1, and prox-distal RCA diffusely diseased w/ mild-moderate stenoses   [facility] 12/17/20: laser arthrectomy/brachytherapy/PTCA pLAD 95%, LM normal, OM1 patent stent, RCA not injected    Cardiologist: Dr. Graves   Pharmacy: [facility]   Escort:  sister- in- law     63M with hx of CONTRAST ALLERGY and PMHx of HTN, HLD, BPH, DM II, EDMUND on CPAP, CAD (multiple PCIs and brachytherapy) who initially presented to outpatient cardiologist, Star Sanchez, with complaints of GRAHAM walking 3 blocks which is relieved by rest and worse w/ inclines. Patient also endorses B/L leg swelling, prolong standing, B/L calf discomfort on exertion, relieved by rest and decreased on ambulation, and brief palpitations. Denies CP, SOB at rest, orthopnea, PND, abd pain, N/V/D, hematuria, hematochezia, BRBPR, melena, fever, chills, cough, recent travel, or sick contacts. EST 03/25/24: ischemia at rich-septal wall TTE 02/01/24: LVEF 65%, mild DD, mild TR, trace [place].     light of patient’s risk factors, known CAD, CCS Angina Class III symptoms patient referred to [facility] for recommended cardiac catheterization with possible intervention if clinically indicated.     Prior Cardiac Studies   [facility] 08/16/22: LM <25%, pLAD, OM1, and RPL stents cannot be evaluated due to motion, mild-moderate D1, and prox-distal RCA diffusely diseased w/ mild-moderate stenoses   [facility] 12/17/20: laser arthrectomy/brachytherapy/PTCA pLAD 95%, LM normal, OM1 patent stent, RCA not injected

## 2024-04-04 ENCOUNTER — TRANSCRIPTION ENCOUNTER (OUTPATIENT)
Age: 64
End: 2024-04-04

## 2024-04-04 ENCOUNTER — INPATIENT (INPATIENT)
Facility: HOSPITAL | Age: 64
LOS: 0 days | Discharge: HOME | DRG: 322 | End: 2024-04-05
Attending: STUDENT IN AN ORGANIZED HEALTH CARE EDUCATION/TRAINING PROGRAM | Admitting: INTERNAL MEDICINE
Payer: COMMERCIAL

## 2024-04-04 DIAGNOSIS — Z98.1 ARTHRODESIS STATUS: Chronic | ICD-10-CM

## 2024-04-04 DIAGNOSIS — Z98.89 OTHER SPECIFIED POSTPROCEDURAL STATES: Chronic | ICD-10-CM

## 2024-04-04 DIAGNOSIS — S69.90XA UNSPECIFIED INJURY OF UNSPECIFIED WRIST, HAND AND FINGER(S), INITIAL ENCOUNTER: Chronic | ICD-10-CM

## 2024-04-04 LAB
A1C WITH ESTIMATED AVERAGE GLUCOSE RESULT: 7.7 % — HIGH (ref 4–5.6)
ALBUMIN SERPL ELPH-MCNC: 4.6 G/DL — SIGNIFICANT CHANGE UP (ref 3.3–5)
ALP SERPL-CCNC: 86 U/L — SIGNIFICANT CHANGE UP (ref 40–120)
ALT FLD-CCNC: 34 U/L — SIGNIFICANT CHANGE UP (ref 10–45)
ANION GAP SERPL CALC-SCNC: 12 MMOL/L — SIGNIFICANT CHANGE UP (ref 5–17)
APTT BLD: 33.3 SEC — SIGNIFICANT CHANGE UP (ref 24.5–35.6)
AST SERPL-CCNC: 23 U/L — SIGNIFICANT CHANGE UP (ref 10–40)
BASOPHILS # BLD AUTO: 0.09 K/UL — SIGNIFICANT CHANGE UP (ref 0–0.2)
BASOPHILS NFR BLD AUTO: 1.3 % — SIGNIFICANT CHANGE UP (ref 0–2)
BILIRUB SERPL-MCNC: 0.4 MG/DL — SIGNIFICANT CHANGE UP (ref 0.2–1.2)
BUN SERPL-MCNC: 22 MG/DL — SIGNIFICANT CHANGE UP (ref 7–23)
CALCIUM SERPL-MCNC: 10.1 MG/DL — SIGNIFICANT CHANGE UP (ref 8.4–10.5)
CHLORIDE SERPL-SCNC: 101 MMOL/L — SIGNIFICANT CHANGE UP (ref 96–108)
CHOLEST SERPL-MCNC: 127 MG/DL — SIGNIFICANT CHANGE UP
CK MB CFR SERPL CALC: 2.2 NG/ML — SIGNIFICANT CHANGE UP (ref 0–6.7)
CK SERPL-CCNC: 164 U/L — SIGNIFICANT CHANGE UP (ref 30–200)
CO2 SERPL-SCNC: 26 MMOL/L — SIGNIFICANT CHANGE UP (ref 22–31)
CREAT SERPL-MCNC: 1.12 MG/DL — SIGNIFICANT CHANGE UP (ref 0.5–1.3)
EGFR: 74 ML/MIN/1.73M2 — SIGNIFICANT CHANGE UP
EOSINOPHIL # BLD AUTO: 0.17 K/UL — SIGNIFICANT CHANGE UP (ref 0–0.5)
EOSINOPHIL NFR BLD AUTO: 2.5 % — SIGNIFICANT CHANGE UP (ref 0–6)
ESTIMATED AVERAGE GLUCOSE: 174 MG/DL — HIGH (ref 68–114)
GLUCOSE BLDC GLUCOMTR-MCNC: 119 MG/DL — HIGH (ref 70–99)
GLUCOSE BLDC GLUCOMTR-MCNC: 130 MG/DL — HIGH (ref 70–99)
GLUCOSE BLDC GLUCOMTR-MCNC: 162 MG/DL — HIGH (ref 70–99)
GLUCOSE SERPL-MCNC: 144 MG/DL — HIGH (ref 70–99)
HCT VFR BLD CALC: 48.1 % — SIGNIFICANT CHANGE UP (ref 39–50)
HDLC SERPL-MCNC: 40 MG/DL — LOW
HGB BLD-MCNC: 16.4 G/DL — SIGNIFICANT CHANGE UP (ref 13–17)
IMM GRANULOCYTES NFR BLD AUTO: 0.3 % — SIGNIFICANT CHANGE UP (ref 0–0.9)
INR BLD: 0.92 — SIGNIFICANT CHANGE UP (ref 0.85–1.18)
LIPID PNL WITH DIRECT LDL SERPL: 70 MG/DL — SIGNIFICANT CHANGE UP
LYMPHOCYTES # BLD AUTO: 2.28 K/UL — SIGNIFICANT CHANGE UP (ref 1–3.3)
LYMPHOCYTES # BLD AUTO: 33.3 % — SIGNIFICANT CHANGE UP (ref 13–44)
MAGNESIUM SERPL-MCNC: 2 MG/DL — SIGNIFICANT CHANGE UP (ref 1.6–2.6)
MCHC RBC-ENTMCNC: 30.6 PG — SIGNIFICANT CHANGE UP (ref 27–34)
MCHC RBC-ENTMCNC: 34.1 GM/DL — SIGNIFICANT CHANGE UP (ref 32–36)
MCV RBC AUTO: 89.7 FL — SIGNIFICANT CHANGE UP (ref 80–100)
MONOCYTES # BLD AUTO: 0.66 K/UL — SIGNIFICANT CHANGE UP (ref 0–0.9)
MONOCYTES NFR BLD AUTO: 9.6 % — SIGNIFICANT CHANGE UP (ref 2–14)
NEUTROPHILS # BLD AUTO: 3.63 K/UL — SIGNIFICANT CHANGE UP (ref 1.8–7.4)
NEUTROPHILS NFR BLD AUTO: 53 % — SIGNIFICANT CHANGE UP (ref 43–77)
NON HDL CHOLESTEROL: 87 MG/DL — SIGNIFICANT CHANGE UP
NRBC # BLD: 0 /100 WBCS — SIGNIFICANT CHANGE UP (ref 0–0)
PLATELET # BLD AUTO: 184 K/UL — SIGNIFICANT CHANGE UP (ref 150–400)
POTASSIUM SERPL-MCNC: 3.5 MMOL/L — SIGNIFICANT CHANGE UP (ref 3.5–5.3)
POTASSIUM SERPL-SCNC: 3.5 MMOL/L — SIGNIFICANT CHANGE UP (ref 3.5–5.3)
PROT SERPL-MCNC: 8.3 G/DL — SIGNIFICANT CHANGE UP (ref 6–8.3)
PROTHROM AB SERPL-ACNC: 10.5 SEC — SIGNIFICANT CHANGE UP (ref 9.5–13)
RBC # BLD: 5.36 M/UL — SIGNIFICANT CHANGE UP (ref 4.2–5.8)
RBC # FLD: 12.3 % — SIGNIFICANT CHANGE UP (ref 10.3–14.5)
SODIUM SERPL-SCNC: 139 MMOL/L — SIGNIFICANT CHANGE UP (ref 135–145)
TRIGL SERPL-MCNC: 87 MG/DL — SIGNIFICANT CHANGE UP
WBC # BLD: 6.85 K/UL — SIGNIFICANT CHANGE UP (ref 3.8–10.5)
WBC # FLD AUTO: 6.85 K/UL — SIGNIFICANT CHANGE UP (ref 3.8–10.5)

## 2024-04-04 PROCEDURE — 93010 ELECTROCARDIOGRAM REPORT: CPT

## 2024-04-04 PROCEDURE — 99152 MOD SED SAME PHYS/QHP 5/>YRS: CPT

## 2024-04-04 PROCEDURE — 92978 ENDOLUMINL IVUS OCT C 1ST: CPT | Mod: 26,LC

## 2024-04-04 PROCEDURE — 92928 PRQ TCAT PLMT NTRAC ST 1 LES: CPT | Mod: LC

## 2024-04-04 PROCEDURE — 93458 L HRT ARTERY/VENTRICLE ANGIO: CPT | Mod: 26,59

## 2024-04-04 RX ORDER — SODIUM CHLORIDE 9 MG/ML
1000 INJECTION INTRAMUSCULAR; INTRAVENOUS; SUBCUTANEOUS
Refills: 0 | Status: DISCONTINUED | OUTPATIENT
Start: 2024-04-04 | End: 2024-04-05

## 2024-04-04 RX ORDER — ASPIRIN/CALCIUM CARB/MAGNESIUM 324 MG
325 TABLET ORAL ONCE
Refills: 0 | Status: COMPLETED | OUTPATIENT
Start: 2024-04-04 | End: 2024-04-04

## 2024-04-04 RX ORDER — HYDROCORTISONE 20 MG
200 TABLET ORAL ONCE
Refills: 0 | Status: COMPLETED | OUTPATIENT
Start: 2024-04-04 | End: 2024-04-04

## 2024-04-04 RX ORDER — LISINOPRIL 2.5 MG/1
1 TABLET ORAL
Refills: 0 | DISCHARGE

## 2024-04-04 RX ORDER — GABAPENTIN 400 MG/1
1 CAPSULE ORAL
Refills: 0 | DISCHARGE

## 2024-04-04 RX ORDER — ASPIRIN/CALCIUM CARB/MAGNESIUM 324 MG
81 TABLET ORAL EVERY 24 HOURS
Refills: 0 | Status: DISCONTINUED | OUTPATIENT
Start: 2024-04-05 | End: 2024-04-05

## 2024-04-04 RX ORDER — SODIUM CHLORIDE 9 MG/ML
250 INJECTION INTRAMUSCULAR; INTRAVENOUS; SUBCUTANEOUS ONCE
Refills: 0 | Status: COMPLETED | OUTPATIENT
Start: 2024-04-04 | End: 2024-04-04

## 2024-04-04 RX ORDER — CLOPIDOGREL BISULFATE 75 MG/1
75 TABLET, FILM COATED ORAL DAILY
Refills: 0 | Status: DISCONTINUED | OUTPATIENT
Start: 2024-04-05 | End: 2024-04-05

## 2024-04-04 RX ORDER — DIPHENHYDRAMINE HCL 50 MG
50 CAPSULE ORAL ONCE
Refills: 0 | Status: COMPLETED | OUTPATIENT
Start: 2024-04-04 | End: 2024-04-04

## 2024-04-04 RX ORDER — POTASSIUM CHLORIDE 20 MEQ
20 PACKET (EA) ORAL ONCE
Refills: 0 | Status: COMPLETED | OUTPATIENT
Start: 2024-04-04 | End: 2024-04-04

## 2024-04-04 RX ORDER — METOPROLOL TARTRATE 50 MG
1 TABLET ORAL
Refills: 0 | DISCHARGE

## 2024-04-04 RX ORDER — HYDROCHLOROTHIAZIDE 25 MG
1 TABLET ORAL
Refills: 0 | DISCHARGE

## 2024-04-04 RX ORDER — FAMOTIDINE 10 MG/ML
20 INJECTION INTRAVENOUS ONCE
Refills: 0 | Status: COMPLETED | OUTPATIENT
Start: 2024-04-04 | End: 2024-04-04

## 2024-04-04 RX ORDER — SEMAGLUTIDE 0.68 MG/ML
2 INJECTION, SOLUTION SUBCUTANEOUS
Refills: 0 | DISCHARGE

## 2024-04-04 RX ORDER — POTASSIUM CHLORIDE 20 MEQ
40 PACKET (EA) ORAL ONCE
Refills: 0 | Status: COMPLETED | OUTPATIENT
Start: 2024-04-04 | End: 2024-04-04

## 2024-04-04 RX ORDER — INSULIN LISPRO 100/ML
VIAL (ML) SUBCUTANEOUS
Refills: 0 | Status: DISCONTINUED | OUTPATIENT
Start: 2024-04-04 | End: 2024-04-05

## 2024-04-04 RX ORDER — DEXTROSE 10 % IN WATER 10 %
125 INTRAVENOUS SOLUTION INTRAVENOUS ONCE
Refills: 0 | Status: DISCONTINUED | OUTPATIENT
Start: 2024-04-04 | End: 2024-04-05

## 2024-04-04 RX ORDER — INSULIN GLARGINE 100 [IU]/ML
0 INJECTION, SOLUTION SUBCUTANEOUS
Refills: 0 | DISCHARGE

## 2024-04-04 RX ORDER — LISINOPRIL 2.5 MG/1
2.5 TABLET ORAL DAILY
Refills: 0 | Status: DISCONTINUED | OUTPATIENT
Start: 2024-04-05 | End: 2024-04-05

## 2024-04-04 RX ORDER — METOPROLOL TARTRATE 50 MG
50 TABLET ORAL DAILY
Refills: 0 | Status: DISCONTINUED | OUTPATIENT
Start: 2024-04-05 | End: 2024-04-05

## 2024-04-04 RX ORDER — ASPIRIN/CALCIUM CARB/MAGNESIUM 324 MG
325 TABLET ORAL ONCE
Refills: 0 | Status: DISCONTINUED | OUTPATIENT
Start: 2024-04-04 | End: 2024-04-04

## 2024-04-04 RX ORDER — ATORVASTATIN CALCIUM 80 MG/1
40 TABLET, FILM COATED ORAL AT BEDTIME
Refills: 0 | Status: DISCONTINUED | OUTPATIENT
Start: 2024-04-04 | End: 2024-04-05

## 2024-04-04 RX ORDER — MORPHINE SULFATE 50 MG/1
2 CAPSULE, EXTENDED RELEASE ORAL ONCE
Refills: 0 | Status: DISCONTINUED | OUTPATIENT
Start: 2024-04-04 | End: 2024-04-04

## 2024-04-04 RX ORDER — CLOPIDOGREL BISULFATE 75 MG/1
75 TABLET, FILM COATED ORAL DAILY
Refills: 0 | Status: DISCONTINUED | OUTPATIENT
Start: 2024-04-04 | End: 2024-04-04

## 2024-04-04 RX ORDER — HYDROCORTISONE 20 MG
200 TABLET ORAL ONCE
Refills: 0 | Status: DISCONTINUED | OUTPATIENT
Start: 2024-04-04 | End: 2024-04-04

## 2024-04-04 RX ORDER — PANTOPRAZOLE SODIUM 20 MG/1
40 TABLET, DELAYED RELEASE ORAL ONCE
Refills: 0 | Status: DISCONTINUED | OUTPATIENT
Start: 2024-04-04 | End: 2024-04-04

## 2024-04-04 RX ADMIN — Medication 20 MILLIEQUIVALENT(S): at 15:31

## 2024-04-04 RX ADMIN — SODIUM CHLORIDE 220 MILLILITER(S): 9 INJECTION INTRAMUSCULAR; INTRAVENOUS; SUBCUTANEOUS at 22:06

## 2024-04-04 RX ADMIN — Medication 325 MILLIGRAM(S): at 16:06

## 2024-04-04 RX ADMIN — SODIUM CHLORIDE 75 MILLILITER(S): 9 INJECTION INTRAMUSCULAR; INTRAVENOUS; SUBCUTANEOUS at 15:27

## 2024-04-04 RX ADMIN — SODIUM CHLORIDE 220 MILLILITER(S): 9 INJECTION INTRAMUSCULAR; INTRAVENOUS; SUBCUTANEOUS at 19:57

## 2024-04-04 RX ADMIN — Medication 40 MILLIEQUIVALENT(S): at 15:31

## 2024-04-04 RX ADMIN — ATORVASTATIN CALCIUM 40 MILLIGRAM(S): 80 TABLET, FILM COATED ORAL at 22:07

## 2024-04-04 RX ADMIN — Medication 200 MILLIGRAM(S): at 15:27

## 2024-04-04 RX ADMIN — CLOPIDOGREL BISULFATE 75 MILLIGRAM(S): 75 TABLET, FILM COATED ORAL at 15:26

## 2024-04-04 RX ADMIN — MORPHINE SULFATE 2 MILLIGRAM(S): 50 CAPSULE, EXTENDED RELEASE ORAL at 19:58

## 2024-04-04 RX ADMIN — SODIUM CHLORIDE 500 MILLILITER(S): 9 INJECTION INTRAMUSCULAR; INTRAVENOUS; SUBCUTANEOUS at 15:27

## 2024-04-04 RX ADMIN — FAMOTIDINE 20 MILLIGRAM(S): 10 INJECTION INTRAVENOUS at 16:06

## 2024-04-04 RX ADMIN — Medication 2: at 22:06

## 2024-04-05 ENCOUNTER — TRANSCRIPTION ENCOUNTER (OUTPATIENT)
Age: 64
End: 2024-04-05

## 2024-04-05 VITALS
DIASTOLIC BLOOD PRESSURE: 88 MMHG | HEART RATE: 100 BPM | SYSTOLIC BLOOD PRESSURE: 120 MMHG | RESPIRATION RATE: 20 BRPM | OXYGEN SATURATION: 96 %

## 2024-04-05 LAB
A1C WITH ESTIMATED AVERAGE GLUCOSE RESULT: 7.6 % — HIGH (ref 4–5.6)
ALBUMIN SERPL ELPH-MCNC: 3.7 G/DL — SIGNIFICANT CHANGE UP (ref 3.3–5)
ALP SERPL-CCNC: 76 U/L — SIGNIFICANT CHANGE UP (ref 40–120)
ALT FLD-CCNC: 32 U/L — SIGNIFICANT CHANGE UP (ref 10–45)
ANION GAP SERPL CALC-SCNC: 9 MMOL/L — SIGNIFICANT CHANGE UP (ref 5–17)
AST SERPL-CCNC: 22 U/L — SIGNIFICANT CHANGE UP (ref 10–40)
BILIRUB SERPL-MCNC: 0.5 MG/DL — SIGNIFICANT CHANGE UP (ref 0.2–1.2)
BUN SERPL-MCNC: 23 MG/DL — SIGNIFICANT CHANGE UP (ref 7–23)
CALCIUM SERPL-MCNC: 9.7 MG/DL — SIGNIFICANT CHANGE UP (ref 8.4–10.5)
CHLORIDE SERPL-SCNC: 104 MMOL/L — SIGNIFICANT CHANGE UP (ref 96–108)
CO2 SERPL-SCNC: 26 MMOL/L — SIGNIFICANT CHANGE UP (ref 22–31)
CREAT SERPL-MCNC: 1.15 MG/DL — SIGNIFICANT CHANGE UP (ref 0.5–1.3)
EGFR: 72 ML/MIN/1.73M2 — SIGNIFICANT CHANGE UP
ESTIMATED AVERAGE GLUCOSE: 171 MG/DL — HIGH (ref 68–114)
GLUCOSE BLDC GLUCOMTR-MCNC: 145 MG/DL — HIGH (ref 70–99)
GLUCOSE BLDC GLUCOMTR-MCNC: 162 MG/DL — HIGH (ref 70–99)
GLUCOSE SERPL-MCNC: 178 MG/DL — HIGH (ref 70–99)
HCT VFR BLD CALC: 46.1 % — SIGNIFICANT CHANGE UP (ref 39–50)
HGB BLD-MCNC: 15.2 G/DL — SIGNIFICANT CHANGE UP (ref 13–17)
MAGNESIUM SERPL-MCNC: 2 MG/DL — SIGNIFICANT CHANGE UP (ref 1.6–2.6)
MCHC RBC-ENTMCNC: 30.6 PG — SIGNIFICANT CHANGE UP (ref 27–34)
MCHC RBC-ENTMCNC: 33 GM/DL — SIGNIFICANT CHANGE UP (ref 32–36)
MCV RBC AUTO: 92.9 FL — SIGNIFICANT CHANGE UP (ref 80–100)
NRBC # BLD: 0 /100 WBCS — SIGNIFICANT CHANGE UP (ref 0–0)
PLATELET # BLD AUTO: 187 K/UL — SIGNIFICANT CHANGE UP (ref 150–400)
POTASSIUM SERPL-MCNC: 3.8 MMOL/L — SIGNIFICANT CHANGE UP (ref 3.5–5.3)
POTASSIUM SERPL-SCNC: 3.8 MMOL/L — SIGNIFICANT CHANGE UP (ref 3.5–5.3)
PROT SERPL-MCNC: 6.7 G/DL — SIGNIFICANT CHANGE UP (ref 6–8.3)
RBC # BLD: 4.96 M/UL — SIGNIFICANT CHANGE UP (ref 4.2–5.8)
RBC # FLD: 12 % — SIGNIFICANT CHANGE UP (ref 10.3–14.5)
SODIUM SERPL-SCNC: 139 MMOL/L — SIGNIFICANT CHANGE UP (ref 135–145)
WBC # BLD: 9.06 K/UL — SIGNIFICANT CHANGE UP (ref 3.8–10.5)
WBC # FLD AUTO: 9.06 K/UL — SIGNIFICANT CHANGE UP (ref 3.8–10.5)

## 2024-04-05 PROCEDURE — 80053 COMPREHEN METABOLIC PANEL: CPT

## 2024-04-05 PROCEDURE — 85025 COMPLETE CBC W/AUTO DIFF WBC: CPT

## 2024-04-05 PROCEDURE — 85610 PROTHROMBIN TIME: CPT

## 2024-04-05 PROCEDURE — 83036 HEMOGLOBIN GLYCOSYLATED A1C: CPT

## 2024-04-05 PROCEDURE — 80061 LIPID PANEL: CPT

## 2024-04-05 PROCEDURE — 85027 COMPLETE CBC AUTOMATED: CPT

## 2024-04-05 PROCEDURE — 93005 ELECTROCARDIOGRAM TRACING: CPT

## 2024-04-05 PROCEDURE — C1753: CPT

## 2024-04-05 PROCEDURE — 82550 ASSAY OF CK (CPK): CPT

## 2024-04-05 PROCEDURE — C1769: CPT

## 2024-04-05 PROCEDURE — 85730 THROMBOPLASTIN TIME PARTIAL: CPT

## 2024-04-05 PROCEDURE — C1894: CPT

## 2024-04-05 PROCEDURE — 99239 HOSP IP/OBS DSCHRG MGMT >30: CPT

## 2024-04-05 PROCEDURE — C1725: CPT

## 2024-04-05 PROCEDURE — 85347 COAGULATION TIME ACTIVATED: CPT

## 2024-04-05 PROCEDURE — C1874: CPT

## 2024-04-05 PROCEDURE — 36415 COLL VENOUS BLD VENIPUNCTURE: CPT

## 2024-04-05 PROCEDURE — 83735 ASSAY OF MAGNESIUM: CPT

## 2024-04-05 PROCEDURE — C1887: CPT

## 2024-04-05 PROCEDURE — 82962 GLUCOSE BLOOD TEST: CPT

## 2024-04-05 PROCEDURE — 82553 CREATINE MB FRACTION: CPT

## 2024-04-05 RX ORDER — ASPIRIN/CALCIUM CARB/MAGNESIUM 324 MG
1 TABLET ORAL
Qty: 30 | Refills: 11
Start: 2024-04-05 | End: 2025-03-30

## 2024-04-05 RX ORDER — CLOPIDOGREL BISULFATE 75 MG/1
1 TABLET, FILM COATED ORAL
Qty: 30 | Refills: 11
Start: 2024-04-05 | End: 2025-03-30

## 2024-04-05 RX ORDER — POTASSIUM CHLORIDE 20 MEQ
20 PACKET (EA) ORAL ONCE
Refills: 0 | Status: COMPLETED | OUTPATIENT
Start: 2024-04-05 | End: 2024-04-05

## 2024-04-05 RX ADMIN — CLOPIDOGREL BISULFATE 75 MILLIGRAM(S): 75 TABLET, FILM COATED ORAL at 12:16

## 2024-04-05 RX ADMIN — Medication 20 MILLIEQUIVALENT(S): at 12:15

## 2024-04-05 RX ADMIN — Medication 81 MILLIGRAM(S): at 06:10

## 2024-04-05 RX ADMIN — Medication 2: at 07:08

## 2024-04-05 NOTE — DISCHARGE NOTE PROVIDER - NSDCFUADDINST_GEN_ALL_CORE_FT
- SEEK IMMEDIATE MEDICAL CARE IF YOU HAVE ANY OF THE FOLLOWING SYMPTOMS: worsening chest pain, racing heart beat, unexplained jaw/neck/back pain, severe abdominal pain, shortness of breath, dizziness or lightheadedness, fainting, sweaty or clammy skin, vomiting, or coughing up blood. These symptoms may represent a serious problem that is an emergency. Do not wait to see if the symptoms will go away. Get medical help right away. Call 911 and do not drive yourself to the hospital.  - Aspirin and Plavix can put you at increased risk of bleeding; please avoid NSAIDS (such as Motrin, Advil, Ibuprofen, Naproxen, or Aleve, as these medications may further your risk of bleeding  - We recommend a Mediterranean diet: Some suggestions include continue incorporating 2 or more servings per day of vegetables, fruits, and whole grains. Increase intake of fish and legumes/beans to 2 or more servings per week. Aim to increase intake of healthy fats, such as olive oil and avocados, and have a handful of nuts/seeds most days. Reduce red/processed meat consumption to 2 or fewer times per week.

## 2024-04-05 NOTE — DISCHARGE NOTE NURSING/CASE MANAGEMENT/SOCIAL WORK - PATIENT PORTAL LINK FT
You can access the FollowMyHealth Patient Portal offered by [facility] by registering at the following website: [email or web address]. By joining [facility]’s FollowMyHealth portal, you will also be able to view your health information using other applications (apps) compatible with our system.

## 2024-04-05 NOTE — PATIENT PROFILE ADULT - HISTORY OF COVID-19 VACCINATION
Vaccine status unknown 57F, no formal pphx, admited for disorganization.  Unclear etiology.    Pt in behavioral control this AM but remains oddly related and vague.  Continue tx plan as ordered.

## 2024-04-05 NOTE — DISCHARGE NOTE PROVIDER - HOSPITAL COURSE
**INCOMPLETE**     63M with hx of CONTRAST ALLERGY and PMHx of HTN, HLD, BPH, DM II, EDMUND on CPAP, CAD (multiple PCIs and brachytherapy) who initially presented to outpatient cardiologist, Star Sanchez, with complaints of GRAHAM walking 3 blocks which is relieved by rest and worse w/ inclines. Patient also endorses B/L leg swelling, prolong standing, B/L calf discomfort on exertion, relieved by rest and decreased on ambulation, and brief palpitations. Patient now presents to [facility] for recommended cardiac cath with possible intervention if clinically indicated in light of pt's risk factors, known CAD, CCS Angina Class III symptoms patient referred to [facility] for recommended cardiac catheterization with possible intervention if clinically indicated.     Pt now s/p cardiac cath 04/04/24:   S/p Cardiac Cath: IVUS/Scoreflex/MEMO [id number]% ,  moderate in- stent restenosis, LM mild, p/m LAD stents patent mild in-stent restenosis, D1 moderate diffuse disease 50-70%, LCx mild diffuse disease, dLCx patent stent, RCA mild diffuse disease, RPDA patent stent with mild in-stent restenosis. EDP 18 mmHg. Access: R Radial     Pt admitted overnight for observation and telemetry monitoring. Pt seen and examined at bedside this AM without any complaints or events overnight, VSS, labs and telemetry reviewed and pt stable for discharge as discussed with Dr. Cheatham. Pt has received appropriate discharge instructions, including medication regimen, access site management and follow up with Dr. Graves in 1-2 weeks.    Discharge medications: ASA 81mg QD, Plavix 75mg QD, Atorvastatin 40mg qhs, Toprol- XL 50mg BID, Lisinopril 2.5mg QD, HCTZ 12.5mg QD  Home medications: Basaglar 28u SQ QHS, Ozempic 2mg SQ once weekly      Cardiac Rehab (Post PCI): Education on benefits of Cardiac Rehab provided to patient: Yes, Referral and Prescription Given for Cardiac Rehab: Yes, Pt given list of locations & instructed to contact their insurance company to review list of participating providers.  Pt discharge copies detail cardiovascular history, medication testing/treatments OR has created a patient portal account and instructed to provider their records at their 1st appointment.  CVD (GLP-1 receptor agonist, SGLT2 inhibitor) meds discussed w/ patients and encouraged to discuss further with PMD or endo at next visit.   63M with hx of CONTRAST ALLERGY and PMHx of HTN, HLD, BPH, DM II, EDMUND on CPAP, CAD (multiple PCIs and brachytherapy) who initially presented to outpatient cardiologist, Star Sanchez, with complaints of GRAHAM walking 3 blocks which is relieved by rest and worse w/ inclines. Patient also endorses B/L leg swelling, prolong standing, B/L calf discomfort on exertion, relieved by rest and decreased on ambulation, and brief palpitations. Patient now presents to [facility] for recommended cardiac cath with possible intervention if clinically indicated in light of pt's risk factors, known CAD, CCS Angina Class III symptoms patient referred to [facility] for recommended cardiac catheterization with possible intervention if clinically indicated.     Pt now s/p cardiac cath 04/04/24:   S/p Cardiac Cath: IVUS/Scoreflex/MEMO [id number]% ,  moderate in- stent restenosis, LM mild, p/m LAD stents patent mild in-stent restenosis, D1 moderate diffuse disease 50-70%, LCx mild diffuse disease, dLCx patent stent, RCA mild diffuse disease, RPDA patent stent with mild in-stent restenosis. EDP 18 mmHg. Access: R Radial.     Patient had 7 beat NSVT overnight asymptomatic.     Pt admitted overnight for observation and telemetry monitoring. Pt seen and examined at bedside this AM without any complaints or events overnight, VSS, labs and telemetry reviewed as stated above. Pt stable for discharge as discussed with Dr. Cheatham. Pt has received appropriate discharge instructions, including medication regimen, access site management and follow up with Dr. Graves in 1-2 weeks.    Discharge medications: ASA 81mg QD, Plavix 75mg QD, Atorvastatin 40mg qhs, Toprol- XL 50mg BID, Lisinopril 2.5mg QD, HCTZ 12.5mg QD  Home medications: Basaglar 28u SQ QHS, Ozempic 2mg SQ once weekly    Cardiac Rehab (Post PCI): Education on benefits of Cardiac Rehab provided to patient: Yes, Referral and Prescription Given for Cardiac Rehab: Yes, Pt given list of locations & instructed to contact their insurance company to review list of participating providers.  Pt discharge copies detail cardiovascular history, medication testing/treatments OR has created a patient portal account and instructed to provider their records at their 1st appointment.  CVD (GLP-1 receptor agonist, SGLT2 inhibitor) meds discussed w/ patients and encouraged to discuss further with PMD or endo at next visit.

## 2024-04-05 NOTE — DISCHARGE NOTE PROVIDER - NSDCCPCAREPLAN_GEN_ALL_CORE_FT
PRINCIPAL DISCHARGE DIAGNOSIS  Diagnosis: CAD (coronary artery disease)  Assessment and Plan of Treatment: - You have a diagnosis of coronary artery disease and underwent a cardiac catheterization. You received a stent to a branch of your left circumflex coronary artery.   -  Please continue taking Aspirin 81mg daily and Plavix (Clopidogrel) 75mg daily.   - The procedure was done through the right wrist. Please avoid any heavy lifting  (no more than 3 to 5 lbs) or strenuous activity for five days. If you develop any swelling, bleeding, hardening of the skin (hematoma formation), acute pain, numbness/tingling  in your arm or leg please contact your doctor immediately or call our 24/7 line: [phone].   NEVER MISS A DOSE OF ASPIRIN OR PLAVIX; IF YOU DO, YOU ARE AT RISK OF YOUR STENT CLOSING AND HAVING A HEART ATTACK. DO NOT STOP THESE TWO MEDICATIONS UNLESS INSTRUCTED TO DO SO BY YOUR CARDIOLOGIST.    - We have provided you with a prescription for cardiac rehab which is medically supervised exercise program for your heart and has been shown to improve the quantity and quality of life of people with heart disease like yours. You should attend cardiac rehab 3 times per week for 12 weeks. We have provided you with a list of nearby facilities. Please call your insurance carrier to determine which of these facilities are covered under your plan. Please bring this prescription with you to your follow up appointment with your cardiologist who can then further assist you to enroll into a cardiac rehab program.   - Please make a follow up appointment with your cardiologist within 1-2 weeks of your discharge. All of your prescriptions have been sent electronically to your pharmacy.        SECONDARY DISCHARGE DIAGNOSES  Diagnosis: HLD (hyperlipidemia)  Assessment and Plan of Treatment: Please continue Atorvastatin 40mg at bedtime to keep your cholesterol low. High cholesterol contributes to heart disease.       PRINCIPAL DISCHARGE DIAGNOSIS  Diagnosis: CAD (coronary artery disease)  Assessment and Plan of Treatment: - You have a diagnosis of coronary artery disease and underwent a cardiac catheterization. You received a stent to a branch of your left circumflex coronary artery.   -  Please continue taking Aspirin 81mg daily and Plavix (Clopidogrel) 75mg daily.   - The procedure was done through the right wrist. Please avoid any heavy lifting  (no more than 3 to 5 lbs) or strenuous activity for five days. If you develop any swelling, bleeding, hardening of the skin (hematoma formation), acute pain, numbness/tingling  in your arm or leg please contact your doctor immediately or call our 24/7 line: [phone].   NEVER MISS A DOSE OF ASPIRIN OR PLAVIX; IF YOU DO, YOU ARE AT RISK OF YOUR STENT CLOSING AND HAVING A HEART ATTACK. DO NOT STOP THESE TWO MEDICATIONS UNLESS INSTRUCTED TO DO SO BY YOUR CARDIOLOGIST.    - We have provided you with a prescription for cardiac rehab which is medically supervised exercise program for your heart and has been shown to improve the quantity and quality of life of people with heart disease like yours. You should attend cardiac rehab 3 times per week for 12 weeks. We have provided you with a list of nearby facilities. Please call your insurance carrier to determine which of these facilities are covered under your plan. Please bring this prescription with you to your follow up appointment with your cardiologist who can then further assist you to enroll into a cardiac rehab program.   - Please make a follow up appointment with your cardiologist within 1-2 weeks of your discharge. All of your prescriptions have been sent electronically to your pharmacy.        SECONDARY DISCHARGE DIAGNOSES  Diagnosis: HLD (hyperlipidemia)  Assessment and Plan of Treatment: Please continue Atorvastatin 40mg at bedtime to keep your cholesterol low. High cholesterol contributes to heart disease.      Diagnosis: DM (diabetes mellitus)  Assessment and Plan of Treatment: - Your Hemoglobin A1c is 7.7%  and your goal A1c is less than 7.0%. This number measures your average blood sugar level over the last three months.  - Please continue Basaglar and Ozempic as listed  for diabetes. Maintain a low carbohydrate, low sugar diet, exercise, monitor your fingerstick blood sugars regarly and follow up with your Endocrinologist/Primary Care Doctor.       PRINCIPAL DISCHARGE DIAGNOSIS  Diagnosis: CAD (coronary artery disease)  Assessment and Plan of Treatment: - You have a diagnosis of coronary artery disease and underwent a cardiac catheterization. You received a stent to a branch of your left circumflex coronary artery.   -  Please continue taking Aspirin 81mg daily and Plavix (Clopidogrel) 75mg daily.   - The procedure was done through the right wrist. Please avoid any heavy lifting  (no more than 3 to 5 lbs) or strenuous activity for five days. If you develop any swelling, bleeding, hardening of the skin (hematoma formation), acute pain, numbness/tingling  in your arm or leg please contact your doctor immediately or call our 24/7 line: [phone].   NEVER MISS A DOSE OF ASPIRIN OR PLAVIX; IF YOU DO, YOU ARE AT RISK OF YOUR STENT CLOSING AND HAVING A HEART ATTACK. DO NOT STOP THESE TWO MEDICATIONS UNLESS INSTRUCTED TO DO SO BY YOUR CARDIOLOGIST.    - We have provided you with a prescription for cardiac rehab which is medically supervised exercise program for your heart and has been shown to improve the quantity and quality of life of people with heart disease like yours. You should attend cardiac rehab 3 times per week for 12 weeks. We have provided you with a list of nearby facilities. Please call your insurance carrier to determine which of these facilities are covered under your plan. Please bring this prescription with you to your follow up appointment with your cardiologist who can then further assist you to enroll into a cardiac rehab program.   - Please make a follow up appointment with your cardiologist, Dr. Graves, within 1-2 weeks of your discharge. All of your prescriptions have been sent electronically to your pharmacy.        SECONDARY DISCHARGE DIAGNOSES  Diagnosis: HLD (hyperlipidemia)  Assessment and Plan of Treatment: Please continue Atorvastatin 40mg at bedtime to keep your cholesterol low. High cholesterol contributes to heart disease.      Diagnosis: DM (diabetes mellitus)  Assessment and Plan of Treatment: - Your Hemoglobin A1c is 7.7%  and your goal A1c is less than 7.0%. This number measures your average blood sugar level over the last three months.  - Please continue Basaglar and Ozempic as listed  for diabetes. Maintain a low carbohydrate, low sugar diet, exercise, monitor your fingerstick blood sugars regarly and follow up with your Endocrinologist/Primary Care Doctor.

## 2024-04-05 NOTE — PATIENT PROFILE ADULT - FUNCTIONAL ASSESSMENT - BASIC MOBILITY 6.
4-calculated by average/Not able to assess (calculate score using [facility] averaging method)

## 2024-04-05 NOTE — DISCHARGE NOTE PROVIDER - CARE PROVIDER_API CALL
Will Hurtado  Interventional Cardiology  [address] [place] [id number]  Phone: [phone]  Fax: [phone]  Follow Up Time: 1 week

## 2024-04-05 NOTE — DISCHARGE NOTE PROVIDER - NSDCMRMEDTOKEN_GEN_ALL_CORE_FT
atorvastatin 40 mg oral tablet: 1 tab(s) orally once a day (at bedtime)  Basaglar KwikPen 100 units/mL subcutaneous solution: 28 unit(s) subcutaneous once a day (at bedtime)  clopidogrel 75 mg oral tablet: 1 tab(s) orally once a day  hydroCHLOROthiazide 12.5 mg oral tablet: 1 tab(s) orally once a day  lisinopril 2.5 mg oral tablet: 1 tab(s) orally once a day  Ozempic 8 mg/3 mL (2 mg dose) subcutaneous solution: 2 milligram(s) subcutaneously once a week  Toprol-XL 50 mg oral tablet, extended release: 1 tab(s) orally 2 times a day   Aspirin EC 81 mg oral delayed release tablet: 1 tab(s) orally once a day  atorvastatin 40 mg oral tablet: 1 tab(s) orally once a day (at bedtime)  Basaglar KwikPen 100 units/mL subcutaneous solution: 28 unit(s) subcutaneous once a day (at bedtime)  cardiac rehab, 3x/week x 12 weeks for diagnosis CAD. Cardiologist Dr. Graves: cardiac rehab, 3x/week x 12 weeks for diagnosis CAD. Cardiologist Dr. Graves  clopidogrel 75 mg oral tablet: 1 tab(s) orally once a day  hydroCHLOROthiazide 12.5 mg oral tablet: 1 tab(s) orally once a day  lisinopril 2.5 mg oral tablet: 1 tab(s) orally once a day  Ozempic 8 mg/3 mL (2 mg dose) subcutaneous solution: 2 milligram(s) subcutaneously once a week  Toprol-XL 50 mg oral tablet, extended release: 1 tab(s) orally 2 times a day

## 2024-04-08 LAB
ISTAT ACTK (ACTIVATED CLOTTING TIME KAOLIN): 266 SEC — HIGH (ref 74–137)
ISTAT ACTK (ACTIVATED CLOTTING TIME KAOLIN): 298 SEC — HIGH (ref 74–137)

## 2024-04-11 DIAGNOSIS — I10 ESSENTIAL (PRIMARY) HYPERTENSION: ICD-10-CM

## 2024-04-11 DIAGNOSIS — K59.00 CONSTIPATION, UNSPECIFIED: ICD-10-CM

## 2024-04-11 DIAGNOSIS — N40.0 BENIGN PROSTATIC HYPERPLASIA WITHOUT LOWER URINARY TRACT SYMPTOMS: ICD-10-CM

## 2024-04-11 DIAGNOSIS — I47.20 VENTRICULAR TACHYCARDIA, UNSPECIFIED: ICD-10-CM

## 2024-04-11 DIAGNOSIS — Z91.041 RADIOGRAPHIC DYE ALLERGY STATUS: ICD-10-CM

## 2024-04-11 DIAGNOSIS — Z83.3 FAMILY HISTORY OF DIABETES MELLITUS: ICD-10-CM

## 2024-04-11 DIAGNOSIS — G47.33 OBSTRUCTIVE SLEEP APNEA (ADULT) (PEDIATRIC): ICD-10-CM

## 2024-04-11 DIAGNOSIS — Z79.4 LONG TERM (CURRENT) USE OF INSULIN: ICD-10-CM
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
## 2024-04-11 DIAGNOSIS — K21.9 GASTRO-ESOPHAGEAL REFLUX DISEASE WITHOUT ESOPHAGITIS: ICD-10-CM

## 2024-04-11 DIAGNOSIS — Z99.89 DEPENDENCE ON OTHER ENABLING MACHINES AND DEVICES: ICD-10-CM

## 2024-04-11 DIAGNOSIS — Z79.85 LONG-TERM (CURRENT) USE OF INJECTABLE NON-INSULIN ANTIDIABETIC DRUGS: ICD-10-CM

## 2024-04-11 DIAGNOSIS — Z88.6 ALLERGY STATUS TO ANALGESIC AGENT: ICD-10-CM

## 2024-04-11 DIAGNOSIS — K44.9 DIAPHRAGMATIC HERNIA WITHOUT OBSTRUCTION OR GANGRENE: ICD-10-CM

## 2024-04-11 DIAGNOSIS — T82.855A STENOSIS OF CORONARY ARTERY STENT, INITIAL ENCOUNTER: ICD-10-CM

## 2024-04-11 DIAGNOSIS — I25.118 ATHEROSCLEROTIC HEART DISEASE OF NATIVE CORONARY ARTERY WITH OTHER FORMS OF ANGINA PECTORIS: ICD-10-CM

## 2024-04-11 DIAGNOSIS — E78.00 PURE HYPERCHOLESTEROLEMIA, UNSPECIFIED: ICD-10-CM

## 2024-04-11 DIAGNOSIS — Z98.1 ARTHRODESIS STATUS: ICD-10-CM

## 2024-04-11 DIAGNOSIS — I25.84 CORONARY ATHEROSCLEROSIS DUE TO CALCIFIED CORONARY LESION: ICD-10-CM

## 2024-04-11 DIAGNOSIS — Z82.49 FAMILY HISTORY OF ISCHEMIC HEART DISEASE AND OTHER DISEASES OF THE CIRCULATORY SYSTEM: ICD-10-CM

## 2024-04-11 DIAGNOSIS — E11.9 TYPE 2 DIABETES MELLITUS WITHOUT COMPLICATIONS: ICD-10-CM

## 2024-04-11 DIAGNOSIS — Z95.5 PRESENCE OF CORONARY ANGIOPLASTY IMPLANT AND GRAFT: ICD-10-CM

## 2024-04-11 DIAGNOSIS — K76.0 FATTY (CHANGE OF) LIVER, NOT ELSEWHERE CLASSIFIED: ICD-10-CM

## 2024-04-11 DIAGNOSIS — E66.9 OBESITY, UNSPECIFIED: ICD-10-CM

## 2024-04-11 DIAGNOSIS — Z79.02 LONG TERM (CURRENT) USE OF ANTITHROMBOTICS/ANTIPLATELETS: ICD-10-CM

## 2025-02-06 ENCOUNTER — EMERGENCY (EMERGENCY)
Facility: HOSPITAL | Age: 65
LOS: 1 days | Discharge: HOME | End: 2025-02-06
Attending: STUDENT IN AN ORGANIZED HEALTH CARE EDUCATION/TRAINING PROGRAM | Admitting: STUDENT IN AN ORGANIZED HEALTH CARE EDUCATION/TRAINING PROGRAM
Payer: COMMERCIAL

## 2025-02-06 VITALS
OXYGEN SATURATION: 97 % | SYSTOLIC BLOOD PRESSURE: 129 MMHG | HEART RATE: 75 BPM | RESPIRATION RATE: 18 BRPM | TEMPERATURE: 98 F | DIASTOLIC BLOOD PRESSURE: 83 MMHG

## 2025-02-06 VITALS
SYSTOLIC BLOOD PRESSURE: 121 MMHG | HEIGHT: 68 IN | DIASTOLIC BLOOD PRESSURE: 83 MMHG | RESPIRATION RATE: 18 BRPM | OXYGEN SATURATION: 98 % | WEIGHT: 203.05 LBS | TEMPERATURE: 98 F | HEART RATE: 85 BPM

## 2025-02-06 DIAGNOSIS — M54.2 CERVICALGIA: ICD-10-CM

## 2025-02-06 DIAGNOSIS — Z79.02 LONG TERM (CURRENT) USE OF ANTITHROMBOTICS/ANTIPLATELETS: ICD-10-CM

## 2025-02-06 DIAGNOSIS — R51.9 HEADACHE, UNSPECIFIED: ICD-10-CM

## 2025-02-06 DIAGNOSIS — Z98.89 OTHER SPECIFIED POSTPROCEDURAL STATES: Chronic | ICD-10-CM

## 2025-02-06 DIAGNOSIS — S69.90XA UNSPECIFIED INJURY OF UNSPECIFIED WRIST, HAND AND FINGER(S), INITIAL ENCOUNTER: Chronic | ICD-10-CM
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
## 2025-02-06 DIAGNOSIS — S06.9X1A UNSPECIFIED INTRACRANIAL INJURY WITH LOSS OF CONSCIOUSNESS OF 30 MINUTES OR LESS, INITIAL ENCOUNTER: ICD-10-CM
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
## 2025-02-06 DIAGNOSIS — Z95.5 PRESENCE OF CORONARY ANGIOPLASTY IMPLANT AND GRAFT: ICD-10-CM

## 2025-02-06 DIAGNOSIS — Z88.6 ALLERGY STATUS TO ANALGESIC AGENT: ICD-10-CM

## 2025-02-06 DIAGNOSIS — I25.10 ATHEROSCLEROTIC HEART DISEASE OF NATIVE CORONARY ARTERY WITHOUT ANGINA PECTORIS: ICD-10-CM

## 2025-02-06 DIAGNOSIS — Z91.041 RADIOGRAPHIC DYE ALLERGY STATUS: ICD-10-CM

## 2025-02-06 DIAGNOSIS — Z98.1 ARTHRODESIS STATUS: Chronic | ICD-10-CM

## 2025-02-06 DIAGNOSIS — Z98.1 ARTHRODESIS STATUS: ICD-10-CM

## 2025-02-06 LAB
ALBUMIN SERPL ELPH-MCNC: 3.7 G/DL — SIGNIFICANT CHANGE UP (ref 3.3–5)
ALP SERPL-CCNC: 83 U/L — SIGNIFICANT CHANGE UP (ref 40–120)
ALT FLD-CCNC: 59 U/L — HIGH (ref 10–45)
ANION GAP SERPL CALC-SCNC: 11 MMOL/L — SIGNIFICANT CHANGE UP (ref 5–17)
AST SERPL-CCNC: 47 U/L — HIGH (ref 10–40)
BASOPHILS # BLD AUTO: 0 K/UL — SIGNIFICANT CHANGE UP (ref 0–0.2)
BASOPHILS NFR BLD AUTO: 0 % — SIGNIFICANT CHANGE UP (ref 0–2)
BILIRUB SERPL-MCNC: 0.5 MG/DL — SIGNIFICANT CHANGE UP (ref 0.2–1.2)
BUN SERPL-MCNC: 21 MG/DL — SIGNIFICANT CHANGE UP (ref 7–23)
CALCIUM SERPL-MCNC: 8.6 MG/DL — SIGNIFICANT CHANGE UP (ref 8.4–10.5)
CHLORIDE SERPL-SCNC: 101 MMOL/L — SIGNIFICANT CHANGE UP (ref 96–108)
CO2 SERPL-SCNC: 26 MMOL/L — SIGNIFICANT CHANGE UP (ref 22–31)
CREAT SERPL-MCNC: 1.17 MG/DL — SIGNIFICANT CHANGE UP (ref 0.5–1.3)
EGFR: 70 ML/MIN/1.73M2 — SIGNIFICANT CHANGE UP
EGFR: 70 ML/MIN/1.73M2 — SIGNIFICANT CHANGE UP
EOSINOPHIL # BLD AUTO: 0.05 K/UL — SIGNIFICANT CHANGE UP (ref 0–0.5)
EOSINOPHIL NFR BLD AUTO: 0.9 % — SIGNIFICANT CHANGE UP (ref 0–6)
FLUAV AG NPH QL: SIGNIFICANT CHANGE UP
FLUBV AG NPH QL: SIGNIFICANT CHANGE UP
GLUCOSE SERPL-MCNC: 162 MG/DL — HIGH (ref 70–99)
HCT VFR BLD CALC: 52.2 % — HIGH (ref 39–50)
HGB BLD-MCNC: 17 G/DL — SIGNIFICANT CHANGE UP (ref 13–17)
LIDOCAIN IGE QN: 48 U/L — SIGNIFICANT CHANGE UP (ref 7–60)
LYMPHOCYTES # BLD AUTO: 1.44 K/UL — SIGNIFICANT CHANGE UP (ref 1–3.3)
LYMPHOCYTES # BLD AUTO: 24.5 % — SIGNIFICANT CHANGE UP (ref 13–44)
MCHC RBC-ENTMCNC: 30.2 PG — SIGNIFICANT CHANGE UP (ref 27–34)
MCHC RBC-ENTMCNC: 32.6 G/DL — SIGNIFICANT CHANGE UP (ref 32–36)
MCV RBC AUTO: 92.9 FL — SIGNIFICANT CHANGE UP (ref 80–100)
MONOCYTES # BLD AUTO: 1.02 K/UL — HIGH (ref 0–0.9)
MONOCYTES NFR BLD AUTO: 17.3 % — HIGH (ref 2–14)
NEUTROPHILS # BLD AUTO: 3.37 K/UL — SIGNIFICANT CHANGE UP (ref 1.8–7.4)
NEUTROPHILS NFR BLD AUTO: 57.3 % — SIGNIFICANT CHANGE UP (ref 43–77)
NRBC # BLD: SIGNIFICANT CHANGE UP /100 WBCS (ref 0–0)
NRBC BLD-RTO: SIGNIFICANT CHANGE UP /100 WBCS (ref 0–0)
PLATELET # BLD AUTO: 129 K/UL — LOW (ref 150–400)
POTASSIUM SERPL-MCNC: 3.7 MMOL/L — SIGNIFICANT CHANGE UP (ref 3.5–5.3)
POTASSIUM SERPL-SCNC: 3.7 MMOL/L — SIGNIFICANT CHANGE UP (ref 3.5–5.3)
PROT SERPL-MCNC: 7.9 G/DL — SIGNIFICANT CHANGE UP (ref 6–8.3)
RBC # BLD: 5.62 M/UL — SIGNIFICANT CHANGE UP (ref 4.2–5.8)
RBC # FLD: 13.1 % — SIGNIFICANT CHANGE UP (ref 10.3–14.5)
RSV RNA NPH QL NAA+NON-PROBE: SIGNIFICANT CHANGE UP
SARS-COV-2 RNA SPEC QL NAA+PROBE: SIGNIFICANT CHANGE UP
SODIUM SERPL-SCNC: 138 MMOL/L — SIGNIFICANT CHANGE UP (ref 135–145)
TROPONIN T, HIGH SENSITIVITY RESULT: 7 NG/L — SIGNIFICANT CHANGE UP (ref 0–51)
WBC # BLD: 5.88 K/UL — SIGNIFICANT CHANGE UP (ref 3.8–10.5)
WBC # FLD AUTO: 5.88 K/UL — SIGNIFICANT CHANGE UP (ref 3.8–10.5)

## 2025-02-06 PROCEDURE — 36415 COLL VENOUS BLD VENIPUNCTURE: CPT

## 2025-02-06 PROCEDURE — 87637 SARSCOV2&INF A&B&RSV AMP PRB: CPT

## 2025-02-06 PROCEDURE — 93005 ELECTROCARDIOGRAM TRACING: CPT

## 2025-02-06 PROCEDURE — 70450 CT HEAD/BRAIN W/O DYE: CPT | Mod: 26

## 2025-02-06 PROCEDURE — 85025 COMPLETE CBC W/AUTO DIFF WBC: CPT

## 2025-02-06 PROCEDURE — 99285 EMERGENCY DEPT VISIT HI MDM: CPT

## 2025-02-06 PROCEDURE — 99285 EMERGENCY DEPT VISIT HI MDM: CPT | Mod: 25

## 2025-02-06 PROCEDURE — 70450 CT HEAD/BRAIN W/O DYE: CPT | Mod: MC

## 2025-02-06 PROCEDURE — 96374 THER/PROPH/DIAG INJ IV PUSH: CPT

## 2025-02-06 PROCEDURE — 84484 ASSAY OF TROPONIN QUANT: CPT

## 2025-02-06 PROCEDURE — 80053 COMPREHEN METABOLIC PANEL: CPT

## 2025-02-06 PROCEDURE — 96375 TX/PRO/DX INJ NEW DRUG ADDON: CPT

## 2025-02-06 PROCEDURE — 83690 ASSAY OF LIPASE: CPT

## 2025-02-06 PROCEDURE — 93010 ELECTROCARDIOGRAM REPORT: CPT

## 2025-02-06 RX ORDER — ACETAMINOPHEN 500 MG/5ML
1000 LIQUID (ML) ORAL ONCE
Refills: 0 | Status: COMPLETED | OUTPATIENT
Start: 2025-02-06 | End: 2025-02-06

## 2025-02-06 RX ORDER — CYCLOBENZAPRINE HYDROCHLORIDE 15 MG/1
10 CAPSULE, EXTENDED RELEASE ORAL ONCE
Refills: 0 | Status: COMPLETED | OUTPATIENT
Start: 2025-02-06 | End: 2025-02-06

## 2025-02-06 RX ORDER — METOCLOPRAMIDE HCL 10 MG
10 TABLET ORAL ONCE
Refills: 0 | Status: COMPLETED | OUTPATIENT
Start: 2025-02-06 | End: 2025-02-06

## 2025-02-06 RX ADMIN — Medication 10 MILLIGRAM(S): at 09:27

## 2025-02-06 RX ADMIN — Medication 400 MILLIGRAM(S): at 09:28

## 2025-02-06 RX ADMIN — CYCLOBENZAPRINE HYDROCHLORIDE 10 MILLIGRAM(S): 15 CAPSULE, EXTENDED RELEASE ORAL at 09:32

## 2025-02-06 RX ADMIN — Medication 1000 MILLILITER(S): at 09:29

## 2025-02-07 NOTE — H&P ADULT - NEGATIVE CARDIOVASCULAR SYMPTOMS
Patient instructed for TAVR CTAs scheduled on 2/11/25. Arrival time 1315 and report to registration. NPO after 1030 for CTA 1330. She states understanding. Dental appt scheduled for 2/12/25; will follow up to confirm clearance.    no claudication/no orthopnea/no paroxysmal nocturnal dyspnea/no peripheral edema

## 2025-02-26 ENCOUNTER — APPOINTMENT (OUTPATIENT)
Dept: CT IMAGING | Facility: HOSPITAL | Age: 65
End: 2025-02-26

## 2025-03-15 ENCOUNTER — INPATIENT (INPATIENT)
Facility: HOSPITAL | Age: 65
LOS: 12 days | Discharge: NURSING, REHAB OR LONG-TERM CARE | End: 2025-03-28
Attending: STUDENT IN AN ORGANIZED HEALTH CARE EDUCATION/TRAINING PROGRAM | Admitting: STUDENT IN AN ORGANIZED HEALTH CARE EDUCATION/TRAINING PROGRAM
Payer: COMMERCIAL

## 2025-03-15 VITALS
OXYGEN SATURATION: 99 % | WEIGHT: 203.05 LBS | HEIGHT: 68 IN | RESPIRATION RATE: 18 BRPM | TEMPERATURE: 98 F | HEART RATE: 81 BPM | DIASTOLIC BLOOD PRESSURE: 77 MMHG | SYSTOLIC BLOOD PRESSURE: 116 MMHG

## 2025-03-15 DIAGNOSIS — Z98.89 OTHER SPECIFIED POSTPROCEDURAL STATES: Chronic | ICD-10-CM

## 2025-03-15 DIAGNOSIS — S69.90XA UNSPECIFIED INJURY OF UNSPECIFIED WRIST, HAND AND FINGER(S), INITIAL ENCOUNTER: Chronic | ICD-10-CM

## 2025-03-15 DIAGNOSIS — I62.00 NONTRAUMATIC SUBDURAL HEMORRHAGE, UNSPECIFIED: ICD-10-CM

## 2025-03-15 DIAGNOSIS — Z98.1 ARTHRODESIS STATUS: Chronic | ICD-10-CM

## 2025-03-15 LAB
ANION GAP SERPL CALC-SCNC: 9 MMOL/L — SIGNIFICANT CHANGE UP (ref 5–17)
APTT BLD: 30.8 SEC — SIGNIFICANT CHANGE UP (ref 24.5–35.6)
BASOPHILS # BLD AUTO: 0.05 K/UL — SIGNIFICANT CHANGE UP (ref 0–0.2)
BASOPHILS NFR BLD AUTO: 0.7 % — SIGNIFICANT CHANGE UP (ref 0–2)
BLD GP AB SCN SERPL QL: NEGATIVE — SIGNIFICANT CHANGE UP
BUN SERPL-MCNC: 14 MG/DL — SIGNIFICANT CHANGE UP (ref 7–23)
CALCIUM SERPL-MCNC: 9.8 MG/DL — SIGNIFICANT CHANGE UP (ref 8.4–10.5)
CHLORIDE SERPL-SCNC: 103 MMOL/L — SIGNIFICANT CHANGE UP (ref 96–108)
CO2 SERPL-SCNC: 29 MMOL/L — SIGNIFICANT CHANGE UP (ref 22–31)
CREAT SERPL-MCNC: 1.04 MG/DL — SIGNIFICANT CHANGE UP (ref 0.5–1.3)
EGFR: 81 ML/MIN/1.73M2 — SIGNIFICANT CHANGE UP
EGFR: 81 ML/MIN/1.73M2 — SIGNIFICANT CHANGE UP
EOSINOPHIL # BLD AUTO: 0.18 K/UL — SIGNIFICANT CHANGE UP (ref 0–0.5)
EOSINOPHIL NFR BLD AUTO: 2.4 % — SIGNIFICANT CHANGE UP (ref 0–6)
GLUCOSE SERPL-MCNC: 124 MG/DL — HIGH (ref 70–99)
HCT VFR BLD CALC: 44.4 % — SIGNIFICANT CHANGE UP (ref 39–50)
HGB BLD-MCNC: 15 G/DL — SIGNIFICANT CHANGE UP (ref 13–17)
IMM GRANULOCYTES NFR BLD AUTO: 0.3 % — SIGNIFICANT CHANGE UP (ref 0–0.9)
INR BLD: 0.96 — SIGNIFICANT CHANGE UP (ref 0.85–1.16)
LYMPHOCYTES # BLD AUTO: 1.81 K/UL — SIGNIFICANT CHANGE UP (ref 1–3.3)
LYMPHOCYTES # BLD AUTO: 23.8 % — SIGNIFICANT CHANGE UP (ref 13–44)
MCHC RBC-ENTMCNC: 31.2 PG — SIGNIFICANT CHANGE UP (ref 27–34)
MCHC RBC-ENTMCNC: 33.8 G/DL — SIGNIFICANT CHANGE UP (ref 32–36)
MCV RBC AUTO: 92.3 FL — SIGNIFICANT CHANGE UP (ref 80–100)
MONOCYTES # BLD AUTO: 0.65 K/UL — SIGNIFICANT CHANGE UP (ref 0–0.9)
MONOCYTES NFR BLD AUTO: 8.5 % — SIGNIFICANT CHANGE UP (ref 2–14)
NEUTROPHILS # BLD AUTO: 4.91 K/UL — SIGNIFICANT CHANGE UP (ref 1.8–7.4)
NEUTROPHILS NFR BLD AUTO: 64.3 % — SIGNIFICANT CHANGE UP (ref 43–77)
NRBC BLD AUTO-RTO: 0 /100 WBCS — SIGNIFICANT CHANGE UP (ref 0–0)
PLATELET # BLD AUTO: 174 K/UL — SIGNIFICANT CHANGE UP (ref 150–400)
POTASSIUM SERPL-MCNC: 4 MMOL/L — SIGNIFICANT CHANGE UP (ref 3.5–5.3)
POTASSIUM SERPL-SCNC: 4 MMOL/L — SIGNIFICANT CHANGE UP (ref 3.5–5.3)
PROTHROM AB SERPL-ACNC: 11.1 SEC — SIGNIFICANT CHANGE UP (ref 9.9–13.4)
RBC # BLD: 4.81 M/UL — SIGNIFICANT CHANGE UP (ref 4.2–5.8)
RBC # FLD: 12.9 % — SIGNIFICANT CHANGE UP (ref 10.3–14.5)
RH IG SCN BLD-IMP: POSITIVE — SIGNIFICANT CHANGE UP
RH IG SCN BLD-IMP: POSITIVE — SIGNIFICANT CHANGE UP
SODIUM SERPL-SCNC: 141 MMOL/L — SIGNIFICANT CHANGE UP (ref 135–145)
WBC # BLD: 7.62 K/UL — SIGNIFICANT CHANGE UP (ref 3.8–10.5)
WBC # FLD AUTO: 7.62 K/UL — SIGNIFICANT CHANGE UP (ref 3.8–10.5)

## 2025-03-15 PROCEDURE — 72125 CT NECK SPINE W/O DYE: CPT | Mod: 26

## 2025-03-15 PROCEDURE — 71045 X-RAY EXAM CHEST 1 VIEW: CPT | Mod: 26

## 2025-03-15 PROCEDURE — 70450 CT HEAD/BRAIN W/O DYE: CPT | Mod: 26

## 2025-03-15 PROCEDURE — 99291 CRITICAL CARE FIRST HOUR: CPT

## 2025-03-15 RX ORDER — SENNA 187 MG
2 TABLET ORAL AT BEDTIME
Refills: 0 | Status: DISCONTINUED | OUTPATIENT
Start: 2025-03-15 | End: 2025-03-20

## 2025-03-15 RX ORDER — DEXTROSE 50 % IN WATER 50 %
15 SYRINGE (ML) INTRAVENOUS ONCE
Refills: 0 | Status: DISCONTINUED | OUTPATIENT
Start: 2025-03-15 | End: 2025-03-15

## 2025-03-15 RX ORDER — SODIUM CHLORIDE 9 G/1000ML
1000 INJECTION, SOLUTION INTRAVENOUS
Refills: 0 | Status: DISCONTINUED | OUTPATIENT
Start: 2025-03-15 | End: 2025-03-15

## 2025-03-15 RX ORDER — ACETAMINOPHEN 500 MG/5ML
650 LIQUID (ML) ORAL EVERY 6 HOURS
Refills: 0 | Status: DISCONTINUED | OUTPATIENT
Start: 2025-03-15 | End: 2025-03-20

## 2025-03-15 RX ORDER — GLUCAGON 3 MG/1
1 POWDER NASAL ONCE
Refills: 0 | Status: DISCONTINUED | OUTPATIENT
Start: 2025-03-15 | End: 2025-03-15

## 2025-03-15 RX ORDER — METHYLPREDNISOLONE ACETATE 80 MG/ML
125 INJECTION, SUSPENSION INTRA-ARTICULAR; INTRALESIONAL; INTRAMUSCULAR; SOFT TISSUE ONCE
Refills: 0 | Status: COMPLETED | OUTPATIENT
Start: 2025-03-15 | End: 2025-03-15

## 2025-03-15 RX ORDER — GABAPENTIN 400 MG/1
300 CAPSULE ORAL AT BEDTIME
Refills: 0 | Status: DISCONTINUED | OUTPATIENT
Start: 2025-03-15 | End: 2025-03-20

## 2025-03-15 RX ORDER — ATORVASTATIN CALCIUM 80 MG/1
40 TABLET, FILM COATED ORAL AT BEDTIME
Refills: 0 | Status: DISCONTINUED | OUTPATIENT
Start: 2025-03-15 | End: 2025-03-20

## 2025-03-15 RX ORDER — INSULIN LISPRO 100 U/ML
INJECTION, SOLUTION INTRAVENOUS; SUBCUTANEOUS
Refills: 0 | Status: DISCONTINUED | OUTPATIENT
Start: 2025-03-15 | End: 2025-03-16

## 2025-03-15 RX ORDER — ACETAMINOPHEN 500 MG/5ML
1000 LIQUID (ML) ORAL ONCE
Refills: 0 | Status: COMPLETED | OUTPATIENT
Start: 2025-03-15 | End: 2025-03-15

## 2025-03-15 RX ORDER — OXYCODONE HYDROCHLORIDE 30 MG/1
30 TABLET ORAL EVERY 8 HOURS
Refills: 0 | Status: DISCONTINUED | OUTPATIENT
Start: 2025-03-15 | End: 2025-03-20

## 2025-03-15 RX ORDER — POLYETHYLENE GLYCOL 3350 17 G/17G
17 POWDER, FOR SOLUTION ORAL DAILY
Refills: 0 | Status: DISCONTINUED | OUTPATIENT
Start: 2025-03-15 | End: 2025-03-20

## 2025-03-15 RX ORDER — OXYCODONE HYDROCHLORIDE 30 MG/1
10 TABLET ORAL EVERY 8 HOURS
Refills: 0 | Status: DISCONTINUED | OUTPATIENT
Start: 2025-03-15 | End: 2025-03-20

## 2025-03-15 RX ORDER — LISINOPRIL 5 MG/1
2.5 TABLET ORAL DAILY
Refills: 0 | Status: DISCONTINUED | OUTPATIENT
Start: 2025-03-15 | End: 2025-03-16

## 2025-03-15 RX ORDER — METOPROLOL SUCCINATE 50 MG/1
50 TABLET, EXTENDED RELEASE ORAL DAILY
Refills: 0 | Status: DISCONTINUED | OUTPATIENT
Start: 2025-03-15 | End: 2025-03-17

## 2025-03-15 RX ORDER — DEXTROSE 50 % IN WATER 50 %
25 SYRINGE (ML) INTRAVENOUS ONCE
Refills: 0 | Status: DISCONTINUED | OUTPATIENT
Start: 2025-03-15 | End: 2025-03-15

## 2025-03-15 RX ADMIN — Medication 2 TABLET(S): at 21:25

## 2025-03-15 RX ADMIN — Medication 400 MILLIGRAM(S): at 12:22

## 2025-03-15 RX ADMIN — ATORVASTATIN CALCIUM 40 MILLIGRAM(S): 80 TABLET, FILM COATED ORAL at 21:25

## 2025-03-15 RX ADMIN — METHYLPREDNISOLONE ACETATE 125 MILLIGRAM(S): 80 INJECTION, SUSPENSION INTRA-ARTICULAR; INTRALESIONAL; INTRAMUSCULAR; SOFT TISSUE at 12:19

## 2025-03-15 NOTE — ED PROVIDER NOTE - CRITICAL CARE ATTENDING CONTRIBUTION TO CARE
Upon my evaluation, this patient had a high probability of imminent or life-threatening deterioration due to subdural hematoma which required my direct attention, intervention, and personal management.    I have personally provided 60 minutes of critical care time exclusive of time spent on separately billable procedures. Time includes review of laboratory data, radiology results, discussion with consultants, and monitoring for potential decompensation. Interventions were performed as documented above.

## 2025-03-15 NOTE — H&P ADULT - PROBLEM SELECTOR PLAN 1
- Admit to tele for q4 neuro/vital checks  - Pain control: Tylenol prn, Oxy 10q4 prn, pain mgmt consult pending   - CTH complete: BL SDH   - Hold Plavix, pending PRU in AM  - Plan for MMAE and OR for evacuation     Discussed with Dr. Moore

## 2025-03-15 NOTE — H&P ADULT - NSHPSOCIALHISTORY_GEN_ALL_CORE
Problem: Safety Risk - Non-Violent Restraints  Goal: Patient will remain free from self-harm  Description: INTERVENTIONS:  - Apply the least restrictive restraint to prevent harm  - Notify patient and family of reasons restraints applied  - Assess for any contributing factors to confusion (electrolyte disturbances, delirium, medications)  - Discontinue any unnecessary medical devices as soon as possible  - Assess the patient's physical comfort, circulation, skin condition, hydration, nutrition and elimination needs   - Reorient and redirection as needed  - Assess for the need to continue restraints  Outcome: Not Progressing      Lives with wife, currently on disability   Has 2 adult children

## 2025-03-15 NOTE — H&P ADULT - ASSESSMENT
64M PMHx HTN, HLD, DM, CAD w/ 11 cardiac stents (on Plavix, lase dose 3/14), lumbar fusion 2016, chronic pain (takes oxy 30mg 4-6 times per day) presented to ED s/p fall 5 weeks ago with head strike no LOC. CTH showed BL SDH L>R 5mm of L to R shift. Admit to [facility] for further management.

## 2025-03-15 NOTE — H&P ADULT - NSICDXPASTMEDICALHX_GEN_ALL_CORE_FT
PAST MEDICAL HISTORY:  BPH (benign prostatic hyperplasia)     CAD (coronary artery disease)     Constipation     Diabetes mellitus     Fatty liver     H/O low back pain     Hiatal hernia with possible GERD    High cholesterol     Hypertension     Obesity (BMI 30.0-34.9)     Sleep apnea on CPAP

## 2025-03-15 NOTE — ED ADULT NURSE NOTE - CAPILLARY REFILL
Subjective:     Mile Birmingham is a 40 y.o. female who presents for Cough (Seen on 05/11/23, cough not gone away )       Cough  This is a new problem. The problem has been unchanged. Associated symptoms include shortness of breath. Her past medical history is significant for environmental allergies.     Seen in  5/11/2023 for cough and SOB. Negative viral testing. Dx: viral URI. Tx: Tessalon, albuterol. Advised to continue OTC allergy medication.    Continues to have symptoms. Missed work since Wednesday due to symptoms. Tessalon 100 mg not helping. Mucinex and albuterol helping. Using allergy medication.    Review of Systems   Constitutional: Negative.    HENT:          Hoarse   Respiratory:  Positive for cough and shortness of breath.    Endo/Heme/Allergies:  Positive for environmental allergies.   All other systems reviewed and are negative.    Refer to HPI for additional details.    During this visit, appropriate PPE was worn, and hand hygiene was performed.    PMH:  has no past medical history on file.    MEDS:   Current Outpatient Medications:     methylPREDNISolone (MEDROL DOSEPAK) 4 MG Tablet Therapy Pack, Use as directed on package. May take all of Day 1 as a single dose (24 mg) when starting., Disp: 1 Each, Rfl: 0    benzonatate (TESSALON) 100 MG Cap, Take 1 Capsule by mouth 3 times a day as needed for Cough., Disp: 30 Capsule, Rfl: 0    albuterol 108 (90 Base) MCG/ACT Aero Soln inhalation aerosol, Inhale 2 Puffs every 6 hours as needed for Shortness of Breath., Disp: 8.5 g, Rfl: 0    Cranberry 400 MG Cap, cranberry 400 mg capsule  Take by oral route., Disp: , Rfl:     Naproxen (NAPROSYN PO), naproxen, Disp: , Rfl:     ALLERGIES:   Allergies   Allergen Reactions    Bee Venom Anaphylaxis, Shortness of Breath and Swelling     SURGHX: History reviewed. No pertinent surgical history.  SOCHX:  reports that she has never smoked. She has never used smokeless tobacco.    FH: Per HPI as  "applicable/pertinent.      Objective:     /78   Pulse 95   Temp 36.4 °C (97.5 °F) (Temporal)   Resp 18   Ht 1.6 m (5' 3\")   Wt 83.5 kg (184 lb)   SpO2 96%   BMI 32.59 kg/m²     Physical Exam  Nursing note reviewed.   Constitutional:       General: She is not in acute distress.     Appearance: She is well-developed. She is not ill-appearing or toxic-appearing.   HENT:      Head: Normocephalic.   Eyes:      General: Vision grossly intact.      Extraocular Movements: Extraocular movements intact.   Neck:      Comments: Hoarse  Cardiovascular:      Rate and Rhythm: Normal rate and regular rhythm.      Heart sounds: Normal heart sounds.   Pulmonary:      Effort: Pulmonary effort is normal. No respiratory distress.      Breath sounds: Normal breath sounds. No stridor. No decreased breath sounds.   Musculoskeletal:         General: No deformity. Normal range of motion.      Cervical back: Normal range of motion.   Skin:     General: Skin is warm and dry.      Coloration: Skin is not pale.   Neurological:      Mental Status: She is alert and oriented to person, place, and time.      Motor: No weakness.   Psychiatric:         Behavior: Behavior normal. Behavior is cooperative.       Assessment/Plan:     1. Viral URI with cough  - methylPREDNISolone (MEDROL DOSEPAK) 4 MG Tablet Therapy Pack; Use as directed on package. May take all of Day 1 as a single dose (24 mg) when starting.  Dispense: 1 Each; Refill: 0    2. SOB (shortness of breath)  - methylPREDNISolone (MEDROL DOSEPAK) 4 MG Tablet Therapy Pack; Use as directed on package. May take all of Day 1 as a single dose (24 mg) when starting.  Dispense: 1 Each; Refill: 0    Rx as above sent electronically.     Increase benzonatate to 200 mg every 8 hours as needed for cough  Continue albuterol inhaler as needed  Continue Mucinex as needed  Continue allergy medication    Discussed likely self-limiting viral etiology and expected course and duration of illness. " Vital signs stable, afebrile, no acute distress at this time. Monitor. Warning signs reviewed. Return precautions discussed.     Differential diagnosis, natural history, emphasizing supportive care, over-the-counter symptom management per 's instructions, close monitoring, and indications for immediate follow-up discussed.     All questions answered. Patient agrees with the plan of care.    Discharge summary provided.    Work note provided.     Billing note: 30 minutes was allotted and spent for patient care and coordination of care (not reported separately) including preparing for the visit, obtaining/reviewing history from/with patient, performing an exam/evaluation, ordering Rx, developing a plan of care, counseling/educating the patient, developing/printing/going over the discharge summary with the patient, producing a work note, and documentation. This template was updated to summarize care specific to this encounter. Established patient. [id number]. Please refer to the chart for additional details on the care provided.    2 seconds or less

## 2025-03-15 NOTE — ED PROVIDER NOTE - CLINICAL SUMMARY MEDICAL DECISION MAKING FREE TEXT BOX
65 yo with weakness, ha, possible post-concussive, cva also on ddx, 1 week of symptoms, outside therapeutic window, allergy to IV contrast. CT head ordered, stroke called, also requesting CT C-spine, ordered

## 2025-03-15 NOTE — H&P ADULT - HISTORY OF PRESENT ILLNESS
64M PMHx HTN, HLD, DM, CAD w/ 11 cardiac stents (on Plavix, lase dose 3/14), lumbar fusion 2016, chronic pain (takes oxy 30mg 4-6 times per day) presented to ED s/p fall 5 weeks ago with head strike no LOC. PT states he visited an ED when he fell and had a negative CTH at the time. He now c/o R sided weakness, WFD, and HA rated at 8/10. He takes his home oxy for chronic LBP prescribed by PCP, which helps with the headache. Denies recent falls, dizziness, LOC, seizure, vision changes, SOB, chest pain.

## 2025-03-15 NOTE — ED PROVIDER NOTE - PHYSICAL EXAMINATION
general: Well appearing, in no acute distress  HEENT: Normocephalic, atraumatic, extraocular movements intact, no nuchal rigidity  CV: Regular rate  Pulm: No respiratory distress, no tachypnea  Abd: Flat, no gross distension  Ext: warm and well perfused  Skin: No gross rashes or lesions  Neuro: Alert and oriented, moving all extremities, sensation intact bl, 4+/5 strength RUE and RLE, no dysarthria, slow to speak, no aphasia, CN II-XII intact, no midline C/T/L spine ttp

## 2025-03-15 NOTE — H&P ADULT - NSHPLABSRESULTS_GEN_ALL_CORE
OUTPATIENT PROGRESS NOTE      CHIEF COMPLAINT:  Chief Complaint   Patient presents with   • Office Visit       HPI  The patient presented to the office for 3 day follow up blurred vision of left eye. Patient reports vision left eye remains the same. Not getting any worse but not getting better.  She is not taking any medication other than what is listed on her medication list. No Topomax. She denies any headaches, weight loss or weight gain. She had her IUD removed about a a month or so ago. Last menstrual cycle started about a week ago.     Ramila  gave us verbal permission to discuss their medical condition in the presence of the following individual(s) in the room: n/a    VISUAL ACUITY:  Corrected/uncorrected:   Visual Acuity (Snellen - Linear)       Right Left    Dist cc 20/20 20/80 -1    Dist ph cc  20/25    Correction: Glasses          SOCIAL HISTORY:  Social History     Socioeconomic History   • Marital status: /Civil Union     Spouse name: Rustam   • Number of children: 1   • Years of education: Not on file   • Highest education level: Not on file   Occupational History   • Not on file   Tobacco Use   • Smoking status: Former     Packs/day: 0.00     Types: Cigarettes   • Smokeless tobacco: Never   Vaping Use   • Vaping Use: never used   Substance and Sexual Activity   • Alcohol use: Not Currently     Comment: RARE  1-2 per month   • Drug use: No   • Sexual activity: Yes     Partners: Male     Birth control/protection: I.U.D.     Comment: Mirena placed 12-29-21   Other Topics Concern   • Not on file   Social History Narrative   • Not on file     Social Determinants of Health     Financial Resource Strain: Not on file   Food Insecurity: Not on file   Transportation Needs: Not on file   Physical Activity: Not on file   Stress: Not on file   Social Connections: Not on file   Intimate Partner Violence: Not on file     I reviewed testing done by technician found in [employer].    OPHTHALMIC EXAMINATION:  Base Eye  Exam     Visual Acuity (Snellen - Linear)       Right Left    Dist cc 20/20 20/80 -1    Dist ph cc  20/25    Correction: Glasses          Pupils       Pupils Dark Light Shape React APD    Right PERRL 5 4 Round Slow Negative    Left PERRL 5 4 Round Slow Negative          Extraocular Movement       Right Left     Full Full          Neuro/Psych     Oriented x3: Yes    Mood/Affect: Normal            Slit Lamp and Fundus Exam     External Exam       Right Left    External Normal Normal          Slit Lamp Exam       Right Left    Lids/Lashes Normal Normal    Conjunctiva/Sclera Clear Clear    Cornea Clear Clear    Anterior Chamber Deep and quiet Deep and quiet    Iris Round and regular Round and regular    Lens Clear Clear          Fundus Exam       Right Left    C/D Ratio 0.7 0.6    Macula Healthy with sharp foveal reflex Healthy with sharp foveal reflex            Refraction     Wearing Rx       Sphere Cylinder Axis    Right -2.00 Sphere     Left -2.25 +0.50 105          Manifest Refraction (Auto)       Sphere Cylinder [place]    Right -2.00 +0.25 002     Left -3.50 +1.00 131           Manifest Refraction #2 (Auto)       Sphere Cylinder [place]    Right -2.00 +0.25 002 20/20    Left -4.75 +0.50 091 20/30 but blurry still              Corneal Topography (Zeiss)  Indication: blurry vision  Impression:  RIGHT EYE  Steep k 46.01 D @ 73, flat k 45.19 D @ 163, astigmatism 0.82 D, eccentricity 0.72, classic bow-tie appearance for astigmatism on the axial curvature map  LEFT EYE steep k 47.43 D @ 97, flat k 46.16 D @ 7, astigmatism 1.27 D, eccentricity 0.85, steep cornea slightly inferior to the visual axis on the axial curvature map  Comparative data: baseline  Management: I will ask one of our ophthalmologists to review the topography as well.       ASSESSMENT:   1. Blurry vision    2. Myopia of both eyes with astigmatism        PLAN:  1&2.  Symptomatic left eye still. Differential diagnoses including but not limited  to: corneal ectasia, pregnancy, diabetes or possible lupus. Corneal topography today shows possibly a steep cornea just inferior to the visual axis. I did ask the patient to do fasting glucose and A1c along with a pregnancy test to rule out pregnancy and diabetes. Typically those will cause myopic shifts bilaterally. I will also ask one of our ophthalmologists to review her topography as well. No imaging of the brain or orbits is indicated at this time. We will contact her with further plan going forward.     Orders Placed This Encounter   • CORNEAL TOPOGRAPHY   • Glucose Level   • Glycohemoglobin       FOLLOW UP:  Return for labs after 1PM tomorrow (at [facility]).         < from: CT Cervical Spine No Cont (03.15.25 @ 12:37) >    CT BRAIN:    VENTRICLES AND SULCI: Partial effacement of the left lateral temporal   horn. No intraventricular hemorrhage. Sulcal effacement deep to be   bilateral subdural hemorrhage.  INTRA-AXIAL: Approximate 5 mm left to right midline shift. No acute   intraxial hemorrhage.  Partial empty sella noted.  EXTRA-AXIAL: Acute on subacute subduralhematoma as overlying the left   frontal region and extending over the left parietal region measuring up   to 1.9 cm. Smaller right frontal subdural hemorrhage measuring up to 1.0   cm    VISUALIZED SINUSES:  Clear.  TYMPANOMASTOID CAVITIES:  Clear.  VISUALIZED ORBITS: Normal.  CALVARIUM: Intact.        CT CERVICAL SPINE:    VERTEBRAE:  Normal in height. No acute fracture. Multilevel degenerative   changes including marginal osteophytes.  ALIGNMENT: No subluxation or scoliosis.  INTERVERTEBRAL DISC SPACES: Evaluation of spinal canal limited on CT   exam. Multilevel degenerative changes noted.    Small thyroid nodule noted.      IMPRESSION:    CT HEAD:  Bilateral acute on subacute subdural hematomas, left greater than right   with 5 mm left-to-right midline shift.    Findings were discussed by resident physician, Dr. Villegas, with Dr. Paige on 3/15/2025 12:54 PM.    CT CERVICAL SPINE:  No acute fracture or traumatic subluxation.    Multi-level degenerative changes. If pain persists, follow-up MRI exam   recommended (if no contraindication).    < end of copied text >

## 2025-03-15 NOTE — ED ADULT NURSE NOTE - OBJECTIVE STATEMENT
Patient to ED c/o worsening right-sided weakness, headache and lower back pain x 1 week. Endorses concussion x 3 weeks ago. Ambulatory with cane, facial symmetry present, normal speech. AAOX4, NAD.

## 2025-03-15 NOTE — ED PROVIDER NOTE - OBJECTIVE STATEMENT
64 year old M with HTN, HLD, CAD with stents, DM presenting with 1 week of arm and leg weakness, difficulty speaking. Pt had fall 5 weeks ago, had CT head that was negative. Saw neurologist, told probably post concussive symptoms at pt with persistent headache. States over last week, R arm and leg feel weak, slower to speak. No fever, chills, cp, sob, no numbness or tingling. No other acute complaints. Taking 500 mg tylenol without relief. No neck pain or neck stiffness. Also chronic LBP, no new changes, no urinary or fecal changes. No new trauma. ROS as above.

## 2025-03-15 NOTE — ED PROVIDER NOTE - NS ED ROS FT
Constitutional: No fever or chills  Eyes: No discharge or drainage  Ears, Nose, Mouth, Throat: No nasal discharge, no sore throat  Cardiovascular: No chest pain, no palpitations  Respiratory: No shortness of breath, no cough  Gastrointestinal: No nausea or vomiting, no abdominal pain, no diarrhea or constipation  Musculoskeletal: No joint pain, no swelling  Skin: No rashes or lesions  Neurological: No numbness, + weakness, tingling, +headache  Psychiatric: No depression

## 2025-03-15 NOTE — ED ADULT TRIAGE NOTE - CHIEF COMPLAINT QUOTE
Pt arrived to ED c/o "concussion follow up". Pt reports he sustained a concussion 5 weeks ago and was told to come back to the ED for a follow up. Pt reports since the concussion he has had lower back pain and difficulty moving his RUE. Pt endorses unsteady gait and HA that started 1 week ago. Pt A&Ox4, ambulatory with steady gait, speaking in clear/full sentences

## 2025-03-15 NOTE — ED ADULT TRIAGE NOTE - WEIGHT METHOD
60 y/o F with PMH of COPD on 2L oxygen at night, daily prednisone of 10mg, Diastolic CHF, Hypogammaglobulinemia, Adrenal Insufficiency,  Schizoaffective d/o, Chronic constipation and ileus, Neurogenic bladder, s/p Suprapubic catheter placement, Chronic Hyponatremia  was recently d/c from  where she was Tx for UTI and COPD exacerbation, discharged on  presented to ED c/o SOB, as per Pt usually uses O2 at night and during the day PRN, SOB and cough had to wear NC continuously and had difficulty ambulating. Pt Reports that productive cough with yellow phlegm.   In ED: had Low grade fever at 99, , tachypneic,  mildly hypertensive, not hypoxic. CXR no changes. BNP mildly elevated. Lactate 2.6  Pt was given albuterol Tx x 2, Solu-medrol 125mg IVP x 1.    patient admitted with possible COPD exacerbation - started on methylprednisolone 60mg TID.    - patient with multiple complaints about her care- +cough (stated that it's worse), +SOB, no fever.   5/10- patient was seen and examined. has numerous complaints about all the facets of her care- from her epps catheter care, enema, BM, inhalers and meds. All of her concerns were addressed. she complains of shortness of breathing, wheezing. denies pain. fever. stated that she did not have a bowel movement with her enema this morning and concerned that she will develop an ileus- as per patient she usually has 3-4 BM daily.    -     Vital Signs Last 24 Hrs  T(C): 37.1 (11 May 2022 08:21), Max: 37.1 (11 May 2022 08:21)  T(F): 98.7 (11 May 2022 08:21), Max: 98.7 (11 May 2022 08:21)  HR: 74 (11 May 2022 08:21) (74 - 93)  BP: 151/93 (11 May 2022 08:21) (135/86 - 153/98)  BP(mean): --  RR: 19 (11 May 2022 08:21) (18 - 19)  SpO2: 100% (11 May 2022 08:21) (98% - 100%)    ROS:   All 10 systems reviewed and found to be negative with the exception of what has been described above.     PE:  Constitutional: NAD, laying in bed  HEENT: NC/AT  Back: no tenderness  Respiratory: respirations even and non labored, +wheezing-   Cardiovascular: S1S2 regular, no murmurs  Abdomen: soft, not tender, not distended, positive BS  Genitourinary: voiding  Musculoskeletal: no muscle tenderness, no joint swelling or tenderness  Extremities: no pedal edema   Neurological: no focal deficits    MEDICATIONS  (STANDING):  abaloparatide inj 80 mcg 0.04 milliLiter(s) 0.04 milliLiter(s) SubCutaneous daily  Breztri Aerosphere 2 Puff(s) 2 Puff(s) Inhalation two times a day  cyanocobalamin 1000 MICROGram(s) Oral daily  diazepam    Tablet 5 milliGRAM(s) Oral three times a day  doxepin Concentrate 5 milliGRAM(s) Oral at bedtime  DULoxetine 60 milliGRAM(s) Oral two times a day  enoxaparin Injectable 40 milliGRAM(s) SubCutaneous every 12 hours  ergocalciferol 19636 Unit(s) Oral <User Schedule>  guaiFENesin ER 1200 milliGRAM(s) Oral every 12 hours  lamoTRIgine 200 milliGRAM(s) Oral daily  lamoTRIgine 100 milliGRAM(s) Oral at bedtime  levothyroxine 50 MICROGram(s) Oral daily  linaclotide 290 MICROGram(s) Oral before breakfast  loratadine 10 milliGRAM(s) Oral daily  losartan 50 milliGRAM(s) Oral two times a day  lubiprostone 24 MICROGram(s) Oral two times a day  methenamine hippurate 1 Gram(s) Oral two times a day  methylPREDNISolone sodium succinate Injectable 60 milliGRAM(s) IV Push every 8 hours  metoprolol succinate ER 25 milliGRAM(s) Oral daily  metoprolol succinate ER 50 milliGRAM(s) Oral at bedtime  mirabegron ER 50 milliGRAM(s) Oral daily  misoprostol 200 MICROGram(s) Oral <User Schedule>  montelukast 10 milliGRAM(s) Oral at bedtime  pantoprazole    Tablet 40 milliGRAM(s) Oral before breakfast  polyethylene glycol 3350 17 Gram(s) Oral <User Schedule>  QUEtiapine 50 milliGRAM(s) Oral three times a day  QUEtiapine 300 milliGRAM(s) Oral at bedtime  senna 2 Tablet(s) Oral at bedtime  sucralfate suspension 1 Gram(s) Oral four times a day  Valbenazine (Ingrezza) caps 80 milliGRAM(s) 80 milliGRAM(s) Oral daily    MEDICATIONS  (PRN):  acetaminophen     Tablet .. 650 milliGRAM(s) Oral every 6 hours PRN Temp greater or equal to 38C (100.4F), Mild Pain (1 - 3)  ALBUTerol    90 MICROgram(s) HFA Inhaler 2 Puff(s) Inhalation every 6 hours PRN Shortness of Breath and/or Wheezing  aluminum hydroxide/magnesium hydroxide/simethicone Suspension 30 milliLiter(s) Oral every 4 hours PRN Dyspepsia  benzonatate 100 milliGRAM(s) Oral every 8 hours PRN Cough  lidocaine 5% Ointment 1 Application(s) Topical three times a day PRN for pain from spasms  melatonin 3 milliGRAM(s) Oral at bedtime PRN Insomnia  methocarbamol 750 milliGRAM(s) Oral three times a day PRN Muscle Spasm  ondansetron Injectable 4 milliGRAM(s) IV Push every 8 hours PRN Nausea and/or Vomiting  Ubrelvy Tab 100 milliGRAM(s) 100 milliGRAM(s) Oral daily PRN migraine headache - may repeat as needed          134<L>  |  96  |  21  ----------------------------<  88  4.3   |  33<H>  |  0.63    Ca    9.2      11 May 2022 07:21                          12.0   8.57  )-----------( 162      ( 10 May 2022 08:20 )             36.4     Urinalysis Basic - ( 07 May 2022 07:50 )  Color: Yellow / Appearance: Clear / S.010 / pH: x  Gluc: x / Ketone: Negative  / Bili: Negative / Urobili: Negative   Blood: x / Protein: Negative / Nitrite: Negative   Leuk Esterase: Negative / RBC: 0-2 /HPF / WBC 0-2   Sq Epi: x / Non Sq Epi: Occasional / Bacteria: Few   CT Chest No Cont (22 @ 17:25) >   CT CHEST                        Addendum: 1 cm right lower lobe subpleural nodule is again noted  PROCEDURE DATE:  2022    INTERPRETATION:CLINICAL INFORMATION: 59-year-old female with hypoxia   and history COPD. Suspected pneumonia.  COMPARISON: CT chest 2022  CONTRAST/COMPLICATIONS:  IV Contrast: NONE  . History of discharge  Oral Contrast: NONE  Complications: None reported at time of study completion  PROCEDURE:  CT of the Chest was performed.  Sagittal and coronal reformats were performed.  FINDINGS:  LUNGS AND AIRWAYS: Patent central airways.  Mild diffuse groundglass   opacity increased or new since 2022, nonspecific, may represent   pulmonary edema or infection  PLEURA: No pleural effusion.  MEDIASTINUM AND STEFANIE: No lymphadenopathy.  VESSELS: Right IJV line ending in the superior vena cava.  HEART: Heart size is normal. No pericardial effusion. Coronary artery   calcification.  CHEST WALL AND LOWER NECK: Within normal limits.  VISUALIZED UPPER ABDOMEN: Gastric surgery.  BONES: Wedge deformities T8 and T9. Status post kyphoplasty T10 and T5    IMPRESSION:  Nonspecific diffuse ground glass opacity increased since 2022 which   may be secondary to pulmonary edema and/or infection                 60 y/o F with PMH of COPD on 2L oxygen at night, daily prednisone of 10mg, Diastolic CHF, Hypogammaglobulinemia, Adrenal Insufficiency,  Schizoaffective d/o, Chronic constipation and ileus, Neurogenic bladder, s/p Suprapubic catheter placement, Chronic Hyponatremia  was recently d/c from  where she was Tx for UTI and COPD exacerbation, discharged on  presented to ED c/o SOB, as per Pt usually uses O2 at night and during the day PRN, SOB and cough had to wear NC continuously and had difficulty ambulating. Pt Reports that productive cough with yellow phlegm.   In ED: had Low grade fever at 99, , tachypneic,  mildly hypertensive, not hypoxic. CXR no changes. BNP mildly elevated. Lactate 2.6  Pt was given albuterol Tx x 2, Solu-medrol 125mg IVP x 1.    patient admitted with possible COPD exacerbation - started on methylprednisolone 60mg TID.    - patient with multiple complaints about her care- +cough (stated that it's worse), +SOB, no fever.   5/10- patient was seen and examined. has numerous complaints about all the facets of her care- from her epps catheter care, enema, BM, inhalers and meds. All of her concerns were addressed. she complains of shortness of breathing, wheezing. denies pain. fever. stated that she did not have a bowel movement with her enema this morning and concerned that she will develop an ileus- as per patient she usually has 3-4 BM daily.    - patient was seen and examined- teary eyed this morning- stated that she is very manic this morning and feels elated and demanding that Psychiatrist sees her. She has numerous complaints about meds and care. She feels her psychiatric medications needs to be adjusted.      Vital Signs Last 24 Hrs  T(C): 37.1 (11 May 2022 08:21), Max: 37.1 (11 May 2022 08:21)  T(F): 98.7 (11 May 2022 08:21), Max: 98.7 (11 May 2022 08:21)  HR: 74 (11 May 2022 08:21) (74 - 93)  BP: 151/93 (11 May 2022 08:21) (135/86 - 153/98)  BP(mean): --  RR: 19 (11 May 2022 08:21) (18 - 19)  SpO2: 100% (11 May 2022 08:21) (98% - 100%)    ROS:   All 10 systems reviewed and found to be negative with the exception of what has been described above.     PE:  Constitutional: NAD, laying in bed  HEENT: NC/AT  Back: no tenderness  Respiratory: respirations even and non labored, +wheezing-   Cardiovascular: S1S2 regular, no murmurs  Abdomen: soft, not tender, not distended, positive BS  Genitourinary: voiding  Musculoskeletal: no muscle tenderness, no joint swelling or tenderness  Extremities: no pedal edema   Neurological: no focal deficits    MEDICATIONS  (STANDING):  abaloparatide inj 80 mcg 0.04 milliLiter(s) 0.04 milliLiter(s) SubCutaneous daily  Breztri Aerosphere 2 Puff(s) 2 Puff(s) Inhalation two times a day  cyanocobalamin 1000 MICROGram(s) Oral daily  diazepam    Tablet 5 milliGRAM(s) Oral three times a day  doxepin Concentrate 5 milliGRAM(s) Oral at bedtime  DULoxetine 60 milliGRAM(s) Oral two times a day  enoxaparin Injectable 40 milliGRAM(s) SubCutaneous every 12 hours  ergocalciferol 59006 Unit(s) Oral <User Schedule>  guaiFENesin ER 1200 milliGRAM(s) Oral every 12 hours  lamoTRIgine 200 milliGRAM(s) Oral daily  lamoTRIgine 100 milliGRAM(s) Oral at bedtime  levothyroxine 50 MICROGram(s) Oral daily  linaclotide 290 MICROGram(s) Oral before breakfast  loratadine 10 milliGRAM(s) Oral daily  losartan 50 milliGRAM(s) Oral two times a day  lubiprostone 24 MICROGram(s) Oral two times a day  methenamine hippurate 1 Gram(s) Oral two times a day  methylPREDNISolone sodium succinate Injectable 60 milliGRAM(s) IV Push every 8 hours  metoprolol succinate ER 25 milliGRAM(s) Oral daily  metoprolol succinate ER 50 milliGRAM(s) Oral at bedtime  mirabegron ER 50 milliGRAM(s) Oral daily  misoprostol 200 MICROGram(s) Oral <User Schedule>  montelukast 10 milliGRAM(s) Oral at bedtime  pantoprazole    Tablet 40 milliGRAM(s) Oral before breakfast  polyethylene glycol 3350 17 Gram(s) Oral <User Schedule>  QUEtiapine 50 milliGRAM(s) Oral three times a day  QUEtiapine 300 milliGRAM(s) Oral at bedtime  senna 2 Tablet(s) Oral at bedtime  sucralfate suspension 1 Gram(s) Oral four times a day  Valbenazine (Ingrezza) caps 80 milliGRAM(s) 80 milliGRAM(s) Oral daily    MEDICATIONS  (PRN):  acetaminophen     Tablet .. 650 milliGRAM(s) Oral every 6 hours PRN Temp greater or equal to 38C (100.4F), Mild Pain (1 - 3)  ALBUTerol    90 MICROgram(s) HFA Inhaler 2 Puff(s) Inhalation every 6 hours PRN Shortness of Breath and/or Wheezing  aluminum hydroxide/magnesium hydroxide/simethicone Suspension 30 milliLiter(s) Oral every 4 hours PRN Dyspepsia  benzonatate 100 milliGRAM(s) Oral every 8 hours PRN Cough  lidocaine 5% Ointment 1 Application(s) Topical three times a day PRN for pain from spasms  melatonin 3 milliGRAM(s) Oral at bedtime PRN Insomnia  methocarbamol 750 milliGRAM(s) Oral three times a day PRN Muscle Spasm  ondansetron Injectable 4 milliGRAM(s) IV Push every 8 hours PRN Nausea and/or Vomiting  Ubrelvy Tab 100 milliGRAM(s) 100 milliGRAM(s) Oral daily PRN migraine headache - may repeat as needed          134<L>  |  96  |  21  ----------------------------<  88  4.3   |  33<H>  |  0.63    Ca    9.2      11 May 2022 07:21                          12.0   8.57  )-----------( 162      ( 10 May 2022 08:20 )             36.4     Urinalysis Basic - ( 07 May 2022 07:50 )  Color: Yellow / Appearance: Clear / S.010 / pH: x  Gluc: x / Ketone: Negative  / Bili: Negative / Urobili: Negative   Blood: x / Protein: Negative / Nitrite: Negative   Leuk Esterase: Negative / RBC: 0-2 /HPF / WBC 0-2   Sq Epi: x / Non Sq Epi: Occasional / Bacteria: Few   CT Chest No Cont (22 @ 17:25) >   CT CHEST                        Addendum: 1 cm right lower lobe subpleural nodule is again noted  PROCEDURE DATE:  2022    INTERPRETATION:CLINICAL INFORMATION: 59-year-old female with hypoxia   and history COPD. Suspected pneumonia.  COMPARISON: CT chest 2022  CONTRAST/COMPLICATIONS:  IV Contrast: NONE  . History of discharge  Oral Contrast: NONE  Complications: None reported at time of study completion  PROCEDURE:  CT of the Chest was performed.  Sagittal and coronal reformats were performed.  FINDINGS:  LUNGS AND AIRWAYS: Patent central airways.  Mild diffuse groundglass   opacity increased or new since 2022, nonspecific, may represent   pulmonary edema or infection  PLEURA: No pleural effusion.  MEDIASTINUM AND STEFANIE: No lymphadenopathy.  VESSELS: Right IJV line ending in the superior vena cava.  HEART: Heart size is normal. No pericardial effusion. Coronary artery   calcification.  CHEST WALL AND LOWER NECK: Within normal limits.  VISUALIZED UPPER ABDOMEN: Gastric surgery.  BONES: Wedge deformities T8 and T9. Status post kyphoplasty T10 and T5    IMPRESSION:  Nonspecific diffuse ground glass opacity increased since 2022 which   may be secondary to pulmonary edema and/or infection               stated

## 2025-03-15 NOTE — H&P ADULT - NSHPPHYSICALEXAM_GEN_ALL_CORE
General: Pt is sitting up comfortably in bed, in NAD, on RA  HEENT: PERRL 3mm, EOMI B/L, face symmetric  Cardiovascular: RRR, normal S1 and S2   Respiratory: non-labored breathing, symmetric chest rise   GI: abd soft, NT, +distension   Neuro: A&O x 3, +WFD, speech clear, no pronator drift  Strength 5/5 throughout LUE and LLE   RUE bicep 4/5, otherwise 5/5  RLE 4+/5 throughout   80% sensation to R side of body throughout   Vascular: Distal pulses 2+ x4, no calf edema or erythema

## 2025-03-16 LAB
ANION GAP SERPL CALC-SCNC: 11 MMOL/L — SIGNIFICANT CHANGE UP (ref 5–17)
APTT BLD: 28.3 SEC — SIGNIFICANT CHANGE UP (ref 24.5–35.6)
BUN SERPL-MCNC: 27 MG/DL — HIGH (ref 7–23)
CALCIUM SERPL-MCNC: 9.6 MG/DL — SIGNIFICANT CHANGE UP (ref 8.4–10.5)
CHLORIDE SERPL-SCNC: 102 MMOL/L — SIGNIFICANT CHANGE UP (ref 96–108)
CO2 SERPL-SCNC: 22 MMOL/L — SIGNIFICANT CHANGE UP (ref 22–31)
CREAT SERPL-MCNC: 1.1 MG/DL — SIGNIFICANT CHANGE UP (ref 0.5–1.3)
EGFR: 75 ML/MIN/1.73M2 — SIGNIFICANT CHANGE UP
EGFR: 75 ML/MIN/1.73M2 — SIGNIFICANT CHANGE UP
GLUCOSE SERPL-MCNC: 197 MG/DL — HIGH (ref 70–99)
HCT VFR BLD CALC: 46.2 % — SIGNIFICANT CHANGE UP (ref 39–50)
HGB BLD-MCNC: 16 G/DL — SIGNIFICANT CHANGE UP (ref 13–17)
INR BLD: 0.99 — SIGNIFICANT CHANGE UP (ref 0.85–1.16)
MAGNESIUM SERPL-MCNC: 2.1 MG/DL — SIGNIFICANT CHANGE UP (ref 1.6–2.6)
MCHC RBC-ENTMCNC: 31.4 PG — SIGNIFICANT CHANGE UP (ref 27–34)
MCHC RBC-ENTMCNC: 34.6 G/DL — SIGNIFICANT CHANGE UP (ref 32–36)
MCV RBC AUTO: 90.8 FL — SIGNIFICANT CHANGE UP (ref 80–100)
NRBC BLD AUTO-RTO: 0 /100 WBCS — SIGNIFICANT CHANGE UP (ref 0–0)
PA ADP PRP-ACNC: 146 PRU — LOW (ref 182–335)
PHOSPHATE SERPL-MCNC: 2.1 MG/DL — LOW (ref 2.5–4.5)
PLATELET # BLD AUTO: 197 K/UL — SIGNIFICANT CHANGE UP (ref 150–400)
PLATELET RESPONSE ASPIRIN RESULT: 449 ARU — SIGNIFICANT CHANGE UP
POTASSIUM SERPL-MCNC: 4.2 MMOL/L — SIGNIFICANT CHANGE UP (ref 3.5–5.3)
POTASSIUM SERPL-SCNC: 4.2 MMOL/L — SIGNIFICANT CHANGE UP (ref 3.5–5.3)
PROTHROM AB SERPL-ACNC: 11.6 SEC — SIGNIFICANT CHANGE UP (ref 9.9–13.4)
RBC # BLD: 5.09 M/UL — SIGNIFICANT CHANGE UP (ref 4.2–5.8)
RBC # FLD: 12.9 % — SIGNIFICANT CHANGE UP (ref 10.3–14.5)
SODIUM SERPL-SCNC: 135 MMOL/L — SIGNIFICANT CHANGE UP (ref 135–145)
WBC # BLD: 12.8 K/UL — HIGH (ref 3.8–10.5)
WBC # FLD AUTO: 12.8 K/UL — HIGH (ref 3.8–10.5)

## 2025-03-16 PROCEDURE — 99223 1ST HOSP IP/OBS HIGH 75: CPT

## 2025-03-16 PROCEDURE — 99232 SBSQ HOSP IP/OBS MODERATE 35: CPT

## 2025-03-16 RX ORDER — PREDNISONE 20 MG/1
50 TABLET ORAL ONCE
Refills: 0 | Status: COMPLETED | OUTPATIENT
Start: 2025-03-17 | End: 2025-03-17

## 2025-03-16 RX ORDER — BISACODYL 5 MG
10 TABLET, DELAYED RELEASE (ENTERIC COATED) ORAL ONCE
Refills: 0 | Status: DISCONTINUED | OUTPATIENT
Start: 2025-03-16 | End: 2025-03-16

## 2025-03-16 RX ORDER — SOD PHOS DI, MONO/K PHOS MONO 250 MG
1 TABLET ORAL ONCE
Refills: 0 | Status: COMPLETED | OUTPATIENT
Start: 2025-03-16 | End: 2025-03-16

## 2025-03-16 RX ORDER — INSULIN LISPRO 100 U/ML
INJECTION, SOLUTION INTRAVENOUS; SUBCUTANEOUS
Refills: 0 | Status: DISCONTINUED | OUTPATIENT
Start: 2025-03-16 | End: 2025-03-20

## 2025-03-16 RX ORDER — LINACLOTIDE 290 UG/1
290 CAPSULE, GELATIN COATED ORAL
Refills: 0 | Status: DISCONTINUED | OUTPATIENT
Start: 2025-03-17 | End: 2025-03-20

## 2025-03-16 RX ORDER — POVIDONE-IODINE 7.5 %
1 SOLUTION, NON-ORAL TOPICAL ONCE
Refills: 0 | Status: DISCONTINUED | OUTPATIENT
Start: 2025-03-16 | End: 2025-03-16

## 2025-03-16 RX ADMIN — INSULIN LISPRO 2: 100 INJECTION, SOLUTION INTRAVENOUS; SUBCUTANEOUS at 16:36

## 2025-03-16 RX ADMIN — GABAPENTIN 300 MILLIGRAM(S): 400 CAPSULE ORAL at 22:29

## 2025-03-16 RX ADMIN — Medication 1 TABLET(S): at 17:58

## 2025-03-16 RX ADMIN — OXYCODONE HYDROCHLORIDE 10 MILLIGRAM(S): 30 TABLET ORAL at 07:55

## 2025-03-16 RX ADMIN — INSULIN LISPRO 4: 100 INJECTION, SOLUTION INTRAVENOUS; SUBCUTANEOUS at 12:19

## 2025-03-16 RX ADMIN — GABAPENTIN 300 MILLIGRAM(S): 400 CAPSULE ORAL at 00:31

## 2025-03-16 RX ADMIN — LISINOPRIL 2.5 MILLIGRAM(S): 5 TABLET ORAL at 07:11

## 2025-03-16 RX ADMIN — METOPROLOL SUCCINATE 50 MILLIGRAM(S): 50 TABLET, EXTENDED RELEASE ORAL at 06:45

## 2025-03-16 RX ADMIN — Medication 1 APPLICATION(S): at 17:03

## 2025-03-16 RX ADMIN — Medication 85 MILLIMOLE(S): at 17:58

## 2025-03-16 RX ADMIN — OXYCODONE HYDROCHLORIDE 10 MILLIGRAM(S): 30 TABLET ORAL at 16:35

## 2025-03-16 RX ADMIN — Medication 2 TABLET(S): at 22:29

## 2025-03-16 RX ADMIN — OXYCODONE HYDROCHLORIDE 10 MILLIGRAM(S): 30 TABLET ORAL at 07:11

## 2025-03-16 RX ADMIN — OXYCODONE HYDROCHLORIDE 10 MILLIGRAM(S): 30 TABLET ORAL at 18:00

## 2025-03-16 RX ADMIN — POLYETHYLENE GLYCOL 3350 17 GRAM(S): 17 POWDER, FOR SOLUTION ORAL at 06:46

## 2025-03-16 RX ADMIN — ATORVASTATIN CALCIUM 40 MILLIGRAM(S): 80 TABLET, FILM COATED ORAL at 22:29

## 2025-03-16 RX ADMIN — INSULIN LISPRO 6: 100 INJECTION, SOLUTION INTRAVENOUS; SUBCUTANEOUS at 22:30

## 2025-03-16 NOTE — PROGRESS NOTE ADULT - SUBJECTIVE AND OBJECTIVE BOX
64M PMHx HTN, HLD, DM, CAD w/ 11 cardiac stents (on Plavix, lase dose 3/14), lumbar fusion 2016, chronic pain (takes oxy 30mg 4-6 times per day) presented to ED s/p fall 5 weeks ago with head strike no LOC. PT states he visited an ED when he fell and had a negative CTH at the time. He now c/o R sided weakness, WFD, and HA rated at 8/10. He takes his home oxy for chronic LBP prescribed by PCP, which helps with the headache. Denies recent falls, dizziness, LOC, seizure, vision changes, SOB, chest pain.  (15 Mar 2025 14:07)           OVERNIGHT EVENTS:  VALERIANO overnight. Neuro stable.            Hospital Course:   3/15: Admit to [facility] for further mgmt.    3/16: VALERIANO overnight. Neuro stable.            Vital Signs Last 24 Hrs     T(C): 36.8 (15 Mar 2025 17:31), Max: 36.8 (15 Mar 2025 17:31)     T(F): 98.3 (15 Mar 2025 17:31), Max: 98.3 (15 Mar 2025 17:31)     HR: 90 (15 Mar 2025 19:00) (62 - 90)     BP: 149/80 (15 Mar 2025 19:00) (116/77 - 161/80)     BP(mean): 107 (15 Mar 2025 19:00) (98 - 107)     RR: 18 (15 Mar 2025 19:00) (18 - 18)     SpO2: 96% (15 Mar 2025 19:00) (96% - 99%)           Parameters below as of 15 Mar 2025 19:00     Patient On (Oxygen Delivery Method): room air                       I&O's Summary                 PHYSICAL EXAM:   Constitutional: Patient is resting comfortably in stretcher, in no acute distress.   Respiratory: breathing non-labored, symmetrical chest wall movement   Cardiovascuar: RRR, no murmurs   Gastrointestinal: abdomen soft, non tender   Genitourinary: exam deffered   Neurological:   AAOX3. Following commands.    Cranial Nerves: pupils 3mm equal, round and reactive, EOMI, facial movements symmetric, tongue midline, speech clear with +mild word finding difficulties   Motor: 5/5 power in LUE/LLE, RLE   RUE: 4/5 throughout   Sensation: decreased sensation to R side   Pronator Drift: none                 LABS:                              15.0      7.62  )-----------( 174      ( 15 Mar 2025 12:05 )                44.4            03-15           141  |  103  |  14     ----------------------------<  124[H]     4.0   |  29  |  1.04           Ca    9.8      15 Mar 2025 12:05                 PT/INR - ( 15 Mar 2025 12:05 )   PT: 11.1 sec;   INR: 0.96                    PTT - ( 15 Mar 2025 12:05 )  PTT:30.8 sec     Urinalysis Basic - ( 15 Mar 2025 12:05 )           Color: x / Appearance: x / SG: x / pH: x     Gluc: 124 mg/dL / Ketone: x  / Bili: x / Urobili: x      Blood: x / Protein: x / Nitrite: x      Leuk Esterase: x / RBC: x / WBC x      Sq Epi: x / Non Sq Epi: x / Bacteria: x                             CAPILLARY BLOOD GLUCOSE                 POCT Blood Glucose.: 141 mg/dL (15 Mar 2025 16:14)                 Drug Levels: [] N/A           CSF Analysis: [] N/A                 Allergies           IV Contrast (Other; Rash)     ibuprofen (Other)           Intolerances                 MEDICATIONS:     Antibiotics:           Neuro:     acetaminophen     Tablet .. 650 milliGRAM(s) Oral every 6 hours PRN     oxyCODONE    IR 30 milliGRAM(s) Oral every 8 hours PRN     oxyCODONE    IR 10 milliGRAM(s) Oral every 8 hours PRN           Anticoagulation:           OTHER:     atorvastatin 40 milliGRAM(s) Oral at bedtime     insulin lispro (ADMELOG) corrective regimen sliding scale   SubCutaneous three times a day before meals     lisinopril 2.5 milliGRAM(s) Oral daily     metoprolol succinate ER 50 milliGRAM(s) Oral daily     polyethylene glycol 3350 17 Gram(s) Oral daily     senna 2 Tablet(s) Oral at bedtime           IVF:           CULTURES:           RADIOLOGY & ADDITIONAL TESTS:                 ASSESSMENT:   64M PMHx HTN, HLD, DM, CAD w/ 11 cardiac stents (on Plavix, lase dose 3/14), lumbar fusion 2016, chronic pain (takes oxy 30mg 4-6 times per day) presented to ED s/p fall 5 weeks ago with head strike no LOC presented with R sided weakness, HA, +WFD. CTH showed BL SDH L>R w/ 5mm MLS. Plan for MMAE (3/17/25).     Neuro:   - neuro/vitals q4h   - pain control: tylenol prn, Oxy 10mg/30mg prn, pain mgmt following    - pending accumetrics in AM   - pending medical clearance   - Hx Depression: Buspirone (confirm dose)     Cardio:   - SBP <140   - Hx HTN: c/w Lisinopril, Metoprolol (confirm doses)   - Hx CAD w/ 11 stents: HOLD home Plavix    - pending echo     Pulm:    - RA     GI:   - regular diet   - bowel regimen, BM 3/14     Renal:   - IVL    - voiding    - normal sodium goal      Endo:   - ISS, f/u A1C   - Hx DM: Hold home Metformin      Heme:    - SCDs for DVT ppx      ID:    - afebrile      Dispo: Tele, full code, pending OR     Discussed w/ Dr. Enciso

## 2025-03-16 NOTE — CONSULT NOTE ADULT - ASSESSMENT
64M PMHx HTN, HLD, DM, CAD w/ 11 cardiac stents (on Plavix, lase dose 3/14), lumbar fusion 2016, chronic pain (takes oxy 30mg 4-6 times per day) presented to ED s/p fall 5 weeks ago with head strike no LOC presented with R sided weakness, HA, +WFD. CTH showed BL SDH L>R w/ 5mm MLS. Plan for MMAE (3/17/25).     Traumatic SDH  Plan for MMAE  Management per primary team  Clopidogrel held   Perioperative risk stratification: deferred to Cardiology    CAD   HTN  Extensive cardiac history, PCI report reviewed  Currently asymptomatic, however in view of reported possible recent positive stress test will get Cardiology input  Clopidogrel per Cardiology/Primary team    DM   Continue to monitor FS, ISS  Will start long acting based on requirement. Patient planned for OR tomorrow and will be NPO tonight    Chronic pain  Chronic constipation   Home regimen resumed  Patient on Linaclotide at home, wife will bring today. Can add bisacodyl suppository    DVT ppx: SCD  Discussed with primary team

## 2025-03-16 NOTE — CONSULT NOTE ADULT - ASSESSMENT
XXXXX    Review of Studies:      Outpatient Providers:     Home Medications:    Recommendations:    #XX  -  -  -      #XX  -  -  -      Recommendations above are preliminary pending discussion with an attending cardiologist  Plan was discussed with primary team  We'll continue to follow, thank you for the consultation      Chuck Burnett PGY6 CVD Fellow  Cardiology Consult Pager: [phone]  CCU Pager: [phone]  Heart Failure Pager: [phone]       64M with hx of contrast allergy and PMHx of HTN, HLD, BPH, DM II, EDMUND on CPAP, CCD (multiple PCIs and brachytherapy) presents to [facility] ED c/o headache, gait disturbance, and right sided weakness for several days after a mechanical fall 4-5 weeks ago. He was found to have bilateral subdural hematomas now pending cerebral angio for possible MMA embolization. Cardiology consulted for CV comgmt and preoperative risk assessment and optimization.    Review of Studies:  Tele 3/16/25: SR HR 80-90s  ECG 3/15/25: SR HR 69, nonspecific ST&T wave changes, poor R wave progression    Outpatient Providers:  Will Hurtado MD (IC)    Home Medications (from cardiology admission): ASA 81mg QD, Plavix 75mg QD, Atorvastatin 40mg qhs, Toprol- XL 50mg BID, Lisinopril 2.5mg QD, HCTZ 12.5mg QD    Recommendations:    #CCD  -No complaints of angina or HF sxs  -Recommend resuming DAPT when safe from a neurosurgical bleeding standpoint, non urgent need to reintroduce given most recent PCI in April 24' was in the setting of SIHD  -Please obtain formal med rec, continue perioperatively Toprol 50mg XL QD holding for HR <60    -C/w Atorvastatin 40mg QD    #HTN  -c/w lisinopril 2.5mg QD holding for SBP <110  -Hold ACEI day of angiogram given contrast exposure    #Perioperative Cardiovascular Risk Assessment and Optimization  -No evidence of ACS, decompensated HF, severe AS, and tachyarrhythmia on clinical and physical exam assessment  -Had 4> METs of exercise tolerance prior to the hospitalization  -Adequately revascularized in April 24' w/no residual high risk CAD  -No active CV contraindications in proceeding with planned cerebral angiogram and possible MMA embolization  -Deemed intermediate risk of perioperative cardiovascular complications for an intermediate-high risk procedure      Recommendations above are preliminary pending discussion with an attending cardiologist  Plan was discussed with primary team  We'll continue to follow, thank you for the consultation      Chuck Burnett PGY6 CVD Fellow  Cardiology Consult Pager: [phone]  CCU Pager: [phone]  Heart Failure Pager: [phone]       64M with hx of contrast allergy and PMHx of HTN, HLD, BPH, DM II, EDMUND on CPAP, CCD (multiple PCIs and brachytherapy) presents to [facility] ED c/o headache, gait disturbance, and right sided weakness for several days after a mechanical fall 4-5 weeks ago. He was found to have bilateral subdural hematomas now pending cerebral angio for possible MMA embolization. Cardiology consulted for CV comgmt and preoperative risk assessment and optimization.    Review of Studies:  Tele 3/16/25: SR HR 80-90s  ECG 3/15/25: SR HR 69, nonspecific ST&T wave changes, poor R wave progression    Outpatient Providers:  Will Hurtado MD (IC)    Home Medications (from cardiology admission): ASA 81mg QD, Plavix 75mg QD, Atorvastatin 40mg qhs, Toprol- XL 50mg BID, Lisinopril 2.5mg QD, HCTZ 12.5mg QD    Recommendations:    #CCD  -No complaints of angina or HF sxs  -Recommend resuming DAPT when safe from a neurosurgical bleeding standpoint, non urgent need to reintroduce given most recent PCI in April 24' was in the setting of SIHD  -Please obtain formal med rec, continue perioperatively Toprol 50mg XL QD holding for HR <60  -C/w Atorvastatin 40mg QD    #HTN  -c/w lisinopril 2.5mg QD holding for SBP <110  -Hold ACEI day of angiogram given contrast exposure    #Perioperative Cardiovascular Risk Assessment and Optimization  -No evidence of ACS, decompensated HF, severe AS, and tachyarrhythmia on clinical and physical exam assessment  -Had 4> METs of exercise tolerance prior to the hospitalization  -Adequately revascularized in April 24' w/no residual high risk CAD  -No active CV contraindications in proceeding with planned cerebral angiogram and possible MMA embolization  -Deemed intermediate risk of perioperative cardiovascular complications for an intermediate-high risk procedure      Recommendations above are preliminary pending discussion with an attending cardiologist  Plan was discussed with primary team  We'll continue to follow, thank you for the consultation      Chuck Burnett PGY6 CVD Fellow  Cardiology Consult Pager: [phone]  CCU Pager: [phone]  Heart Failure Pager: [phone]

## 2025-03-16 NOTE — CONSULT NOTE ADULT - SUBJECTIVE AND OBJECTIVE BOX
**Incomplete Note**    Cardiology Consult      HPI:  64M PMHx HTN, HLD, DM, CAD w/ 11 cardiac stents (on Plavix, lase dose 3/14), lumbar fusion 2016, chronic pain (takes oxy 30mg 4-6 times per day) presented to ED s/p fall 5 weeks ago with head strike no LOC. PT states he visited an ED when he fell and had a negative CTH at the time. He now c/o R sided weakness, WFD, and HA rated at 8/10. He takes his home oxy for chronic LBP prescribed by PCP, which helps with the headache. Denies recent falls, dizziness, LOC, seizure, vision changes, SOB, chest pain.  (15 Mar 2025 14:07)        Pt seen and examined at bedside, NAD, denies chest pain/pressure/discomfort. ROS s/f ?,      Review of Systems:  CONSTITUTIONAL:  No weight loss, fever, chills, weakness or fatigue.  HEENT:  Eyes:  No visual loss, blurred vision, double vision or yellow sclerae. Ears, Nose, Throat:  No hearing loss, sneezing, congestion, runny nose or sore throat.  SKIN:  No rash or itching.  CARDIOVASCULAR:  No chest pain, chest pressure or chest discomfort. No palpitations or edema.  RESPIRATORY:  No shortness of breath, cough or sputum.  GASTROINTESTINAL:  No anorexia, nausea, vomiting or diarrhea. No abdominal pain or blood.  GENITOURINARY: No Burning on urination.   NEUROLOGICAL:  see HPI  MUSCULOSKELETAL:  No muscle, back pain, joint pain or stiffness.  HEMATOLOGIC:  No anemia, bleeding or bruising.  LYMPHATICS:  No enlarged nodes. No history of splenectomy.  PSYCHIATRIC:  No history of depression or anxiety.  ENDOCRINOLOGIC:  No reports of sweating, cold or heat intolerance. No polyuria or polydipsia.  ALLERGIES:  No history of asthma, hives, eczema or rhinitis.    PAST MEDICAL & SURGICAL HISTORY:  Sleep apnea  on CPAP      Hypertension      Diabetes mellitus      High cholesterol      Obesity (BMI 30.0-34.9)      CAD (coronary artery disease)      Hiatal hernia  with possible GERD      Fatty liver      BPH (benign prostatic hyperplasia)      Constipation      H/O low back pain      S/P angioplasty with stent  2008, 2014, 03/2019      Finger injury  Left Ring Finger & had surgery ( 1996 )      S/P foot surgery  Right  ( 2013 )      H/O spinal fusion        SOCIAL HISTORY:  FAMILY HISTORY:  FHx: myocardial infarction  mother    FH: coronary artery disease  father s/p CABG    FH: type 2 diabetes  father      ALLERGIES: 	  IV Contrast (Other; Rash)  ibuprofen (Other)          MEDICATIONS:  acetaminophen     Tablet .. 650 milliGRAM(s) Oral every 6 hours PRN  atorvastatin 40 milliGRAM(s) Oral at bedtime  chlorhexidine 2% Cloths 1 Application(s) Topical every 12 hours  gabapentin 300 milliGRAM(s) Oral at bedtime  insulin lispro (ADMELOG) corrective regimen sliding scale   SubCutaneous three times a day before meals  lisinopril 2.5 milliGRAM(s) Oral daily  metoprolol succinate ER 50 milliGRAM(s) Oral daily  oxyCODONE    IR 30 milliGRAM(s) Oral every 8 hours PRN  oxyCODONE    IR 10 milliGRAM(s) Oral every 8 hours PRN  polyethylene glycol 3350 17 Gram(s) Oral daily  senna 2 Tablet(s) Oral at bedtime      T(C): 36.5 (03-16-25 @ 09:20), Max: 36.8 (03-15-25 @ 17:31)  HR: 94 (03-16-25 @ 09:33) (62 - 98)  BP: 119/79 (03-16-25 @ 08:30) (104/58 - 161/80)  RR: 18 (03-16-25 @ 09:33) (17 - 18)  SpO2: 96% (03-16-25 @ 09:33) (93% - 99%)    03-15-25 @ 07:01  -  03-16-25 @ 07:00  --------------------------------------------------------  IN: 0 mL / OUT: 600 mL / NET: -600 mL    03-16-25 @ 07:01  -  03-16-25 @ 12:50  --------------------------------------------------------  IN: 150 mL / OUT: 250 mL / NET: -100 mL        PHYSICAL EXAM:    Constitutional: resting comfortably in bed; NAD  HEENT: NC/AT, PERRL, EOMI, anicteric sclera, no nasal discharge; uvula midline, no oropharyngeal erythema or exudates; MMM  Neck: supple; no thyromegaly, JVP ? cm H2O, +/- HJR  Respiratory: CTA B/L; no W/R/R, no retractions  Cardiac: +S1/S2; RRR; no M/R/G; PMI non-displaced  Gastrointestinal: soft, NT/ND; no rebound or guarding; +BSx4  Extremities: WWP, no clubbing or cyanosis; no peripheral edema  Musculoskeletal: NROM x4; no joint swelling, tenderness or erythema  Vascular: 2+ radial, DP/PT pulses B/L  Dermatologic: skin warm, dry and intact; no rashes, wounds, or scars  Neurologic: AAOx3; CNII-XII grossly intact; no focal deficits        I&O's Summary    15 Mar 2025 07:01  -  16 Mar 2025 07:00  --------------------------------------------------------  IN: 0 mL / OUT: 600 mL / NET: -600 mL    16 Mar 2025 07:01  -  16 Mar 2025 12:50  --------------------------------------------------------  IN: 150 mL / OUT: 250 mL / NET: -100 mL        LABS:	 	                        15.0   7.62  )-----------( 174      ( 15 Mar 2025 12:05 )             44.4     03-15    141  |  103  |  14  ----------------------------<  124[H]  4.0   |  29  |  1.04    Ca    9.8      15 Mar 2025 12:05          proBNP:   Lipid Profile:   HgA1c:   TSH:      Cardiology Consult      HPI:  64M PMHx HTN, HLD, DM, CAD w/ 11 cardiac stents (on Plavix, lase dose 3/14), lumbar fusion 2016, chronic pain (takes oxy 30mg 4-6 times per day) presented to ED s/p fall 5 weeks ago with head strike no LOC. PT states he visited an ED when he fell and had a negative CTH at the time. He now c/o R sided weakness, WFD, and HA rated at 8/10. He takes his home oxy for chronic LBP prescribed by PCP, which helps with the headache. Denies recent falls, dizziness, LOC, seizure, vision changes, SOB, chest pain.  (15 Mar 2025 14:07)        Pt seen and examined at bedside, NAD, denies chest pain/pressure/discomfort. ROS s/f negativee      Review of Systems:  CONSTITUTIONAL:  No weight loss, fever, chills, weakness or fatigue.  HEENT:  Eyes:  No visual loss, blurred vision, double vision or yellow sclerae. Ears, Nose, Throat:  No hearing loss, sneezing, congestion, runny nose or sore throat.  SKIN:  No rash or itching.  CARDIOVASCULAR:  No chest pain, chest pressure or chest discomfort. No palpitations or edema.  RESPIRATORY:  No shortness of breath, cough or sputum.  GASTROINTESTINAL:  No anorexia, nausea, vomiting or diarrhea. No abdominal pain or blood.  GENITOURINARY: No Burning on urination.   NEUROLOGICAL:  see HPI  MUSCULOSKELETAL:  No muscle, back pain, joint pain or stiffness.  HEMATOLOGIC:  No anemia, bleeding or bruising.  LYMPHATICS:  No enlarged nodes. No history of splenectomy.  PSYCHIATRIC:  No history of depression or anxiety.  ENDOCRINOLOGIC:  No reports of sweating, cold or heat intolerance. No polyuria or polydipsia.  ALLERGIES:  No history of asthma, hives, eczema or rhinitis.    PAST MEDICAL & SURGICAL HISTORY:  Sleep apnea  on CPAP      Hypertension      Diabetes mellitus      High cholesterol      Obesity (BMI 30.0-34.9)      CAD (coronary artery disease)      Hiatal hernia  with possible GERD      Fatty liver      BPH (benign prostatic hyperplasia)      Constipation      H/O low back pain      S/P angioplasty with stent  2008, 2014, 03/2019      Finger injury  Left Ring Finger & had surgery ( 1996 )      S/P foot surgery  Right  ( 2013 )      H/O spinal fusion        SOCIAL HISTORY:  FAMILY HISTORY:  FHx: myocardial infarction  mother    FH: coronary artery disease  father s/p CABG    FH: type 2 diabetes  father      ALLERGIES: 	  IV Contrast (Other; Rash)  ibuprofen (Other)          MEDICATIONS:  acetaminophen     Tablet .. 650 milliGRAM(s) Oral every 6 hours PRN  atorvastatin 40 milliGRAM(s) Oral at bedtime  chlorhexidine 2% Cloths 1 Application(s) Topical every 12 hours  gabapentin 300 milliGRAM(s) Oral at bedtime  insulin lispro (ADMELOG) corrective regimen sliding scale   SubCutaneous three times a day before meals  lisinopril 2.5 milliGRAM(s) Oral daily  metoprolol succinate ER 50 milliGRAM(s) Oral daily  oxyCODONE    IR 30 milliGRAM(s) Oral every 8 hours PRN  oxyCODONE    IR 10 milliGRAM(s) Oral every 8 hours PRN  polyethylene glycol 3350 17 Gram(s) Oral daily  senna 2 Tablet(s) Oral at bedtime      T(C): 36.5 (03-16-25 @ 09:20), Max: 36.8 (03-15-25 @ 17:31)  HR: 94 (03-16-25 @ 09:33) (62 - 98)  BP: 119/79 (03-16-25 @ 08:30) (104/58 - 161/80)  RR: 18 (03-16-25 @ 09:33) (17 - 18)  SpO2: 96% (03-16-25 @ 09:33) (93% - 99%)    03-15-25 @ 07:01  -  03-16-25 @ 07:00  --------------------------------------------------------  IN: 0 mL / OUT: 600 mL / NET: -600 mL    03-16-25 @ 07:01  -  03-16-25 @ 12:50  --------------------------------------------------------  IN: 150 mL / OUT: 250 mL / NET: -100 mL        PHYSICAL EXAM:    Constitutional: resting comfortably in bed; NAD  HEENT: NC/AT, PERRL, EOMI, anicteric sclera, no nasal discharge; uvula midline, no oropharyngeal erythema or exudates; MMM  Neck: supple; no thyromegaly, JVP 8 cm H2O, - HJR  Respiratory: CTA B/L; no W/R/R, no retractions  Cardiac: +S1/S2; RRR; no M/R/G; PMI non-displaced  Gastrointestinal: soft, NT/ND; no rebound or guarding; +BSx4  Extremities: WWP, no clubbing or cyanosis; no peripheral edema  Vascular: 2+ radial, DP/PT pulses B/L  Neurologic: AAOx3; CNII-XII grossly intact; no focal deficits        I&O's Summary    15 Mar 2025 07:01  -  16 Mar 2025 07:00  --------------------------------------------------------  IN: 0 mL / OUT: 600 mL / NET: -600 mL    16 Mar 2025 07:01  -  16 Mar 2025 12:50  --------------------------------------------------------  IN: 150 mL / OUT: 250 mL / NET: -100 mL        LABS:	 	                        15.0   7.62  )-----------( 174      ( 15 Mar 2025 12:05 )             44.4     03-15    141  |  103  |  14  ----------------------------<  124[H]  4.0   |  29  |  1.04    Ca    9.8      15 Mar 2025 12:05          proBNP:   Lipid Profile:   HgA1c:   TSH:

## 2025-03-16 NOTE — CONSULT NOTE ADULT - SUBJECTIVE AND OBJECTIVE BOX
64M PMHx HTN, HLD, DM, CAD w/ 11 cardiac stents (on Plavix, lase dose 3/14), lumbar fusion 2016, chronic pain (takes oxy 30mg 4-6 times per day) presented to ED s/p fall 5 weeks ago with head strike no LOC. PT states he visited an ED when he fell and had a negative CTH at the time. He now c/o R sided weakness, WFD, and HA rated at 8/10. He takes his home oxy for chronic LBP prescribed by PCP, which helps with the headache. Denies recent falls, dizziness, LOC, seizure, vision changes, SOB, chest pain.  (15 Mar 2025 14:07)    PMH as above  Social history: alcohol, cocaine use quit in 2011  Allergies: contrast   Family history: CAD mother, DM father    SUBJECTIVE:  Patient was seen and examined at bedside. Patient reports persistent headache with brain fog, denies N.V, no photophobia, Denies chest pain, no SOB, no orthopnea no LE edema. Reports last PCI was 04/2024, has follow-up with Cardiology later this year and was told he needed another intervention , however refused as he was asymptomatic, He believes his cardiologist recommended it in setting of abnormal stress test    Review of systems: No fever, chills, dizziness, Changes in vision, CP, dyspnea, nausea or vomiting, dysuria, changes in bowel movements, LE edema. Rest of 12 point Review of systems negative unless otherwise documented elsewhere in note.     Diet, NPO after Midnight:      NPO Start Date: 16-Mar-2025,   NPO Start Time: 23:59 (03-16-25 @ 08:23) [Active]  Diet, Regular (03-15-25 @ 16:58) [Active]      MEDICATIONS:  MEDICATIONS  (STANDING):  atorvastatin 40 milliGRAM(s) Oral at bedtime  chlorhexidine 2% Cloths 1 Application(s) Topical every 12 hours  gabapentin 300 milliGRAM(s) Oral at bedtime  insulin lispro (ADMELOG) corrective regimen sliding scale   SubCutaneous three times a day before meals  lisinopril 2.5 milliGRAM(s) Oral daily  metoprolol succinate ER 50 milliGRAM(s) Oral daily  polyethylene glycol 3350 17 Gram(s) Oral daily  senna 2 Tablet(s) Oral at bedtime    MEDICATIONS  (PRN):  acetaminophen     Tablet .. 650 milliGRAM(s) Oral every 6 hours PRN Temp greater or equal to 38C (100.4F), Mild Pain (1 - 3)  oxyCODONE    IR 30 milliGRAM(s) Oral every 8 hours PRN Severe Pain (7 - 10)  oxyCODONE    IR 10 milliGRAM(s) Oral every 8 hours PRN Moderate Pain (4 - 6)      Allergies    IV Contrast (Other; Rash)  ibuprofen (Other)    Intolerances        OBJECTIVE:  Vital Signs Last 24 Hrs  T(C): 36.5 (16 Mar 2025 09:20), Max: 36.8 (15 Mar 2025 17:31)  T(F): 97.7 (16 Mar 2025 09:20), Max: 98.3 (15 Mar 2025 17:31)  HR: 94 (16 Mar 2025 09:33) (62 - 98)  BP: 119/79 (16 Mar 2025 08:30) (104/58 - 161/80)  BP(mean): 95 (16 Mar 2025 08:30) (74 - 107)  RR: 18 (16 Mar 2025 09:33) (17 - 18)  SpO2: 96% (16 Mar 2025 09:33) (93% - 99%)    Parameters below as of 16 Mar 2025 09:33  Patient On (Oxygen Delivery Method): room air      I&O's Summary    15 Mar 2025 07:01  -  16 Mar 2025 07:00  --------------------------------------------------------  IN: 0 mL / OUT: 600 mL / NET: -600 mL    16 Mar 2025 07:01  -  16 Mar 2025 12:46  --------------------------------------------------------  IN: 150 mL / OUT: 250 mL / NET: -100 mL        PHYSICAL EXAM:  General: AOX3, NAD, lying in bed, speaking in full sentences, some word finding difficulties, no labored breathing on RA  HEENT: AT/NC, no facial asymmetry   lUNGS: poor inspiration, no crackles, no wheezes  Heart: regular  Abdomen: obese, soft, no tenderness  Extremities:  warm, no edema, no calf tenderness, no gross focal deficit     LABS:                        15.0   7.62  )-----------( 174      ( 15 Mar 2025 12:05 )             44.4     03-15    141  |  103  |  14  ----------------------------<  124[H]  4.0   |  29  |  1.04    Ca    9.8      15 Mar 2025 12:05        PT/INR - ( 15 Mar 2025 12:05 )   PT: 11.1 sec;   INR: 0.96          PTT - ( 15 Mar 2025 12:05 )  PTT:30.8 sec  CAPILLARY BLOOD GLUCOSE      POCT Blood Glucose.: 219 mg/dL (16 Mar 2025 11:35)  POCT Blood Glucose.: 148 mg/dL (16 Mar 2025 06:50)  POCT Blood Glucose.: 229 mg/dL (15 Mar 2025 21:51)  POCT Blood Glucose.: 141 mg/dL (15 Mar 2025 16:14)    Urinalysis Basic - ( 15 Mar 2025 12:05 )    Color: x / Appearance: x / SG: x / pH: x  Gluc: 124 mg/dL / Ketone: x  / Bili: x / Urobili: x   Blood: x / Protein: x / Nitrite: x   Leuk Esterase: x / RBC: x / WBC x   Sq Epi: x / Non Sq Epi: x / Bacteria: x        MICRODATA:      RADIOLOGY/OTHER STUDIES:

## 2025-03-16 NOTE — CONSULT NOTE ADULT - TIME BILLING
Bedside exam and interview   Reviewed vitals, labs, imaging, HIE    Discussed patient's plan of care with primary team   Documentation of encounter
As above

## 2025-03-16 NOTE — PROGRESS NOTE ADULT - SUBJECTIVE AND OBJECTIVE BOX
Lab Results   Component Value Date    HGBA1C 7 6 (H) 11/21/2022   insurance  Wont cover any injectables ; jardiance wasn't covered by previous insurance willtry again Surgery: Middle Meningeal Artery (MMA) Embolization   Consent: Signed by patient                   NAME/NUMBER of HCP: Britany Marroquin (spouse) [phone]    ALLERGIES:  IV Contrast (Other; Rash)  ibuprofen (Other)      OVERNIGHT EVENTS:  No acute events overnight.     T(C): 36.3 (03-16-25 @ 05:13), Max: 36.8 (03-15-25 @ 17:31)  HR: 90 (03-16-25 @ 06:55) (62 - 98)  BP: 114/78 (03-16-25 @ 06:40) (104/58 - 161/80)  RR: 17 (03-15-25 @ 21:25) (17 - 18)  SpO2: 95% (03-16-25 @ 06:55) (93% - 99%)  Wt(kg): 92.1kg    EXAM:  Constitutional: resting in bed comfortably   Respiratory: breathing non-labored, symmetrical chest wall movement  Cardiovascuar: RRR, no murmurs  Gastrointestinal: abdomen soft, non tender  Genitourinary: exam deffered  Neurological:  AAOX3. Face symmetric, mild word finding difficulties   Motor:   LUE 5/5  LLE 5/5  RUE 4/5  RLE 4+/5  Sensation: decreased sensation to light touch to right-side of face and RUE   Pronator Drift: None  Extremities: distal pulses 2+ x4    LABS:      Pregnancy test (serum hcg for any female under 56, must be resulted day before OR): [ ] Negative Result  [ ] Positive Result  [X] N/A : male or female>55 y/o  Type & Screen (in past 72hrs):      Blood ordered and on hold for OR:  [X] No need     [ ] 1u pRBC on hold      [ ] 2u pRBC on hold    CXR: done on 3/15  EKG:   ECHO:  Medical Clearances:  Other Clearances:     Last dose of antiplatelet/anticoagulation drug: Last dose of plavix on 3/14 (P2Y12: 146)    Implanted Devices (pacemaker, drug pump...etc):  []YES   [X] NO    3M nasal swab ordered?  [X] Y  _N  Cranial surgery: Order written for hair to be shampooed night before surgery  [] yes   [X]no                 Assessment:  64M PMHx HTN, HLD, DM, CAD w/ 11 cardiac stents (on Plavix, lase dose 3/14), lumbar fusion 2016, chronic pain (takes oxy 30mg 4-6 times per day) presented to ED s/p fall 5 weeks ago with head strike no LOC presented with R sided weakness, HA, +WFD. CTH showed BL SDH L>R w/ 5mm MLS. Pre-op for MMAE tomorrow (3/17/25).    Plan:  - for OR tomorrow  - consents and vendor consents signed and in chart  - cleared by medicine  - NPO at midnight, IVF while NPO  - CHG/3M ordered  - active T&S   - coags and labs reviewed  - SCDs, SQL held for OR    Discussed with Dr. Moore Surgery: Middle Meningeal Artery (MMA) Embolization   Consent: Signed by patient                   NAME/NUMBER of HCP: Britany Marroquin (spouse) [phone]    ALLERGIES:  IV Contrast (Other; Rash)  ibuprofen (Other)      OVERNIGHT EVENTS:  No acute events overnight.     T(C): 36.3 (03-16-25 @ 05:13), Max: 36.8 (03-15-25 @ 17:31)  HR: 90 (03-16-25 @ 06:55) (62 - 98)  BP: 114/78 (03-16-25 @ 06:40) (104/58 - 161/80)  RR: 17 (03-15-25 @ 21:25) (17 - 18)  SpO2: 95% (03-16-25 @ 06:55) (93% - 99%)  Wt(kg): 92.1kg    EXAM:  Constitutional: resting in bed comfortably   Respiratory: breathing non-labored, symmetrical chest wall movement  Cardiovascuar: RRR, no murmurs  Gastrointestinal: abdomen soft, non tender  Genitourinary: exam deffered  Neurological:  AAOX3. Face symmetric, mild word finding difficulties   Motor:   LUE 5/5  LLE 5/5  RUE 4/5  RLE 4+/5  Sensation: decreased sensation to light touch to right-side of face and RUE   Pronator Drift: None  Extremities: distal pulses 2+ x4    LABS:                     16.0   12.80 )-----------( 197      ( 16 Mar 2025 16:16 )             46.2     135  |  102  |  27[H]  ----------------------------<  197[H]  4.2   |  22  |  1.10    Ca    9.6      16 Mar 2025 16:16  Phos  2.1     03-16  Mg     2.1     03-16    PT/INR - ( 16 Mar 2025 16:16 )   PT: 11.6 sec;   INR: 0.99     PTT - ( 16 Mar 2025 16:16 )  PTT:28.3 sec    Pregnancy test (serum hcg for any female under 56, must be resulted day before OR): [ ] Negative Result  [ ] Positive Result  [X] N/A : male or female>55 y/o  Type & Screen (in past 72hrs):      Blood ordered and on hold for OR:  [X] No need     [ ] 1u pRBC on hold      [ ] 2u pRBC on hold    CXR: done on 3/15  EKG: In chart   ECHO: pending   Medical Clearances: Documented on 3/16  Cardiac Clearance: Documented on 3/16    Last dose of antiplatelet/anticoagulation drug: Last dose of plavix on 3/14 (P2Y12: 146)    Implanted Devices (pacemaker, drug pump...etc):  []YES   [X] NO    3M nasal swab ordered?  [X] Y  _N  Cranial surgery: Order written for hair to be shampooed night before surgery  [] yes   [X]no                 Assessment:  64M PMHx HTN, HLD, DM, CAD w/ 11 cardiac stents (on Plavix, lase dose 3/14), lumbar fusion 2016, chronic pain (takes oxy 30mg 4-6 times per day) presented to ED s/p fall 5 weeks ago with head strike no LOC presented with R sided weakness, HA, +WFD. CTH showed BL SDH L>R w/ 5mm MLS. Pre-op for MMAE tomorrow (3/17/25).    Plan:  - for OR tomorrow  - consents signed and in chart  - cleared by medicine and cardiology  - NPO at midnight, IVF while NPO  - CHG/3M ordered  - active T&S   - coags and labs reviewed  - SCDs, SQL held for OR    Discussed with Dr. Church and Dr. Moore

## 2025-03-16 NOTE — CONSULT NOTE ADULT - ATTENDING COMMENTS
Patient is a 63 yo Male with Pmhx of CAD s/p PCI HTN, HLD, BPH, DM II, EDMUND on CPAP, presents to [facility] ED c/o headache, gait disturbance, and right sided weakness for several days after a mechanical fall, found to have bilateral subdural hematomas now pending cerebral angio for possible MMA embolization. Cardiology was consulted for CV comanagement and Preoperative risk assessment and optimization.    Review of Studies:  - Tele 3/16/25: SR HR 80-90s  - ECG 3/15/25: SR HR 69, nonspecific ST&T wave changes, poor R wave progression  - [facility] 04/2024:  There is single-vessel coronary artery disease . Successful IVUS-guided Intervention of OM1 80% calcified stenosis with a 2.5 Scoring balloon (Scoreflex), a 2.75 x 12 mmDES Synergy, post dilated with a 2.75 NC balloon. 0% Residual stenosis post intervention with excellent angiographic resultand CORNEL 3 flow.     Outpatient Providers:  Will Hurtado MD (IC)    Home Medications (from cardiology admission): ASA 81mg QD, Plavix 75mg QD, Atorvastatin 40mg qhs, Toprol- XL 50mg BID, Lisinopril 2.5mg QD, HCTZ 12.5mg QD    # Preoperative CV risk Assessment and Optimization.  # HTN  - Patient has known CAD. He underwent [facility] in 04/2024. At that time he was founf to have 1VCAD for which he underwent revascularization of OM1. He had Patent mLAD stent with MIld ISR, patent RPL and RPDA stent and 30% prox to mid RCA stenosis  - Since he has been well without Anginal symptoms. He has good functional status without exertional symptoms  - He is now admitted with bilateral subdural hematomas now pending cerebral angio for possible MMA embolization  - At this time, there are no acticve CV contraindication with proceeding with time sensitive intervention.   - Patient is Deemed at intermediate risk of perioperative cardiovascular complications for an intermediate risk procedure  - From a CAD standpoint, it is ok to withhold DAPT for now with plan to resume ASA 81 mg po daily soon as safe from bleeding standpoint  - Continue Toprol 50mg XL QD perioperatively as well as lisinopril 2.5mg QD. Can Hold on day of Embolization  -C/w Atorvastatin 40mg QD  - Please obtain echo for structural assessment  - Cardiology will cont to follow with you, please call with any questions

## 2025-03-17 ENCOUNTER — APPOINTMENT (OUTPATIENT)
Dept: NEUROSURGERY | Facility: HOSPITAL | Age: 65
End: 2025-03-17

## 2025-03-17 ENCOUNTER — RESULT REVIEW (OUTPATIENT)
Age: 65
End: 2025-03-17

## 2025-03-17 LAB
A1C WITH ESTIMATED AVERAGE GLUCOSE RESULT: 7.7 % — HIGH (ref 4–5.6)
ANION GAP SERPL CALC-SCNC: 11 MMOL/L — SIGNIFICANT CHANGE UP (ref 5–17)
APTT BLD: 28.9 SEC — SIGNIFICANT CHANGE UP (ref 24.5–35.6)
BUN SERPL-MCNC: 25 MG/DL — HIGH (ref 7–23)
CALCIUM SERPL-MCNC: 9.4 MG/DL — SIGNIFICANT CHANGE UP (ref 8.4–10.5)
CHLORIDE SERPL-SCNC: 100 MMOL/L — SIGNIFICANT CHANGE UP (ref 96–108)
CO2 SERPL-SCNC: 24 MMOL/L — SIGNIFICANT CHANGE UP (ref 22–31)
CREAT SERPL-MCNC: 1.01 MG/DL — SIGNIFICANT CHANGE UP (ref 0.5–1.3)
EGFR: 84 ML/MIN/1.73M2 — SIGNIFICANT CHANGE UP
EGFR: 84 ML/MIN/1.73M2 — SIGNIFICANT CHANGE UP
ESTIMATED AVERAGE GLUCOSE: 174 MG/DL — HIGH (ref 68–114)
GLUCOSE SERPL-MCNC: 202 MG/DL — HIGH (ref 70–99)
HCT VFR BLD CALC: 43.3 % — SIGNIFICANT CHANGE UP (ref 39–50)
HGB BLD-MCNC: 14.8 G/DL — SIGNIFICANT CHANGE UP (ref 13–17)
INR BLD: 0.98 — SIGNIFICANT CHANGE UP (ref 0.85–1.16)
MAGNESIUM SERPL-MCNC: 2.2 MG/DL — SIGNIFICANT CHANGE UP (ref 1.6–2.6)
MCHC RBC-ENTMCNC: 31.4 PG — SIGNIFICANT CHANGE UP (ref 27–34)
MCHC RBC-ENTMCNC: 34.2 G/DL — SIGNIFICANT CHANGE UP (ref 32–36)
MCV RBC AUTO: 91.9 FL — SIGNIFICANT CHANGE UP (ref 80–100)
NRBC BLD AUTO-RTO: 0 /100 WBCS — SIGNIFICANT CHANGE UP (ref 0–0)
PA ADP PRP-ACNC: 145 PRU — LOW (ref 182–335)
PHOSPHATE SERPL-MCNC: 2.8 MG/DL — SIGNIFICANT CHANGE UP (ref 2.5–4.5)
PLATELET # BLD AUTO: 174 K/UL — SIGNIFICANT CHANGE UP (ref 150–400)
PLATELET RESPONSE ASPIRIN RESULT: 470 ARU — SIGNIFICANT CHANGE UP
POTASSIUM SERPL-MCNC: 4 MMOL/L — SIGNIFICANT CHANGE UP (ref 3.5–5.3)
POTASSIUM SERPL-SCNC: 4 MMOL/L — SIGNIFICANT CHANGE UP (ref 3.5–5.3)
PROTHROM AB SERPL-ACNC: 11.5 SEC — SIGNIFICANT CHANGE UP (ref 9.9–13.4)
RBC # BLD: 4.71 M/UL — SIGNIFICANT CHANGE UP (ref 4.2–5.8)
RBC # FLD: 13 % — SIGNIFICANT CHANGE UP (ref 10.3–14.5)
SODIUM SERPL-SCNC: 135 MMOL/L — SIGNIFICANT CHANGE UP (ref 135–145)
WBC # BLD: 8.45 K/UL — SIGNIFICANT CHANGE UP (ref 3.8–10.5)
WBC # FLD AUTO: 8.45 K/UL — SIGNIFICANT CHANGE UP (ref 3.8–10.5)

## 2025-03-17 PROCEDURE — 99221 1ST HOSP IP/OBS SF/LOW 40: CPT

## 2025-03-17 PROCEDURE — 75898 FOLLOW-UP ANGIOGRAPHY: CPT | Mod: 26

## 2025-03-17 PROCEDURE — 93306 TTE W/DOPPLER COMPLETE: CPT | Mod: 26

## 2025-03-17 PROCEDURE — 36227 PLACE CATH XTRNL CAROTID: CPT | Mod: 50

## 2025-03-17 PROCEDURE — 76937 US GUIDE VASCULAR ACCESS: CPT | Mod: 26

## 2025-03-17 PROCEDURE — 75894 X-RAYS TRANSCATH THERAPY: CPT | Mod: 26

## 2025-03-17 PROCEDURE — 61624 TCAT PERM OCCLS/EMBOLJ CNS: CPT

## 2025-03-17 PROCEDURE — 36223 PLACE CATH CAROTID/INOM ART: CPT | Mod: 50

## 2025-03-17 PROCEDURE — 99233 SBSQ HOSP IP/OBS HIGH 50: CPT | Mod: GC

## 2025-03-17 PROCEDURE — 99024 POSTOP FOLLOW-UP VISIT: CPT

## 2025-03-17 RX ORDER — DOXAZOSIN MESYLATE 8 MG/1
1 TABLET ORAL AT BEDTIME
Refills: 0 | Status: DISCONTINUED | OUTPATIENT
Start: 2025-03-17 | End: 2025-03-20

## 2025-03-17 RX ORDER — PREDNISONE 20 MG/1
50 TABLET ORAL ONCE
Refills: 0 | Status: COMPLETED | OUTPATIENT
Start: 2025-03-17 | End: 2025-03-17

## 2025-03-17 RX ORDER — SEMAGLUTIDE 1 MG/.5ML
0 INJECTION, SOLUTION SUBCUTANEOUS
Refills: 0 | DISCHARGE

## 2025-03-17 RX ORDER — METOPROLOL SUCCINATE 50 MG/1
50 TABLET, EXTENDED RELEASE ORAL EVERY 12 HOURS
Refills: 0 | Status: DISCONTINUED | OUTPATIENT
Start: 2025-03-17 | End: 2025-03-20

## 2025-03-17 RX ORDER — METFORMIN HYDROCHLORIDE 850 MG/1
1 TABLET ORAL
Refills: 0 | DISCHARGE

## 2025-03-17 RX ORDER — SOD PHOS DI, MONO/K PHOS MONO 250 MG
1 TABLET ORAL ONCE
Refills: 0 | Status: COMPLETED | OUTPATIENT
Start: 2025-03-17 | End: 2025-03-17

## 2025-03-17 RX ORDER — INSULIN GLARGINE-YFGN 100 [IU]/ML
28 INJECTION, SOLUTION SUBCUTANEOUS AT BEDTIME
Refills: 0 | Status: DISCONTINUED | OUTPATIENT
Start: 2025-03-17 | End: 2025-03-18

## 2025-03-17 RX ORDER — DIPHENHYDRAMINE HCL 12.5MG/5ML
50 ELIXIR ORAL ONCE
Refills: 0 | Status: DISCONTINUED | OUTPATIENT
Start: 2025-03-17 | End: 2025-03-17

## 2025-03-17 RX ADMIN — GABAPENTIN 300 MILLIGRAM(S): 400 CAPSULE ORAL at 23:06

## 2025-03-17 RX ADMIN — LINACLOTIDE 290 MICROGRAM(S): 290 CAPSULE, GELATIN COATED ORAL at 06:52

## 2025-03-17 RX ADMIN — INSULIN LISPRO 2: 100 INJECTION, SOLUTION INTRAVENOUS; SUBCUTANEOUS at 06:52

## 2025-03-17 RX ADMIN — DOXAZOSIN MESYLATE 1 MILLIGRAM(S): 8 TABLET ORAL at 23:06

## 2025-03-17 RX ADMIN — POLYETHYLENE GLYCOL 3350 17 GRAM(S): 17 POWDER, FOR SOLUTION ORAL at 05:49

## 2025-03-17 RX ADMIN — PREDNISONE 50 MILLIGRAM(S): 20 TABLET ORAL at 13:47

## 2025-03-17 RX ADMIN — ATORVASTATIN CALCIUM 40 MILLIGRAM(S): 80 TABLET, FILM COATED ORAL at 23:06

## 2025-03-17 RX ADMIN — OXYCODONE HYDROCHLORIDE 10 MILLIGRAM(S): 30 TABLET ORAL at 23:29

## 2025-03-17 RX ADMIN — Medication 650 MILLIGRAM(S): at 12:49

## 2025-03-17 RX ADMIN — Medication 1 PACKET(S): at 07:51

## 2025-03-17 RX ADMIN — PREDNISONE 50 MILLIGRAM(S): 20 TABLET ORAL at 02:40

## 2025-03-17 RX ADMIN — OXYCODONE HYDROCHLORIDE 10 MILLIGRAM(S): 30 TABLET ORAL at 07:50

## 2025-03-17 RX ADMIN — Medication 1 APPLICATION(S): at 05:48

## 2025-03-17 RX ADMIN — OXYCODONE HYDROCHLORIDE 10 MILLIGRAM(S): 30 TABLET ORAL at 22:29

## 2025-03-17 RX ADMIN — OXYCODONE HYDROCHLORIDE 10 MILLIGRAM(S): 30 TABLET ORAL at 08:00

## 2025-03-17 RX ADMIN — INSULIN GLARGINE-YFGN 28 UNIT(S): 100 INJECTION, SOLUTION SUBCUTANEOUS at 23:05

## 2025-03-17 RX ADMIN — INSULIN LISPRO 2: 100 INJECTION, SOLUTION INTRAVENOUS; SUBCUTANEOUS at 23:05

## 2025-03-17 RX ADMIN — Medication 650 MILLIGRAM(S): at 13:45

## 2025-03-17 RX ADMIN — METOPROLOL SUCCINATE 50 MILLIGRAM(S): 50 TABLET, EXTENDED RELEASE ORAL at 12:49

## 2025-03-17 RX ADMIN — PREDNISONE 50 MILLIGRAM(S): 20 TABLET ORAL at 07:53

## 2025-03-17 RX ADMIN — Medication 2 TABLET(S): at 23:06

## 2025-03-17 RX ADMIN — METOPROLOL SUCCINATE 50 MILLIGRAM(S): 50 TABLET, EXTENDED RELEASE ORAL at 23:06

## 2025-03-17 NOTE — PROGRESS NOTE ADULT - SUBJECTIVE AND OBJECTIVE BOX
NEUROSURGERY POST OP NOTE:    POD# 0 S/P b/l MMAE     S: Patient seen and examined at bedside. Denies any pain at this time.       T(C): 36.3 (03-17-25 @ 16:33), Max: 37.1 (03-16-25 @ 22:29)  HR: 91 (03-17-25 @ 16:33) (70 - 91)  BP: 136/80 (03-17-25 @ 16:33) (102/67 - 136/80)  RR: 18 (03-17-25 @ 16:33) (16 - 18)  SpO2: 96% (03-17-25 @ 16:33) (92% - 99%)      03-16-25 @ 07:01  -  03-17-25 @ 07:00  --------------------------------------------------------  IN: 873 mL / OUT: 250 mL / NET: 623 mL    03-17-25 @ 07:01  -  03-17-25 @ 20:08  --------------------------------------------------------  IN: 340 mL / OUT: 200 mL / NET: 140 mL        acetaminophen     Tablet .. 650 milliGRAM(s) Oral every 6 hours PRN  atorvastatin 40 milliGRAM(s) Oral at bedtime  doxazosin 1 milliGRAM(s) Oral at bedtime  famotidine Injectable 20 milliGRAM(s) IV Push once  gabapentin 300 milliGRAM(s) Oral at bedtime  insulin glargine Injectable (LANTUS) 28 Unit(s) SubCutaneous at bedtime  insulin lispro (ADMELOG) corrective regimen sliding scale   SubCutaneous Before meals and at bedtime  linaclotide 290 MICROGram(s) Oral before breakfast  metoprolol succinate ER 50 milliGRAM(s) Oral every 12 hours  oxyCODONE    IR 30 milliGRAM(s) Oral every 8 hours PRN  oxyCODONE    IR 10 milliGRAM(s) Oral every 8 hours PRN  polyethylene glycol 3350 17 Gram(s) Oral daily  senna 2 Tablet(s) Oral at bedtime      RADIOLOGY:     Exam:    PHYSICAL EXAM:  Constitutional: awake, alert, sitting up in bed. NAD.   Respiratory: non-labored breathing. Normal chest rise.   Cardiovascular: Regular rate and rhythm  Gastrointestinal:  Soft, nontender, nondistended.  .  Vascular: Extremities warm, no ulcers, no discoloration of skin.   Neurological: Gen: AA&O x 3, conversant, appropriate.      CN II-XII grossly intact.    Motor: VELASQUEZ x 4, RUE 4+/5 otherwise 5/5 throughout UE/LE.    Sens: Sensation intact to light touch throughout.    Extremities: distal pulses 2+ x 4 DPPT symmetric throughout.     No pronator drift  Wound/incision: Right groin c/d/i, no hematoma.     WOUND/DRAINS:    DEVICES:       Assessment: 64M PMHx HTN, HLD, DM, CAD w/ 11 cardiac stents (on Plavix, lase dose 3/14), lumbar fusion 2016, chronic pain (takes oxy 30mg 4-6 times per day) presented to ED s/p fall 5 weeks ago with head strike no LOC presented with R sided weakness, HA, +WFD. CTH showed BL SDH L>R w/ 5mm MLS. Now s/p b/l MMAE 3/17/25.      Plan:    Neuro:  - neuro/vitals q4h  - pain control: tylenol prn, Oxy 10mg/30mg prn, gabapentin 300mg at bedtime, pain mgmt following   - accumetrics (3/17): therapeutic on asa/plavix  - cardiology clearance: intermediate risk of perioperative cardiovascular complications for an intermediate-high risk procedure  - Hx Depression: Buspirone (confirm dose)  - flat x 3 hours, bedrest for 5 hours    Cardio:  - SBP <140  - Hx HTN: c/w Metoprolol ER 50mg bid, lisinopril held periop  - Hx CAD w/ 11 stents: HOLD home Plavix   - echo 3/17: EF 68%  - cardiology following    Pulm:   - RA  - hx EDMUND: CPAP at night    GI:  - ADAT  - bowel regimen, BM 3/16  - chronic constipation: c/w linzess     Renal:  - IVF while advancing diet   - voiding   - normal sodium goal   - cont home cardura    Endo:  - ISS, A1C 7.7  - c/w home lantus 28u qhs   - Hx DM, hold home metformin    Heme:   - SCDs for DVT ppx     ID:   - afebrile     Misc:   - hold home terbinafine    Dispo: Tele, full code, pending OR    Discussed w/ Dr. Enciso

## 2025-03-17 NOTE — PROGRESS NOTE ADULT - SUBJECTIVE AND OBJECTIVE BOX
Patient is a 64y old  Male who presents with a chief complaint of Traumatic BL SDH (16 Mar 2025 12:49)    INTERVAL EVENTS:  - NAEO  - Patient was seen and examined with wife at bedside. Patient denies chest pain, SOB. Reports feeling poor coordination and weakness in RUE and RLE, endorsing HA.    Review of systems: No CP, dyspnea, nausea or vomiting, dysuria, LE edema.     Diet, NPO:   Except Medications (03-17-25 @ 09:23) [Active]      MEDICATIONS:  MEDICATIONS  (STANDING):  atorvastatin 40 milliGRAM(s) Oral at bedtime  doxazosin 1 milliGRAM(s) Oral at bedtime  gabapentin 300 milliGRAM(s) Oral at bedtime  insulin lispro (ADMELOG) corrective regimen sliding scale   SubCutaneous Before meals and at bedtime  linaclotide 290 MICROGram(s) Oral before breakfast  metoprolol succinate ER 50 milliGRAM(s) Oral every 12 hours  polyethylene glycol 3350 17 Gram(s) Oral daily  predniSONE   Tablet 50 milliGRAM(s) Oral once  senna 2 Tablet(s) Oral at bedtime    MEDICATIONS  (PRN):  acetaminophen     Tablet .. 650 milliGRAM(s) Oral every 6 hours PRN Temp greater or equal to 38C (100.4F), Mild Pain (1 - 3)  oxyCODONE    IR 10 milliGRAM(s) Oral every 8 hours PRN Moderate Pain (4 - 6)  oxyCODONE    IR 30 milliGRAM(s) Oral every 8 hours PRN Severe Pain (7 - 10)      Allergies    IV Contrast (Other; Rash)  ibuprofen (Other)    Intolerances        OBJECTIVE:  Vital Signs Last 24 Hrs  T(C): 36.7 (17 Mar 2025 09:02), Max: 37.1 (16 Mar 2025 22:29)  T(F): 98.1 (17 Mar 2025 09:02), Max: 98.8 (16 Mar 2025 22:29)  HR: 76 (17 Mar 2025 10:32) (70 - 86)  BP: 121/79 (17 Mar 2025 10:32) (102/67 - 126/85)  BP(mean): 97 (17 Mar 2025 10:32) (79 - 102)  RR: 17 (17 Mar 2025 10:32) (16 - 18)  SpO2: 95% (17 Mar 2025 10:32) (92% - 99%)    Parameters below as of 17 Mar 2025 10:32  Patient On (Oxygen Delivery Method): room air      I&O's Summary    16 Mar 2025 07:01  -  17 Mar 2025 07:00  --------------------------------------------------------  IN: 873 mL / OUT: 250 mL / NET: 623 mL    17 Mar 2025 07:01  -  17 Mar 2025 12:19  --------------------------------------------------------  IN: 240 mL / OUT: 200 mL / NET: 40 mL        PHYSICAL EXAM:  Gen: Reclining in bed at time of exam, appears stated age  HEENT: NCAT, MMM, clear OP  Neck: supple, trachea at midline  CV: RRR, +S1/S2  Pulm: adequate respiratory effort, no increase in work of breathing  Abd: soft, ND  Skin: warm and dry,   Ext: no ANDREINA, able to move all extremities anti-gravity  Neuro: Alert, speaking in full sentences  Psych: affect and behavior appropriate, pleasant at time of interview    LABS:                        14.8   8.45  )-----------( 174      ( 17 Mar 2025 06:14 )             43.3     03-17    135  |  100  |  25[H]  ----------------------------<  202[H]  4.0   |  24  |  1.01    Ca    9.4      17 Mar 2025 06:14  Phos  2.8     03-17  Mg     2.2     03-17        PT/INR - ( 17 Mar 2025 06:14 )   PT: 11.5 sec;   INR: 0.98          PTT - ( 17 Mar 2025 06:14 )  PTT:28.9 sec  CAPILLARY BLOOD GLUCOSE      POCT Blood Glucose.: 167 mg/dL (17 Mar 2025 06:25)  POCT Blood Glucose.: 262 mg/dL (16 Mar 2025 21:40)  POCT Blood Glucose.: 199 mg/dL (16 Mar 2025 16:07)    Urinalysis Basic - ( 17 Mar 2025 06:14 )    Color: x / Appearance: x / SG: x / pH: x  Gluc: 202 mg/dL / Ketone: x  / Bili: x / Urobili: x   Blood: x / Protein: x / Nitrite: x   Leuk Esterase: x / RBC: x / WBC x   Sq Epi: x / Non Sq Epi: x / Bacteria: x        MICRODATA:      RADIOLOGY/OTHER STUDIES:

## 2025-03-17 NOTE — PROGRESS NOTE ADULT - ASSESSMENT
Mr. Marroquin is a 63yo man with PMHx HTN, HLD, IDDM, CAD w/ multiple prior cardiac stents, EDMUND on CPAP, lumbar fusion 2016, chronic pain (takes oxy 30mg 4-6 times per day) presented to ED with R-sided weakness iso fall 5 weeks ago with head strike, found to have b/l SDH L>R w/ 5mm MLS, now pending MMA embolization with nsgy.    #B/l traumatic SDH  - Management per primary - pending MMA embolization and [place] holes  - On steroid protocol for hx of contrast allergy  - DAPT held - resume when safe from surgical perspective  - C/w Toprol 50mg BID tamy-operatively with hold parameters for HR < 60  - Cardiology consulted for pre-op evaluation    #CAD s/p multiple stents - most recently 04/2024  - Resume DAPT when safe from nsgy perspective  - C/w atorvastatin 40mg qhs    #IDDM - a1c 7.7%, patient reports glargine 28U qhs + lispro 14U tid with meals and metformin  - Lispro held while NPO  - Monitor FSG + SSI q6h while NPO  - Resume home glargine dose tonight    #EDMUND  - C/w CPAP qhs    #HTN  - Hold lisinopril and hctz, resume post-op as needed

## 2025-03-18 PROBLEM — Z87.39 PERSONAL HISTORY OF OTHER DISEASES OF THE MUSCULOSKELETAL SYSTEM AND CONNECTIVE TISSUE: Chronic | Status: ACTIVE | Noted: 2025-03-15

## 2025-03-18 LAB
ANION GAP SERPL CALC-SCNC: 13 MMOL/L — SIGNIFICANT CHANGE UP (ref 5–17)
BUN SERPL-MCNC: 29 MG/DL — HIGH (ref 7–23)
CALCIUM SERPL-MCNC: 8.5 MG/DL — SIGNIFICANT CHANGE UP (ref 8.4–10.5)
CHLORIDE SERPL-SCNC: 102 MMOL/L — SIGNIFICANT CHANGE UP (ref 96–108)
CO2 SERPL-SCNC: 20 MMOL/L — LOW (ref 22–31)
CREAT SERPL-MCNC: 1 MG/DL — SIGNIFICANT CHANGE UP (ref 0.5–1.3)
EGFR: 85 ML/MIN/1.73M2 — SIGNIFICANT CHANGE UP
EGFR: 85 ML/MIN/1.73M2 — SIGNIFICANT CHANGE UP
GLUCOSE SERPL-MCNC: 223 MG/DL — HIGH (ref 70–99)
HCT VFR BLD CALC: 43.4 % — SIGNIFICANT CHANGE UP (ref 39–50)
HGB BLD-MCNC: 14.6 G/DL — SIGNIFICANT CHANGE UP (ref 13–17)
MAGNESIUM SERPL-MCNC: 2.2 MG/DL — SIGNIFICANT CHANGE UP (ref 1.6–2.6)
MCHC RBC-ENTMCNC: 31.1 PG — SIGNIFICANT CHANGE UP (ref 27–34)
MCHC RBC-ENTMCNC: 33.6 G/DL — SIGNIFICANT CHANGE UP (ref 32–36)
MCV RBC AUTO: 92.5 FL — SIGNIFICANT CHANGE UP (ref 80–100)
NRBC BLD AUTO-RTO: 0 /100 WBCS — SIGNIFICANT CHANGE UP (ref 0–0)
PA ADP PRP-ACNC: 159 PRU — LOW (ref 182–335)
PHOSPHATE SERPL-MCNC: 3.9 MG/DL — SIGNIFICANT CHANGE UP (ref 2.5–4.5)
PLATELET # BLD AUTO: 178 K/UL — SIGNIFICANT CHANGE UP (ref 150–400)
PLATELET RESPONSE ASPIRIN RESULT: 529 ARU — SIGNIFICANT CHANGE UP
POTASSIUM SERPL-MCNC: 4.2 MMOL/L — SIGNIFICANT CHANGE UP (ref 3.5–5.3)
POTASSIUM SERPL-SCNC: 4.2 MMOL/L — SIGNIFICANT CHANGE UP (ref 3.5–5.3)
RBC # BLD: 4.69 M/UL — SIGNIFICANT CHANGE UP (ref 4.2–5.8)
RBC # FLD: 13 % — SIGNIFICANT CHANGE UP (ref 10.3–14.5)
SODIUM SERPL-SCNC: 135 MMOL/L — SIGNIFICANT CHANGE UP (ref 135–145)
WBC # BLD: 8.83 K/UL — SIGNIFICANT CHANGE UP (ref 3.8–10.5)
WBC # FLD AUTO: 8.83 K/UL — SIGNIFICANT CHANGE UP (ref 3.8–10.5)

## 2025-03-18 PROCEDURE — 99232 SBSQ HOSP IP/OBS MODERATE 35: CPT

## 2025-03-18 PROCEDURE — 99233 SBSQ HOSP IP/OBS HIGH 50: CPT

## 2025-03-18 PROCEDURE — 75898 FOLLOW-UP ANGIOGRAPHY: CPT | Mod: 26

## 2025-03-18 PROCEDURE — 70450 CT HEAD/BRAIN W/O DYE: CPT | Mod: 26

## 2025-03-18 PROCEDURE — 36227 PLACE CATH XTRNL CAROTID: CPT | Mod: 50

## 2025-03-18 PROCEDURE — 61624 TCAT PERM OCCLS/EMBOLJ CNS: CPT

## 2025-03-18 PROCEDURE — 36223 PLACE CATH CAROTID/INOM ART: CPT | Mod: 50

## 2025-03-18 PROCEDURE — 99024 POSTOP FOLLOW-UP VISIT: CPT

## 2025-03-18 PROCEDURE — 75894 X-RAYS TRANSCATH THERAPY: CPT | Mod: 26

## 2025-03-18 PROCEDURE — 76937 US GUIDE VASCULAR ACCESS: CPT | Mod: 26

## 2025-03-18 RX ORDER — INSULIN GLARGINE-YFGN 100 [IU]/ML
22 INJECTION, SOLUTION SUBCUTANEOUS AT BEDTIME
Refills: 0 | Status: DISCONTINUED | OUTPATIENT
Start: 2025-03-18 | End: 2025-03-18

## 2025-03-18 RX ORDER — BISACODYL 5 MG
10 TABLET, DELAYED RELEASE (ENTERIC COATED) ORAL ONCE
Refills: 0 | Status: COMPLETED | OUTPATIENT
Start: 2025-03-18 | End: 2025-03-18

## 2025-03-18 RX ORDER — INSULIN GLARGINE-YFGN 100 [IU]/ML
28 INJECTION, SOLUTION SUBCUTANEOUS AT BEDTIME
Refills: 0 | Status: DISCONTINUED | OUTPATIENT
Start: 2025-03-18 | End: 2025-03-19

## 2025-03-18 RX ORDER — LISINOPRIL 5 MG/1
2.5 TABLET ORAL DAILY
Refills: 0 | Status: DISCONTINUED | OUTPATIENT
Start: 2025-03-18 | End: 2025-03-18

## 2025-03-18 RX ORDER — INSULIN LISPRO 100 U/ML
10 INJECTION, SOLUTION INTRAVENOUS; SUBCUTANEOUS
Refills: 0 | Status: DISCONTINUED | OUTPATIENT
Start: 2025-03-18 | End: 2025-03-19

## 2025-03-18 RX ORDER — FLUTICASONE PROPIONATE 50 UG/1
1 SPRAY, METERED NASAL
Refills: 0 | DISCHARGE

## 2025-03-18 RX ORDER — BUSPIRONE HYDROCHLORIDE 15 MG/1
15 TABLET ORAL
Refills: 0 | Status: DISCONTINUED | OUTPATIENT
Start: 2025-03-18 | End: 2025-03-20

## 2025-03-18 RX ORDER — BUSPIRONE HYDROCHLORIDE 15 MG/1
1 TABLET ORAL
Refills: 0 | DISCHARGE

## 2025-03-18 RX ORDER — INFLUENZA A VIRUS A/IDAHO/07/2018 (H1N1) ANTIGEN (MDCK CELL DERIVED, PROPIOLACTONE INACTIVATED, INFLUENZA A VIRUS A/INDIANA/08/2018 (H3N2) ANTIGEN (MDCK CELL DERIVED, PROPIOLACTONE INACTIVATED), INFLUENZA B VIRUS B/SINGAPORE/INFTT-16-0610/2016 ANTIGEN (MDCK CELL DERIVED, PROPIOLACTONE INACTIVATED), INFLUENZA B VIRUS B/IOWA/06/2017 ANTIGEN (MDCK CELL DERIVED, PROPIOLACTONE INACTIVATED) 15; 15; 15; 15 UG/.5ML; UG/.5ML; UG/.5ML; UG/.5ML
0.5 INJECTION, SUSPENSION INTRAMUSCULAR ONCE
Refills: 0 | Status: COMPLETED | OUTPATIENT
Start: 2025-03-18 | End: 2025-03-18

## 2025-03-18 RX ADMIN — Medication 1 APPLICATION(S): at 17:23

## 2025-03-18 RX ADMIN — ATORVASTATIN CALCIUM 40 MILLIGRAM(S): 80 TABLET, FILM COATED ORAL at 21:09

## 2025-03-18 RX ADMIN — INSULIN LISPRO 6: 100 INJECTION, SOLUTION INTRAVENOUS; SUBCUTANEOUS at 16:48

## 2025-03-18 RX ADMIN — OXYCODONE HYDROCHLORIDE 10 MILLIGRAM(S): 30 TABLET ORAL at 07:33

## 2025-03-18 RX ADMIN — POLYETHYLENE GLYCOL 3350 17 GRAM(S): 17 POWDER, FOR SOLUTION ORAL at 11:49

## 2025-03-18 RX ADMIN — INSULIN LISPRO 10 UNIT(S): 100 INJECTION, SOLUTION INTRAVENOUS; SUBCUTANEOUS at 12:36

## 2025-03-18 RX ADMIN — LINACLOTIDE 290 MICROGRAM(S): 290 CAPSULE, GELATIN COATED ORAL at 06:16

## 2025-03-18 RX ADMIN — INSULIN LISPRO 4: 100 INJECTION, SOLUTION INTRAVENOUS; SUBCUTANEOUS at 12:36

## 2025-03-18 RX ADMIN — INSULIN LISPRO 10 UNIT(S): 100 INJECTION, SOLUTION INTRAVENOUS; SUBCUTANEOUS at 09:13

## 2025-03-18 RX ADMIN — DOXAZOSIN MESYLATE 1 MILLIGRAM(S): 8 TABLET ORAL at 21:09

## 2025-03-18 RX ADMIN — INSULIN GLARGINE-YFGN 28 UNIT(S): 100 INJECTION, SOLUTION SUBCUTANEOUS at 22:28

## 2025-03-18 RX ADMIN — OXYCODONE HYDROCHLORIDE 10 MILLIGRAM(S): 30 TABLET ORAL at 21:09

## 2025-03-18 RX ADMIN — OXYCODONE HYDROCHLORIDE 10 MILLIGRAM(S): 30 TABLET ORAL at 06:33

## 2025-03-18 RX ADMIN — OXYCODONE HYDROCHLORIDE 10 MILLIGRAM(S): 30 TABLET ORAL at 23:00

## 2025-03-18 RX ADMIN — Medication 10 MILLIGRAM(S): at 19:08

## 2025-03-18 RX ADMIN — GABAPENTIN 300 MILLIGRAM(S): 400 CAPSULE ORAL at 21:10

## 2025-03-18 RX ADMIN — INSULIN LISPRO 2: 100 INJECTION, SOLUTION INTRAVENOUS; SUBCUTANEOUS at 22:28

## 2025-03-18 RX ADMIN — BUSPIRONE HYDROCHLORIDE 15 MILLIGRAM(S): 15 TABLET ORAL at 19:09

## 2025-03-18 RX ADMIN — INSULIN LISPRO 10 UNIT(S): 100 INJECTION, SOLUTION INTRAVENOUS; SUBCUTANEOUS at 17:49

## 2025-03-18 RX ADMIN — METOPROLOL SUCCINATE 50 MILLIGRAM(S): 50 TABLET, EXTENDED RELEASE ORAL at 19:08

## 2025-03-18 RX ADMIN — INSULIN LISPRO 2: 100 INJECTION, SOLUTION INTRAVENOUS; SUBCUTANEOUS at 09:13

## 2025-03-18 NOTE — PROGRESS NOTE ADULT - ASSESSMENT
64M with hx of contrast allergy and PMHx of HTN, HLD, BPH, DM II, EDMUND on CPAP, CCD (multiple PCIs and brachytherapy) presents to [facility] ED c/o headache, gait disturbance, and right sided weakness for several days after a mechanical fall 4-5 weeks ago. He was found to have bilateral subdural hematomas now s/p MMA embolization and pending [place] surgery. Cardiology consulted for CV comgmt and preoperative risk assessment and optimization.    Review of Studies:  Tele 3/16/25: SR HR 80-90s  ECG 3/15/25: SR HR 69, nonspecific ST&T wave changes, poor R wave progression    Outpatient Providers:  Will Hurtado MD (IC)    Home Medications (from cardiology admission): ASA 81mg QD, Plavix 75mg QD, Atorvastatin 40mg qhs, Toprol- XL 50mg BID, Lisinopril 2.5mg QD, HCTZ 12.5mg QD    Recommendations:    #Perioperative Cardiovascular Risk Assessment and Optimization  -No evidence of ACS, decompensated HF, severe AS, and tachyarrhythmia on clinical and physical exam assessment  -Had 4> METs of exercise tolerance prior to the hospitalization  -Adequately revascularized in April 24' w/no residual high risk CAD  -No active CV contraindications in proceeding with [place] hole procedure  -Deemed intermediate risk of perioperative cardiovascular complications for an intermediate risk procedure    #CCD  -No complaints of angina or HF sxs  -After surgery, if no contraindications, would resume aspirin 81mg QD (discontinue plavix)  -C/W toprol 50mg QD  -C/w Atorvastatin 40mg QD    #HTN  -Can continue holding home lisinopril for now, can resume post op        Case d/w attending

## 2025-03-18 NOTE — OCCUPATIONAL THERAPY INITIAL EVALUATION ADULT - LEVEL OF INDEPENDENCE: DRESS LOWER BODY, OT EVAL
pulling up b/l socks while seated at EOB, Min A x 1 to maintain sitting balance 2/2 falling back in bed/minimum assist (75% patients effort)

## 2025-03-18 NOTE — PHYSICAL THERAPY INITIAL EVALUATION ADULT - TRANSFER SAFETY CONCERNS NOTED: SIT/STAND, REHAB EVAL
no point tenderness to palpation of spine decreased balance during turns/losing balance/decreased step length/decreased weight-shifting ability

## 2025-03-18 NOTE — OCCUPATIONAL THERAPY INITIAL EVALUATION ADULT - DIAGNOSIS, OT EVAL
MRS 4. Patient POD # 1 s/p b/l MMAE, presents decreased RUE/RLE strength/sensation, R lateral lean during sitting and mobility, R sided listing during mobility impacting independence with functional activities and mobility.

## 2025-03-18 NOTE — PHYSICAL THERAPY INITIAL EVALUATION ADULT - MODALITIES TREATMENT COMMENTS
R hand dominant; (L) hand  5/5, (R) hand  4/5. CN Testing: B/L Frontalis intact; B/L buccinator intact; smile symmetrical; tongue protrusion at midline; B/L eyes open/close intact; Shoulder elevation: intact LUE, impaired RUE; Vision H-Test: bilateral tracking and smooth pursuit intact; Convergence/Divergence: intact; Vision Quadrant Test: intact bilaterally. Rapid alternating movements: WNL bilaterally

## 2025-03-18 NOTE — PROGRESS NOTE ADULT - SUBJECTIVE AND OBJECTIVE BOX
Surgery:   Consent: Signed by patient vs HCP                   NAME/NUMBER of HCP:     Representative Consent: [  ] Signed by patient vs HCP                                                 [  ] N/A -> only for cerebral angiogram    IV Contrast (Other; Rash)  ibuprofen (Other)      OVERNIGHT EVENTS:    T(C): 36.4 (03-18-25 @ 08:44), Max: 36.8 (03-17-25 @ 22:07)  HR: 78 (03-18-25 @ 11:52) (64 - 91)  BP: 109/72 (03-18-25 @ 11:52) (100/64 - 136/80)  RR: 16 (03-18-25 @ 11:52) (16 - 18)  SpO2: 96% (03-18-25 @ 11:52) (94% - 97%)  Wt(kg): --    EXAM:      03-18    135  |  102  |  29[H]  ----------------------------<  223[H]  4.2   |  20[L]  |  1.00    Ca    8.5      18 Mar 2025 05:30  Phos  3.9     03-18  Mg     2.2     03-18      CBC Full  -  ( 18 Mar 2025 05:30 )  WBC Count : 8.83 K/uL  RBC Count : 4.69 M/uL  Hemoglobin : 14.6 g/dL  Hematocrit : 43.4 %  Platelet Count - Automated : 178 K/uL  Mean Cell Volume : 92.5 fl  Mean Cell Hemoglobin : 31.1 pg  Mean Cell Hemoglobin Concentration : 33.6 g/dL  Auto Neutrophil # : x  Auto Lymphocyte # : x  Auto Monocyte # : x  Auto Eosinophil # : x  Auto Basophil # : x  Auto Neutrophil % : x  Auto Lymphocyte % : x  Auto Monocyte % : x  Auto Eosinophil % : x  Auto Basophil % : x    PT/INR - ( 17 Mar 2025 06:14 )   PT: 11.5 sec;   INR: 0.98          PTT - ( 17 Mar 2025 06:14 )  PTT:28.9 sec    Pregnancy test (serum hcg for any female under 56, must be resulted day before OR): [ ] Negative Result  [ ] Positive Result  [ ] N/A : male or female>55 y/o  Type & Screen (in past 72hrs):     2 Type & Screen within 72 hours if anticipate blood need in OR:  _ Y _ N     Blood ordered and on hold for OR:   [ ] No need     [ ] 1u pRBC on hold      [ ] 2u pRBC on hold    COVID swab (in past 48hrs): _ Y  _N    CXR:  EKG:  ECHO:  Medical Clearances:  Other Clearances:     Last dose of antiplatelet/anticoagulation drug:    Implanted Devices (pacemaker, drug pump...etc):  []YES   [] NO                  If yes --> EPS consulted to interrogate device: [ ] YES  [ ] NO                            If yes -->  EPS called to let them know patient going for surgery: [ ] device needs to be turned off                                                                                                                                                 [ ] magnet needs to be placed for surgery                                                                                                                                                [ ] nothing to do per EP, may proceed with cautery use in OR                                       3M nasal swab ordered?  _Y  _N    Cranial surgery: Order written for hair to be shampooed night before surgery and morning before surgery  [] yes   []no  Chlorhexidine Wipes ordered for Neck Down?  _ Y  _ N  (twice a day if 1 day before surgery, daily for 3 days if 3 days prior, daily if in ICU)                 Assessment:    Plan:    Assessment:  Present when checked    []  GCS  E   V  M     Heart Failure: []Acute, [] acute on chronic , []chronic  Heart Failure:  [] Diastolic (HFpEF), [] Systolic (HFrEF), []Combined (HFpEF and HFrEF), [] RHF, [] Pulm HTN, [] Other    [] NICHOLAS, [] ATN, [] AIN, [] other  [] CKD1, [] CKD2, [] CKD 3, [] CKD 4, [] CKD 5, []ESRD    Encephalopathy: [] Metabolic, [] Hepatic, [] toxic, [] Neurological, [] Other    Abnormal Nurtitional Status: [] malnurtition (see nutrition note), [ ]underweight: BMI < 19, [] morbid obesity: BMI >40, [] Cachexia    [] Sepsis  [] hypovolemic shock,[] cardiogenic shock, [] hemorrhagic shock, [] neuogenic shock  [] Acute Respiratory Failure  []Cerebral edema, [] Brain compression/ herniation,   [] Functional quadriplegia  [] Acute blood loss anemia   Surgery: Bilateral craniotomies for subdural hematoma evacuation   Consent: Signed by patient                    NAME/NUMBER of HCP: Britany (wife): [phone]    Representative Consent: [ X ] Signed by patient                                               [  ] N/A -> only for cerebral angiogram    IV Contrast (Other; Rash)  ibuprofen (Other)    OVERNIGHT EVENTS: VALERIANO overnight,.     T(C): 36.4 (03-18-25 @ 08:44), Max: 36.8 (03-17-25 @ 22:07)  HR: 78 (03-18-25 @ 11:52) (64 - 91)  BP: 109/72 (03-18-25 @ 11:52) (100/64 - 136/80)  RR: 16 (03-18-25 @ 11:52) (16 - 18)  SpO2: 96% (03-18-25 @ 11:52) (94% - 97%)  Wt(kg): --    EXAM:  Constitutional: awake, alert, sitting up in bed. NAD.   Respiratory: non-labored breathing. Normal chest rise.   Cardiovascular: Regular rate and rhythm  Gastrointestinal:  Soft, nontender, slightly distended.  Vascular: Extremities warm, no ulcers, no discoloration of skin.   Neurological: Gen: AA&O x 3, conversant, appropriate.      Motor: VELASQUEZ x 4, LUE and LLE 5/5, RUE and RLE 4+/5    Sens: Sensation intact to light touch throughout.    Extremities: distal pulses DP 2+/PT 1+      No pronator drift  Wound/incision: Right groin incision c/d/i, no hematoma with gauze and tegaderm in place    03-18    135  |  102  |  29[H]  ----------------------------<  223[H]  4.2   |  20[L]  |  1.00    Ca    8.5      18 Mar 2025 05:30  Phos  3.9     03-18  Mg     2.2     03-18    CBC Full  -  ( 18 Mar 2025 05:30 )  WBC Count : 8.83 K/uL  RBC Count : 4.69 M/uL  Hemoglobin : 14.6 g/dL  Hematocrit : 43.4 %  Platelet Count - Automated : 178 K/uL  Mean Cell Volume : 92.5 fl  Mean Cell Hemoglobin : 31.1 pg  Mean Cell Hemoglobin Concentration : 33.6 g/dL    PT/INR - ( 17 Mar 2025 06:14 )   PT: 11.5 sec;   INR: 0.98          PTT - ( 17 Mar 2025 06:14 )  PTT:28.9 sec    Pregnancy test (serum hcg for any female under 56, must be resulted day before OR): [ ] Negative Result  [ ] Positive Result  [X] N/A : male or female>55 y/o  Type & Screen (in past 72hrs): Ordered for 3/19 AM labs      2 Type & Screen within 72 hours if anticipate blood need in OR: Yes     Blood ordered and on hold for OR:   [ ] No need     [ ] 1u pRBC on hold      [X] 2u pRBC on hold    CXR: 3/16 no acute infiltrates   EKG: Sinus  ECHO: EF 68% on 3/17  Cleared by cardiology given extensive cardiac history     Last dose of antiplatelet/anticoagulation drug: Plavix 75mg 3/14, still therapeutic as of 3/18; repeat accumetrics ordered for AM     Implanted Devices (pacemaker, drug pump...etc):  []YES   [X] NO                                       3M nasal swab ordered?  **NOT ordered due to contrast allergy**  Cranial surgery: Order written for hair to be shampooed night before surgery and morning before surgery  [] yes   []no  Chlorhexidine Wipes ordered for Neck Down?  YES  (twice a day if 1 day before surgery, daily for 3 days if 3 days prior, daily if in ICU)               Assessment:  64M PMHx HTN, HLD, DM, CAD w/ 11 cardiac stents (on Plavix, lase dose 3/14), lumbar fusion 2016, chronic pain (takes oxy 30mg 4-6 times per day) presented to ED s/p fall 5 weeks ago with head strike no LOC presented with R sided weakness, HA, +WFD. CTH showed BL SDH L>R w/ 5mm MLS. Now s/p b/l MMAE (3/17/25). Pre-op for BL craniotomies for SDH evacuation (3/19/25).       Plan:  - for OR tomorrow  - consents and vendor consents signed and in chart  - cleared by cardiology   - Tylenol and Emend ordered for on call to OR  - NPO at midnight, IVF while NPO  - shampoo orders  - CHG/3M ordered  - active T&S --> 2 u PRBC on hold  - coags and labs reviewed  - SCDs    Discussed with Dr. Moore

## 2025-03-18 NOTE — PATIENT PROFILE ADULT - FALL HARM RISK - HARM RISK INTERVENTIONS

## 2025-03-18 NOTE — PROGRESS NOTE ADULT - SUBJECTIVE AND OBJECTIVE BOX
Patient is a 64y old  Male who presents with a chief complaint of Traumatic BL SDH (18 Mar 2025 02:11)    INTERVAL EVENTS:  - NAEO  - Patient was seen and examined with wife at bedside. Reports improved dexterity in right UE post MMAE.     Review of systems: No CP, dyspnea, nausea or vomiting, dysuria, LE edema.     Diet, Consistent Carbohydrate w/Evening Snack (03-18-25 @ 07:53) [Active]      MEDICATIONS:  MEDICATIONS  (STANDING):  atorvastatin 40 milliGRAM(s) Oral at bedtime  doxazosin 1 milliGRAM(s) Oral at bedtime  gabapentin 300 milliGRAM(s) Oral at bedtime  influenza   Vaccine 0.5 milliLiter(s) IntraMuscular once  insulin glargine Injectable (LANTUS) 28 Unit(s) SubCutaneous at bedtime  insulin lispro (ADMELOG) corrective regimen sliding scale   SubCutaneous Before meals and at bedtime  insulin lispro Injectable (ADMELOG) 10 Unit(s) SubCutaneous three times a day before meals  linaclotide 290 MICROGram(s) Oral before breakfast  lisinopril 2.5 milliGRAM(s) Oral daily  metoprolol succinate ER 50 milliGRAM(s) Oral every 12 hours  polyethylene glycol 3350 17 Gram(s) Oral daily  senna 2 Tablet(s) Oral at bedtime    MEDICATIONS  (PRN):  acetaminophen     Tablet .. 650 milliGRAM(s) Oral every 6 hours PRN Temp greater or equal to 38C (100.4F), Mild Pain (1 - 3)  oxyCODONE    IR 30 milliGRAM(s) Oral every 8 hours PRN Severe Pain (7 - 10)  oxyCODONE    IR 10 milliGRAM(s) Oral every 8 hours PRN Moderate Pain (4 - 6)      Allergies    IV Contrast (Other; Rash)  ibuprofen (Other)    Intolerances        OBJECTIVE:  Vital Signs Last 24 Hrs  T(C): 36.4 (18 Mar 2025 08:44), Max: 36.8 (17 Mar 2025 22:07)  T(F): 97.5 (18 Mar 2025 08:44), Max: 98.3 (17 Mar 2025 22:07)  HR: 64 (18 Mar 2025 08:45) (64 - 91)  BP: 100/64 (18 Mar 2025 08:26) (100/64 - 136/80)  BP(mean): 77 (18 Mar 2025 08:26) (77 - 102)  RR: 16 (18 Mar 2025 08:26) (16 - 18)  SpO2: 94% (18 Mar 2025 08:45) (94% - 97%)    Parameters below as of 18 Mar 2025 08:26  Patient On (Oxygen Delivery Method): room air      I&O's Summary    17 Mar 2025 07:01  -  18 Mar 2025 07:00  --------------------------------------------------------  IN: 790 mL / OUT: 1150 mL / NET: -360 mL    18 Mar 2025 07:01  -  18 Mar 2025 10:45  --------------------------------------------------------  IN: 0 mL / OUT: 300 mL / NET: -300 mL        PHYSICAL EXAM:  Gen: Reclining in bed at time of exam, appears stated age  HEENT: NCAT, MMM, clear OP  Neck: supple, trachea at midline  CV: RRR, +S1/S2  Pulm: adequate respiratory effort, no increase in work of breathing  Abd: soft, ND  Skin: warm and dry,   Ext: no edema  Neuro: Alert, speaking in full sentences  Psych: affect and behavior appropriate, pleasant at time of interview    LABS:                        14.6   8.83  )-----------( 178      ( 18 Mar 2025 05:30 )             43.4     03-18    135  |  102  |  29[H]  ----------------------------<  223[H]  4.2   |  20[L]  |  1.00    Ca    8.5      18 Mar 2025 05:30  Phos  3.9     03-18  Mg     2.2     03-18        PT/INR - ( 17 Mar 2025 06:14 )   PT: 11.5 sec;   INR: 0.98          PTT - ( 17 Mar 2025 06:14 )  PTT:28.9 sec  CAPILLARY BLOOD GLUCOSE      POCT Blood Glucose.: 180 mg/dL (18 Mar 2025 08:54)  POCT Blood Glucose.: 178 mg/dL (18 Mar 2025 07:43)  POCT Blood Glucose.: 160 mg/dL (17 Mar 2025 22:37)  POCT Blood Glucose.: 150 mg/dL (17 Mar 2025 20:21)  POCT Blood Glucose.: 128 mg/dL (17 Mar 2025 16:24)  POCT Blood Glucose.: 136 mg/dL (17 Mar 2025 15:46)  POCT Blood Glucose.: 141 mg/dL (17 Mar 2025 12:39)    Urinalysis Basic - ( 18 Mar 2025 05:30 )    Color: x / Appearance: x / SG: x / pH: x  Gluc: 223 mg/dL / Ketone: x  / Bili: x / Urobili: x   Blood: x / Protein: x / Nitrite: x   Leuk Esterase: x / RBC: x / WBC x   Sq Epi: x / Non Sq Epi: x / Bacteria: x        MICRODATA:      RADIOLOGY/OTHER STUDIES:

## 2025-03-18 NOTE — OCCUPATIONAL THERAPY INITIAL EVALUATION ADULT - MODIFIED CLINICAL TEST OF SENSORY INTEGRATION IN BALANCE TEST
Patient functionally ambulated in the hallway with RW and Min A x 1. Patient noted with R sided listing, slight R sided lean, deficits with step length and narrow base of support requiring min verbal cues for positioning and safety.

## 2025-03-18 NOTE — PROGRESS NOTE ADULT - SUBJECTIVE AND OBJECTIVE BOX
INTERVAL EVENTS:    PAST MEDICAL & SURGICAL HISTORY:  Sleep apnea  on CPAP    Hypertension    Diabetes mellitus    High cholesterol    Obesity (BMI 30.0-34.9)    CAD (coronary artery disease)    Heart burn    Hiatal hernia  with possible GERD    Fatty liver    BPH (benign prostatic hyperplasia)    Constipation    CAD (coronary artery disease)    H/O low back pain    S/P angioplasty with stent  2008, 2014, 03/2019    Finger injury  Left Ring Finger & had surgery ( 1996 )    S/P foot surgery  Right  ( 2013 )    H/O spinal fusion        MEDICATIONS  (STANDING):  atorvastatin 40 milliGRAM(s) Oral at bedtime  doxazosin 1 milliGRAM(s) Oral at bedtime  gabapentin 300 milliGRAM(s) Oral at bedtime  influenza   Vaccine 0.5 milliLiter(s) IntraMuscular once  insulin glargine Injectable (LANTUS) 28 Unit(s) SubCutaneous at bedtime  insulin lispro (ADMELOG) corrective regimen sliding scale   SubCutaneous Before meals and at bedtime  insulin lispro Injectable (ADMELOG) 10 Unit(s) SubCutaneous three times a day before meals  linaclotide 290 MICROGram(s) Oral before breakfast  metoprolol succinate ER 50 milliGRAM(s) Oral every 12 hours  polyethylene glycol 3350 17 Gram(s) Oral daily  senna 2 Tablet(s) Oral at bedtime    MEDICATIONS  (PRN):  acetaminophen     Tablet .. 650 milliGRAM(s) Oral every 6 hours PRN Temp greater or equal to 38C (100.4F), Mild Pain (1 - 3)  oxyCODONE    IR 30 milliGRAM(s) Oral every 8 hours PRN Severe Pain (7 - 10)  oxyCODONE    IR 10 milliGRAM(s) Oral every 8 hours PRN Moderate Pain (4 - 6)    T(F): 97.5 (03-18-25 @ 08:44), Max: 98.3 (03-17-25 @ 22:07)  HR: 84 (03-18-25 @ 10:45) (64 - 91)  BP: 101/64 (03-18-25 @ 10:45) (100/64 - 136/80)  BP(mean): 77 (03-18-25 @ 10:45) (77 - 102)  ABP: --  ABP(mean): --  RR: 16 (03-18-25 @ 10:45) (16 - 18)  SpO2: 94% (03-18-25 @ 10:45) (94% - 97%)  CVP(mm Hg): --    I/O Detail 24H    17 Mar 2025 07:01  -  18 Mar 2025 07:00  --------------------------------------------------------  IN:    Oral Fluid: 790 mL  Total IN: 790 mL    OUT:    Blood Loss (mL): 0 mL    Voided (mL): 1150 mL  Total OUT: 1150 mL    Total NET: -360 mL      18 Mar 2025 07:01  -  18 Mar 2025 11:20  --------------------------------------------------------  IN:  Total IN: 0 mL    OUT:    Voided (mL): 300 mL  Total OUT: 300 mL    Total NET: -300 mL          PHYSICAL EXAM:  GEN: NAD  HEENT: EOMI   RESP: CTA b/l  CV: RRR. Normal S1/S2. No m/r/g. No JVD.   ABD: abdomen soft, NT/ND; no rebound or guarding; +BSx4  EXT: No edema, warm extremities  NEURO: alert and attentive    LABS:  CBC 03-18-25 @ 05:30                        14.6   8.83  )-----------( 178                   43.4     Hgb trend: 14.6 <-- , 14.8 <-- , 16.0 <-- , 15.0 <--   WBC trend: 8.83 <-- , 8.45 <-- , 12.80 <-- , 7.62 <--       CMP 03-18-25 @ 05:30    135  |  102  |  29[H]  ----------------------------<  223[H]  4.2   |  20[L]  |  1.00    Ca    8.5      03-18-25 @ 05:30  Phos  3.9     03-18  Mg     2.2     03-18      Serum Cr (eGFR) trend: 1.00 (85) <-- , 1.01 (84) <-- , 1.10 (75) <-- , 1.04 (81) <--       PT/INR - ( 17 Mar 2025 06:14 )   PT: 11.5 sec;   INR: 0.98     PTT - ( 17 Mar 2025 06:14 ):28.9 sec    Cardiac Markers         STUDIES:           INTERVAL EVENTS: no acute events or complaints    PAST MEDICAL & SURGICAL HISTORY:  Sleep apnea  on CPAP    Hypertension    Diabetes mellitus    High cholesterol    Obesity (BMI 30.0-34.9)    CAD (coronary artery disease)    Heart burn    Hiatal hernia  with possible GERD    Fatty liver    BPH (benign prostatic hyperplasia)    Constipation    CAD (coronary artery disease)    H/O low back pain    S/P angioplasty with stent  2008, 2014, 03/2019    Finger injury  Left Ring Finger & had surgery ( 1996 )    S/P foot surgery  Right  ( 2013 )    H/O spinal fusion        MEDICATIONS  (STANDING):  atorvastatin 40 milliGRAM(s) Oral at bedtime  doxazosin 1 milliGRAM(s) Oral at bedtime  gabapentin 300 milliGRAM(s) Oral at bedtime  influenza   Vaccine 0.5 milliLiter(s) IntraMuscular once  insulin glargine Injectable (LANTUS) 28 Unit(s) SubCutaneous at bedtime  insulin lispro (ADMELOG) corrective regimen sliding scale   SubCutaneous Before meals and at bedtime  insulin lispro Injectable (ADMELOG) 10 Unit(s) SubCutaneous three times a day before meals  linaclotide 290 MICROGram(s) Oral before breakfast  metoprolol succinate ER 50 milliGRAM(s) Oral every 12 hours  polyethylene glycol 3350 17 Gram(s) Oral daily  senna 2 Tablet(s) Oral at bedtime    MEDICATIONS  (PRN):  acetaminophen     Tablet .. 650 milliGRAM(s) Oral every 6 hours PRN Temp greater or equal to 38C (100.4F), Mild Pain (1 - 3)  oxyCODONE    IR 30 milliGRAM(s) Oral every 8 hours PRN Severe Pain (7 - 10)  oxyCODONE    IR 10 milliGRAM(s) Oral every 8 hours PRN Moderate Pain (4 - 6)    T(F): 97.5 (03-18-25 @ 08:44), Max: 98.3 (03-17-25 @ 22:07)  HR: 84 (03-18-25 @ 10:45) (64 - 91)  BP: 101/64 (03-18-25 @ 10:45) (100/64 - 136/80)  BP(mean): 77 (03-18-25 @ 10:45) (77 - 102)  ABP: --  ABP(mean): --  RR: 16 (03-18-25 @ 10:45) (16 - 18)  SpO2: 94% (03-18-25 @ 10:45) (94% - 97%)  CVP(mm Hg): --    I/O Detail 24H    17 Mar 2025 07:01  -  18 Mar 2025 07:00  --------------------------------------------------------  IN:    Oral Fluid: 790 mL  Total IN: 790 mL    OUT:    Blood Loss (mL): 0 mL    Voided (mL): 1150 mL  Total OUT: 1150 mL    Total NET: -360 mL      18 Mar 2025 07:01  -  18 Mar 2025 11:20  --------------------------------------------------------  IN:  Total IN: 0 mL    OUT:    Voided (mL): 300 mL  Total OUT: 300 mL    Total NET: -300 mL          PHYSICAL EXAM:  GEN: NAD  HEENT: EOMI   RESP: CTA b/l  CV: RRR. Normal S1/S2. No m/r/g. No JVD.   ABD: abdomen soft, NT/ND; no rebound or guarding; +BSx4  EXT: No edema, warm extremities  NEURO: alert and attentive    LABS:  CBC 03-18-25 @ 05:30                        14.6   8.83  )-----------( 178                   43.4     Hgb trend: 14.6 <-- , 14.8 <-- , 16.0 <-- , 15.0 <--   WBC trend: 8.83 <-- , 8.45 <-- , 12.80 <-- , 7.62 <--       CMP 03-18-25 @ 05:30    135  |  102  |  29[H]  ----------------------------<  223[H]  4.2   |  20[L]  |  1.00    Ca    8.5      03-18-25 @ 05:30  Phos  3.9     03-18  Mg     2.2     03-18      Serum Cr (eGFR) trend: 1.00 (85) <-- , 1.01 (84) <-- , 1.10 (75) <-- , 1.04 (81) <--       PT/INR - ( 17 Mar 2025 06:14 )   PT: 11.5 sec;   INR: 0.98     PTT - ( 17 Mar 2025 06:14 ):28.9 sec    Cardiac Markers         STUDIES:

## 2025-03-18 NOTE — OCCUPATIONAL THERAPY INITIAL EVALUATION ADULT - ADDITIONAL COMMENTS
Patient reports living with his wife in an elevator access apartment building with 1 FOS to enter. Patient states he was independent with all ADL's, and functional mobility with SC prior to admission. Patient has a HHA 21 hours a week who assists with cooking/cleaning/grocery shopping. Patient is R hand dominant and has a bathtub shower. Patient wears glasses.

## 2025-03-18 NOTE — PHYSICAL THERAPY INITIAL EVALUATION ADULT - GAIT DEVIATIONS NOTED, PT EVAL
R sided leaning, veering to the R side, min unsteadiness/decreased hayden/decreased step length/decreased stride length/decreased weight-shifting ability

## 2025-03-18 NOTE — OCCUPATIONAL THERAPY INITIAL EVALUATION ADULT - SENSORY TESTS
Visual fields are full to confrontation, H and Q test grossly intact, Eye movements are intact without nystagmus, Pupils equally round and reactive to light, facial sensation V1-V3 equal and intact, face is symmetric with normal eye closure and smile, tongue protrudes midlines, Decreased R shoulder shrug, puffing cheeks intact, b/l eyebrow shrug intact, hearing bilaterally with rubs intact

## 2025-03-18 NOTE — PHYSICAL THERAPY INITIAL EVALUATION ADULT - ADDITIONAL COMMENTS
Pt. lives with his wife in an elevator access apt with 1 FOS to enter. At baseline, ambulates independently with SC. Has HHA 21 hrs per week to assist him with household activities. Pt. Pt. lives with his wife in an elevator access apt with 1 FOS to enter. At baseline, ambulates independently with SC. Has HHA 21 hrs per week to assist him with household activities. Pt. reports having to start using SC over the past two years due to impaired balance and fall risk. Pt. reports increasing R sided weakness and unsteadiness following recent fall. Patient is R hand dominant.

## 2025-03-18 NOTE — PHYSICAL THERAPY INITIAL EVALUATION ADULT - GENERAL OBSERVATIONS, REHAB EVAL
Pt. received semi supine, +R groin incision C/D/I, +telemetry, +pulse ox, +heplock, NAD, agreeable to PT.

## 2025-03-18 NOTE — PROGRESS NOTE ADULT - SUBJECTIVE AND OBJECTIVE BOX
HPI:  64M PMHx HTN, HLD, DM, CAD w/ 11 cardiac stents (on Plavix, lase dose 3/14), lumbar fusion 2016, chronic pain (takes oxy 30mg 4-6 times per day) presented to ED s/p fall 5 weeks ago with head strike no LOC. PT states he visited an ED when he fell and had a negative CTH at the time. He now c/o R sided weakness, WFD, and HA rated at 8/10. He takes his home oxy for chronic LBP prescribed by PCP, which helps with the headache. Denies recent falls, dizziness, LOC, seizure, vision changes, SOB, chest pain.  (15 Mar 2025 14:07)    OVERNIGHT EVENTS: VALERIANO overnight, neuro stable.    Hospital Course:  3/15: Admit to [facility] for further mgmt.   3/16: VALERIANO overnight. Neuro stable. Preop for MMAE, premedication protocol for contrast allergy. Cards consulted, cleared for MMAE, recommend echo.  3/17: VALERIANO ovn. Neuro stable. Preop for MMAE today. Echo done. Resumed home lantus 28u qhs. POD0 s/p b/l MMAE.   3/18: POD1. VALERIANO ovn. Started lispro 10u TID.     Vital Signs Last 24 Hrs  T(C): 36.8 (17 Mar 2025 22:07), Max: 36.8 (17 Mar 2025 22:07)  T(F): 98.3 (17 Mar 2025 22:07), Max: 98.3 (17 Mar 2025 22:07)  HR: 90 (18 Mar 2025 00:32) (70 - 91)  BP: 131/79 (18 Mar 2025 00:15) (102/67 - 136/80)  BP(mean): 99 (18 Mar 2025 00:15) (79 - 102)  RR: 18 (18 Mar 2025 00:32) (16 - 18)  SpO2: 97% (18 Mar 2025 00:32) (92% - 97%)    Parameters below as of 18 Mar 2025 00:32  Patient On (Oxygen Delivery Method): BiPAP/CPAP    O2 Concentration (%): 30    I&O's Summary    16 Mar 2025 07:01  -  17 Mar 2025 07:00  --------------------------------------------------------  IN: 873 mL / OUT: 250 mL / NET: 623 mL    17 Mar 2025 07:01  -  18 Mar 2025 02:12  --------------------------------------------------------  IN: 690 mL / OUT: 650 mL / NET: 40 mL          PHYSICAL EXAM:  Constitutional: awake, alert, sitting up in bed. NAD.   Respiratory: non-labored breathing. Normal chest rise.   Cardiovascular: Regular rate and rhythm  Gastrointestinal:  Soft, nontender, nondistended.  .  Vascular: Extremities warm, no ulcers, no discoloration of skin.   Neurological: Gen: AA&O x 3, conversant, appropriate.      CN II-XII grossly intact.    Motor: VELASQUEZ x 4, 5/5 throughout UE/LE.    Sens: Sensation intact to light touch throughout.    Extremities: distal pulses DP 2+/PT 1+      No pronator drift  Wound/incision: Right groin incision c/d/i, no hematoma with gauze and tegaderm in place    TUBES/LINES:  [] [place]  [] Lumbar Drain  [] Wound Drains  [] Others      DIET:  [] NPO  [x] Mechanical  [] Tube feeds    LABS:                        14.8   8.45  )-----------( 174      ( 17 Mar 2025 06:14 )             43.3     03-17    135  |  100  |  25[H]  ----------------------------<  202[H]  4.0   |  24  |  1.01    Ca    9.4      17 Mar 2025 06:14  Phos  2.8     03-17  Mg     2.2     03-17      PT/INR - ( 17 Mar 2025 06:14 )   PT: 11.5 sec;   INR: 0.98          PTT - ( 17 Mar 2025 06:14 )  PTT:28.9 sec  Urinalysis Basic - ( 17 Mar 2025 06:14 )    Color: x / Appearance: x / SG: x / pH: x  Gluc: 202 mg/dL / Ketone: x  / Bili: x / Urobili: x   Blood: x / Protein: x / Nitrite: x   Leuk Esterase: x / RBC: x / WBC x   Sq Epi: x / Non Sq Epi: x / Bacteria: x          CAPILLARY BLOOD GLUCOSE      POCT Blood Glucose.: 160 mg/dL (17 Mar 2025 22:37)  POCT Blood Glucose.: 150 mg/dL (17 Mar 2025 20:21)  POCT Blood Glucose.: 128 mg/dL (17 Mar 2025 16:24)  POCT Blood Glucose.: 136 mg/dL (17 Mar 2025 15:46)  POCT Blood Glucose.: 141 mg/dL (17 Mar 2025 12:39)  POCT Blood Glucose.: 167 mg/dL (17 Mar 2025 06:25)      Drug Levels: [] N/A    CSF Analysis: [] N/A      Allergies    IV Contrast (Other; Rash)  ibuprofen (Other)    Intolerances      MEDICATIONS:  Antibiotics:    Neuro:  acetaminophen     Tablet .. 650 milliGRAM(s) Oral every 6 hours PRN  gabapentin 300 milliGRAM(s) Oral at bedtime  oxyCODONE    IR 30 milliGRAM(s) Oral every 8 hours PRN  oxyCODONE    IR 10 milliGRAM(s) Oral every 8 hours PRN    Anticoagulation:    OTHER:  atorvastatin 40 milliGRAM(s) Oral at bedtime  doxazosin 1 milliGRAM(s) Oral at bedtime  insulin glargine Injectable (LANTUS) 28 Unit(s) SubCutaneous at bedtime  insulin lispro (ADMELOG) corrective regimen sliding scale   SubCutaneous Before meals and at bedtime  insulin lispro Injectable (ADMELOG) 10 Unit(s) SubCutaneous three times a day before meals  linaclotide 290 MICROGram(s) Oral before breakfast  lisinopril 2.5 milliGRAM(s) Oral daily  metoprolol succinate ER 50 milliGRAM(s) Oral every 12 hours  polyethylene glycol 3350 17 Gram(s) Oral daily  senna 2 Tablet(s) Oral at bedtime    IVF:    CULTURES:    RADIOLOGY & ADDITIONAL TESTS:      ASSESSMENT:  64M PMHx HTN, HLD, DM, CAD w/ 11 cardiac stents (on Plavix, lase dose 3/14), lumbar fusion 2016, chronic pain (takes oxy 30mg 4-6 times per day) presented to ED s/p fall 5 weeks ago with head strike no LOC presented with R sided weakness, HA, +WFD. CTH showed BL SDH L>R w/ 5mm MLS. Now s/p b/l MMAE 3/17/25.    SUBDURAL HEMORRHAGE    No pertinent family history    FHx: myocardial infarction    FH: coronary artery disease    FH: type 2 diabetes    Handoff    MEWS Score    Sleep apnea    Hypertension    Diabetes mellitus    High cholesterol    Obesity (BMI 30.0-34.9)    CAD (coronary artery disease)    Heart burn    Hiatal hernia    Fatty liver    BPH (benign prostatic hyperplasia)    Constipation    CAD (coronary artery disease)    H/O low back pain    SDH (subdural hematoma)    SDH (subdural hematoma)    Headache    Subdural hemorrhage    Intracranial subdural hemorrhage    Angiogram, cerebral, with embolization    S/P angioplasty with stent    Finger injury    S/P foot surgery    H/O spinal fusion    MED EVAL    37    SysAdmin_VstLnk        PLAN:    Neuro:  - neuro/vitals q4h  - pain control: tylenol prn, Oxy 10mg/30mg prn, gabapentin 300mg at bedtime, pain mgmt following   - accumetrics (3/17): therapeutic on asa/plavix  - cardiology clearance: intermediate risk of perioperative cardiovascular complications for an intermediate-high risk procedure  - Hx Depression: Buspirone (confirm dose)    Cardio:  - SBP <140  - Hx HTN: c/w Metoprolol ER 50mg bid, lisinopril 2.5mg daily   - Hx CAD w/ 11 stents: HOLD home Plavix   - echo 3/17: EF 68%  - cardiology following    Pulm:   - RA  - hx EDMUND: CPAP at night    GI:  - CCD  - bowel regimen, BM 3/16  - chronic constipation: c/w linzess     Renal:  - IVF while advancing diet   - voiding   - normal sodium goal   - cont home cardura    Endo:  - ISS, A1C 7.7  - c/w home lantus 28u qhs, lispro 10u TID  - Hx DM, hold home metformin    Heme:   - SCDs for DVT ppx     ID:   - afebrile     Misc:   - hold home terbinafine    Dispo: Tele, full code, pending OR    Discussed w/ Dr. Enciso

## 2025-03-18 NOTE — OCCUPATIONAL THERAPY INITIAL EVALUATION ADULT - PERTINENT HX OF CURRENT PROBLEM, REHAB EVAL
64M PMHx HTN, HLD, DM, CAD w/ 11 cardiac stents (on Plavix, lase dose 3/14), lumbar fusion 2016, chronic pain (takes oxy 30mg 4-6 times per day) presented to ED s/p fall 5 weeks ago with head strike no LOC presented with R sided weakness, HA, +WFD. CTH showed BL SDH L>R w/ 5mm MLS. Now s/p b/l MMAE (3/17/25). Pre-op for BL craniotomies for SDH evacuation (3/19/25).

## 2025-03-18 NOTE — OCCUPATIONAL THERAPY INITIAL EVALUATION ADULT - GENERAL OBSERVATIONS, REHAB EVAL
PT Nita present. Patient received semisupine in bed +tele, +heplock IV, +R groin incision C/D/I, room air, NAD. Patient A&OX4, agreeable and tolerated session well. Patient scored a 29/30 on the O-Log. A score of >25 is associated with normal orientation.

## 2025-03-18 NOTE — PHYSICAL THERAPY INITIAL EVALUATION ADULT - LIGHT TOUCH SENSATION, RLE, REHAB EVAL
intact to light touch sensation testing, however 80% on thigh and 60% on lower leg in comparison to LLE

## 2025-03-18 NOTE — PROGRESS NOTE ADULT - ASSESSMENT
Mr. Marroquin is a 63yo man with PMHx HTN, HLD, IDDM, CAD w/ multiple prior cardiac stents, EDMUND on CPAP, lumbar fusion 2016, chronic pain (takes oxy 30mg 4-6 times per day) presented to ED with R-sided weakness iso fall 5 weeks ago with head strike, found to have b/l SDH L>R w/ 5mm MLS, now s/p MMA embolization 3/17 and pending kusum holes.     #B/l traumatic SDH s/p MMAE 3/17  - Management per primary - pending kusum holes later this week  - DAPT held - resume when safe from surgical perspective  - C/w Toprol 50mg BID tamy-operatively with hold parameters for HR < 60  - Defer to cardiology for pre-op evaluation/risk assessment    #CAD s/p multiple stents - most recently 04/2024  - Resume DAPT when safe from nsgy perspective  - C/w atorvastatin 40mg qhs    #IDDM - a1c 7.7%, patient reports glargine 28U qhs + lispro 14U tid with meals and metformin  - C/w glargine 28U qhs + lispro 10U tid with meals + SSI  - On day of surgery, recommend stopping lispro and decreasing glargine to 22U the night before    #EDMUND  - C/w CPAP qhs    #HTN  - Hold lisinopril and hctz iso normotension, resume post-op as needed    DVT ppx - per primary    Dispo: pending PT/OT

## 2025-03-18 NOTE — PHYSICAL THERAPY INITIAL EVALUATION ADULT - IMPAIRED TRANSFERS: SIT/STAND, REHAB EVAL
decreased aerobic capacity/endurance/impaired balance/impaired postural control/decreased sensation/decreased strength

## 2025-03-18 NOTE — OCCUPATIONAL THERAPY INITIAL EVALUATION ADULT - PROPRIOCEPTION, TRUNK, OT EVAL
well developed, well nourished , in no acute distress , ambulating without difficulty , normal communication ability within normal limits

## 2025-03-19 LAB
ANION GAP SERPL CALC-SCNC: 10 MMOL/L — SIGNIFICANT CHANGE UP (ref 5–17)
APTT BLD: 26.9 SEC — SIGNIFICANT CHANGE UP (ref 24.5–35.6)
BLD GP AB SCN SERPL QL: NEGATIVE — SIGNIFICANT CHANGE UP
BUN SERPL-MCNC: 28 MG/DL — HIGH (ref 7–23)
CALCIUM SERPL-MCNC: 8.6 MG/DL — SIGNIFICANT CHANGE UP (ref 8.4–10.5)
CHLORIDE SERPL-SCNC: 105 MMOL/L — SIGNIFICANT CHANGE UP (ref 96–108)
CO2 SERPL-SCNC: 23 MMOL/L — SIGNIFICANT CHANGE UP (ref 22–31)
CREAT SERPL-MCNC: 0.95 MG/DL — SIGNIFICANT CHANGE UP (ref 0.5–1.3)
EGFR: 90 ML/MIN/1.73M2 — SIGNIFICANT CHANGE UP
EGFR: 90 ML/MIN/1.73M2 — SIGNIFICANT CHANGE UP
GLUCOSE SERPL-MCNC: 130 MG/DL — HIGH (ref 70–99)
HCT VFR BLD CALC: 43.3 % — SIGNIFICANT CHANGE UP (ref 39–50)
HGB BLD-MCNC: 14.6 G/DL — SIGNIFICANT CHANGE UP (ref 13–17)
INR BLD: 1.02 — SIGNIFICANT CHANGE UP (ref 0.85–1.16)
MAGNESIUM SERPL-MCNC: 2.5 MG/DL — SIGNIFICANT CHANGE UP (ref 1.6–2.6)
MCHC RBC-ENTMCNC: 31.3 PG — SIGNIFICANT CHANGE UP (ref 27–34)
MCHC RBC-ENTMCNC: 33.7 G/DL — SIGNIFICANT CHANGE UP (ref 32–36)
MCV RBC AUTO: 92.7 FL — SIGNIFICANT CHANGE UP (ref 80–100)
NRBC BLD AUTO-RTO: 0 /100 WBCS — SIGNIFICANT CHANGE UP (ref 0–0)
PA ADP PRP-ACNC: 202 PRU — SIGNIFICANT CHANGE UP (ref 182–335)
PHOSPHATE SERPL-MCNC: 2.2 MG/DL — LOW (ref 2.5–4.5)
PLATELET # BLD AUTO: 147 K/UL — LOW (ref 150–400)
PLATELET RESPONSE ASPIRIN RESULT: 589 ARU — SIGNIFICANT CHANGE UP
POTASSIUM SERPL-MCNC: 3.7 MMOL/L — SIGNIFICANT CHANGE UP (ref 3.5–5.3)
POTASSIUM SERPL-SCNC: 3.7 MMOL/L — SIGNIFICANT CHANGE UP (ref 3.5–5.3)
PROTHROM AB SERPL-ACNC: 11.9 SEC — SIGNIFICANT CHANGE UP (ref 9.9–13.4)
RBC # BLD: 4.67 M/UL — SIGNIFICANT CHANGE UP (ref 4.2–5.8)
RBC # FLD: 13.2 % — SIGNIFICANT CHANGE UP (ref 10.3–14.5)
RH IG SCN BLD-IMP: POSITIVE — SIGNIFICANT CHANGE UP
SODIUM SERPL-SCNC: 138 MMOL/L — SIGNIFICANT CHANGE UP (ref 135–145)
WBC # BLD: 7.26 K/UL — SIGNIFICANT CHANGE UP (ref 3.8–10.5)
WBC # FLD AUTO: 7.26 K/UL — SIGNIFICANT CHANGE UP (ref 3.8–10.5)

## 2025-03-19 PROCEDURE — 99231 SBSQ HOSP IP/OBS SF/LOW 25: CPT

## 2025-03-19 PROCEDURE — 99233 SBSQ HOSP IP/OBS HIGH 50: CPT

## 2025-03-19 RX ORDER — APREPITANT 40 MG/1
40 CAPSULE ORAL ONCE
Refills: 0 | Status: COMPLETED | OUTPATIENT
Start: 2025-03-20 | End: 2025-03-20

## 2025-03-19 RX ORDER — ACETAMINOPHEN 500 MG/5ML
1000 LIQUID (ML) ORAL ONCE
Refills: 0 | Status: COMPLETED | OUTPATIENT
Start: 2025-03-20 | End: 2025-03-20

## 2025-03-19 RX ORDER — INSULIN LISPRO 100 U/ML
14 INJECTION, SOLUTION INTRAVENOUS; SUBCUTANEOUS
Refills: 0 | Status: DISCONTINUED | OUTPATIENT
Start: 2025-03-19 | End: 2025-03-20

## 2025-03-19 RX ORDER — INSULIN LISPRO 100 U/ML
14 INJECTION, SOLUTION INTRAVENOUS; SUBCUTANEOUS ONCE
Refills: 0 | Status: COMPLETED | OUTPATIENT
Start: 2025-03-19 | End: 2025-03-19

## 2025-03-19 RX ORDER — SOD PHOS DI, MONO/K PHOS MONO 250 MG
2 TABLET ORAL ONCE
Refills: 0 | Status: COMPLETED | OUTPATIENT
Start: 2025-03-19 | End: 2025-03-19

## 2025-03-19 RX ORDER — METHOCARBAMOL 500 MG/1
500 TABLET, FILM COATED ORAL EVERY 8 HOURS
Refills: 0 | Status: DISCONTINUED | OUTPATIENT
Start: 2025-03-19 | End: 2025-03-20

## 2025-03-19 RX ORDER — INSULIN GLARGINE-YFGN 100 [IU]/ML
22 INJECTION, SOLUTION SUBCUTANEOUS AT BEDTIME
Refills: 0 | Status: DISCONTINUED | OUTPATIENT
Start: 2025-03-19 | End: 2025-03-20

## 2025-03-19 RX ADMIN — BUSPIRONE HYDROCHLORIDE 15 MILLIGRAM(S): 15 TABLET ORAL at 06:56

## 2025-03-19 RX ADMIN — INSULIN LISPRO 4: 100 INJECTION, SOLUTION INTRAVENOUS; SUBCUTANEOUS at 11:47

## 2025-03-19 RX ADMIN — INSULIN GLARGINE-YFGN 22 UNIT(S): 100 INJECTION, SOLUTION SUBCUTANEOUS at 23:51

## 2025-03-19 RX ADMIN — OXYCODONE HYDROCHLORIDE 10 MILLIGRAM(S): 30 TABLET ORAL at 06:56

## 2025-03-19 RX ADMIN — ATORVASTATIN CALCIUM 40 MILLIGRAM(S): 80 TABLET, FILM COATED ORAL at 23:00

## 2025-03-19 RX ADMIN — GABAPENTIN 300 MILLIGRAM(S): 400 CAPSULE ORAL at 23:00

## 2025-03-19 RX ADMIN — Medication 40 MILLIEQUIVALENT(S): at 07:26

## 2025-03-19 RX ADMIN — INSULIN LISPRO 2: 100 INJECTION, SOLUTION INTRAVENOUS; SUBCUTANEOUS at 23:21

## 2025-03-19 RX ADMIN — OXYCODONE HYDROCHLORIDE 10 MILLIGRAM(S): 30 TABLET ORAL at 16:42

## 2025-03-19 RX ADMIN — DOXAZOSIN MESYLATE 1 MILLIGRAM(S): 8 TABLET ORAL at 23:00

## 2025-03-19 RX ADMIN — INSULIN LISPRO 2: 100 INJECTION, SOLUTION INTRAVENOUS; SUBCUTANEOUS at 17:45

## 2025-03-19 RX ADMIN — Medication 2 TABLET(S): at 23:00

## 2025-03-19 RX ADMIN — METOPROLOL SUCCINATE 50 MILLIGRAM(S): 50 TABLET, EXTENDED RELEASE ORAL at 19:17

## 2025-03-19 RX ADMIN — INSULIN LISPRO 10 UNIT(S): 100 INJECTION, SOLUTION INTRAVENOUS; SUBCUTANEOUS at 08:54

## 2025-03-19 RX ADMIN — Medication 1 APPLICATION(S): at 06:57

## 2025-03-19 RX ADMIN — Medication 2 PACKET(S): at 07:26

## 2025-03-19 RX ADMIN — OXYCODONE HYDROCHLORIDE 10 MILLIGRAM(S): 30 TABLET ORAL at 17:36

## 2025-03-19 RX ADMIN — BUSPIRONE HYDROCHLORIDE 15 MILLIGRAM(S): 15 TABLET ORAL at 17:46

## 2025-03-19 RX ADMIN — INSULIN LISPRO 14 UNIT(S): 100 INJECTION, SOLUTION INTRAVENOUS; SUBCUTANEOUS at 17:47

## 2025-03-19 RX ADMIN — INSULIN LISPRO 14 UNIT(S): 100 INJECTION, SOLUTION INTRAVENOUS; SUBCUTANEOUS at 11:50

## 2025-03-19 RX ADMIN — Medication 1 APPLICATION(S): at 18:55

## 2025-03-19 RX ADMIN — METHOCARBAMOL 500 MILLIGRAM(S): 500 TABLET, FILM COATED ORAL at 17:45

## 2025-03-19 NOTE — DIETITIAN INITIAL EVALUATION ADULT - PERTINENT MEDS FT
MEDICATIONS  (STANDING):  atorvastatin 40 milliGRAM(s) Oral at bedtime  busPIRone 15 milliGRAM(s) Oral two times a day  doxazosin 1 milliGRAM(s) Oral at bedtime  gabapentin 300 milliGRAM(s) Oral at bedtime  influenza   Vaccine 0.5 milliLiter(s) IntraMuscular once  insulin glargine Injectable (LANTUS) 28 Unit(s) SubCutaneous at bedtime  insulin lispro (ADMELOG) corrective regimen sliding scale   SubCutaneous Before meals and at bedtime  insulin lispro Injectable (ADMELOG) 14 Unit(s) SubCutaneous three times a day before meals  linaclotide 290 MICROGram(s) Oral before breakfast  metoprolol succinate ER 50 milliGRAM(s) Oral every 12 hours  polyethylene glycol 3350 17 Gram(s) Oral daily  senna 2 Tablet(s) Oral at bedtime    MEDICATIONS  (PRN):  acetaminophen     Tablet .. 650 milliGRAM(s) Oral every 6 hours PRN Temp greater or equal to 38C (100.4F), Mild Pain (1 - 3)  oxyCODONE    IR 30 milliGRAM(s) Oral every 8 hours PRN Severe Pain (7 - 10)  oxyCODONE    IR 10 milliGRAM(s) Oral every 8 hours PRN Moderate Pain (4 - 6)

## 2025-03-19 NOTE — DIETITIAN INITIAL EVALUATION ADULT - PERSON TAUGHT/METHOD
Pt amenable to education; RD provided education in regards to the importance of adequate macro and micronutrients, as well as hydration to support ADLs, maintain energy levels and overall functional/nutritional status. General healthful education provided. Nutrient-dense foods promoted. RD discussed pt's elevated nutrient needs related to preoperative demands, emphasizing the role that protein plays in the healing process. Pt appeared overall receptive and verbalized understanding./verbal instruction/patient instructed

## 2025-03-19 NOTE — DIETITIAN INITIAL EVALUATION ADULT - PERTINENT LABORATORY DATA
03-19    138  |  105  |  28[H]  ----------------------------<  130[H]  3.7   |  23  |  0.95    Ca    8.6      19 Mar 2025 05:30  Phos  2.2     03-19  Mg     2.5     03-19    POCT Blood Glucose.: 237 mg/dL (03-19-25 @ 11:06)  A1C with Estimated Average Glucose Result: 7.7 % (03-17-25 @ 06:14)  A1C with Estimated Average Glucose Result: 7.6 % (04-05-24 @ 05:30)  A1C with Estimated Average Glucose Result: 7.7 % (04-04-24 @ 14:34)

## 2025-03-19 NOTE — DIETITIAN INITIAL EVALUATION ADULT - ADD RECOMMEND
1. Continue with current diet order (consistent carbohydrate diet)  **Provide additional nourishments and/or oral nutrition supplements per pt preferences and/or if pt's PO intakes are consistently <50%**  2. Encourage pt to meet nutritional needs as able  3. Monitor PO intakes, trend weights (weekly), monitor skin integrity, monitor labs (electrolytes, CMP), monitor GI function  4. Encourage adherence to diet education (reinforce as able)  5. Continue insulin regimen prn to promote euglycemia  6. Pain and bowel regimen per team  7. Will continue to assess/honor preferences as able  8. Align nutrition interventions with goals of care at all times

## 2025-03-19 NOTE — DIETITIAN INITIAL EVALUATION ADULT - OTHER INFO
This is a 64 year old male past medical history significant for HTN, HLD, DM, CAD with 11 cardiac stents (on Plavix, lase dose 3/14), lumbar fusion 2016, chronic pain (takes oxy 30mg 4-6 times per day) presented to ED s/p fall 5 weeks ago with head strike no LOC. PT states he visited an ED when he fell and had a negative CTH at the time. He now c/o R sided weakness, WFD, and HA rated at 8/10. Pt is status post bilateral MMAE (3/17/25). Plan for BL craniotomies for SDH evacuation (3/20/25).      Pt seen in room for nutrition assessment. Pt appeared pleasant, sitting up in bed. Pt reports fair to good appetite PTA and during hospital stay. As per diet recall PTA: pt stated he was eating "okay", not "as healthy" at home, stated his wife cooks at times, pt eats bread, biandas, kallie, fruits, meats, small amounts of rice. Currently on consistent carbohydrate diet, tolerating well, noted with ~75% PO intakes overall. No cultural, Adventist, or ethnic food preferences noted. No known food allergies. No supplements (micronutrient, oral nutrition supplements) at home noted. Pt endorsed some wt changes, reports wt fluctuations, nothing major, over the past few months. Dosing wt: ~203 pounds, Ideal body weight: ~154 pounds, pt is ~132% of ideal body weight. No noted major GI upset such as nausea, vomiting, pt noted with some moderate loose light brown stool, BM on 3/18/25, diarrhea, pt nurse and team are aware. No documented edema. Skin: surgical incisions to the right groin; no pressure ulcers documented. Shalom: 20. No issues chewing or swallowing noted. No noted discomfort/pain. Labs reviewed: elevated POCT glucose (188), BUN (28), low phosphorus (2.2), medical team is aware; RD to continue to monitor trends. Nutritionally pertinent medications/supplements: insulin, senna, MIRALAX. Observed pt with no overt signs of muscle or fat wasting. Based on ASPEN guidelines, pt does not meet criteria for malnutrition at this time. Pt amenable to education; RD provided education in regards to the importance of adequate macro and micronutrients, as well as hydration to support ADLs, maintain energy levels and overall functional/nutritional status. General healthful education provided. Nutrient-dense foods promoted. RD discussed pt's elevated nutrient needs related to preoperative demands, emphasizing the role that protein plays in the healing process. Pt appeared overall receptive and verbalized understanding. No additional nutrition-related concerns. Will continue to follow. Additional nutrition recommendations below to follow.

## 2025-03-19 NOTE — PROGRESS NOTE ADULT - SUBJECTIVE AND OBJECTIVE BOX
HPI:  64M PMHx HTN, HLD, DM, CAD w/ 11 cardiac stents (on Plavix, lase dose 3/14), lumbar fusion 2016, chronic pain (takes oxy 30mg 4-6 times per day) presented to ED s/p fall 5 weeks ago with head strike no LOC. PT states he visited an ED when he fell and had a negative CTH at the time. He now c/o R sided weakness, WFD, and HA rated at 8/10. He takes his home oxy for chronic LBP prescribed by PCP, which helps with the headache. Denies recent falls, dizziness, LOC, seizure, vision changes, SOB, chest pain.  (15 Mar 2025 14:07)      Subjective:  Patient admits to headache and given oxycodone. No other complaints.     Hospital course:  3/15: Admit to [facility] for further mgmt.   3/16: VALERIANO overnight. Neuro stable. Preop for MMAE, premedication protocol for contrast allergy. Cards consulted, cleared for MMAE, recommend echo.  3/17: VALERIANO ovn. Neuro stable. Preop for MMAE today. Echo done. Resumed home lantus 28u qhs. POD0 s/p b/l MMAE.   3/18: POD1. VALERIANO ovn. Started lispro 10u TID. Hold lisinopril given soft pressures. CTH complete. Still therapeutic on ASA/Plavix, rpt in AM.  3/19: POD2, VALERIANO overnight    Vital Signs Last 24 Hrs  T(C): 36.5 (18 Mar 2025 22:29), Max: 36.7 (18 Mar 2025 14:07)  T(F): 97.7 (18 Mar 2025 22:29), Max: 98 (18 Mar 2025 14:07)  HR: 68 (19 Mar 2025 00:00) (64 - 92)  BP: 118/71 (19 Mar 2025 00:00) (100/64 - 131/84)  BP(mean): 89 (19 Mar 2025 00:00) (76 - 103)  RR: 16 (19 Mar 2025 00:00) (16 - 18)  SpO2: 94% (19 Mar 2025 00:00) (92% - 97%)    Parameters below as of 19 Mar 2025 00:00  Patient On (Oxygen Delivery Method): room air        I&O's Summary    17 Mar 2025 07:01  -  18 Mar 2025 07:00  --------------------------------------------------------  IN: 790 mL / OUT: 1150 mL / NET: -360 mL    18 Mar 2025 07:01  -  19 Mar 2025 00:36  --------------------------------------------------------  IN: 650 mL / OUT: 1000 mL / NET: -350 mL        PHYSICAL EXAM:  General: patient seen laying supine in bed in NAD  Neuro: AAOx3, follows commands, opens eyes spontaneously, speech clear and fluent, CNII-XI grossly intact, face symmetric, no pronator drift,  strength 5/5 LUE and LLE, 4+/5 RUE and RLE, decreased sensation intact to light touch to RUE and RLE  HEENT: PERRL, EOMI  Neck: supple  Cardiac: RRR, S1S2  Pulmonary: chest rise symmetric  Abdomen: soft, nontender, nondistended  Ext: perfusing well, DP pulses 2+ b/l, PT pulses 1+ b/l  Skin: warm, dry  Wound: R groin puncture site c/d/i with steri strips, no hematoma    LABS:                        14.6   8.83  )-----------( 178      ( 18 Mar 2025 05:30 )             43.4     03-18    135  |  102  |  29[H]  ----------------------------<  223[H]  4.2   |  20[L]  |  1.00    Ca    8.5      18 Mar 2025 05:30  Phos  3.9     03-18  Mg     2.2     03-18      PT/INR - ( 17 Mar 2025 06:14 )   PT: 11.5 sec;   INR: 0.98          PTT - ( 17 Mar 2025 06:14 )  PTT:28.9 sec  Urinalysis Basic - ( 18 Mar 2025 05:30 )    Color: x / Appearance: x / SG: x / pH: x  Gluc: 223 mg/dL / Ketone: x  / Bili: x / Urobili: x   Blood: x / Protein: x / Nitrite: x   Leuk Esterase: x / RBC: x / WBC x   Sq Epi: x / Non Sq Epi: x / Bacteria: x          CAPILLARY BLOOD GLUCOSE      POCT Blood Glucose.: 152 mg/dL (18 Mar 2025 22:09)  POCT Blood Glucose.: 266 mg/dL (18 Mar 2025 17:48)  POCT Blood Glucose.: 253 mg/dL (18 Mar 2025 16:16)  POCT Blood Glucose.: 206 mg/dL (18 Mar 2025 12:35)  POCT Blood Glucose.: 180 mg/dL (18 Mar 2025 08:54)  POCT Blood Glucose.: 178 mg/dL (18 Mar 2025 07:43)      Drug Levels: [] N/A    CSF Analysis: [] N/A      Allergies    IV Contrast (Other; Rash)  ibuprofen (Other)    Intolerances      MEDICATIONS:  Antibiotics:    Neuro:  acetaminophen     Tablet .. 650 milliGRAM(s) Oral every 6 hours PRN  busPIRone 15 milliGRAM(s) Oral two times a day  gabapentin 300 milliGRAM(s) Oral at bedtime  oxyCODONE    IR 30 milliGRAM(s) Oral every 8 hours PRN  oxyCODONE    IR 10 milliGRAM(s) Oral every 8 hours PRN    Anticoagulation:    OTHER:  atorvastatin 40 milliGRAM(s) Oral at bedtime  chlorhexidine 2% Cloths 1 Application(s) Topical every 12 hours  doxazosin 1 milliGRAM(s) Oral at bedtime  influenza   Vaccine 0.5 milliLiter(s) IntraMuscular once  insulin glargine Injectable (LANTUS) 28 Unit(s) SubCutaneous at bedtime  insulin lispro (ADMELOG) corrective regimen sliding scale   SubCutaneous Before meals and at bedtime  insulin lispro Injectable (ADMELOG) 10 Unit(s) SubCutaneous three times a day before meals  linaclotide 290 MICROGram(s) Oral before breakfast  metoprolol succinate ER 50 milliGRAM(s) Oral every 12 hours  polyethylene glycol 3350 17 Gram(s) Oral daily  senna 2 Tablet(s) Oral at bedtime    IVF:    CULTURES:    RADIOLOGY & ADDITIONAL TESTS:    SUBDURAL HEMORRHAGE    No pertinent family history    FHx: myocardial infarction    FH: coronary artery disease    FH: type 2 diabetes    Handoff    MEWS Score    Sleep apnea    Hypertension    Diabetes mellitus    High cholesterol    Obesity (BMI 30.0-34.9)    CAD (coronary artery disease)    Heart burn    Hiatal hernia    Fatty liver    BPH (benign prostatic hyperplasia)    Constipation    CAD (coronary artery disease)    H/O low back pain    SDH (subdural hematoma)    SDH (subdural hematoma)    Headache    Subdural hemorrhage    Intracranial subdural hemorrhage    Angiogram, cerebral, with embolization    S/P angioplasty with stent    Finger injury    S/P foot surgery    H/O spinal fusion    MED EVAL    37    SysAdmin_VstLnk        ASSESSMENT:  64M PMHx HTN, HLD, DM, CAD w/ 11 cardiac stents (on Plavix, lase dose 3/14), lumbar fusion 2016, chronic pain (takes oxy 30mg 4-6 times per day) presented to ED s/p fall 5 weeks ago with head strike no LOC presented with R sided weakness, HA, +WFD. CTH showed BL SDH L>R w/ 5mm MLS. Now s/p b/l MMAE (3/17/25). Plan for BL craniotomies for SDH evacuation (3/20/25).     Neuro:  - neuro/vitals q4h  - pain control: tylenol prn, Oxy 10mg/30mg prn, gabapentin 300mg at bedtime, pain mgmt following   - Hx Depression: Buspirone 15mg BID   - Rpt CTH 3/18: stable size of collections   - cardiology cleared for BL crani's     Cardio:  - SBP <140  - Hx HTN: c/w Metoprolol ER 50mg bid, hold lisinopril 2.5mg daily, hold home HCTZ  - Hx CAD w/ 11 stents: HOLD home Plavix, ASA   - echo 3/17: EF 68%  - cardiology following    Pulm:   - RA  - hx EDMUND: CPAP at night    GI:  - CCD  - bowel regimen, BM 3/18  - chronic constipation: c/w linzess     Renal:  - IVL  - voiding   - cont home cardura    Endo:  - ISS, A1C 7.7  - c/w home lantus 28u qhs,  lispro 10u TID   - Hx DM, hold home metformin    Heme:   - SCDs for DVT ppx     ID:   - afebrile     Misc:   - hold home terbinafine    Dispo: Tele, full code, pending OR    Discussed w/ Dr. Enciso       Assessment:  Present when checked    []  GCS  E   V  M     Heart Failure: []Acute, [] acute on chronic , []chronic  Heart Failure:  [] Diastolic (HFpEF), [] Systolic (HFrEF), []Combined (HFpEF and HFrEF), [] RHF, [] Pulm HTN, [] Other    [] NICHOLAS, [] ATN, [] AIN, [] other  [] CKD1, [] CKD2, [] CKD 3, [] CKD 4, [] CKD 5, []ESRD    Encephalopathy: [] Metabolic, [] Hepatic, [] toxic, [] Neurological, [] Other    Abnormal Nurtitional Status: [] malnurtition (see nutrition note), [ ]underweight: BMI < 19, [] morbid obesity: BMI >40, [] Cachexia    [] Sepsis  [] hypovolemic shock,[] cardiogenic shock, [] hemorrhagic shock, [] neuogenic shock  [] Acute Respiratory Failure  []Cerebral edema, [] Brain compression/ herniation,   [] Functional quadriplegia  [] Acute blood loss anemia

## 2025-03-19 NOTE — PROGRESS NOTE ADULT - SUBJECTIVE AND OBJECTIVE BOX
Surgery: Bilateral craniotomies for subdural hematoma evacuation   Consent: Signed by patient                    NAME/NUMBER of HCP: Britany (wife): [phone]    Representative Consent: [ X ] Signed by patient                                               [  ] N/A -> only for cerebral angiogram    IV Contrast (Other; Rash)  ibuprofen (Other)    OVERNIGHT EVENTS: VALERIANO overnight,.     T(C): 36.4 (03-18-25 @ 08:44), Max: 36.8 (03-17-25 @ 22:07)  HR: 78 (03-18-25 @ 11:52) (64 - 91)  BP: 109/72 (03-18-25 @ 11:52) (100/64 - 136/80)  RR: 16 (03-18-25 @ 11:52) (16 - 18)  SpO2: 96% (03-18-25 @ 11:52) (94% - 97%)  Wt(kg): --    EXAM:  Constitutional: awake, alert, sitting up in bed. NAD.   Respiratory: non-labored breathing. Normal chest rise.   Cardiovascular: Regular rate and rhythm  Gastrointestinal:  Soft, nontender, slightly distended.  Vascular: Extremities warm, no ulcers, no discoloration of skin.   Neurological: Gen: AA&O x 3, conversant, appropriate.      Motor: VELASQUEZ x 4, LUE and LLE 5/5, RUE and RLE 4+/5    Sens: Sensation intact to light touch throughout.    Extremities: distal pulses DP 2+/PT 1+      No pronator drift  Wound/incision: Right groin incision c/d/i, no hematoma with gauze and tegaderm in place    03-18    135  |  102  |  29[H]  ----------------------------<  223[H]  4.2   |  20[L]  |  1.00    Ca    8.5      18 Mar 2025 05:30  Phos  3.9     03-18  Mg     2.2     03-18    CBC Full  -  ( 18 Mar 2025 05:30 )  WBC Count : 8.83 K/uL  RBC Count : 4.69 M/uL  Hemoglobin : 14.6 g/dL  Hematocrit : 43.4 %  Platelet Count - Automated : 178 K/uL  Mean Cell Volume : 92.5 fl  Mean Cell Hemoglobin : 31.1 pg  Mean Cell Hemoglobin Concentration : 33.6 g/dL    PT/INR - ( 17 Mar 2025 06:14 )   PT: 11.5 sec;   INR: 0.98          PTT - ( 17 Mar 2025 06:14 )  PTT:28.9 sec    Pregnancy test (serum hcg for any female under 56, must be resulted day before OR): [ ] Negative Result  [ ] Positive Result  [X] N/A : male or female>55 y/o  Type & Screen (in past 72hrs): Ordered for 3/19 AM labs      2 Type & Screen within 72 hours if anticipate blood need in OR: Yes     Blood ordered and on hold for OR:   [ ] No need     [ ] 1u pRBC on hold      [X] 2u pRBC on hold    CXR: 3/16 no acute infiltrates   EKG: Sinus  ECHO: EF 68% on 3/17  Cleared by cardiology Dr. Gallegos given extensive cardiac history     Last dose of antiplatelet/anticoagulation drug: Plavix 75mg 3/14, still therapeutic as of 3/18; repeat accumetrics ordered for AM     Implanted Devices (pacemaker, drug pump...etc):  []YES   [X] NO                                       3M nasal swab ordered?  **NOT ordered due to contrast allergy**  Cranial surgery: Order written for hair to be shampooed night before surgery and morning before surgery  [] yes   []no  Chlorhexidine Wipes ordered for Neck Down?  YES  (twice a day if 1 day before surgery, daily for 3 days if 3 days prior, daily if in ICU)               Assessment:  64M PMHx HTN, HLD, DM, CAD w/ 11 cardiac stents (on Plavix, lase dose 3/14), lumbar fusion 2016, chronic pain (takes oxy 30mg 4-6 times per day) presented to ED s/p fall 5 weeks ago with head strike no LOC presented with R sided weakness, HA, +WFD. CTH showed BL SDH L>R w/ 5mm MLS. Now s/p b/l MMAE (3/17/25). Pre-op for BL craniotomies for SDH evacuation (3/19/25).       Plan:  - for OR tomorrow  - consents and vendor consents signed and in chart  - cleared by cardiology   - Tylenol and Emend ordered for on call to OR  - NPO at midnight, IVF while NPO  - shampoo orders  - CHG/3M ordered  - active T&S --> 2 u PRBC on hold  - coags and labs reviewed  - SCDs    Discussed with Dr. Moore   Surgery: Bilateral craniotomies for subdural hematoma evacuation   Consent: Signed by patient                    NAME/NUMBER of HCP: Britany (wife): [phone]    Representative Consent: [ X ] Signed by patient                                               [  ] N/A -> only for cerebral angiogram    IV Contrast (Other; Rash)  ibuprofen (Other)    OVERNIGHT EVENTS: VALERIANO overnight,.     T(C): 36.4 (03-18-25 @ 08:44), Max: 36.8 (03-17-25 @ 22:07)  HR: 78 (03-18-25 @ 11:52) (64 - 91)  BP: 109/72 (03-18-25 @ 11:52) (100/64 - 136/80)  RR: 16 (03-18-25 @ 11:52) (16 - 18)  SpO2: 96% (03-18-25 @ 11:52) (94% - 97%)  Wt(kg): --    EXAM:  Constitutional: awake, alert, sitting up in bed. NAD.   Respiratory: non-labored breathing. Normal chest rise.   Cardiovascular: Regular rate and rhythm  Gastrointestinal:  Soft, nontender, slightly distended.  Vascular: Extremities warm, no ulcers, no discoloration of skin.   Neurological: Gen: AA&O x 3, conversant, appropriate.      Motor: VELASQUEZ x 4, LUE and LLE 5/5, RUE and RLE 4+/5    Sens: Sensation intact to light touch throughout.    Extremities: distal pulses DP 2+/PT 1+      No pronator drift  Wound/incision: Right groin incision c/d/i, no hematoma with gauze and tegaderm in place    03-18    135  |  102  |  29[H]  ----------------------------<  223[H]  4.2   |  20[L]  |  1.00    Ca    8.5      18 Mar 2025 05:30  Phos  3.9     03-18  Mg     2.2     03-18    CBC Full  -  ( 18 Mar 2025 05:30 )  WBC Count : 8.83 K/uL  RBC Count : 4.69 M/uL  Hemoglobin : 14.6 g/dL  Hematocrit : 43.4 %  Platelet Count - Automated : 178 K/uL  Mean Cell Volume : 92.5 fl  Mean Cell Hemoglobin : 31.1 pg  Mean Cell Hemoglobin Concentration : 33.6 g/dL    PT/INR - ( 17 Mar 2025 06:14 )   PT: 11.5 sec;   INR: 0.98          PTT - ( 17 Mar 2025 06:14 )  PTT:28.9 sec    Pregnancy test (serum hcg for any female under 56, must be resulted day before OR): [ ] Negative Result  [ ] Positive Result  [X] N/A : male or female>55 y/o  Type & Screen (in past 72hrs): Ordered for 3/19 AM labs      2 Type & Screen within 72 hours if anticipate blood need in OR: Yes     Blood ordered and on hold for OR:   [ ] No need     [ ] 1u pRBC on hold      [X] 2u pRBC on hold in addition to 1 u PLTs    CXR: 3/16 no acute infiltrates   EKG: Sinus  ECHO: EF 68% on 3/17  Cleared by cardiology Dr. Gallegos given extensive cardiac history     Last dose of antiplatelet/anticoagulation drug: Plavix 75mg 3/14, still therapeutic as of 3/19; repeat accumetrics ordered for AM and platelets on hold for OR    Implanted Devices (pacemaker, drug pump...etc):  []YES   [X] NO                                       3M nasal swab ordered?  **NOT ordered due to contrast allergy**  Cranial surgery: Order written for hair to be shampooed night before surgery and morning before surgery  [x] yes   []no  Chlorhexidine Wipes ordered for Neck Down?  YES  (twice a day if 1 day before surgery, daily for 3 days if 3 days prior, daily if in ICU)               Assessment:  64M PMHx HTN, HLD, DM, CAD w/ 11 cardiac stents (on Plavix, lase dose 3/14), lumbar fusion 2016, chronic pain (takes oxy 30mg 4-6 times per day) presented to ED s/p fall 5 weeks ago with head strike no LOC presented with R sided weakness, HA, +WFD. CTH showed BL SDH L>R w/ 5mm MLS. Now s/p b/l MMAE (3/17/25). Pre-op for BL craniotomies for SDH evacuation (3/19/25).       Plan:  - for OR tomorrow  - consents and vendor consents signed and in chart  - cleared by cardiology   - Tylenol and Emend ordered for on call to OR  - NPO at midnight, IVF while NPO  - shampoo orders  - CHG ordered - no nasal swab due to contrast allergy  - active T&S --> 2 u PRBC and PLT on hold  - coags and labs reviewed  - SCDs    Discussed with Dr. Moore

## 2025-03-19 NOTE — PROGRESS NOTE ADULT - ASSESSMENT
Mr. Marroquin is a 65yo man with PMHx HTN, HLD, IDDM, CAD w/ multiple prior cardiac stents, EDMUND on CPAP, lumbar fusion 2016, chronic pain (takes oxy 30mg 4-6 times per day) presented to ED with R-sided weakness iso fall 5 weeks ago with head strike, found to have b/l SDH L>R w/ 5mm MLS, now s/p MMA embolization 3/17 and pending kusum holes.     #B/l traumatic SDH s/p MMAE 3/17  - Management per primary - pending kusum holes later this week  - DAPT held - resume when safe from surgical perspective  - C/w Toprol 50mg BID tamy-operatively with hold parameters for HR < 60  - Defer to cardiology for pre-op evaluation/risk assessment    #CAD s/p multiple stents - most recently 04/2024  - Resume DAPT when safe from nsgy perspective  - C/w atorvastatin 40mg qhs    #IDDM - relatively well-controlled with a1c 7.7%, patient reports glargine 28U qhs + lispro 14U tid with meals and metformin  - C/w glargine 28U qhs + increase lispro to 14U tid with meals + SSI  - On day of surgery, recommend stopping lispro and decreasing glargine to 22U the night before    #Hypophosphatemia  - Please replete, daily phos levels    #Thrombocytopenia - mild, will monitor  - Daily CBC    #EDMUND  - C/w CPAP qhs    #HTN  - Hold lisinopril and hctz iso normotension, resume post-op as needed    DVT ppx - per primary    Dispo: PT/OT recommending AR

## 2025-03-19 NOTE — PROGRESS NOTE ADULT - SUBJECTIVE AND OBJECTIVE BOX
Patient is a 64y old  Male who presents with a chief complaint of Traumatic BL SDH (19 Mar 2025 00:36)    INTERVAL EVENTS:  - NAEO  - Patient was seen and examined with wife at bedside. Reports still feels unsteady with ambulation. Reports had BM yesterday  - Tele reviewed    Review of systems: No CP, dyspnea, nausea or vomiting, dysuria, LE edema.     Diet, Consistent Carbohydrate w/Evening Snack (03-18-25 @ 07:53) [Active]      MEDICATIONS:  MEDICATIONS  (STANDING):  atorvastatin 40 milliGRAM(s) Oral at bedtime  busPIRone 15 milliGRAM(s) Oral two times a day  doxazosin 1 milliGRAM(s) Oral at bedtime  gabapentin 300 milliGRAM(s) Oral at bedtime  influenza   Vaccine 0.5 milliLiter(s) IntraMuscular once  insulin glargine Injectable (LANTUS) 28 Unit(s) SubCutaneous at bedtime  insulin lispro (ADMELOG) corrective regimen sliding scale   SubCutaneous Before meals and at bedtime  insulin lispro Injectable (ADMELOG) 10 Unit(s) SubCutaneous three times a day before meals  linaclotide 290 MICROGram(s) Oral before breakfast  metoprolol succinate ER 50 milliGRAM(s) Oral every 12 hours  polyethylene glycol 3350 17 Gram(s) Oral daily  senna 2 Tablet(s) Oral at bedtime    MEDICATIONS  (PRN):  acetaminophen     Tablet .. 650 milliGRAM(s) Oral every 6 hours PRN Temp greater or equal to 38C (100.4F), Mild Pain (1 - 3)  oxyCODONE    IR 30 milliGRAM(s) Oral every 8 hours PRN Severe Pain (7 - 10)  oxyCODONE    IR 10 milliGRAM(s) Oral every 8 hours PRN Moderate Pain (4 - 6)      Allergies    IV Contrast (Other; Rash)  ibuprofen (Other)    Intolerances        OBJECTIVE:  Vital Signs Last 24 Hrs  T(C): 36.5 (19 Mar 2025 09:15), Max: 36.7 (18 Mar 2025 14:07)  T(F): 97.7 (19 Mar 2025 09:15), Max: 98 (18 Mar 2025 14:07)  HR: 86 (19 Mar 2025 09:43) (64 - 92)  BP: 103/72 (19 Mar 2025 08:22) (93/51 - 131/84)  BP(mean): 84 (19 Mar 2025 08:22) (66 - 103)  RR: 18 (19 Mar 2025 08:22) (16 - 18)  SpO2: 97% (19 Mar 2025 09:43) (92% - 98%)    Parameters below as of 19 Mar 2025 08:22  Patient On (Oxygen Delivery Method): room air      I&O's Summary    18 Mar 2025 07:01  -  19 Mar 2025 07:00  --------------------------------------------------------  IN: 850 mL / OUT: 1000 mL / NET: -150 mL    19 Mar 2025 07:01  -  19 Mar 2025 11:25  --------------------------------------------------------  IN: 0 mL / OUT: 800 mL / NET: -800 mL        PHYSICAL EXAM:  Gen: Reclining in bed at time of exam, appears stated age  HEENT: NCAT, MMM, clear OP  Neck: supple, trachea at midline  CV: RRR, +S1/S2  Pulm: adequate respiratory effort, no increase in work of breathing  Abd: soft, ND  Skin: warm and dry,   Ext: no edema  Neuro: Alert, speaking in full sentences  Psych: affect and behavior appropriate, pleasant at time of interview    LABS:                        14.6   7.26  )-----------( 147      ( 19 Mar 2025 05:30 )             43.3     03-19    138  |  105  |  28[H]  ----------------------------<  130[H]  3.7   |  23  |  0.95    Ca    8.6      19 Mar 2025 05:30  Phos  2.2     03-19  Mg     2.5     03-19        PT/INR - ( 19 Mar 2025 05:30 )   PT: 11.9 sec;   INR: 1.02          PTT - ( 19 Mar 2025 05:30 )  PTT:26.9 sec  CAPILLARY BLOOD GLUCOSE      POCT Blood Glucose.: 237 mg/dL (19 Mar 2025 11:06)  POCT Blood Glucose.: 188 mg/dL (19 Mar 2025 08:01)  POCT Blood Glucose.: 122 mg/dL (19 Mar 2025 06:49)  POCT Blood Glucose.: 152 mg/dL (18 Mar 2025 22:09)  POCT Blood Glucose.: 266 mg/dL (18 Mar 2025 17:48)  POCT Blood Glucose.: 253 mg/dL (18 Mar 2025 16:16)  POCT Blood Glucose.: 206 mg/dL (18 Mar 2025 12:35)    Urinalysis Basic - ( 19 Mar 2025 05:30 )    Color: x / Appearance: x / SG: x / pH: x  Gluc: 130 mg/dL / Ketone: x  / Bili: x / Urobili: x   Blood: x / Protein: x / Nitrite: x   Leuk Esterase: x / RBC: x / WBC x   Sq Epi: x / Non Sq Epi: x / Bacteria: x        MICRODATA:      RADIOLOGY/OTHER STUDIES:

## 2025-03-20 ENCOUNTER — TRANSCRIPTION ENCOUNTER (OUTPATIENT)
Age: 65
End: 2025-03-20

## 2025-03-20 LAB
ADD ON TEST-SPECIMEN IN LAB: SIGNIFICANT CHANGE UP
ANION GAP SERPL CALC-SCNC: 10 MMOL/L — SIGNIFICANT CHANGE UP (ref 5–17)
ANION GAP SERPL CALC-SCNC: 7 MMOL/L — SIGNIFICANT CHANGE UP (ref 5–17)
APTT BLD: 27 SEC — SIGNIFICANT CHANGE UP (ref 24.5–35.6)
APTT BLD: 29 SEC — SIGNIFICANT CHANGE UP (ref 24.5–35.6)
BUN SERPL-MCNC: 20 MG/DL — SIGNIFICANT CHANGE UP (ref 7–23)
BUN SERPL-MCNC: 24 MG/DL — HIGH (ref 7–23)
CALCIUM SERPL-MCNC: 8.4 MG/DL — SIGNIFICANT CHANGE UP (ref 8.4–10.5)
CALCIUM SERPL-MCNC: 8.9 MG/DL — SIGNIFICANT CHANGE UP (ref 8.4–10.5)
CHLORIDE SERPL-SCNC: 103 MMOL/L — SIGNIFICANT CHANGE UP (ref 96–108)
CHLORIDE SERPL-SCNC: 105 MMOL/L — SIGNIFICANT CHANGE UP (ref 96–108)
CO2 SERPL-SCNC: 24 MMOL/L — SIGNIFICANT CHANGE UP (ref 22–31)
CO2 SERPL-SCNC: 25 MMOL/L — SIGNIFICANT CHANGE UP (ref 22–31)
CREAT SERPL-MCNC: 0.88 MG/DL — SIGNIFICANT CHANGE UP (ref 0.5–1.3)
CREAT SERPL-MCNC: 1.01 MG/DL — SIGNIFICANT CHANGE UP (ref 0.5–1.3)
EGFR: 84 ML/MIN/1.73M2 — SIGNIFICANT CHANGE UP
EGFR: 84 ML/MIN/1.73M2 — SIGNIFICANT CHANGE UP
EGFR: 97 ML/MIN/1.73M2 — SIGNIFICANT CHANGE UP
EGFR: 97 ML/MIN/1.73M2 — SIGNIFICANT CHANGE UP
GLUCOSE SERPL-MCNC: 140 MG/DL — HIGH (ref 70–99)
GLUCOSE SERPL-MCNC: 179 MG/DL — HIGH (ref 70–99)
HCT VFR BLD CALC: 40.5 % — SIGNIFICANT CHANGE UP (ref 39–50)
HCT VFR BLD CALC: 43.7 % — SIGNIFICANT CHANGE UP (ref 39–50)
HGB BLD-MCNC: 13.8 G/DL — SIGNIFICANT CHANGE UP (ref 13–17)
HGB BLD-MCNC: 14.9 G/DL — SIGNIFICANT CHANGE UP (ref 13–17)
INR BLD: 1.02 — SIGNIFICANT CHANGE UP (ref 0.85–1.16)
INR BLD: 1.03 — SIGNIFICANT CHANGE UP (ref 0.85–1.16)
MAGNESIUM SERPL-MCNC: 2 MG/DL — SIGNIFICANT CHANGE UP (ref 1.6–2.6)
MAGNESIUM SERPL-MCNC: 2.1 MG/DL — SIGNIFICANT CHANGE UP (ref 1.6–2.6)
MCHC RBC-ENTMCNC: 31.2 PG — SIGNIFICANT CHANGE UP (ref 27–34)
MCHC RBC-ENTMCNC: 31.7 PG — SIGNIFICANT CHANGE UP (ref 27–34)
MCHC RBC-ENTMCNC: 34.1 G/DL — SIGNIFICANT CHANGE UP (ref 32–36)
MCHC RBC-ENTMCNC: 34.1 G/DL — SIGNIFICANT CHANGE UP (ref 32–36)
MCV RBC AUTO: 91.4 FL — SIGNIFICANT CHANGE UP (ref 80–100)
MCV RBC AUTO: 93 FL — SIGNIFICANT CHANGE UP (ref 80–100)
NRBC BLD AUTO-RTO: 0 /100 WBCS — SIGNIFICANT CHANGE UP (ref 0–0)
NRBC BLD AUTO-RTO: 0 /100 WBCS — SIGNIFICANT CHANGE UP (ref 0–0)
PHOSPHATE SERPL-MCNC: 2.3 MG/DL — LOW (ref 2.5–4.5)
PHOSPHATE SERPL-MCNC: 2.7 MG/DL — SIGNIFICANT CHANGE UP (ref 2.5–4.5)
PLATELET # BLD AUTO: 147 K/UL — LOW (ref 150–400)
PLATELET # BLD AUTO: 156 K/UL — SIGNIFICANT CHANGE UP (ref 150–400)
PLATELET RESPONSE ASPIRIN RESULT: 397 ARU — SIGNIFICANT CHANGE UP
POTASSIUM SERPL-MCNC: 3.8 MMOL/L — SIGNIFICANT CHANGE UP (ref 3.5–5.3)
POTASSIUM SERPL-MCNC: 4.2 MMOL/L — SIGNIFICANT CHANGE UP (ref 3.5–5.3)
POTASSIUM SERPL-SCNC: 3.8 MMOL/L — SIGNIFICANT CHANGE UP (ref 3.5–5.3)
POTASSIUM SERPL-SCNC: 4.2 MMOL/L — SIGNIFICANT CHANGE UP (ref 3.5–5.3)
PROTHROM AB SERPL-ACNC: 11.7 SEC — SIGNIFICANT CHANGE UP (ref 9.9–13.4)
PROTHROM AB SERPL-ACNC: 12 SEC — SIGNIFICANT CHANGE UP (ref 9.9–13.4)
RBC # BLD: 4.43 M/UL — SIGNIFICANT CHANGE UP (ref 4.2–5.8)
RBC # BLD: 4.7 M/UL — SIGNIFICANT CHANGE UP (ref 4.2–5.8)
RBC # FLD: 13.2 % — SIGNIFICANT CHANGE UP (ref 10.3–14.5)
RBC # FLD: 13.2 % — SIGNIFICANT CHANGE UP (ref 10.3–14.5)
SODIUM SERPL-SCNC: 135 MMOL/L — SIGNIFICANT CHANGE UP (ref 135–145)
SODIUM SERPL-SCNC: 139 MMOL/L — SIGNIFICANT CHANGE UP (ref 135–145)
WBC # BLD: 5.76 K/UL — SIGNIFICANT CHANGE UP (ref 3.8–10.5)
WBC # BLD: 5.95 K/UL — SIGNIFICANT CHANGE UP (ref 3.8–10.5)
WBC # FLD AUTO: 5.76 K/UL — SIGNIFICANT CHANGE UP (ref 3.8–10.5)
WBC # FLD AUTO: 5.95 K/UL — SIGNIFICANT CHANGE UP (ref 3.8–10.5)

## 2025-03-20 PROCEDURE — 61312 CRNEC/CRNOT STTL XDRL/SDRL: CPT | Mod: AS

## 2025-03-20 PROCEDURE — 99291 CRITICAL CARE FIRST HOUR: CPT

## 2025-03-20 PROCEDURE — 61312 CRNEC/CRNOT STTL XDRL/SDRL: CPT

## 2025-03-20 DEVICE — IMPLANTABLE DEVICE: Type: IMPLANTABLE DEVICE | Site: BILATERAL | Status: FUNCTIONAL

## 2025-03-20 DEVICE — SURGCEL 4 X 8": Type: IMPLANTABLE DEVICE | Site: BILATERAL | Status: FUNCTIONAL

## 2025-03-20 DEVICE — SURGIFLO HEMOSTATIC MATRIX KIT: Type: IMPLANTABLE DEVICE | Site: BILATERAL | Status: FUNCTIONAL

## 2025-03-20 DEVICE — SURGIFOAM 8 X 12.5CM X 10MM (100): Type: IMPLANTABLE DEVICE | Site: BILATERAL | Status: FUNCTIONAL

## 2025-03-20 RX ORDER — DEXTROSE 50 % IN WATER 50 %
25 SYRINGE (ML) INTRAVENOUS ONCE
Refills: 0 | Status: DISCONTINUED | OUTPATIENT
Start: 2025-03-20 | End: 2025-03-20

## 2025-03-20 RX ORDER — GLUCAGON 3 MG/1
1 POWDER NASAL ONCE
Refills: 0 | Status: DISCONTINUED | OUTPATIENT
Start: 2025-03-20 | End: 2025-03-20

## 2025-03-20 RX ORDER — INSULIN GLARGINE-YFGN 100 [IU]/ML
10 INJECTION, SOLUTION SUBCUTANEOUS ONCE
Refills: 0 | Status: COMPLETED | OUTPATIENT
Start: 2025-03-20 | End: 2025-03-20

## 2025-03-20 RX ORDER — SODIUM CHLORIDE 9 G/1000ML
1000 INJECTION, SOLUTION INTRAVENOUS
Refills: 0 | Status: DISCONTINUED | OUTPATIENT
Start: 2025-03-20 | End: 2025-03-20

## 2025-03-20 RX ORDER — ATORVASTATIN CALCIUM 80 MG/1
40 TABLET, FILM COATED ORAL AT BEDTIME
Refills: 0 | Status: DISCONTINUED | OUTPATIENT
Start: 2025-03-20 | End: 2025-03-28

## 2025-03-20 RX ORDER — LEVETIRACETAM 10 MG/ML
500 INJECTION, SOLUTION INTRAVENOUS EVERY 12 HOURS
Refills: 0 | Status: DISCONTINUED | OUTPATIENT
Start: 2025-03-20 | End: 2025-03-21

## 2025-03-20 RX ORDER — HYDROMORPHONE/SOD CHLOR,ISO/PF 2 MG/10 ML
0.5 SYRINGE (ML) INJECTION
Refills: 0 | Status: DISCONTINUED | OUTPATIENT
Start: 2025-03-20 | End: 2025-03-21

## 2025-03-20 RX ORDER — OXYCODONE HYDROCHLORIDE 30 MG/1
10 TABLET ORAL EVERY 8 HOURS
Refills: 0 | Status: DISCONTINUED | OUTPATIENT
Start: 2025-03-20 | End: 2025-03-26

## 2025-03-20 RX ORDER — DEXTROSE 50 % IN WATER 50 %
12.5 SYRINGE (ML) INTRAVENOUS ONCE
Refills: 0 | Status: DISCONTINUED | OUTPATIENT
Start: 2025-03-20 | End: 2025-03-20

## 2025-03-20 RX ORDER — ACETAMINOPHEN 500 MG/5ML
1000 LIQUID (ML) ORAL ONCE
Refills: 0 | Status: COMPLETED | OUTPATIENT
Start: 2025-03-20 | End: 2025-03-20

## 2025-03-20 RX ORDER — ACETAMINOPHEN 500 MG/5ML
1000 LIQUID (ML) ORAL EVERY 8 HOURS
Refills: 0 | Status: COMPLETED | OUTPATIENT
Start: 2025-03-20 | End: 2025-03-22

## 2025-03-20 RX ORDER — DOXAZOSIN MESYLATE 8 MG/1
1 TABLET ORAL AT BEDTIME
Refills: 0 | Status: DISCONTINUED | OUTPATIENT
Start: 2025-03-20 | End: 2025-03-28

## 2025-03-20 RX ORDER — SENNA 187 MG
2 TABLET ORAL AT BEDTIME
Refills: 0 | Status: DISCONTINUED | OUTPATIENT
Start: 2025-03-20 | End: 2025-03-28

## 2025-03-20 RX ORDER — METOPROLOL SUCCINATE 50 MG/1
50 TABLET, EXTENDED RELEASE ORAL EVERY 12 HOURS
Refills: 0 | Status: DISCONTINUED | OUTPATIENT
Start: 2025-03-20 | End: 2025-03-28

## 2025-03-20 RX ORDER — POLYETHYLENE GLYCOL 3350 17 G/17G
17 POWDER, FOR SOLUTION ORAL DAILY
Refills: 0 | Status: DISCONTINUED | OUTPATIENT
Start: 2025-03-20 | End: 2025-03-23

## 2025-03-20 RX ORDER — METHOCARBAMOL 500 MG/1
500 TABLET, FILM COATED ORAL EVERY 8 HOURS
Refills: 0 | Status: DISCONTINUED | OUTPATIENT
Start: 2025-03-20 | End: 2025-03-28

## 2025-03-20 RX ORDER — DEXTROSE 50 % IN WATER 50 %
15 SYRINGE (ML) INTRAVENOUS ONCE
Refills: 0 | Status: DISCONTINUED | OUTPATIENT
Start: 2025-03-20 | End: 2025-03-20

## 2025-03-20 RX ORDER — BUSPIRONE HYDROCHLORIDE 15 MG/1
15 TABLET ORAL
Refills: 0 | Status: DISCONTINUED | OUTPATIENT
Start: 2025-03-20 | End: 2025-03-28

## 2025-03-20 RX ORDER — GABAPENTIN 400 MG/1
300 CAPSULE ORAL AT BEDTIME
Refills: 0 | Status: DISCONTINUED | OUTPATIENT
Start: 2025-03-20 | End: 2025-03-28

## 2025-03-20 RX ORDER — ONDANSETRON HCL/PF 4 MG/2 ML
4 VIAL (ML) INJECTION EVERY 6 HOURS
Refills: 0 | Status: DISCONTINUED | OUTPATIENT
Start: 2025-03-20 | End: 2025-03-24

## 2025-03-20 RX ORDER — INSULIN GLARGINE-YFGN 100 [IU]/ML
22 INJECTION, SOLUTION SUBCUTANEOUS AT BEDTIME
Refills: 0 | Status: DISCONTINUED | OUTPATIENT
Start: 2025-03-20 | End: 2025-03-23

## 2025-03-20 RX ORDER — CEFAZOLIN SODIUM IN 0.9 % NACL 3 G/100 ML
2000 INTRAVENOUS SOLUTION, PIGGYBACK (ML) INTRAVENOUS EVERY 8 HOURS
Refills: 0 | Status: COMPLETED | OUTPATIENT
Start: 2025-03-20 | End: 2025-03-20

## 2025-03-20 RX ORDER — INSULIN LISPRO 100 U/ML
INJECTION, SOLUTION INTRAVENOUS; SUBCUTANEOUS
Refills: 0 | Status: DISCONTINUED | OUTPATIENT
Start: 2025-03-20 | End: 2025-03-28

## 2025-03-20 RX ORDER — LEVETIRACETAM 10 MG/ML
500 INJECTION, SOLUTION INTRAVENOUS EVERY 12 HOURS
Refills: 0 | Status: DISCONTINUED | OUTPATIENT
Start: 2025-03-20 | End: 2025-03-20

## 2025-03-20 RX ORDER — OXYCODONE HYDROCHLORIDE 30 MG/1
30 TABLET ORAL EVERY 8 HOURS
Refills: 0 | Status: DISCONTINUED | OUTPATIENT
Start: 2025-03-20 | End: 2025-03-26

## 2025-03-20 RX ADMIN — Medication 0.5 MILLIGRAM(S): at 11:30

## 2025-03-20 RX ADMIN — OXYCODONE HYDROCHLORIDE 30 MILLIGRAM(S): 30 TABLET ORAL at 19:23

## 2025-03-20 RX ADMIN — OXYCODONE HYDROCHLORIDE 30 MILLIGRAM(S): 30 TABLET ORAL at 18:09

## 2025-03-20 RX ADMIN — Medication 400 MILLIGRAM(S): at 14:09

## 2025-03-20 RX ADMIN — GABAPENTIN 300 MILLIGRAM(S): 400 CAPSULE ORAL at 21:20

## 2025-03-20 RX ADMIN — OXYCODONE HYDROCHLORIDE 10 MILLIGRAM(S): 30 TABLET ORAL at 12:10

## 2025-03-20 RX ADMIN — APREPITANT 40 MILLIGRAM(S): 40 CAPSULE ORAL at 06:03

## 2025-03-20 RX ADMIN — METOPROLOL SUCCINATE 50 MILLIGRAM(S): 50 TABLET, EXTENDED RELEASE ORAL at 06:39

## 2025-03-20 RX ADMIN — Medication 100 MILLIGRAM(S): at 23:02

## 2025-03-20 RX ADMIN — ATORVASTATIN CALCIUM 40 MILLIGRAM(S): 80 TABLET, FILM COATED ORAL at 21:58

## 2025-03-20 RX ADMIN — OXYCODONE HYDROCHLORIDE 10 MILLIGRAM(S): 30 TABLET ORAL at 11:37

## 2025-03-20 RX ADMIN — BUSPIRONE HYDROCHLORIDE 15 MILLIGRAM(S): 15 TABLET ORAL at 06:02

## 2025-03-20 RX ADMIN — Medication 1000 MILLIGRAM(S): at 06:03

## 2025-03-20 RX ADMIN — Medication 1000 MILLIGRAM(S): at 21:19

## 2025-03-20 RX ADMIN — METOPROLOL SUCCINATE 50 MILLIGRAM(S): 50 TABLET, EXTENDED RELEASE ORAL at 17:09

## 2025-03-20 RX ADMIN — Medication 2 TABLET(S): at 21:58

## 2025-03-20 RX ADMIN — Medication 1000 MILLIGRAM(S): at 21:49

## 2025-03-20 RX ADMIN — Medication 4 MILLIGRAM(S): at 22:02

## 2025-03-20 RX ADMIN — Medication 0.5 MILLIGRAM(S): at 18:15

## 2025-03-20 RX ADMIN — POLYETHYLENE GLYCOL 3350 17 GRAM(S): 17 POWDER, FOR SOLUTION ORAL at 17:09

## 2025-03-20 RX ADMIN — INSULIN LISPRO 2: 100 INJECTION, SOLUTION INTRAVENOUS; SUBCUTANEOUS at 11:28

## 2025-03-20 RX ADMIN — Medication 0.5 MILLIGRAM(S): at 12:10

## 2025-03-20 RX ADMIN — Medication 0.5 MILLIGRAM(S): at 22:56

## 2025-03-20 RX ADMIN — Medication 100 MILLIGRAM(S): at 17:09

## 2025-03-20 RX ADMIN — LEVETIRACETAM 400 MILLIGRAM(S): 10 INJECTION, SOLUTION INTRAVENOUS at 17:09

## 2025-03-20 RX ADMIN — Medication 0.5 MILLIGRAM(S): at 17:09

## 2025-03-20 RX ADMIN — Medication 1 APPLICATION(S): at 06:11

## 2025-03-20 RX ADMIN — DOXAZOSIN MESYLATE 1 MILLIGRAM(S): 8 TABLET ORAL at 21:57

## 2025-03-20 RX ADMIN — Medication 0.5 MILLIGRAM(S): at 23:11

## 2025-03-20 RX ADMIN — INSULIN GLARGINE-YFGN 10 UNIT(S): 100 INJECTION, SOLUTION SUBCUTANEOUS at 22:26

## 2025-03-20 RX ADMIN — Medication 1000 MILLIGRAM(S): at 06:42

## 2025-03-20 NOTE — BRIEF OPERATIVE NOTE - NSICDXBRIEFPOSTOP_GEN_ALL_CORE_FT
POST-OP DIAGNOSIS:  SDH (subdural hematoma) 17-Mar-2025 17:20:43  Rory Woodward  
POST-OP DIAGNOSIS:  SDH (subdural hematoma) 17-Mar-2025 17:20:43  Rory Woodward

## 2025-03-20 NOTE — BRIEF OPERATIVE NOTE - NSICDXBRIEFOPLAUNCH_GEN_ALL_CORE
<--- Click to Launch ICDx for PreOp, PostOp and Procedure
<--- Click to Launch ICDx for PreOp, PostOp and Procedure
Soft, non-tender, no hepatosplenomegaly, normal bowel sounds

## 2025-03-20 NOTE — PROGRESS NOTE ADULT - ASSESSMENT
64M PMHx HTN, HLD, DM, CAD w/ 11 cardiac stents (on Plavix, lase dose 3/14), lumbar fusion 2016, chronic pain (takes oxy 30mg 4-6 times per day) presented to ED s/p fall 5 weeks ago with head strike no LOC presented with R sided weakness, HA, +WFD. CTH showed BL SDH L>R w/ 5mm MLS. Now s/p b/l MMAE (3/17/25). Now s/p BL kusum holes for SDH evacuation (3/20/25).     Neuro:  - neuro/vitals q1h  - pain control: tylenol prn, Oxy 10mg/30mg prn, gabapentin 300mg at bedtime, robaxin 500mg q8hrs prn, pain mgmt following  - SD bile bag drains x2, monitor output   - seizure ppx: Keppra 500mg BID   - Hx Depression: Buspirone 15mg BID   - Rpt CTH 3/18: stable size of collections   - pending postop CTH   - HOB <30 while SD drains in     Cardio:  - SBP <140  - Hx HTN: c/w Metoprolol ER 50mg bid, hold lisinopril 2.5mg daily, hold home HCTZ  - Hx CAD w/ 11 stents: HOLD home Plavix, ASA   - echo 3/17: EF 68%  - cardiology following; cleared for OR    Pulm:   - RA  - hx EDMUND: CPAP at night    GI:  - ADAT CCD  - bowel regimen, BM 3/19  - chronic constipation: c/w linzess     Renal:  - IVF while advancing diet   - cormier (3/20 - )   - cont home cardura    Endo:  - ISS, A1C 7.7  - c/w  lantus 22u qhs while NPO (hold lantus 28u and lispro 14u premeal)  - Hx DM, hold home metformin    Heme:   - SCDs for DVT ppx     ID:   - afebrile     Misc:   - hold home terbinafine    Dispo: NeuroICU, Full Code      Jessica Draper MD  Neurocritical Care Attending    64M PMHx HTN, HLD, DM, CAD w/ 11 cardiac stents (on Plavix, lase dose 3/14), lumbar fusion 2016, chronic pain (takes oxy 30mg 4-6 times per day) presented to ED s/p fall 5 weeks ago with head strike no LOC presented with R sided weakness, HA, +WFD. CTH showed BL SDH L>R w/ 5mm MLS. Now s/p b/l MMAE (3/17/25). Now s/p BL kusum holes for SDH evacuation (3/20/25).     Neuro:  - neuro/vitals q1h  - pain control: Tylenol prn, Oxy 10mg/30mg prn, gabapentin 300mg at bedtime, Robaxin 500mg q8hrs prn, pain mgmt. following  - SD bile bag drains x2, monitor output   - seizure ppx: Keppra 500mg BID   - Hx Depression: Buspirone 15mg BID   - Rpt CTH 3/18: stable size of collections   - pending postop CTH   - HOB <30 while SD drains in     Cardio:  - SBP <140  - Hx HTN: c/w Metoprolol ER 50mg bid, hold lisinopril 2.5mg daily, hold home HCTZ  - Hx CAD w/ 11 stents: HOLD home Plavix, ASA   - echo 3/17: EF 68%  - cardiology following; cleared for OR  - telemetry monitoring/a-line for hemodynamic monitoring     Pulm:   - RA  - hx EDMUND: CPAP at night (refusing machine), O2 via NC PRN    GI:  - ADAT CCD  - bowel regimen, BM 3/19  - chronic constipation: c/w Linzess     Renal:  - IVF while advancing diet   - [place] (3/20 - )   - cont home Cardura    Endo:  - ISS, A1C 7.7  - c/w  lantus 22u qhs while NPO (hold lantus 28u and lispro 14u premeal)  - Hx DM, hold home metformin    Heme:   - SCDs for DVT ppx     ID:   - afebrile     Misc:   - hold home terbinafine    Dispo: NeuroICU, Full Code      Jessica Draper MD  Neurocritical Care Attending

## 2025-03-20 NOTE — PRE-ANESTHESIA EVALUATION ADULT - NSANTHRISKNONERD_GEN_ALL_CORE
Spoke with pt and he stated it wasn't a good time and requested to call him back an hour from now (it is11:08am). I told the patient that if I had time that I could try and call him back then.   
No risk alerts present
No risk alerts present

## 2025-03-20 NOTE — PROGRESS NOTE ADULT - SUBJECTIVE AND OBJECTIVE BOX
NEUROSURGERY POST OP NOTE:    POD# 0 S/P bilateral kusum holes with subdural drains    S: Feels pain in head and lower abdomen/groin area. Feels that right-sided sensation has improved a little.   Has not passed flatus yet.  Denies chest pain, abdominal pain, change in vision, new weakness      T(C): 36.2 (03-20-25 @ 10:25), Max: 36.8 (03-19-25 @ 18:02)  HR: 64 (03-20-25 @ 10:40) (64 - 88)  BP: 138/84 (03-20-25 @ 10:40) (114/68 - 144/92)  RR: 17 (03-20-25 @ 10:40) (12 - 22)  SpO2: 100% (03-20-25 @ 10:40) (93% - 100%)      03-19-25 @ 07:01  -  03-20-25 @ 07:00  --------------------------------------------------------  IN: 0 mL / OUT: 1760 mL / NET: -1760 mL      acetaminophen     Tablet .. 1000 milliGRAM(s) Oral every 8 hours  atorvastatin 40 milliGRAM(s) Oral at bedtime  busPIRone 15 milliGRAM(s) Oral two times a day  ceFAZolin   IVPB 2000 milliGRAM(s) IV Intermittent every 8 hours  dextrose 5%. 1000 milliLiter(s) IV Continuous <Continuous>  dextrose 5%. 1000 milliLiter(s) IV Continuous <Continuous>  dextrose 50% Injectable 25 Gram(s) IV Push once  dextrose 50% Injectable 12.5 Gram(s) IV Push once  dextrose 50% Injectable 25 Gram(s) IV Push once  dextrose Oral Gel 15 Gram(s) Oral once PRN  doxazosin 1 milliGRAM(s) Oral at bedtime  gabapentin 300 milliGRAM(s) Oral at bedtime  glucagon  Injectable 1 milliGRAM(s) IntraMuscular once  hydrALAZINE Injectable 10 milliGRAM(s) IV Push every 1 hour PRN  HYDROmorphone  Injectable 0.5 milliGRAM(s) IV Push every 3 hours PRN  influenza   Vaccine 0.5 milliLiter(s) IntraMuscular once  insulin glargine Injectable (LANTUS) 22 Unit(s) SubCutaneous at bedtime  insulin lispro (ADMELOG) corrective regimen sliding scale   SubCutaneous three times a day before meals  levETIRAcetam  IVPB 500 milliGRAM(s) IV Intermittent every 12 hours  methocarbamol 500 milliGRAM(s) Oral every 8 hours PRN  metoprolol succinate ER 50 milliGRAM(s) Oral every 12 hours  ondansetron Injectable 4 milliGRAM(s) IV Push every 6 hours PRN  oxyCODONE    IR 10 milliGRAM(s) Oral every 8 hours PRN  oxyCODONE    IR 30 milliGRAM(s) Oral every 8 hours PRN  polyethylene glycol 3350 17 Gram(s) Oral daily  senna 2 Tablet(s) Oral at bedtime  sodium chloride 0.9%. 1000 milliLiter(s) IV Continuous <Continuous>      RADIOLOGY:     Exam:  General: no acute distress, laying comfortably in bed  Skin: skin warm and dry throughout  HEENT: PERRL b/l, EOMI b/l, mild horizontal nystagmus present  Cardio: regular rate and rhythm, S1 and S2 present, no murmurs/rubs/gallops  Respiratory: breathing unlabored on room air, CTA b/l, no wheezes/rhonchi/rales  Abdomen: soft, non-distended, non-tender to palpation, no guarding/rigidity  Extremities: no evidence of edema x4  Neuro:  - AOx3, speaking in full sentences  - face symmetric, tongue midline  - moving all extremities x4  - LUE strength 4+, RUE strength 4+  - LLE strength 4+, RLE strength 5+  - Sensation RUE 80%, %, RLE 85%    WOUND/DRAINS:    DEVICES:       Assessment: 64yMale      Plan:      Assessment:  Present when checked    []  GCS  E   V  M     Heart Failure: []Acute, [] acute on chronic , []chronic  Heart Failure:  [] Diastolic (HFpEF), [] Systolic (HFrEF), []Combined (HFpEF and HFrEF), [] RHF, [] Pulm HTN, [] Other    [] NICHOLAS, [] ATN, [] AIN, [] other  [] CKD1, [] CKD2, [] CKD 3, [] CKD 4, [] CKD 5, []ESRD    Encephalopathy: [] Metabolic, [] Hepatic, [] toxic, [] Neurological, [] Other    Abnormal Nurtitional Status: [] malnurtition (see nutrition note), [ ]underweight: BMI < 19, [] morbid obesity: BMI >40, [] Cachexia    [] Sepsis  [] hypovolemic shock,[] cardiogenic shock, [] hemorrhagic shock, [] neuogenic shock  [] Acute Respiratory Failure  []Cerebral edema, [] Brain compression/ herniation,   [] Functional quadriplegia  [] Acute blood loss anemia       NEUROSURGERY POST OP NOTE:    POD# 0 S/P bilateral kusum holes with subdural drains    S: Feels pain in head and lower abdomen/groin area. Feels that right-sided sensation has improved a little.   Has not passed flatus yet.  Denies chest pain, abdominal pain, change in vision, new weakness      T(C): 36.2 (03-20-25 @ 10:25), Max: 36.8 (03-19-25 @ 18:02)  HR: 64 (03-20-25 @ 10:40) (64 - 88)  BP: 138/84 (03-20-25 @ 10:40) (114/68 - 144/92)  RR: 17 (03-20-25 @ 10:40) (12 - 22)  SpO2: 100% (03-20-25 @ 10:40) (93% - 100%)      03-19-25 @ 07:01  -  03-20-25 @ 07:00  --------------------------------------------------------  IN: 0 mL / OUT: 1760 mL / NET: -1760 mL      acetaminophen     Tablet .. 1000 milliGRAM(s) Oral every 8 hours  atorvastatin 40 milliGRAM(s) Oral at bedtime  busPIRone 15 milliGRAM(s) Oral two times a day  ceFAZolin   IVPB 2000 milliGRAM(s) IV Intermittent every 8 hours  dextrose 5%. 1000 milliLiter(s) IV Continuous <Continuous>  dextrose 5%. 1000 milliLiter(s) IV Continuous <Continuous>  dextrose 50% Injectable 25 Gram(s) IV Push once  dextrose 50% Injectable 12.5 Gram(s) IV Push once  dextrose 50% Injectable 25 Gram(s) IV Push once  dextrose Oral Gel 15 Gram(s) Oral once PRN  doxazosin 1 milliGRAM(s) Oral at bedtime  gabapentin 300 milliGRAM(s) Oral at bedtime  glucagon  Injectable 1 milliGRAM(s) IntraMuscular once  hydrALAZINE Injectable 10 milliGRAM(s) IV Push every 1 hour PRN  HYDROmorphone  Injectable 0.5 milliGRAM(s) IV Push every 3 hours PRN  influenza   Vaccine 0.5 milliLiter(s) IntraMuscular once  insulin glargine Injectable (LANTUS) 22 Unit(s) SubCutaneous at bedtime  insulin lispro (ADMELOG) corrective regimen sliding scale   SubCutaneous three times a day before meals  levETIRAcetam  IVPB 500 milliGRAM(s) IV Intermittent every 12 hours  methocarbamol 500 milliGRAM(s) Oral every 8 hours PRN  metoprolol succinate ER 50 milliGRAM(s) Oral every 12 hours  ondansetron Injectable 4 milliGRAM(s) IV Push every 6 hours PRN  oxyCODONE    IR 10 milliGRAM(s) Oral every 8 hours PRN  oxyCODONE    IR 30 milliGRAM(s) Oral every 8 hours PRN  polyethylene glycol 3350 17 Gram(s) Oral daily  senna 2 Tablet(s) Oral at bedtime  sodium chloride 0.9%. 1000 milliLiter(s) IV Continuous <Continuous>      RADIOLOGY:     Exam:  General: no acute distress, laying comfortably in bed  Skin: skin warm and dry throughout  HEENT: PERRL b/l, EOMI b/l, mild horizontal nystagmus present  Cardio: regular rate and rhythm, S1 and S2 present, no murmurs/rubs/gallops  Respiratory: breathing unlabored on room air, CTA b/l, no wheezes/rhonchi/rales  Abdomen: soft, non-distended, non-tender to palpation, no guarding/rigidity  Extremities: no evidence of edema x4  Neuro:  - AOx3, speaking in full sentences  - face symmetric, tongue midline  - no pronator drift  - moving all extremities x4  - LUE strength 4+, RUE strength 4+  - LLE strength 4+, RLE strength 5+  - Sensation RUE 80%, %, RLE 85%, %    WOUND/DRAINS:  - subdural drain to bile bags x2   - frontal cranial incision x2, covered by dressing    DEVICES:       Assessment: 64yMale w/ PMH       Plan:         NEUROSURGERY POST OP NOTE:    POD# 0 S/P bilateral craniotomy for subdural hematoma evacuation    S: Feels pain in head and lower abdomen/groin area. Feels that right-sided sensation has improved a little.   Has not passed flatus yet.  Denies chest pain, abdominal pain, change in vision, new weakness      T(C): 36.2 (03-20-25 @ 10:25), Max: 36.8 (03-19-25 @ 18:02)  HR: 64 (03-20-25 @ 10:40) (64 - 88)  BP: 138/84 (03-20-25 @ 10:40) (114/68 - 144/92)  RR: 17 (03-20-25 @ 10:40) (12 - 22)  SpO2: 100% (03-20-25 @ 10:40) (93% - 100%)      03-19-25 @ 07:01  -  03-20-25 @ 07:00  --------------------------------------------------------  IN: 0 mL / OUT: 1760 mL / NET: -1760 mL      acetaminophen     Tablet .. 1000 milliGRAM(s) Oral every 8 hours  atorvastatin 40 milliGRAM(s) Oral at bedtime  busPIRone 15 milliGRAM(s) Oral two times a day  ceFAZolin   IVPB 2000 milliGRAM(s) IV Intermittent every 8 hours  dextrose 5%. 1000 milliLiter(s) IV Continuous <Continuous>  dextrose 5%. 1000 milliLiter(s) IV Continuous <Continuous>  dextrose 50% Injectable 25 Gram(s) IV Push once  dextrose 50% Injectable 12.5 Gram(s) IV Push once  dextrose 50% Injectable 25 Gram(s) IV Push once  dextrose Oral Gel 15 Gram(s) Oral once PRN  doxazosin 1 milliGRAM(s) Oral at bedtime  gabapentin 300 milliGRAM(s) Oral at bedtime  glucagon  Injectable 1 milliGRAM(s) IntraMuscular once  hydrALAZINE Injectable 10 milliGRAM(s) IV Push every 1 hour PRN  HYDROmorphone  Injectable 0.5 milliGRAM(s) IV Push every 3 hours PRN  influenza   Vaccine 0.5 milliLiter(s) IntraMuscular once  insulin glargine Injectable (LANTUS) 22 Unit(s) SubCutaneous at bedtime  insulin lispro (ADMELOG) corrective regimen sliding scale   SubCutaneous three times a day before meals  levETIRAcetam  IVPB 500 milliGRAM(s) IV Intermittent every 12 hours  methocarbamol 500 milliGRAM(s) Oral every 8 hours PRN  metoprolol succinate ER 50 milliGRAM(s) Oral every 12 hours  ondansetron Injectable 4 milliGRAM(s) IV Push every 6 hours PRN  oxyCODONE    IR 10 milliGRAM(s) Oral every 8 hours PRN  oxyCODONE    IR 30 milliGRAM(s) Oral every 8 hours PRN  polyethylene glycol 3350 17 Gram(s) Oral daily  senna 2 Tablet(s) Oral at bedtime  sodium chloride 0.9%. 1000 milliLiter(s) IV Continuous <Continuous>      RADIOLOGY:     Exam:  General: no acute distress, laying comfortably in bed  Skin: skin warm and dry throughout  HEENT: PERRL b/l, EOMI b/l, mild horizontal nystagmus present  Cardio: regular rate and rhythm, S1 and S2 present, no murmurs/rubs/gallops  Respiratory: breathing unlabored on room air, CTA b/l, no wheezes/rhonchi/rales  Abdomen: soft, non-distended, non-tender to palpation, no guarding/rigidity  Extremities: no evidence of edema x4  Neuro:  - AOx3, speaking in full sentences  - face symmetric, tongue midline  - no pronator drift  - moving all extremities x4  - LUE strength 4+, RUE strength 4+  - LLE strength 4+, RLE strength 5+  - Sensation RUE 80%, %, RLE 85%, %    WOUND/DRAINS:  - subdural drain to bile bags x2   - frontal cranial incision x2, covered by dressing    DEVICES:       Assessment: 64yMale w/ PMH HTN, HLD, CAD with 11 stents (on Plavix, last dose 3/14), chronic pain (on oxy 30 mg x4-6/day), lumbar fusion 2016, DM presented to ED with right-sided weakness, word finding difficulty, and HA s/p fall with head strike no LOC 5 weeks ago. CTH showed bilateral subdural hematoma L>R w/ 5 mm midline shift. S/p b/l MMAE 3/17/25. Now s/p b/l craniotomy with kusum holes for subdural hematoma evacuation 3/20/25.    Plan:  Neuro:  - neuro/vitals q1h  - pain control: Tylenol 1000 mg q8h PO, gabapentin 300 mg PO bedtime, Dilaudid 0.5 mg IV q3h PRN, oxycodone 10/30 mg PO q8h PRN  - seizure ppx: Keppra 500 mg IV q12h  - depression: buspirone 15 mg PO BID  - Rpt CTH 3/18: stable size of collections   - SCD bile bag x2, monitor output  - HOB less than 30 degrees  - bedrest 3/20/25  - postop CTH    Cardio:  - SBP goal   - HLD: atorvastatin 40 mg PO bedtime  - HTN: metoprolol succinate ER 50 mg PO q12h, hold lisinopril 2.5mg daily, hold home HCTZ  - CAD w/ 11 stents: hold ASA and Plavix  - echo 3/17/25: EF 68%    Pulm:  - NC 2L  - incentive spirometry    GI:  - NPO, ADAT  - nausea: ondansetron 4 mg IV q6h PRN  - Bowel regimen    Renal:  - IVF  - +Escalona, TOV    Endo:  - ISS, Lantus 22 bedtime    Heme:  - DVT ppx: SCDs b/l    ID:  - postop ancef 2000 mg IV intermittent q8h    Dispo: NSICU, full code, PT/OT    Discussed with Dr. Enciso   NEUROSURGERY POST OP NOTE:    POD# 0 S/P bilateral craniotomy for subdural hematoma evacuation    S: Feels pain in head and lower abdomen/groin area. Feels that right-sided sensation has improved a little.   Has not passed flatus yet.  Denies chest pain, abdominal pain, change in vision, new weakness      T(C): 36.2 (03-20-25 @ 10:25), Max: 36.8 (03-19-25 @ 18:02)  HR: 64 (03-20-25 @ 10:40) (64 - 88)  BP: 138/84 (03-20-25 @ 10:40) (114/68 - 144/92)  RR: 17 (03-20-25 @ 10:40) (12 - 22)  SpO2: 100% (03-20-25 @ 10:40) (93% - 100%)      03-19-25 @ 07:01  -  03-20-25 @ 07:00  --------------------------------------------------------  IN: 0 mL / OUT: 1760 mL / NET: -1760 mL      acetaminophen     Tablet .. 1000 milliGRAM(s) Oral every 8 hours  atorvastatin 40 milliGRAM(s) Oral at bedtime  busPIRone 15 milliGRAM(s) Oral two times a day  ceFAZolin   IVPB 2000 milliGRAM(s) IV Intermittent every 8 hours  dextrose 5%. 1000 milliLiter(s) IV Continuous <Continuous>  dextrose 5%. 1000 milliLiter(s) IV Continuous <Continuous>  dextrose 50% Injectable 25 Gram(s) IV Push once  dextrose 50% Injectable 12.5 Gram(s) IV Push once  dextrose 50% Injectable 25 Gram(s) IV Push once  dextrose Oral Gel 15 Gram(s) Oral once PRN  doxazosin 1 milliGRAM(s) Oral at bedtime  gabapentin 300 milliGRAM(s) Oral at bedtime  glucagon  Injectable 1 milliGRAM(s) IntraMuscular once  hydrALAZINE Injectable 10 milliGRAM(s) IV Push every 1 hour PRN  HYDROmorphone  Injectable 0.5 milliGRAM(s) IV Push every 3 hours PRN  influenza   Vaccine 0.5 milliLiter(s) IntraMuscular once  insulin glargine Injectable (LANTUS) 22 Unit(s) SubCutaneous at bedtime  insulin lispro (ADMELOG) corrective regimen sliding scale   SubCutaneous three times a day before meals  levETIRAcetam  IVPB 500 milliGRAM(s) IV Intermittent every 12 hours  methocarbamol 500 milliGRAM(s) Oral every 8 hours PRN  metoprolol succinate ER 50 milliGRAM(s) Oral every 12 hours  ondansetron Injectable 4 milliGRAM(s) IV Push every 6 hours PRN  oxyCODONE    IR 10 milliGRAM(s) Oral every 8 hours PRN  oxyCODONE    IR 30 milliGRAM(s) Oral every 8 hours PRN  polyethylene glycol 3350 17 Gram(s) Oral daily  senna 2 Tablet(s) Oral at bedtime  sodium chloride 0.9%. 1000 milliLiter(s) IV Continuous <Continuous>      RADIOLOGY:     Exam:  General: no acute distress, laying comfortably in bed  Skin: skin warm and dry throughout  HEENT: PERRL b/l, EOMI b/l, mild horizontal nystagmus present  Cardio: regular rate and rhythm, S1 and S2 present, no murmurs/rubs/gallops  Respiratory: breathing unlabored on room air, CTA b/l, no wheezes/rhonchi/rales  Abdomen: soft, non-distended, non-tender to palpation, no guarding/rigidity  Extremities: no evidence of edema x4  Neuro:  - AOx3, speaking in full sentences, following commands  - face symmetric, tongue midline  - no pronator drift  - moving all extremities x4  - LUE strength 4+, RUE strength 4+  - LLE strength 4+, RLE strength 5+  - Sensation RUE 80%, %, RLE 85%, %    WOUND/DRAINS:  - subdural drain to bile bags x2   - frontal cranial incision x2, covered by dressing    DEVICES:       Assessment: 64yMale w/ PMH HTN, HLD, CAD with 11 stents (on Plavix, last dose 3/14), chronic pain (on oxy 30 mg x4-6/day), lumbar fusion 2016, DM presented to ED with right-sided weakness, word finding difficulty, and HA s/p fall with head strike no LOC 5 weeks ago. CTH showed bilateral subdural hematoma L>R w/ 5 mm midline shift. S/p b/l MMAE 3/17/25. Now s/p b/l craniotomy with kusum holes for subdural hematoma evacuation 3/20/25.    Plan:  Neuro:  - neuro/vitals q1h  - pain control: Tylenol 1000 mg q8h PO, gabapentin 300 mg PO bedtime, Dilaudid 0.5 mg IV q3h PRN, oxycodone 10/30 mg PO q8h PRN  - seizure ppx: Keppra 500 mg IV q12h  - depression: buspirone 15 mg PO BID  - Rpt CTH 3/18: stable size of collections   - SCD bile bag x2, monitor output  - HOB less than 30 degrees  - bedrest 3/20/25  - postop CTH    Cardio:  - SBP goal   - HLD: atorvastatin 40 mg PO bedtime  - HTN: metoprolol succinate ER 50 mg PO q12h, hold lisinopril 2.5mg daily, hold home HCTZ  - CAD w/ 11 stents: hold ASA and Plavix  - echo 3/17/25: EF 68%    Pulm:  - NC 2L  - incentive spirometry    GI:  - NPO, ADAT  - nausea: ondansetron 4 mg IV q6h PRN  - Bowel regimen    Renal:  - IVF  - +Escalona, TOV    Endo:  - ISS, Lantus 22 bedtime    Heme:  - DVT ppx: SCDs b/l    ID:  - postop ancef 2000 mg IV intermittent q8h    Dispo: NSICU, full code, PT/OT    Discussed with Dr. Moore and Dr. Diaz   NEUROSURGERY POST OP NOTE:    POD# 0 S/P bilateral kusum holes for subdural hematoma evacuation    S: Feels pain in head and lower abdomen/groin area. Feels that right-sided sensation has improved a little.   Has not passed flatus yet.  Denies chest pain, abdominal pain, change in vision, new weakness      T(C): 36.2 (03-20-25 @ 10:25), Max: 36.8 (03-19-25 @ 18:02)  HR: 64 (03-20-25 @ 10:40) (64 - 88)  BP: 138/84 (03-20-25 @ 10:40) (114/68 - 144/92)  RR: 17 (03-20-25 @ 10:40) (12 - 22)  SpO2: 100% (03-20-25 @ 10:40) (93% - 100%)      03-19-25 @ 07:01  -  03-20-25 @ 07:00  --------------------------------------------------------  IN: 0 mL / OUT: 1760 mL / NET: -1760 mL      acetaminophen     Tablet .. 1000 milliGRAM(s) Oral every 8 hours  atorvastatin 40 milliGRAM(s) Oral at bedtime  busPIRone 15 milliGRAM(s) Oral two times a day  ceFAZolin   IVPB 2000 milliGRAM(s) IV Intermittent every 8 hours  dextrose 5%. 1000 milliLiter(s) IV Continuous <Continuous>  dextrose 5%. 1000 milliLiter(s) IV Continuous <Continuous>  dextrose 50% Injectable 25 Gram(s) IV Push once  dextrose 50% Injectable 12.5 Gram(s) IV Push once  dextrose 50% Injectable 25 Gram(s) IV Push once  dextrose Oral Gel 15 Gram(s) Oral once PRN  doxazosin 1 milliGRAM(s) Oral at bedtime  gabapentin 300 milliGRAM(s) Oral at bedtime  glucagon  Injectable 1 milliGRAM(s) IntraMuscular once  hydrALAZINE Injectable 10 milliGRAM(s) IV Push every 1 hour PRN  HYDROmorphone  Injectable 0.5 milliGRAM(s) IV Push every 3 hours PRN  influenza   Vaccine 0.5 milliLiter(s) IntraMuscular once  insulin glargine Injectable (LANTUS) 22 Unit(s) SubCutaneous at bedtime  insulin lispro (ADMELOG) corrective regimen sliding scale   SubCutaneous three times a day before meals  levETIRAcetam  IVPB 500 milliGRAM(s) IV Intermittent every 12 hours  methocarbamol 500 milliGRAM(s) Oral every 8 hours PRN  metoprolol succinate ER 50 milliGRAM(s) Oral every 12 hours  ondansetron Injectable 4 milliGRAM(s) IV Push every 6 hours PRN  oxyCODONE    IR 10 milliGRAM(s) Oral every 8 hours PRN  oxyCODONE    IR 30 milliGRAM(s) Oral every 8 hours PRN  polyethylene glycol 3350 17 Gram(s) Oral daily  senna 2 Tablet(s) Oral at bedtime  sodium chloride 0.9%. 1000 milliLiter(s) IV Continuous <Continuous>      RADIOLOGY:     Exam:  General: no acute distress, laying comfortably in bed  Skin: skin warm and dry throughout  HEENT: PERRL b/l, EOMI b/l, mild horizontal nystagmus present  Cardio: regular rate and rhythm, S1 and S2 present, no murmurs/rubs/gallops  Respiratory: breathing unlabored on room air, CTA b/l, no wheezes/rhonchi/rales  Abdomen: soft, non-distended, non-tender to palpation, no guarding/rigidity  Extremities: no evidence of edema x4  Neuro:  - AOx3, speaking in full sentences, following commands  - face symmetric, tongue midline  - no pronator drift  - moving all extremities x4  - LUE strength 4+, RUE strength 4+  - LLE strength 4+, RLE strength 5+  - Sensation RUE 80%, %, RLE 85%, %    WOUND/DRAINS:  - subdural drain to bile bags x2   - kusum hole incision x2, covered by telfa dressing    DEVICES:       Assessment: 64yMale w/ PMH HTN, HLD, CAD with 11 stents (on Plavix, last dose 3/14), chronic pain (on oxy 30 mg x4-6/day), lumbar fusion 2016, DM presented to ED with right-sided weakness, word finding difficulty, and HA s/p fall with head strike no LOC 5 weeks ago. CTH showed bilateral subdural hematoma L>R w/ 5 mm midline shift. S/p b/l MMAE 3/17/25. Now s/p b/l craniotomy with kusum holes for subdural hematoma evacuation 3/20/25.    Plan:  Neuro:  - neuro/vitals q1h  - pain control: Tylenol 1000 mg q8h PO, gabapentin 300 mg PO bedtime, Dilaudid 0.5 mg IV q3h PRN, oxycodone 10/30 mg PO q8h PRN  - seizure ppx: Keppra 500 mg IV q12h  - depression: buspirone 15 mg PO BID  - Rpt CTH 3/18: stable size of collections   - SD bile bag x2, monitor output  - HOB less than 30 degrees  - bedrest 3/20/25  - pending postop CTH 3/21    Cardio:  - SBP goal   - HLD: atorvastatin 40 mg PO bedtime  - HTN: metoprolol succinate ER 50 mg PO q12h, hold lisinopril 2.5mg daily, hold home HCTZ  - CAD w/ 11 stents: hold ASA and Plavix  - echo 3/17/25: EF 68%    Pulm:  - NC 2L  - incentive spirometry    GI:  - NPO, ADAT  - nausea: ondansetron 4 mg IV q6h PRN  - Bowel regimen    Renal:  - IVF  - +Escalona (3/20 - )     Endo:  - ISS, Lantus 22 bedtime    Heme:  - DVT ppx: SCDs b/l    ID:  - postop ancef 2000 mg IV intermittent q8h    Dispo: NSICU, full code, PT/OT    Discussed with Dr. Moore and Dr. Diaz

## 2025-03-20 NOTE — PROGRESS NOTE ADULT - ASSESSMENT
64y/M with    acute on subacute subdural hematoma, brain compression  CAD (11 cardiac stents)  lung nodule  Hypertension dyslipidemia Diabetes Mellitus obesity  fatty liver, heartburn  BPH  constipation  EDMUND  h/o LBP    s/p Cerebral Angiogram with Embolization (03/17/2025, Shayne Church)  s/p [place] Hole Evacuation of Hematoma (03/20/2025, Remi Moore)    PLAN:   NEURO: neurochecks q1h, PRN pain meds with acetaminophen, oxycodone, gabapentin, robaxin (pain management)  post-op CT  SD Bile bag drains x2 - monitor output  depression: cont buspirone  REHAB:  physical therapy evaluation and management    EARLY MOB:  HOB <30 while SD drains in    PULM:  PRN O2 support to keep sats >/=92%, incentive spirometry, CPAP HS  CARDIO:  SBP goal 100-140mm Hg, continue metoprolol, off lisonopril / HCTZ; dual antiplatelet therapy on hold; EF 68, cardiology following  ENDO:  Blood sugar goals 140-180 mg/dL, continue insulin sliding scale; insulin glargine 22 units  GI:  bowel regimen, chronic constipation on linzess  DIET: ADAT CCD  RENAL:  IVF until good PO intake, cont cardura  HEM/ONC: check post-op Hb  VTE Prophylaxis: SCDs, no DVT chemoprophylaxis for now as patient is high risk for bleed (fresh post-op)  ID: afebrile, no leukocytosis  Social: will update family    Active issues:  What's keeping patient in the ICU? close neurochecks  What is this patient's dispo plan?    ATTENDING ATTESTATION:  Patient at high risk for neurological deterioration or death due to:  ICU delirium, aspiration PNA, DVT / PE.  Critical care time:  I have personally provided 60 minutes of critical care time, excluding time spent on separate procedures.      Plan discussed with RN, house staff.

## 2025-03-20 NOTE — BRIEF OPERATIVE NOTE - NSICDXBRIEFPREOP_GEN_ALL_CORE_FT
PRE-OP DIAGNOSIS:  SDH (subdural hematoma) 17-Mar-2025 17:20:31  Rory Woodward  
PRE-OP DIAGNOSIS:  SDH (subdural hematoma) 17-Mar-2025 17:20:31  Rory Woodward

## 2025-03-20 NOTE — BRIEF OPERATIVE NOTE - OPERATION/FINDINGS
Patient was brought to neuroangiography suite, was placed on standard anesthesia monitors, sedated, and intubated, under general anesthesia, bilaterals groins were prepped and draped in usual sterile fashion, under ultrasound guidance using a 5 fr micropuncture access kit, a 6 fr (10 cm) sheath was inserted into the right CFA and secured.  A cerebral angiogram was performed.    Endovascular embolization of b/l MMA's was performed using embospheres and microcoils.  Full report to follow.  Patient tolerated procedure well, no new neurological deficit, hemodynamically stable.  Right groin/vasc access site: Closed with exoseal and manual compression, hemostasis achieved, no hematoma.  Patient was transferred to Cath lab recovery area before going back to step down unit  Above d/w: Dr. Church  
s/p bilateral kusum hole evacuation of subdural hematoma.

## 2025-03-20 NOTE — BRIEF OPERATIVE NOTE - NSICDXBRIEFPROCEDURE_GEN_ALL_CORE_FT
PROCEDURES:  Angiogram, cerebral, with embolization 17-Mar-2025 17:19:47 Bilateral MMA Embolization Rory Woodward  
PROCEDURES:  [place] hole with evacuation of hematoma 20-Mar-2025 10:42:09 bilateral Osiris Wilkes

## 2025-03-20 NOTE — PROGRESS NOTE ADULT - SUBJECTIVE AND OBJECTIVE BOX
=================================  NEUROCRITICAL CARE ATTENDING NOTE  =================================    VIJAY COLE   MRN-[id number]  Summary:  64y/M  with HTN, HLD, DM, CAD  cardiac stents (on Plavix, lase dose 3/14), lumbar fusion , chronic pain (takes oxy 30mg 4-6 times per day) presented to ED s/p fall 5 weeks ago with head strike no LOC. PT states he visited an ED when he fell and had a negative CTH at the time. He now c/o R sided weakness, dysnomia, and HA rated at 8/10. He takes his home oxy for chronic LBP prescribed by PCP, which helps with the headache. Denies recent falls, dizziness, LOC, seizure, vision changes, SOB, chest pain.  (15 Mar 2025 14:07)    COURSE IN THE HOSPITAL:  03/15 admitted to [facility]   POD0 angio - Endovascular embolization of bilateral MMA; stable post-op.   POD0 - Bilateral kusum hole for subdural hematoma, stable condition post-op.    Past Medical History: Sleep apnea Hypertension Diabetes mellitus High cholesterol Obesity (BMI 30.0-34.9) CAD (coronary artery disease) Heart burn Hiatal hernia Fatty liver BPH (benign prostatic hyperplasia) Constipation CAD (coronary artery disease) H/O low back pain  Allergies:  IV Contrast (Other; Rash) ibuprofen (Other)  Home meds:   ·	Aspirin EC 81 mg oral delayed release tablet: 1 tab(s) orally once a day  ·	atorvastatin 40 mg oral tablet: 1 tab(s) orally once a day (at bedtime)  ·	busPIRone 15 mg oral tablet: 1 tab(s) orally 2 times a day  ·	celecoxib 100 mg oral capsule: 1 cap(s) orally 2 times a day celecoxib 200 mg oral capsule: 1 cap(s) orally 2 times a day  ·	clopidogrel 75 mg oral tablet: 1 tab(s) orally once a day  ·	diclofenac potassium 50 mg oral tablet: 1 tab(s) orally once a day  ·	doxazosin 1 mg oral tablet: 1 tab(s) orally once a day (at bedtime)  ·	Flonase 50 mcg/inh nasal spray: 1 spray(s) in each nostril 2 times a day  ·	hydroCHLOROthiazide 12.5 mg oral tablet: 1 tab(s) orally once a day  ·	Insulin Glargine: 28 unit(s) subcutaneous once a day (at bedtime)  ·	Insulin Lispro: 14 unit(s) subcutaneous 3 times a day  ·	LamISIL 250 mg oral tablet: 1 tab(s) orally every other day - terbinafine  ·	Linzess 290 mcg oral capsule: 1 cap(s) orally once a day - linaclotide  ·	lisinopril 2.5 mg oral tablet: 1 tab(s) orally once a day  ·	metFORMIN 1000 mg oral tablet: 1 tab(s) orally 2 times a day  ·	metoprolol succinate 50 mg oral tablet, extended release: 1 tab(s) orally 2 times a day  ·	oxyCODONE 30 mg oral tablet: 1 tab(s) orally 4 times a day  ·	Plavix 75 mg oral tablet: 1 tab(s) orally once a day  ·	semaglutide: last dose 3/11  ·	tiZANidine 2 mg oral capsule: 2 cap(s) orally once a day  ·	Toprol-XL 50 mg oral tablet, extended release: 1 tab(s) orally 2 times a day    PHYSICAL EXAMINATION  T(C): 36.2 (-20 @ 10:25), Max: 36.8 (-19 @ 18:02) HR: 61 (-20 @ 12:40) (60 - 88) BP: 122/75 (- @ 12:40) (114/68 - 144/92) RR: 12 (-20 @ 12:40) (12 - 22) SpO2: 99% (-20 @ 12:40) (93% - 100%)  NEUROLOGIC EXAMINATION:  Patient is awake, alert, fully oriented, pupils 2-3mm equal and briskly reactive to light, EOMs intact, muscle strength 5/5 on all 4s.  GENERAL: not intubated, not in cardiorespiratory distress  EENT:  anicteric  CARDIOVASCULAR: (+) S1 S2, normal rate and regular rhythm  PULMONARY: clear to auscultation bilaterally  ABDOMEN: soft, nontender with normoactive bowel sounds  EXTREMITIES: no edema  SKIN: no rash    LABS:  CAPILLARY BLOOD GLUCOSE 156 136 185 193 164                        13.8   5.76  )-----------( 147      ( 20 Mar 2025 11:00 )             40.5         139  |  105  |  20  ----------------------------<  179[H]  3.8   |  24  |  0.88    Ca    8.4      20 Mar 2025 11:00  Phos  2.7       Mg     2.0      @ 07:  -   @ 07:00  --------------------------------------------------------  IN: 0 mL / OUT: 1760 mL / NET: -1760 mL     @ 07:01  -   @ 14:01  --------------------------------------------------------  IN: 1270 mL / OUT: 700 mL / NET: 570 mL    Bacteriology:  CSF studies:  EEG:  [email or web address]	CT BRAIN	New high-density embolic material at skull base; unchanged bilateral subdural collections. Mild mass effect, midline shift to right.  03/15/2025	CT CERV SPINE	No acute fracture or subluxation. Multi-level degenerative changes.  03/15/2025	CT BRAIN	Bilateral acute on subacute subdural hematomas, left > right, w/5 mm midline shift.  2025	CT BRAIN	No acute hemorrhage or fracture; normal ventricles.  2024	CT CHEST	Stable 7 mm lung nodule; additional tiny lung nodules. Follow-up CT in 6-12 months.  Other imagin2022	CTA HEART	<25% stenosis of LM; probable patent stents; mild-moderate stenosis RCA. 7 mm nodule in lingula, suggest 6-month interval surveillance.    IV FLUIDS:  DRIPS:  DIET:  Lines:  Drains:    25 @ 07:01  -  25 @ 07:00  --------------------------------------------------------  OUT:    Voided (mL): 1760 mL  Total OUT: 1760 mL        Wounds:    CODE STATUS:  Full Code                       GOALS OF CARE:  aggressive                      DISPOSITION:  ICU =================================  NEUROCRITICAL CARE ATTENDING NOTE  =================================    VIJAY COLE   MRN-[id number]  Summary:  64y/M  with HTN, HLD, DM, CAD  cardiac stents (on Plavix, lase dose 3/14), lumbar fusion , chronic pain (takes oxy 30mg 4-6 times per day) presented to ED s/p fall 5 weeks ago with head strike no LOC. PT states he visited an ED when he fell and had a negative CTH at the time. He now c/o R sided weakness, dysnomia, and HA rated at 8/10. He takes his home oxy for chronic LBP prescribed by PCP, which helps with the headache. Denies recent falls, dizziness, LOC, seizure, vision changes, SOB, chest pain.  (15 Mar 2025 14:07)    COURSE IN THE HOSPITAL:  03/15 admitted to [facility]   POD0 angio - Endovascular embolization of bilateral MMA; stable post-op.   POD0 - Bilateral kusum hole for subdural hematoma, stable condition post-op.    Past Medical History: Sleep apnea Hypertension Diabetes mellitus High cholesterol Obesity (BMI 30.0-34.9) CAD (coronary artery disease) Heart burn Hiatal hernia Fatty liver BPH (benign prostatic hyperplasia) Constipation CAD (coronary artery disease) H/O low back pain  Allergies:  IV Contrast (Other; Rash) ibuprofen (Other)  Home meds:   ·	Aspirin EC 81 mg oral delayed release tablet: 1 tab(s) orally once a day  ·	atorvastatin 40 mg oral tablet: 1 tab(s) orally once a day (at bedtime)  ·	busPIRone 15 mg oral tablet: 1 tab(s) orally 2 times a day  ·	celecoxib 100 mg oral capsule: 1 cap(s) orally 2 times a day celecoxib 200 mg oral capsule: 1 cap(s) orally 2 times a day  ·	clopidogrel 75 mg oral tablet: 1 tab(s) orally once a day  ·	diclofenac potassium 50 mg oral tablet: 1 tab(s) orally once a day  ·	doxazosin 1 mg oral tablet: 1 tab(s) orally once a day (at bedtime)  ·	Flonase 50 mcg/inh nasal spray: 1 spray(s) in each nostril 2 times a day  ·	hydroCHLOROthiazide 12.5 mg oral tablet: 1 tab(s) orally once a day  ·	Insulin Glargine: 28 unit(s) subcutaneous once a day (at bedtime)  ·	Insulin Lispro: 14 unit(s) subcutaneous 3 times a day  ·	LamISIL 250 mg oral tablet: 1 tab(s) orally every other day - terbinafine  ·	Linzess 290 mcg oral capsule: 1 cap(s) orally once a day - linaclotide  ·	lisinopril 2.5 mg oral tablet: 1 tab(s) orally once a day  ·	metFORMIN 1000 mg oral tablet: 1 tab(s) orally 2 times a day  ·	metoprolol succinate 50 mg oral tablet, extended release: 1 tab(s) orally 2 times a day  ·	oxyCODONE 30 mg oral tablet: 1 tab(s) orally 4 times a day  ·	Plavix 75 mg oral tablet: 1 tab(s) orally once a day  ·	semaglutide: last dose 3/11  ·	tiZANidine 2 mg oral capsule: 2 cap(s) orally once a day  ·	Toprol-XL 50 mg oral tablet, extended release: 1 tab(s) orally 2 times a day    PHYSICAL EXAMINATION  T(C): 36.2 (- @ 10:25), Max: 36.8 (-19 @ 18:02) HR: 61 (- @ 12:40) (60 - 88) BP: 122/75 ( @ 12:40) (114/68 - 144/92) RR: 12 (- @ 12:40) (12 - 22) SpO2: 99% (- @ 12:40) (93% - 100%)  NEUROLOGIC EXAMINATION:  Patient is awake, alert, fully oriented, pupils 2-3mm equal and briskly reactive to light, EOMs intact, muscle strength 5/5 on all 4s, decreased sensation R face / arm / leg  GENERAL: not intubated, not in cardiorespiratory distress  EENT:  anicteric  CARDIOVASCULAR: (+) S1 S2, normal rate and regular rhythm  PULMONARY: clear to auscultation bilaterally  ABDOMEN: soft, nontender with normoactive bowel sounds  EXTREMITIES: no edema  SKIN: no rash    LABS:  CAPILLARY BLOOD GLUCOSE 156 136 185 193 164                        13.8   5.76  )-----------( 147      ( 20 Mar 2025 11:00 )             40.5         139  |  105  |  20  ----------------------------<  179[H]  3.8   |  24  |  0.88    Ca    8.4      20 Mar 2025 11:00  Phos  2.7       Mg     2.0      @ 07:01  -   @ 07:00  --------------------------------------------------------  IN: 0 mL / OUT: 1760 mL / NET: -1760 mL     @ 07:01  -   @ 14:01  --------------------------------------------------------  IN: 1270 mL / OUT: 700 mL / NET: 570 mL    Bacteriology:  CSF studies:  EEG:  [id number]	CT BRAIN	New high-density embolic material at skull base; unchanged bilateral subdural collections. Mild mass effect, midline shift to right.  03/15/2025	CT CERV SPINE	No acute fracture or subluxation. Multi-level degenerative changes.  03/15/2025	CT BRAIN	Bilateral acute on subacute subdural hematomas, left > right, w/5 mm midline shift.  2025	CT BRAIN	No acute hemorrhage or fracture; normal ventricles.  2024	CT CHEST	Stable 7 mm lung nodule; additional tiny lung nodules. Follow-up CT in 6-12 months.  Other imagin2022	CTA HEART	<25% stenosis of LM; probable patent stents; mild-moderate stenosis RCA. 7 mm nodule in lingula, suggest 6-month interval surveillance.    IV FLUIDS:  DRIPS:  DIET:  Lines:  Drains:    25 @ 07:01  -  25 @ 07:00  --------------------------------------------------------  OUT:    Voided (mL): 1760 mL  Total OUT: 1760 mL        Wounds:    CODE STATUS:  Full Code                       GOALS OF CARE:  aggressive                      DISPOSITION:  ICU

## 2025-03-20 NOTE — PROGRESS NOTE ADULT - SUBJECTIVE AND OBJECTIVE BOX
NEUROCRITICAL CARE ATTENDING NOTE      VIJAY COLE   MRN-[id number]  HPI per chart review  64y/M  with HTN, HLD, DM, CAD w/ 11 cardiac stents (on Plavix, lase dose 3/14), lumbar fusion 2016, chronic pain (takes oxy 30mg 4-6 times per day) presented to ED s/p fall 5 weeks ago with head strike no LOC. PT states he visited an ED when he fell and had a negative CTH at the time. He now c/o R sided weakness, dysnomia, and HA rated at 8/10. He takes his home oxy for chronic LBP prescribed by PCP, which helps with the headache. Denies recent falls, dizziness, LOC, seizure, vision changes, SOB, chest pain.  (15 Mar 2025 14:07)    Hospital Course/Interval Events  3/15: Admit to [facility] for further mgmt.   3/16: VALERIANO overnight. Neuro stable. Preop for MMAE, premedication protocol for contrast allergy. Cards consulted, cleared for MMAE, recommend echo.  3/17: VALERIANO ovn. Neuro stable. Preop for MMAE today. Echo done. Resumed home lantus 28u qhs. POD0 s/p b/l MMAE.   3/18: POD1. VALERIANO ovn. Started lispro 10u TID. Hold lisinopril given soft pressures. CTH complete. Still therapeutic on ASA/Plavix, rpt in AM.  3/19: POD2, VALERIANO overnight, lantus decreased to 22u for NPO.   3/20: POD3, VALERIANO overnight, POD0 b/l kusum hole     PHYSICAL EXAMINATION  ICU Vital Signs Last 24 Hrs  T(C): 36.3 (20 Mar 2025 18:49), Max: 36.6 (19 Mar 2025 22:42)  T(F): 97.4 (20 Mar 2025 18:49), Max: 97.9 (19 Mar 2025 22:42)  HR: 67 (20 Mar 2025 19:00) (60 - 77)  BP: 140/88 (20 Mar 2025 19:00) (111/70 - 144/92)  BP(mean): 108 (20 Mar 2025 19:00) (86 - 111)  ABP: 141/78 (20 Mar 2025 19:00) (111/62 - 149/89)  ABP(mean): 103 (20 Mar 2025 19:00) (78 - 113)  RR: 18 (20 Mar 2025 19:00) (10 - 22)  SpO2: 97% (20 Mar 2025 19:00) (93% - 100%)  O2 Parameters below as of 20 Mar 2025 19:00  Patient On (Oxygen Delivery Method): room air  GENERAL: elderly man, lying in bed. awake   EENT:  anicteric  CARDIOVASCULAR: (+) S1 S2, normal rate and regular rhythm  PULMONARY: clear to auscultation bilaterally  ABDOMEN: soft, nontender with normoactive bowel sounds  EXTREMITIES: no edema  SKIN: no rash  NEUROLOGIC EXAMINATION:    Mental status: awake, alert, oriented to self, place, time, language fluent, following commands  CNs: PEERLA, VFF, face symmetric, tongue midline, facial sensation intact  Motor: UE and LE 5/5 b/l, no drift  Sensory: decreased sensation R face / arm / leg  Coord: no ataxia       MEDICATIONS  (STANDING):  acetaminophen     Tablet .. 1000 milliGRAM(s) Oral every 8 hours  atorvastatin 40 milliGRAM(s) Oral at bedtime  busPIRone 15 milliGRAM(s) Oral two times a day  ceFAZolin   IVPB 2000 milliGRAM(s) IV Intermittent every 8 hours  doxazosin 1 milliGRAM(s) Oral at bedtime  gabapentin 300 milliGRAM(s) Oral at bedtime  influenza   Vaccine 0.5 milliLiter(s) IntraMuscular once  insulin glargine Injectable (LANTUS) 22 Unit(s) SubCutaneous at bedtime  insulin lispro (ADMELOG) corrective regimen sliding scale   SubCutaneous three times a day before meals  levETIRAcetam  IVPB 500 milliGRAM(s) IV Intermittent every 12 hours  metoprolol succinate ER 50 milliGRAM(s) Oral every 12 hours  polyethylene glycol 3350 17 Gram(s) Oral daily  senna 2 Tablet(s) Oral at bedtime  sodium chloride 0.9%. 1000 milliLiter(s) (90 mL/Hr) IV Continuous <Continuous>    MEDICATIONS  (PRN):  hydrALAZINE Injectable 10 milliGRAM(s) IV Push every 1 hour PRN SBP > 140 mm Hg  HYDROmorphone  Injectable 0.5 milliGRAM(s) IV Push every 3 hours PRN breakthrough pain only  methocarbamol 500 milliGRAM(s) Oral every 8 hours PRN Muscle Spasm  ondansetron Injectable 4 milliGRAM(s) IV Push every 6 hours PRN Nausea and/or Vomiting  oxyCODONE    IR 30 milliGRAM(s) Oral every 8 hours PRN Severe Pain (7 - 10)  oxyCODONE    IR 10 milliGRAM(s) Oral every 8 hours PRN Moderate Pain (4 - 6)      Neuroimaging and relevant labs reviewed NEUROCRITICAL CARE ATTENDING NOTE      VIJAY COLE   MRN-[id number]  HPI per chart review  64y/M  with HTN, HLD, DM, CAD w/ 11 cardiac stents (on Plavix, lase dose 3/14), lumbar fusion 2016, chronic pain (takes oxy 30mg 4-6 times per day) presented to ED s/p fall 5 weeks ago with head strike no LOC. PT states he visited an ED when he fell and had a negative CTH at the time. He now c/o R sided weakness, dysnomia, and HA rated at 8/10. He takes his home oxy for chronic LBP prescribed by PCP, which helps with the headache. Denies recent falls, dizziness, LOC, seizure, vision changes, SOB, chest pain.  (15 Mar 2025 14:07)    Hospital Course/Interval Events  3/15: Admit to [facility] for further mgmt.   3/16: VALERIANO overnight. Neuro stable. Preop for MMAE, premedication protocol for contrast allergy. Cards consulted, cleared for MMAE, recommend echo.  3/17: VALERIANO ovn. Neuro stable. Preop for MMAE today. Echo done. Resumed home lantus 28u qhs. POD0 s/p b/l MMAE.   3/18: POD1. VALERIANO ovn. Started lispro 10u TID. Hold lisinopril given soft pressures. CTH complete. Still therapeutic on ASA/Plavix, rpt in AM.  3/19: POD2, VALERIANO overnight, lantus decreased to 22u for NPO.   3/20: POD3, VALERIANO overnight, POD0 b/l kusum hole     PHYSICAL EXAMINATION  ICU Vital Signs Last 24 Hrs  T(C): 36.3 (20 Mar 2025 18:49), Max: 36.6 (19 Mar 2025 22:42)  T(F): 97.4 (20 Mar 2025 18:49), Max: 97.9 (19 Mar 2025 22:42)  HR: 67 (20 Mar 2025 19:00) (60 - 77)  BP: 140/88 (20 Mar 2025 19:00) (111/70 - 144/92)  BP(mean): 108 (20 Mar 2025 19:00) (86 - 111)  ABP: 141/78 (20 Mar 2025 19:00) (111/62 - 149/89)  ABP(mean): 103 (20 Mar 2025 19:00) (78 - 113)  RR: 18 (20 Mar 2025 19:00) (10 - 22)  SpO2: 97% (20 Mar 2025 19:00) (93% - 100%)  O2 Parameters below as of 20 Mar 2025 19:00  Patient On (Oxygen Delivery Method): room air  GENERAL: elderly man, lying in bed. awake   HEENT:  head wrapped, sclera anicteric  CARDIOVASCULAR: (+) S1 S2, normal rate and regular rhythm  PULMONARY: clear to auscultation bilaterally  ABDOMEN: soft, nontender with normoactive bowel sounds  EXTREMITIES: no edema  SKIN: no rash  NEUROLOGIC EXAMINATION:    Mental status: awake, alert, oriented to self, place, time, language fluent, following commands  CNs: PEERL, EOMI, VFF, face symmetric, tongue midline, facial sensation intact  Motor: UE and LE 5/5 b/l, no drift  Sensory: decreased sensation to LT at the R face / arm / leg  Coord: no ataxia       MEDICATIONS  (STANDING):  acetaminophen     Tablet .. 1000 milliGRAM(s) Oral every 8 hours  atorvastatin 40 milliGRAM(s) Oral at bedtime  busPIRone 15 milliGRAM(s) Oral two times a day  ceFAZolin   IVPB 2000 milliGRAM(s) IV Intermittent every 8 hours  doxazosin 1 milliGRAM(s) Oral at bedtime  gabapentin 300 milliGRAM(s) Oral at bedtime  influenza   Vaccine 0.5 milliLiter(s) IntraMuscular once  insulin glargine Injectable (LANTUS) 22 Unit(s) SubCutaneous at bedtime  insulin lispro (ADMELOG) corrective regimen sliding scale   SubCutaneous three times a day before meals  levETIRAcetam  IVPB 500 milliGRAM(s) IV Intermittent every 12 hours  metoprolol succinate ER 50 milliGRAM(s) Oral every 12 hours  polyethylene glycol 3350 17 Gram(s) Oral daily  senna 2 Tablet(s) Oral at bedtime  sodium chloride 0.9%. 1000 milliLiter(s) (90 mL/Hr) IV Continuous <Continuous>    MEDICATIONS  (PRN):  hydrALAZINE Injectable 10 milliGRAM(s) IV Push every 1 hour PRN SBP > 140 mm Hg  HYDROmorphone  Injectable 0.5 milliGRAM(s) IV Push every 3 hours PRN breakthrough pain only  methocarbamol 500 milliGRAM(s) Oral every 8 hours PRN Muscle Spasm  ondansetron Injectable 4 milliGRAM(s) IV Push every 6 hours PRN Nausea and/or Vomiting  oxyCODONE    IR 30 milliGRAM(s) Oral every 8 hours PRN Severe Pain (7 - 10)  oxyCODONE    IR 10 milliGRAM(s) Oral every 8 hours PRN Moderate Pain (4 - 6)      Neuroimaging and relevant labs reviewed                            13.8   5.76  )-----------( 147      ( 20 Mar 2025 11:00 )             40.5       03-20    139  |  105  |  20  ----------------------------<  179[H]  3.8   |  24  |  0.88    Ca    8.4      20 Mar 2025 11:00  Phos  2.7     03-20  Mg     2.0     03-20

## 2025-03-21 LAB
ANION GAP SERPL CALC-SCNC: 10 MMOL/L — SIGNIFICANT CHANGE UP (ref 5–17)
BUN SERPL-MCNC: 14 MG/DL — SIGNIFICANT CHANGE UP (ref 7–23)
CALCIUM SERPL-MCNC: 8.8 MG/DL — SIGNIFICANT CHANGE UP (ref 8.4–10.5)
CHLORIDE SERPL-SCNC: 99 MMOL/L — SIGNIFICANT CHANGE UP (ref 96–108)
CO2 SERPL-SCNC: 23 MMOL/L — SIGNIFICANT CHANGE UP (ref 22–31)
CREAT SERPL-MCNC: 0.75 MG/DL — SIGNIFICANT CHANGE UP (ref 0.5–1.3)
EGFR: 101 ML/MIN/1.73M2 — SIGNIFICANT CHANGE UP
EGFR: 101 ML/MIN/1.73M2 — SIGNIFICANT CHANGE UP
GLUCOSE SERPL-MCNC: 136 MG/DL — HIGH (ref 70–99)
HCT VFR BLD CALC: 42.2 % — SIGNIFICANT CHANGE UP (ref 39–50)
HGB BLD-MCNC: 14.3 G/DL — SIGNIFICANT CHANGE UP (ref 13–17)
MAGNESIUM SERPL-MCNC: 1.8 MG/DL — SIGNIFICANT CHANGE UP (ref 1.6–2.6)
MCHC RBC-ENTMCNC: 31.4 PG — SIGNIFICANT CHANGE UP (ref 27–34)
MCHC RBC-ENTMCNC: 33.9 G/DL — SIGNIFICANT CHANGE UP (ref 32–36)
MCV RBC AUTO: 92.7 FL — SIGNIFICANT CHANGE UP (ref 80–100)
NRBC BLD AUTO-RTO: 0 /100 WBCS — SIGNIFICANT CHANGE UP (ref 0–0)
PHOSPHATE SERPL-MCNC: 2.6 MG/DL — SIGNIFICANT CHANGE UP (ref 2.5–4.5)
PLATELET # BLD AUTO: 145 K/UL — LOW (ref 150–400)
POTASSIUM SERPL-MCNC: 4 MMOL/L — SIGNIFICANT CHANGE UP (ref 3.5–5.3)
POTASSIUM SERPL-SCNC: 4 MMOL/L — SIGNIFICANT CHANGE UP (ref 3.5–5.3)
RBC # BLD: 4.55 M/UL — SIGNIFICANT CHANGE UP (ref 4.2–5.8)
RBC # FLD: 13.2 % — SIGNIFICANT CHANGE UP (ref 10.3–14.5)
SODIUM SERPL-SCNC: 132 MMOL/L — LOW (ref 135–145)
SP GR SPEC: 1.01 — SIGNIFICANT CHANGE UP (ref 1–1.03)
WBC # BLD: 7.52 K/UL — SIGNIFICANT CHANGE UP (ref 3.8–10.5)
WBC # FLD AUTO: 7.52 K/UL — SIGNIFICANT CHANGE UP (ref 3.8–10.5)

## 2025-03-21 PROCEDURE — 99223 1ST HOSP IP/OBS HIGH 75: CPT | Mod: GC

## 2025-03-21 PROCEDURE — 99233 SBSQ HOSP IP/OBS HIGH 50: CPT

## 2025-03-21 PROCEDURE — 99291 CRITICAL CARE FIRST HOUR: CPT

## 2025-03-21 PROCEDURE — 70450 CT HEAD/BRAIN W/O DYE: CPT | Mod: 26

## 2025-03-21 PROCEDURE — 99291 CRITICAL CARE FIRST HOUR: CPT | Mod: 25

## 2025-03-21 RX ORDER — LEVETIRACETAM 10 MG/ML
500 INJECTION, SOLUTION INTRAVENOUS EVERY 12 HOURS
Refills: 0 | Status: COMPLETED | OUTPATIENT
Start: 2025-03-21 | End: 2025-03-27

## 2025-03-21 RX ORDER — LINACLOTIDE 290 UG/1
290 CAPSULE, GELATIN COATED ORAL
Refills: 0 | Status: DISCONTINUED | OUTPATIENT
Start: 2025-03-21 | End: 2025-03-28

## 2025-03-21 RX ORDER — MAGNESIUM SULFATE 500 MG/ML
2 SYRINGE (ML) INJECTION ONCE
Refills: 0 | Status: COMPLETED | OUTPATIENT
Start: 2025-03-21 | End: 2025-03-21

## 2025-03-21 RX ORDER — SOD PHOS DI, MONO/K PHOS MONO 250 MG
1 TABLET ORAL ONCE
Refills: 0 | Status: COMPLETED | OUTPATIENT
Start: 2025-03-21 | End: 2025-03-21

## 2025-03-21 RX ADMIN — DOXAZOSIN MESYLATE 1 MILLIGRAM(S): 8 TABLET ORAL at 22:39

## 2025-03-21 RX ADMIN — Medication 25 GRAM(S): at 07:49

## 2025-03-21 RX ADMIN — Medication 3 GRAM(S): at 12:06

## 2025-03-21 RX ADMIN — OXYCODONE HYDROCHLORIDE 10 MILLIGRAM(S): 30 TABLET ORAL at 15:42

## 2025-03-21 RX ADMIN — Medication 1000 MILLIGRAM(S): at 15:09

## 2025-03-21 RX ADMIN — Medication 0.5 MILLIGRAM(S): at 07:14

## 2025-03-21 RX ADMIN — Medication 2 TABLET(S): at 22:38

## 2025-03-21 RX ADMIN — Medication 1 PACKET(S): at 07:49

## 2025-03-21 RX ADMIN — INSULIN GLARGINE-YFGN 22 UNIT(S): 100 INJECTION, SOLUTION SUBCUTANEOUS at 22:39

## 2025-03-21 RX ADMIN — Medication 1000 MILLIGRAM(S): at 05:56

## 2025-03-21 RX ADMIN — LEVETIRACETAM 500 MILLIGRAM(S): 10 INJECTION, SOLUTION INTRAVENOUS at 17:27

## 2025-03-21 RX ADMIN — OXYCODONE HYDROCHLORIDE 30 MILLIGRAM(S): 30 TABLET ORAL at 20:54

## 2025-03-21 RX ADMIN — ATORVASTATIN CALCIUM 40 MILLIGRAM(S): 80 TABLET, FILM COATED ORAL at 22:38

## 2025-03-21 RX ADMIN — OXYCODONE HYDROCHLORIDE 10 MILLIGRAM(S): 30 TABLET ORAL at 15:12

## 2025-03-21 RX ADMIN — METOPROLOL SUCCINATE 50 MILLIGRAM(S): 50 TABLET, EXTENDED RELEASE ORAL at 17:27

## 2025-03-21 RX ADMIN — INSULIN LISPRO 4: 100 INJECTION, SOLUTION INTRAVENOUS; SUBCUTANEOUS at 22:39

## 2025-03-21 RX ADMIN — Medication 1000 MILLIGRAM(S): at 05:26

## 2025-03-21 RX ADMIN — OXYCODONE HYDROCHLORIDE 30 MILLIGRAM(S): 30 TABLET ORAL at 21:30

## 2025-03-21 RX ADMIN — METOPROLOL SUCCINATE 50 MILLIGRAM(S): 50 TABLET, EXTENDED RELEASE ORAL at 05:26

## 2025-03-21 RX ADMIN — GABAPENTIN 300 MILLIGRAM(S): 400 CAPSULE ORAL at 22:38

## 2025-03-21 RX ADMIN — Medication 0.5 MILLIGRAM(S): at 07:45

## 2025-03-21 RX ADMIN — INSULIN LISPRO 2: 100 INJECTION, SOLUTION INTRAVENOUS; SUBCUTANEOUS at 12:27

## 2025-03-21 RX ADMIN — LINACLOTIDE 290 MICROGRAM(S): 290 CAPSULE, GELATIN COATED ORAL at 07:14

## 2025-03-21 RX ADMIN — Medication 1000 MILLIGRAM(S): at 15:39

## 2025-03-21 RX ADMIN — OXYCODONE HYDROCHLORIDE 10 MILLIGRAM(S): 30 TABLET ORAL at 02:19

## 2025-03-21 RX ADMIN — Medication 3 GRAM(S): at 17:27

## 2025-03-21 RX ADMIN — LEVETIRACETAM 400 MILLIGRAM(S): 10 INJECTION, SOLUTION INTRAVENOUS at 05:26

## 2025-03-21 RX ADMIN — OXYCODONE HYDROCHLORIDE 10 MILLIGRAM(S): 30 TABLET ORAL at 02:49

## 2025-03-21 RX ADMIN — BUSPIRONE HYDROCHLORIDE 15 MILLIGRAM(S): 15 TABLET ORAL at 17:27

## 2025-03-21 RX ADMIN — BUSPIRONE HYDROCHLORIDE 15 MILLIGRAM(S): 15 TABLET ORAL at 05:26

## 2025-03-21 RX ADMIN — Medication 1 APPLICATION(S): at 12:10

## 2025-03-21 RX ADMIN — Medication 10 MILLIGRAM(S): at 01:01

## 2025-03-21 RX ADMIN — POLYETHYLENE GLYCOL 3350 17 GRAM(S): 17 POWDER, FOR SOLUTION ORAL at 12:06

## 2025-03-21 NOTE — PROGRESS NOTE ADULT - SUBJECTIVE AND OBJECTIVE BOX
NEUROCRITICAL CARE ATTENDING NOTE      VIJAY COLE   MRN-[id number]  HPI per chart review  64y/M  with HTN, HLD, DM, CAD w/ 11 cardiac stents (on Plavix, lase dose 3/14), lumbar fusion 2016, chronic pain (takes oxy 30mg 4-6 times per day) presented to ED s/p fall 5 weeks ago with head strike no LOC. PT states he visited an ED when he fell and had a negative CTH at the time. He now c/o R sided weakness, dysnomia, and HA rated at 8/10. He takes his home oxy for chronic LBP prescribed by PCP, which helps with the headache. Denies recent falls, dizziness, LOC, seizure, vision changes, SOB, chest pain.  (15 Mar 2025 14:07)    Hospital Course/Interval Events  3/15: Admit to [facility] for further mgmt.   3/16: VALERIANO overnight. Neuro stable. Preop for MMAE, premedication protocol for contrast allergy. Cards consulted, cleared for MMAE, recommend echo.  3/17: VALERIANO ovn. Neuro stable. Preop for MMAE today. Echo done. Resumed home lantus 28u qhs. POD0 s/p b/l MMAE.   3/18: POD1. VALERIANO ovn. Started lispro 10u TID. Hold lisinopril given soft pressures. CTH complete. Still therapeutic on ASA/Plavix, rpt in AM.  3/19: POD2, VALERIANO overnight, lantus decreased to 22u for NPO.   3/20: POD3, VALERIANO overnight, POD0 b/l kusum hole. Lantus 10 for .   3/21: POD 4 angio for embo, POD 1 BL kusum holes. 350, 300cc urine output x 2 hrs. DI labs sent, labs not indicative of DI. CTH done,     PHYSICAL EXAMINATION  ICU Vital Signs Last 24 Hrs  T(C): 36.7 (21 Mar 2025 17:53), Max: 37.5 (21 Mar 2025 09:25)  T(F): 98.1 (21 Mar 2025 17:53), Max: 99.5 (21 Mar 2025 09:25)  HR: 80 (21 Mar 2025 19:00) (67 - 90)  BP: 107/66 (21 Mar 2025 19:00) (107/66 - 149/89)  BP(mean): 82 (21 Mar 2025 19:00) (78 - 114)  ABP: 116/57 (21 Mar 2025 19:00) (106/57 - 158/85)  ABP(mean): 77 (21 Mar 2025 19:00) (75 - 114)  RR: 16 (21 Mar 2025 19:00) (10 - 21)  SpO2: 98% (21 Mar 2025 19:00) (91% - 98%)  O2 Parameters below as of 21 Mar 2025 19:00  Patient On (Oxygen Delivery Method): room air  GENERAL: elderly man, lying in bed, awake   HEENT:  head wrapped, sclera anicteric  CARDIOVASCULAR: (+) S1 S2, normal rate and regular rhythm  PULMONARY: clear to auscultation bilaterally  ABDOMEN: soft, nontender with normoactive bowel sounds  EXTREMITIES: no edema  SKIN: no rash  NEUROLOGIC EXAMINATION:    Mental status: awake, alert, oriented to self, place, time, language fluent, following commands  CNs: PEERL, EOMI, VFF, face symmetric, tongue midline, facial sensation intact  Motor: UE and LE 5/5 b/l, no drift  Sensory: decreased sensation to LT at the R face / arm / leg  Coord: no ataxia     MEDICATIONS  (STANDING):  acetaminophen     Tablet .. 1000 milliGRAM(s) Oral every 8 hours  atorvastatin 40 milliGRAM(s) Oral at bedtime  busPIRone 15 milliGRAM(s) Oral two times a day  chlorhexidine 2% Cloths 1 Application(s) Topical daily  doxazosin 1 milliGRAM(s) Oral at bedtime  gabapentin 300 milliGRAM(s) Oral at bedtime  influenza   Vaccine 0.5 milliLiter(s) IntraMuscular once  insulin glargine Injectable (LANTUS) 22 Unit(s) SubCutaneous at bedtime  insulin lispro (ADMELOG) corrective regimen sliding scale   SubCutaneous Before meals and at bedtime  levETIRAcetam 500 milliGRAM(s) Oral every 12 hours  linaclotide 290 MICROGram(s) Oral before breakfast  metoprolol succinate ER 50 milliGRAM(s) Oral every 12 hours  polyethylene glycol 3350 17 Gram(s) Oral daily  senna 2 Tablet(s) Oral at bedtime  sodium chloride 3 Gram(s) Oral every 6 hours    MEDICATIONS  (PRN):  hydrALAZINE Injectable 10 milliGRAM(s) IV Push every 1 hour PRN SBP > 140 mm Hg  methocarbamol 500 milliGRAM(s) Oral every 8 hours PRN Muscle Spasm  ondansetron Injectable 4 milliGRAM(s) IV Push every 6 hours PRN Nausea and/or Vomiting  oxyCODONE    IR 30 milliGRAM(s) Oral every 8 hours PRN Severe Pain (7 - 10)  oxyCODONE    IR 10 milliGRAM(s) Oral every 8 hours PRN Moderate Pain (4 - 6)          Neuroimaging and relevant labs reviewed     NEUROCRITICAL CARE ATTENDING NOTE      VIJAY COLE   MRN-[id number]  HPI per chart review  64y/M  with HTN, HLD, DM, CAD w/ 11 cardiac stents (on Plavix, lase dose 3/14), lumbar fusion 2016, chronic pain (takes oxy 30mg 4-6 times per day) presented to ED s/p fall 5 weeks ago with head strike no LOC. PT states he visited an ED when he fell and had a negative CTH at the time. He now c/o R sided weakness, dysnomia, and HA rated at 8/10. He takes his home oxy for chronic LBP prescribed by PCP, which helps with the headache. Denies recent falls, dizziness, LOC, seizure, vision changes, SOB, chest pain.  (15 Mar 2025 14:07)    Hospital Course/Interval Events  3/15: Admit to [facility] for further mgmt.   3/16: VALERIANO overnight. Neuro stable. Preop for MMAE, premedication protocol for contrast allergy. Cards consulted, cleared for MMAE, recommend echo.  3/17: VALERIANO ovn. Neuro stable. Preop for MMAE today. Echo done. Resumed home lantus 28u qhs. POD0 s/p b/l MMAE.   3/18: POD1. VALERIANO ovn. Started lispro 10u TID. Hold lisinopril given soft pressures. CTH complete. Still therapeutic on ASA/Plavix, rpt in AM.  3/19: POD2, VALERIANO overnight, lantus decreased to 22u for NPO.   3/20: POD3, VALERIANO overnight, POD0 b/l kusum hole. Lantus 10 for .   3/21: POD 4 angio for embo, POD 1 BL kusum holes. 350, 300cc urine output x 2 hrs. DI labs sent, labs not indicative of DI. CTH done,     PHYSICAL EXAMINATION  ICU Vital Signs Last 24 Hrs  T(C): 36.7 (21 Mar 2025 17:53), Max: 37.5 (21 Mar 2025 09:25)  T(F): 98.1 (21 Mar 2025 17:53), Max: 99.5 (21 Mar 2025 09:25)  HR: 80 (21 Mar 2025 19:00) (67 - 90)  BP: 107/66 (21 Mar 2025 19:00) (107/66 - 149/89)  BP(mean): 82 (21 Mar 2025 19:00) (78 - 114)  ABP: 116/57 (21 Mar 2025 19:00) (106/57 - 158/85)  ABP(mean): 77 (21 Mar 2025 19:00) (75 - 114)  RR: 16 (21 Mar 2025 19:00) (10 - 21)  SpO2: 98% (21 Mar 2025 19:00) (91% - 98%)  O2 Parameters below as of 21 Mar 2025 19:00  Patient On (Oxygen Delivery Method): room air  GENERAL: elderly man, lying in bed, awake   HEENT:  head wrapped, sclera anicteric  CARDIOVASCULAR: (+) S1 S2, normal rate and regular rhythm  PULMONARY: clear to auscultation bilaterally  ABDOMEN: soft, nontender BS +  EXTREMITIES: no edema  SKIN: no rash  NEUROLOGIC EXAMINATION:    Mental status: awake, alert, oriented to self, place, time, language fluent, following commands  CNs: PEERL, EOMI, VFF, face symmetric, tongue midline, facial sensation intact  Motor: UE and LE 5/5 b/l, no drift  Sensory: decreased sensation to LT at the R face / arm / leg  Coord: no ataxia     MEDICATIONS  (STANDING):  acetaminophen     Tablet .. 1000 milliGRAM(s) Oral every 8 hours  atorvastatin 40 milliGRAM(s) Oral at bedtime  busPIRone 15 milliGRAM(s) Oral two times a day  chlorhexidine 2% Cloths 1 Application(s) Topical daily  doxazosin 1 milliGRAM(s) Oral at bedtime  gabapentin 300 milliGRAM(s) Oral at bedtime  influenza   Vaccine 0.5 milliLiter(s) IntraMuscular once  insulin glargine Injectable (LANTUS) 22 Unit(s) SubCutaneous at bedtime  insulin lispro (ADMELOG) corrective regimen sliding scale   SubCutaneous Before meals and at bedtime  levETIRAcetam 500 milliGRAM(s) Oral every 12 hours  linaclotide 290 MICROGram(s) Oral before breakfast  metoprolol succinate ER 50 milliGRAM(s) Oral every 12 hours  polyethylene glycol 3350 17 Gram(s) Oral daily  senna 2 Tablet(s) Oral at bedtime  sodium chloride 3 Gram(s) Oral every 6 hours    MEDICATIONS  (PRN):  hydrALAZINE Injectable 10 milliGRAM(s) IV Push every 1 hour PRN SBP > 140 mm Hg  methocarbamol 500 milliGRAM(s) Oral every 8 hours PRN Muscle Spasm  ondansetron Injectable 4 milliGRAM(s) IV Push every 6 hours PRN Nausea and/or Vomiting  oxyCODONE    IR 30 milliGRAM(s) Oral every 8 hours PRN Severe Pain (7 - 10)  oxyCODONE    IR 10 milliGRAM(s) Oral every 8 hours PRN Moderate Pain (4 - 6)          Neuroimaging and relevant labs reviewed

## 2025-03-21 NOTE — PROGRESS NOTE ADULT - SUBJECTIVE AND OBJECTIVE BOX
S/Overnight events:  VALERIANO overnight     Hospital Course:   3/15: Admit to [facility] for further mgmt.   3/16: VALERIANO overnight. Neuro stable. Preop for MMAE, premedication protocol for contrast allergy. Cards consulted, cleared for MMAE, recommend echo.  3/17: VALERIANO ovn. Neuro stable. Preop for MMAE today. Echo done. Resumed home lantus 28u qhs. POD0 s/p b/l MMAE.   3/18: POD1. VALERIANO ovn. Started lispro 10u TID. Hold lisinopril given soft pressures. CTH complete. Still therapeutic on ASA/Plavix, rpt in AM.  3/19: POD2, VALERIANO overnight, lantus decreased to 22u for NPO.   3/20: POD3, VALERIANO overnight, POD0 b/l kusum hole. Lantus 10 for .     Vital Signs Last 24 Hrs  T(C): 36.8 (21 Mar 2025 05:06), Max: 36.8 (21 Mar 2025 05:06)  T(F): 98.2 (21 Mar 2025 05:06), Max: 98.2 (21 Mar 2025 05:06)  HR: 83 (21 Mar 2025 05:00) (60 - 84)  BP: 128/73 (21 Mar 2025 05:00) (111/70 - 149/89)  BP(mean): 95 (21 Mar 2025 05:00) (86 - 114)  RR: 12 (21 Mar 2025 05:00) (10 - 22)  SpO2: 98% (21 Mar 2025 05:00) (91% - 100%)    Parameters below as of 21 Mar 2025 06:00  Patient On (Oxygen Delivery Method): nasal cannula  O2 Flow (L/min): 2    I&O's Detail    19 Mar 2025 07:01  -  20 Mar 2025 07:00  --------------------------------------------------------  IN:  Total IN: 0 mL    OUT:    Voided (mL): 1760 mL  Total OUT: 1760 mL    Total NET: -1760 mL      20 Mar 2025 07:01  -  21 Mar 2025 05:57  --------------------------------------------------------  IN:    IV PiggyBack: 1210 mL    Oral Fluid: 120 mL    sodium chloride 0.9%: 1710 mL  Total IN: 3040 mL    OUT:    Blood Loss (mL): 75 mL    Indwelling Catheter - Urethral (mL): 2775 mL  Total OUT: 2850 mL    Total NET: 190 mL    I&O's Summary    19 Mar 2025 07:  -  20 Mar 2025 07:00  --------------------------------------------------------  IN: 0 mL / OUT: 1760 mL / NET: -1760 mL    20 Mar 2025 07:  -  21 Mar 2025 05:57  --------------------------------------------------------  IN: 3040 mL / OUT: 2850 mL / NET: 190 mL    PHYSICAL EXAM:  General: Pt is sitting up comfortably in bed, in NAD, on RA  HEENT: CN II-XII grossly intact, PERRL 3mm, EOMI B/L, face symmetric  Cardiovascular: RRR, normal S1 and S2   Respiratory: non-labored breathing, symmetric chest rise   GI: abd soft, NTND   Neuro: A&O x 3, no aphasia, speech clear, no dysmetria, no pronator drift  Strength 5/5 throughout all 4 extremities  Decreased sensation to R side of body   Vascular: Distal pulses 2+ x4, no calf edema or erythema  Wounds: BL kusum holes C/D/I, no evidence of hematoma     DEVICE/DRAIN DRESSING: BL subdural bile bags     TUBES/LINES:  [] CVC  [X] A-line  [] Lumbar Drain  [] Ventriculostomy  [] Other    DIET:  [] NPO  [X] Mechanical  [] Tube feeds    LABS:  PT/INR - ( 20 Mar 2025 11:00 )   PT: 12.0 sec;   INR: 1.03          PTT - ( 20 Mar 2025 11:00 )  PTT:27.0 sec  Urinalysis Basic - ( 21 Mar 2025 02:36 )    Color: x / Appearance: x / S.015 / pH: x  Gluc: x / Ketone: x  / Bili: x / Urobili: x   Blood: x / Protein: x / Nitrite: x   Leuk Esterase: x / RBC: x / WBC x   Sq Epi: x / Non Sq Epi: x / Bacteria: x      CAPILLARY BLOOD GLUCOSE      POCT Blood Glucose.: 102 mg/dL (20 Mar 2025 21:33)  POCT Blood Glucose.: 156 mg/dL (20 Mar 2025 10:36)  POCT Blood Glucose.: 136 mg/dL (20 Mar 2025 06:38)    Allergies    IV Contrast (Other; Rash)  ibuprofen (Other)    Intolerances      MEDICATIONS:  Antibiotics:    Neuro:  acetaminophen     Tablet .. 1000 milliGRAM(s) Oral every 8 hours  busPIRone 15 milliGRAM(s) Oral two times a day  gabapentin 300 milliGRAM(s) Oral at bedtime  HYDROmorphone  Injectable 0.5 milliGRAM(s) IV Push every 3 hours PRN  levETIRAcetam  IVPB 500 milliGRAM(s) IV Intermittent every 12 hours  methocarbamol 500 milliGRAM(s) Oral every 8 hours PRN  ondansetron Injectable 4 milliGRAM(s) IV Push every 6 hours PRN  oxyCODONE    IR 30 milliGRAM(s) Oral every 8 hours PRN  oxyCODONE    IR 10 milliGRAM(s) Oral every 8 hours PRN    Anticoagulation:    OTHER:  atorvastatin 40 milliGRAM(s) Oral at bedtime  chlorhexidine 2% Cloths 1 Application(s) Topical daily  doxazosin 1 milliGRAM(s) Oral at bedtime  hydrALAZINE Injectable 10 milliGRAM(s) IV Push every 1 hour PRN  influenza   Vaccine 0.5 milliLiter(s) IntraMuscular once  insulin glargine Injectable (LANTUS) 22 Unit(s) SubCutaneous at bedtime  insulin lispro (ADMELOG) corrective regimen sliding scale   SubCutaneous three times a day before meals  linaclotide 290 MICROGram(s) Oral before breakfast  metoprolol succinate ER 50 milliGRAM(s) Oral every 12 hours  polyethylene glycol 3350 17 Gram(s) Oral daily  senna 2 Tablet(s) Oral at bedtime    IVF:  sodium chloride 0.9%. 1000 milliLiter(s) IV Continuous <Continuous>    ASSESSMENT:  64M PMHx HTN, HLD, DM, CAD w/  cardiac stents (on Plavix, lase dose 3/14), lumbar fusion , chronic pain (takes oxy 30mg 4-6 times per day) presented to ED s/p fall 5 weeks ago with head strike no LOC presented with R sided weakness, HA, +WFD. CTH showed BL SDH L>R w/ 5mm MLS. Now s/p b/l MMAE (3/17/25). Now s/p BL kusum holes for SDH evacuation (3/20/25).     SUBDURAL HEMORRHAGE    No pertinent family history    FHx: myocardial infarction    FH: coronary artery disease    FH: type 2 diabetes    Handoff    MEWS Score    Sleep apnea    Hypertension    Diabetes mellitus    High cholesterol    Obesity (BMI 30.0-34.9)    CAD (coronary artery disease)    Heart burn    Hiatal hernia    Fatty liver    BPH (benign prostatic hyperplasia)    Constipation    CAD (coronary artery disease)    H/O low back pain    SDH (subdural hematoma)    SDH (subdural hematoma)    Headache    Subdural hemorrhage    Intracranial subdural hemorrhage    Angiogram, cerebral, with embolization    [place] hole with evacuation of hematoma    S/P angioplasty with stent    Finger injury    S/P foot surgery    H/O spinal fusion    MED EVAL    37    [id number]      PLAN:  Neuro:  - neuro/vitals q1h  - pain control: tylenol prn, Oxy 10mg/30mg prn, gabapentin 300mg at bedtime, robaxin 500mg q8hrs prn, pain mgmt following  - SD bile bag drains x2, monitor output   - seizure ppx: Keppra 500mg BID   - Hx Depression: Buspirone 15mg BID   - Rpt CTH 3/18: stable size of collections   - pending postop CTH   - HOB <30 while SD drains in     Cardio:  - SBP <140  - Hx HTN: c/w Metoprolol ER 50mg bid, hold lisinopril 2.5mg daily, hold home HCTZ  - Hx CAD w/  stents: HOLD home Plavix, ASA   - echo 3/17: EF 68%  - cardiology following    Pulm:   - RA  - hx EDMUND: CPAP at night    GI:  - CCD  - bowel regimen, BM 3/19  - chronic constipation: c/w linzess     Renal:  - IVF while advancing diet   - casa (3/20 - )   - cont home cardura    Endo:  - ISS, A1C 7.7  - c/w  lantus 22u qhs while NPO (hold lantus 28u and lispro 14u premeal)  - Hx DM, hold home metformin    Heme:   - SCDs for DVT ppx     ID:   - afebrile     Misc:   - hold home terbinafine    Dispo: Tele, full code, pending PT/OT    Discussed w/ Dr. Enciso

## 2025-03-21 NOTE — CONSULT NOTE ADULT - SUBJECTIVE AND OBJECTIVE BOX
HPI:  64M PMHx HTN, HLD, DM, CAD w/ 11 cardiac stents (on Plavix, lase dose 3/14), lumbar fusion , chronic pain (takes oxy 30mg 4-6 times per day) presented to ED s/p fall 5 weeks ago with head strike no LOC. PT states he visited an ED when he fell and had a negative CTH at the time. He now c/o R sided weakness, WFD, and HA rated at 8/10. He takes his home oxy for chronic LBP prescribed by PCP, which helps with the headache. Denies recent falls, dizziness, LOC, seizure, vision changes, SOB, chest pain.  (15 Mar 2025 14:07)    CTH showed BL SDH L>R w/ 5mm MLS. Now s/p b/l MMAE (3/17/25). Now s/p BL kusum holes for SDH evacuation (3/20/25). PM&R consulted for evaluation of rehab needs.   -----------------------------------------------------------------------  SUBJECTIVE:      -----------------------------------------------------------------------  REVIEW OF SYSTEMS  Constitutional - No fever,  +fatigue  Neurological -   Pain -   Bowel -   Bladder -   -----------------------------------------------------------------------  FUNCTIONAL HISTORY:      CURRENT FUNCTIONAL STATUS:    Physical Therapy:      Occupational Therapy:      Speech Therapy:    -----------------------------------------------------------------------  PAST MEDICAL & SURGICAL HISTORY  Sleep apnea    Hypertension    Diabetes mellitus    High cholesterol    Obesity (BMI 30.0-34.9)    CAD (coronary artery disease)    Heart burn    Hiatal hernia    Fatty liver    BPH (benign prostatic hyperplasia)    Constipation    CAD (coronary artery disease)    H/O low back pain    S/P angioplasty with stent    Finger injury    S/P foot surgery    H/O spinal fusion      FAMILY HISTORY   No pertinent family history    FHx: myocardial infarction    FH: coronary artery disease    FH: type 2 diabetes      SOCIAL HISTORY  As above  -----------------------------------------------------------------------  VITALS  T(C): 37.5 (25 @ 09:25), Max: 37.5 (25 @ 09:25)  HR: 77 (25 @ 11:00) (61 - 90)  BP: 108/69 (25 @ 11:00) (108/69 - 149/89)  RR: 11 (25 @ 11:00) (10 - 21)  SpO2: 95% (25 @ 11:00) (91% - 100%)  Wt(kg): --    PHYSICAL EXAM    -----------------------------------------------------------------------  RECENT LABS  CBC Full  -  ( 21 Mar 2025 02:34 )  WBC Count : 7.52 K/uL  RBC Count : 4.55 M/uL  Hemoglobin : 14.3 g/dL  Hematocrit : 42.2 %  Platelet Count - Automated : 145 K/uL  Mean Cell Volume : 92.7 fl  Mean Cell Hemoglobin : 31.4 pg  Mean Cell Hemoglobin Concentration : 33.9 g/dL  Auto Neutrophil # : x  Auto Lymphocyte # : x  Auto Monocyte # : x  Auto Eosinophil # : x  Auto Basophil # : x  Auto Neutrophil % : x  Auto Lymphocyte % : x  Auto Monocyte % : x  Auto Eosinophil % : x  Auto Basophil % : x        132[L]  |  99  |  14  ----------------------------<  136[H]  4.0   |  23  |  0.75    Ca    8.8      21 Mar 2025 02:34  Phos  2.6       Mg     1.8           Urinalysis Basic - ( 21 Mar 2025 02:36 )    Color: x / Appearance: x / S.015 / pH: x  Gluc: x / Ketone: x  / Bili: x / Urobili: x   Blood: x / Protein: x / Nitrite: x   Leuk Esterase: x / RBC: x / WBC x   Sq Epi: x / Non Sq Epi: x / Bacteria: x      -----------------------------------------------------------------------  IMAGING:    -----------------------------------------------------------------------  ALLERGIES  IV Contrast (Other; Rash)  ibuprofen (Other)      MEDICATIONS   acetaminophen     Tablet .. 1000 milliGRAM(s) Oral every 8 hours  atorvastatin 40 milliGRAM(s) Oral at bedtime  busPIRone 15 milliGRAM(s) Oral two times a day  chlorhexidine 2% Cloths 1 Application(s) Topical daily  doxazosin 1 milliGRAM(s) Oral at bedtime  gabapentin 300 milliGRAM(s) Oral at bedtime  hydrALAZINE Injectable 10 milliGRAM(s) IV Push every 1 hour PRN  HYDROmorphone  Injectable 0.5 milliGRAM(s) IV Push every 3 hours PRN  influenza   Vaccine 0.5 milliLiter(s) IntraMuscular once  insulin glargine Injectable (LANTUS) 22 Unit(s) SubCutaneous at bedtime  insulin lispro (ADMELOG) corrective regimen sliding scale   SubCutaneous Before meals and at bedtime  levETIRAcetam 500 milliGRAM(s) Oral every 12 hours  linaclotide 290 MICROGram(s) Oral before breakfast  methocarbamol 500 milliGRAM(s) Oral every 8 hours PRN  metoprolol succinate ER 50 milliGRAM(s) Oral every 12 hours  ondansetron Injectable 4 milliGRAM(s) IV Push every 6 hours PRN  oxyCODONE    IR 30 milliGRAM(s) Oral every 8 hours PRN  oxyCODONE    IR 10 milliGRAM(s) Oral every 8 hours PRN  polyethylene glycol 3350 17 Gram(s) Oral daily  senna 2 Tablet(s) Oral at bedtime  sodium chloride 3 Gram(s) Oral every 6 hours    -----------------------------------------------------------------------   Per ACS nomogram pt supposed to get 4,400U IVP bolus of heparin pre-heparin drip. MD Silva aware of nomogram, states to push 4,000U. Closed-loop communication utilized to confirm heparin initial bolus, safety of pt maintained. HPI:  64M PMHx HTN, HLD, DM, CAD w/ 11 cardiac stents (on Plavix, lase dose 3/14), lumbar fusion , chronic pain (takes oxy 30mg 4-6 times per day) presented to ED s/p fall 5 weeks ago with head strike no LOC. PT states he visited an ED when he fell and had a negative CTH at the time. He now c/o R sided weakness, WFD, and HA rated at 8/10. He takes his home oxy for chronic LBP prescribed by PCP, which helps with the headache. Denies recent falls, dizziness, LOC, seizure, vision changes, SOB, chest pain.  (15 Mar 2025 14:07)    CTH showed BL SDH L>R w/ 5mm MLS. Now s/p b/l MMAE (3/17/25). Now s/p BL kusum holes for SDH evacuation (3/20/25). PM&R consulted for evaluation of rehab needs.   -----------------------------------------------------------------------  SUBJECTIVE:  Patient seen and evaluated today in NSICU. Doing well. Reports headache and nausea improved today. Still with subdural biobags in place. Post op CTH today with decreased SDH  -----------------------------------------------------------------------  REVIEW OF SYSTEMS  Constitutional - No fever,  +fatigue  Neurological - no focal deficits  Pain - headache improving  Bowel - constipation LBM 3/19  Bladder - voiding, increased UOP today  -----------------------------------------------------------------------  FUNCTIONAL HISTORY:  Lives with his wife in elevator accessible apartment with 1 FOS to enter.   PTA - independent with mobility, uses SC for ambulating. Has a HHA 21 hours a day. Recent fall reported.    CURRENT FUNCTIONAL STATUS:    Physical Therapy: 3/18    Cognitive Status Examination:   · Orientation	oriented to person, place, time and situation  · Level of Consciousness	alert  · Follows Commands and Answers Questions	100% of the time  · Personal Safety and Judgment	intact    Range of Motion Exam:   · Active Range of Motion Examination	bilateral upper extremity Active ROM was WFL (within functional limits); bilateral  lower extremity Active ROM was WFL (within functional limits)    Manual Muscle Testing:   · Manual Muscle Testing Results	R hip flexion 4/5, R knee ext/flex 4+/5, R ankle DF and PF 5/5, LLE 5/5, RUE 4/5, LUE 5/5     Bed Mobility: Sit to Supine:     · Level of [place]	minimum assist (75% patients effort)  · Physical Assist/Nonphysical Assist	verbal cues; 1 person assist    Bed Mobility: Supine to Sit:     · Level of [place]	minimum assist (75% patients effort)  · Physical Assist/Nonphysical Assist	verbal cues; 1 person assist  · Assistive Device	bed rails    Bed Mobility Analysis:     · Bed Mobility Limitations	decreased ability to use arms for pushing/pulling; decreased ability to use legs for bridging/pushing; impaired ability to control trunk for mobility  · Impairments Contributing to Impaired Bed Mobility	impaired balance; impaired postural control; decreased strength; decreased sensation; decreased aerobic capacity/endurance    Transfer: Sit to Stand:     · Level of [place]	contact guard  · Physical Assist/Nonphysical Assist	verbal cues; 1 person assist  · Weight-Bearing Restrictions	weight-bearing as tolerated  · Assistive Device	rolling walker    Transfer: Stand to Sit:     · Level of [place]	contact guard  · Physical Assist/Nonphysical Assist	verbal cues; 1 person assist  · Weight-Bearing Restrictions	weight-bearing as tolerated  · Assistive Device	rolling walker    Sit/Stand Transfer Safety Analysis:     · Transfer Safety Concerns Noted	decreased balance during turns; losing balance; decreased step length; decreased weight-shifting ability  · Impairments Contributing to Impaired Transfers	impaired balance; impaired postural control; decreased strength; decreased sensation; decreased aerobic capacity/endurance    Gait Skills:     · Level of [place]	minimum assist (75% patients effort)  · Physical Assist/Nonphysical Assist	verbal cues; 1 person assist  · Weight-Bearing Restrictions	weight-bearing as tolerated  · Assistive Device	rolling walker  · Gait Distance	50 feet        Occupational Therapy: 3/18 Progress Summary    Progress Summary: OT IE Completed. MRS 4. RN Duyen notified and cleared patient for OT evaluation. PT Nita present. Patient POD # 1 s/p b/l MMAE, received semisupine in bed +tele, +heplock IV, +R groin incision C/D/I, room air, NAD. Patient A&OX4, agreeable and tolerated session well. Patient scored a 29/30 on the O-Log. A score of >25 is associated with normal orientation. Patient presents decreased RUE/RLE strength/sensation, R lateral lean during sitting and mobility, R sided listing during mobility impacting independence with functional activities and mobility. Patient demonstrated 1 person assist for bed mobility, sit<>stand, functional mobility with RW, LB dressing. Patient noted with L lateral lean and listing during mobility, decreased step length and narrow base of support requiring min verbal cues throughout for proper positioning and safety. Patient returned semisupine in bed with all lines intact and call bell in reach. RN made aware. Patient will continue to benefit from skilled OT in order to improve overall strength, balance, postural control and activity tolerance towards greater independence.         Speech Therapy:    -----------------------------------------------------------------------  PAST MEDICAL & SURGICAL HISTORY  Sleep apnea    Hypertension    Diabetes mellitus    High cholesterol    Obesity (BMI 30.0-34.9)    CAD (coronary artery disease)    Heart burn    Hiatal hernia    Fatty liver    BPH (benign prostatic hyperplasia)    Constipation    CAD (coronary artery disease)    H/O low back pain    S/P angioplasty with stent    Finger injury    S/P foot surgery    H/O spinal fusion      FAMILY HISTORY   No pertinent family history    FHx: myocardial infarction    FH: coronary artery disease    FH: type 2 diabetes      SOCIAL HISTORY  As above  -----------------------------------------------------------------------  VITALS  T(C): 37.5 (25 @ 09:25), Max: 37.5 (25 @ 09:25)  HR: 77 (25 @ 11:00) (61 - 90)  BP: 108/69 (25 @ 11:00) (108/69 - 149/89)  RR: 11 (25 @ 11:00) (10 - 21)  SpO2: 95% (25 @ 11:00) (91% - 100%)  Wt(kg): --    PHYSICAL EXAM  General - NAD, Comfortable, attila wrap in place, +SD drains  Chest - Breathing comfortably, No Respiratory distress  Cardiovascular - Regular pulse  Abdomen - No abdominal distension  Extremities - No deformities of upper or lower limbs.  MSK - ROM within functional limits  Neurologic Exam -                    Cognitive - Awake, Alert, AAO to self, place, date, year, situation; follows commands     Communication - Fluent, No dysarthria, repetition intact, attention/concentration intact     Cranial Nerves -  EOMI, No facial asymmetry     Motor - RUE drift                    LEFT    UE - ShAB 5/5, EF 5/5, EE 5/5, WE 5/5,  5/5                    LEFT    LE - HF 5/5, KE 5/5, DF 5/5, PF 5/5                    RIGHT UE - ShAB 5/5, EF 5/5, EE 5/5, WE 5/5,  5/5                    RIGHT LE - HF 5/5, KE 5/5, DF 5/5, PF 5/5        Tone - normal     Sensory - grossly intact to LT     Reflexes - no clonus  Psychiatric - Mood stable, Affect WNL   -----------------------------------------------------------------------  RECENT LABS  CBC Full  -  ( 21 Mar 2025 02:34 )  WBC Count : 7.52 K/uL  RBC Count : 4.55 M/uL  Hemoglobin : 14.3 g/dL  Hematocrit : 42.2 %  Platelet Count - Automated : 145 K/uL  Mean Cell Volume : 92.7 fl  Mean Cell Hemoglobin : 31.4 pg  Mean Cell Hemoglobin Concentration : 33.9 g/dL  Auto Neutrophil # : x  Auto Lymphocyte # : x  Auto Monocyte # : x  Auto Eosinophil # : x  Auto Basophil # : x  Auto Neutrophil % : x  Auto Lymphocyte % : x  Auto Monocyte % : x  Auto Eosinophil % : x  Auto Basophil % : x        132[L]  |  99  |  14  ----------------------------<  136[H]  4.0   |  23  |  0.75    Ca    8.8      21 Mar 2025 02:34  Phos  2.6       Mg     1.8           Urinalysis Basic - ( 21 Mar 2025 02:36 )    Color: x / Appearance: x / S.015 / pH: x  Gluc: x / Ketone: x  / Bili: x / Urobili: x   Blood: x / Protein: x / Nitrite: x   Leuk Esterase: x / RBC: x / WBC x   Sq Epi: x / Non Sq Epi: x / Bacteria: x      -----------------------------------------------------------------------  IMAGING:  < from: CT Head No Cont (25 @ 12:13) >  IMPRESSION: Post bifrontal kusum hole's and subdural drain placement the   residualsubdural collections are significantly smaller compared with   3/18/2025.    < end of copied text >    -----------------------------------------------------------------------  ALLERGIES  IV Contrast (Other; Rash)  ibuprofen (Other)      MEDICATIONS   acetaminophen     Tablet .. 1000 milliGRAM(s) Oral every 8 hours  atorvastatin 40 milliGRAM(s) Oral at bedtime  busPIRone 15 milliGRAM(s) Oral two times a day  chlorhexidine 2% Cloths 1 Application(s) Topical daily  doxazosin 1 milliGRAM(s) Oral at bedtime  gabapentin 300 milliGRAM(s) Oral at bedtime  hydrALAZINE Injectable 10 milliGRAM(s) IV Push every 1 hour PRN  HYDROmorphone  Injectable 0.5 milliGRAM(s) IV Push every 3 hours PRN  influenza   Vaccine 0.5 milliLiter(s) IntraMuscular once  insulin glargine Injectable (LANTUS) 22 Unit(s) SubCutaneous at bedtime  insulin lispro (ADMELOG) corrective regimen sliding scale   SubCutaneous Before meals and at bedtime  levETIRAcetam 500 milliGRAM(s) Oral every 12 hours  linaclotide 290 MICROGram(s) Oral before breakfast  methocarbamol 500 milliGRAM(s) Oral every 8 hours PRN  metoprolol succinate ER 50 milliGRAM(s) Oral every 12 hours  ondansetron Injectable 4 milliGRAM(s) IV Push every 6 hours PRN  oxyCODONE    IR 30 milliGRAM(s) Oral every 8 hours PRN  oxyCODONE    IR 10 milliGRAM(s) Oral every 8 hours PRN  polyethylene glycol 3350 17 Gram(s) Oral daily  senna 2 Tablet(s) Oral at bedtime  sodium chloride 3 Gram(s) Oral every 6 hours    -----------------------------------------------------------------------

## 2025-03-21 NOTE — PROGRESS NOTE ADULT - ASSESSMENT
64M with hx of contrast allergy and PMHx of HTN, HLD, BPH, DM II, EDMUND on CPAP, CCD (multiple PCIs and brachytherapy) presents to [facility] ED c/o headache, gait disturbance, and right sided weakness for several days after a mechanical fall 4-5 weeks ago. He was found to have bilateral subdural hematomas now s/p MMA embolization and s/p [place] surgery for evacuation of SDH. Cardiology originally consulted for pre operative evaluation and now following for CV management.     Review of Studies:  Tele 3/16/25: SR HR 80-90s  ECG 3/15/25: SR HR 69, nonspecific ST&T wave changes, poor R wave progression    Outpatient Providers:  Will Hurtado MD ([facility])    Home Medications (from cardiology admission): Plavix 75mg QD, Atorvastatin 40mg qhs, Toprol- XL 50mg BID, Lisinopril 2.5mg QD, HCTZ 12.5mg QD    Recommendations:      #CCD  -No complaints of angina or HF sxs  -After surgery, if no contraindications, would resume plavix 75mg QD. However, if deemed a higher bleeding risk, OK to utilize aspirin 81mg QD.   -C/W toprol 50mg QD  -C/w Atorvastatin 40mg QD    #HTN  -Can continue holding home lisinopril and HCTZ for now, can resume once no longer strict BP goals         Case d/w attending

## 2025-03-21 NOTE — PROGRESS NOTE ADULT - SUBJECTIVE AND OBJECTIVE BOX
INTERVAL EVENTS: post op, still with drains in     PAST MEDICAL & SURGICAL HISTORY:  Sleep apnea  on CPAP    Hypertension    Diabetes mellitus    High cholesterol    Obesity (BMI 30.0-34.9)    CAD (coronary artery disease)    Heart burn    Hiatal hernia  with possible GERD    Fatty liver    BPH (benign prostatic hyperplasia)    Constipation    CAD (coronary artery disease)    H/O low back pain    S/P angioplasty with stent  2008, 2014, 03/2019    Finger injury  Left Ring Finger & had surgery ( 1996 )    S/P foot surgery  Right  ( 2013 )    H/O spinal fusion        MEDICATIONS  (STANDING):  acetaminophen     Tablet .. 1000 milliGRAM(s) Oral every 8 hours  atorvastatin 40 milliGRAM(s) Oral at bedtime  busPIRone 15 milliGRAM(s) Oral two times a day  chlorhexidine 2% Cloths 1 Application(s) Topical daily  doxazosin 1 milliGRAM(s) Oral at bedtime  gabapentin 300 milliGRAM(s) Oral at bedtime  influenza   Vaccine 0.5 milliLiter(s) IntraMuscular once  insulin glargine Injectable (LANTUS) 22 Unit(s) SubCutaneous at bedtime  insulin lispro (ADMELOG) corrective regimen sliding scale   SubCutaneous Before meals and at bedtime  levETIRAcetam 500 milliGRAM(s) Oral every 12 hours  linaclotide 290 MICROGram(s) Oral before breakfast  metoprolol succinate ER 50 milliGRAM(s) Oral every 12 hours  polyethylene glycol 3350 17 Gram(s) Oral daily  senna 2 Tablet(s) Oral at bedtime  sodium chloride 3 Gram(s) Oral every 6 hours    MEDICATIONS  (PRN):  hydrALAZINE Injectable 10 milliGRAM(s) IV Push every 1 hour PRN SBP > 140 mm Hg  HYDROmorphone  Injectable 0.5 milliGRAM(s) IV Push every 3 hours PRN breakthrough pain only  methocarbamol 500 milliGRAM(s) Oral every 8 hours PRN Muscle Spasm  ondansetron Injectable 4 milliGRAM(s) IV Push every 6 hours PRN Nausea and/or Vomiting  oxyCODONE    IR 30 milliGRAM(s) Oral every 8 hours PRN Severe Pain (7 - 10)  oxyCODONE    IR 10 milliGRAM(s) Oral every 8 hours PRN Moderate Pain (4 - 6)    T(F): 99.5 (03-21-25 @ 09:25), Max: 99.5 (03-21-25 @ 09:25)  HR: 80 (03-21-25 @ 12:00) (62 - 90)  BP: 117/57 (03-21-25 @ 12:00) (108/69 - 149/89)  BP(mean): 78 (03-21-25 @ 12:00) (78 - 114)  ABP: 108/62 (03-21-25 @ 12:00) (108/62 - 158/85)  ABP(mean): 79 (03-21-25 @ 12:00) (78 - 114)  RR: 17 (03-21-25 @ 12:00) (10 - 21)  SpO2: 96% (03-21-25 @ 12:00) (91% - 100%)  CVP(mm Hg): --    I/O Detail 24H    20 Mar 2025 07:01  -  21 Mar 2025 07:00  --------------------------------------------------------  IN:    IV PiggyBack: 1335 mL    Oral Fluid: 300 mL    sodium chloride 0.9%: 1890 mL  Total IN: 3525 mL    OUT:    Blood Loss (mL): 75 mL    External Ventricular Device (mL): 90 mL    External Ventricular Device (mL): 100 mL    Indwelling Catheter - Urethral (mL): 3100 mL  Total OUT: 3365 mL    Total NET: 160 mL      21 Mar 2025 07:01  -  21 Mar 2025 12:46  --------------------------------------------------------  IN:    IV PiggyBack: 25 mL    sodium chloride 0.9%: 180 mL  Total IN: 205 mL    OUT:    Indwelling Catheter - Urethral (mL): 355 mL    Voided (mL): 525 mL  Total OUT: 880 mL    Total NET: -675 mL          PHYSICAL EXAM:  GEN: NAD  HEENT: EOMI   RESP: CTA b/l  CV: RRR. Normal S1/S2. No m/r/g. No JVD.   ABD: abdomen soft, NT/ND; no rebound or guarding; +BSx4  EXT: No edema, warm extremities  NEURO: alert and attentive, has drain output still    LABS:  CBC 03-21-25 @ 02:34                        14.3   7.52  )-----------( 145                   42.2     Hgb trend: 14.3 <-- , 13.8 <-- , 14.9 <-- , 14.6 <--   WBC trend: 7.52 <-- , 5.76 <-- , 5.95 <-- , 7.26 <--       CMP 03-21-25 @ 02:34    132[L]  |  99  |  14  ----------------------------<  136[H]  4.0   |  23  |  0.75    Ca    8.8      03-21-25 @ 02:34  Phos  2.6     03-21  Mg     1.8     03-21      Serum Cr (eGFR) trend: 0.75 (101) <-- , 0.88 (97) <-- , 1.01 (84) <-- , 0.95 (90) <--       PT/INR - ( 20 Mar 2025 11:00 )   PT: 12.0 sec;   INR: 1.03     PTT - ( 20 Mar 2025 11:00 ):27.0 sec    Cardiac Markers   03-20-25 @ 11:00: HS Trop T <6    / CK x     / CKMB x            STUDIES:

## 2025-03-21 NOTE — SBIRT NOTE ADULT - NSSBIRTUNABLESCROTHER_GEN_A_CORE
Patient reports drinking alcohol 1-2 drinks approx 2-4 times per week. Patient's usage within [employer] guidelines, SBIRT not warranted.

## 2025-03-21 NOTE — PROGRESS NOTE ADULT - SUBJECTIVE AND OBJECTIVE BOX
=================================  NEUROCRITICAL CARE ATTENDING NOTE  =================================    VIJAY COLE   MRN-[id number]  Summary:  64y/M  with HTN, HLD, DM, CAD w/  cardiac stents (on Plavix, lase dose 3/14), lumbar fusion , chronic pain (takes oxy 30mg 4-6 times per day) presented to ED s/p fall 5 weeks ago with head strike no LOC. PT states he visited an ED when he fell and had a negative CTH at the time. He now c/o R sided weakness, dysnomia, and HA rated at 8/10. He takes his home oxy for chronic LBP prescribed by PCP, which helps with the headache. Denies recent falls, dizziness, LOC, seizure, vision changes, SOB, chest pain.  (15 Mar 2025 14:07)    COURSE IN THE HOSPITAL:  64M with Hypertension dyslipidemia Diabetes Mellitus CAD x11 stents p/w fall 5wks ago, head strike, subsequent R weakness dysnomia HA. CT acute/subacute SDH  03/15 admitted to [facility]   POD0 angio - Endovascular embolization of bilateral MMA; stable post-op.   POD0 - Bilateral kusum hole for subdural hematoma, stable condition post-op.   POD2/1     Past Medical History: Sleep apnea Hypertension Diabetes mellitus High cholesterol Obesity (BMI 30.0-34.9) CAD (coronary artery disease) Heart burn Hiatal hernia Fatty liver BPH (benign prostatic hyperplasia) Constipation CAD (coronary artery disease) H/O low back pain  Allergies:  IV Contrast (Other; Rash) ibuprofen (Other)  Home meds:   ·	Aspirin EC 81 mg oral delayed release tablet: 1 tab(s) orally once a day  ·	atorvastatin 40 mg oral tablet: 1 tab(s) orally once a day (at bedtime)  ·	busPIRone 15 mg oral tablet: 1 tab(s) orally 2 times a day  ·	celecoxib 100 mg oral capsule: 1 cap(s) orally 2 times a day celecoxib 200 mg oral capsule: 1 cap(s) orally 2 times a day  ·	clopidogrel 75 mg oral tablet: 1 tab(s) orally once a day  ·	diclofenac potassium 50 mg oral tablet: 1 tab(s) orally once a day  ·	doxazosin 1 mg oral tablet: 1 tab(s) orally once a day (at bedtime)  ·	Flonase 50 mcg/inh nasal spray: 1 spray(s) in each nostril 2 times a day  ·	hydroCHLOROthiazide 12.5 mg oral tablet: 1 tab(s) orally once a day  ·	Insulin Glargine: 28 unit(s) subcutaneous once a day (at bedtime)  ·	Insulin Lispro: 14 unit(s) subcutaneous 3 times a day  ·	LamISIL 250 mg oral tablet: 1 tab(s) orally every other day - terbinafine  ·	Linzess 290 mcg oral capsule: 1 cap(s) orally once a day - linaclotide  ·	lisinopril 2.5 mg oral tablet: 1 tab(s) orally once a day  ·	metFORMIN 1000 mg oral tablet: 1 tab(s) orally 2 times a day  ·	metoprolol succinate 50 mg oral tablet, extended release: 1 tab(s) orally 2 times a day  ·	oxyCODONE 30 mg oral tablet: 1 tab(s) orally 4 times a day  ·	Plavix 75 mg oral tablet: 1 tab(s) orally once a day  ·	semaglutide: last dose 3/11  ·	tiZANidine 2 mg oral capsule: 2 cap(s) orally once a day  ·	Toprol-XL 50 mg oral tablet, extended release: 1 tab(s) orally 2 times a day    PHYSICAL EXAMINATION  T(C): 36.8 (25 @ 05:06), Max: 36.8 (25 @ 05:06) HR: 79 (25 @ 07:00) (60 - 84) BP: 119/81 (25 @ 07:00) (111/70 - 149/89) ABP: 128/64 (25 @ 07:00) (111/62 - 158/85) RR: 17 (25 @ 07:00) (10 - 22) SpO2: 96% (25 @ 07:00) (91% - 100%)  NEUROLOGIC EXAMINATION:  Patient is awake, alert, fully oriented, pupils 2-3mm equal and briskly reactive to light, EOMs intact, muscle strength 5/5 on all 4s, decreased sensation R face / arm / leg  GENERAL: not intubated, not in cardiorespiratory distress  EENT:  anicteric  CARDIOVASCULAR: (+) S1 S2, normal rate and regular rhythm  PULMONARY: clear to auscultation bilaterally  ABDOMEN: soft, nontender with normoactive bowel sounds  EXTREMITIES: no edema  SKIN: no rash    LABS:   CAPILLARY BLOOD GLUCOSE 137 102 156                14.3   7.52  )-----------( 145  (147)    ( 21 Mar 2025 02:34 )             42.2     (139, 135)  132[L]  |  99  |  14  ----------------------------<  136[H]  4.0   |  23  |  0.75    Ca    8.8      21 Mar 2025 02:34  Phos  2.6       Mg     1.8     20 @ 07:01   @ 07:00  --------------------------------------------------------  IN: 3525 mL / OUT: 3365 mL / NET: 160 mL     @ 07:01  -   @ 08:01  --------------------------------------------------------  IN: 115 mL / OUT: 0 mL / NET: 115 mL    Bacteriology:  CSF studies:  EEG:  [id number]	CT BRAIN	New high-density embolic material at skull base; unchanged bilateral subdural collections. Mild mass effect, midline shift to right.  03/15/2025	CT CERV SPINE	No acute fracture or subluxation. Multi-level degenerative changes.  03/15/2025	CT BRAIN	Bilateral acute on subacute subdural hematomas, left > right, w/5 mm midline shift.  2025	CT BRAIN	No acute hemorrhage or fracture; normal ventricles.  2024	CT CHEST	Stable 7 mm lung nodule; additional tiny lung nodules. Follow-up CT in 6-12 months.  Other imagin2022	CTA HEART	<25% stenosis of LM; probable patent stents; mild-moderate stenosis RCA. 7 mm nodule in lingula, suggest 6-month interval surveillance.    MEDICATIONS: 03-    ·	acetaminophen     Tablet .. 1000 Oral every 8 hours  ·	busPIRone 15 Oral two times a day  ·	gabapentin 300 Oral at bedtime  ·	levETIRAcetam  IVPB 500 IV Intermittent every 12 hours  ·	doxazosin 1 milliGRAM(s) Oral at bedtime  ·	metoprolol succinate ER 50 milliGRAM(s) Oral every 12 hours  ·	linaclotide 290 Oral before breakfast  ·	polyethylene glycol 3350 17 Oral daily  ·	senna 2 Oral at bedtime  ·	atorvastatin 40 Oral at bedtime  ·	insulin glargine Injectable (LANTUS) 22 SubCutaneous at bedtime  ·	insulin lispro (ADMELOG) corrective regimen sliding scale  SubCutaneous Before meals and at bedtime  ·	hydrALAZINE Injectable 10 IV Push every 1 hour PRN  ·	HYDROmorphone  Injectable 0.5 IV Push every 3 hours PRN  ·	methocarbamol 500 Oral every 8 hours PRN  ·	ondansetron Injectable 4 IV Push every 6 hours PRN  ·	oxyCODONE    IR 30 Oral every 8 hours PRN  ·	oxyCODONE    IR 10 Oral every 8 hours PRN     IV FLUIDS:  DRIPS:  DIET:  Lines:  Drains:    24 hours:       External Ventricular Device (mL): 90 mL    External Ventricular Device (mL): 100 mL   Wounds:    CODE STATUS:  Full Code                       GOALS OF CARE:  aggressive                      DISPOSITION:  ICU =================================  NEUROCRITICAL CARE ATTENDING NOTE  =================================    VIJAY COLE   MRN-[id number]  Summary:  64y/M  with HTN, HLD, DM, CAD w/  cardiac stents (on Plavix, lase dose 3/14), lumbar fusion , chronic pain (takes oxy 30mg 4-6 times per day) presented to ED s/p fall 5 weeks ago with head strike no LOC. PT states he visited an ED when he fell and had a negative CTH at the time. He now c/o R sided weakness, dysnomia, and HA rated at 8/10. He takes his home oxy for chronic LBP prescribed by PCP, which helps with the headache. Denies recent falls, dizziness, LOC, seizure, vision changes, SOB, chest pain.  (15 Mar 2025 14:07)    COURSE IN THE HOSPITAL:  64M with Hypertension dyslipidemia Diabetes Mellitus CAD x11 stents p/w fall 5wks ago, head strike, subsequent R weakness dysnomia HA. CT acute/subacute SDH  03/15 admitted to [facility]   POD0 angio - Endovascular embolization of bilateral MMA; stable post-op.   POD0 - Bilateral kusum hole for subdural hematoma, stable condition post-op.   POD2/1 increased UO x 2 hours    Past Medical History: Sleep apnea Hypertension Diabetes mellitus High cholesterol Obesity (BMI 30.0-34.9) CAD (coronary artery disease) Heart burn Hiatal hernia Fatty liver BPH (benign prostatic hyperplasia) Constipation CAD (coronary artery disease) H/O low back pain  Allergies:  IV Contrast (Other; Rash) ibuprofen (Other)  Home meds:   ·	Aspirin EC 81 mg oral delayed release tablet: 1 tab(s) orally once a day  ·	atorvastatin 40 mg oral tablet: 1 tab(s) orally once a day (at bedtime)  ·	busPIRone 15 mg oral tablet: 1 tab(s) orally 2 times a day  ·	celecoxib 100 mg oral capsule: 1 cap(s) orally 2 times a day celecoxib 200 mg oral capsule: 1 cap(s) orally 2 times a day  ·	clopidogrel 75 mg oral tablet: 1 tab(s) orally once a day  ·	diclofenac potassium 50 mg oral tablet: 1 tab(s) orally once a day  ·	doxazosin 1 mg oral tablet: 1 tab(s) orally once a day (at bedtime)  ·	Flonase 50 mcg/inh nasal spray: 1 spray(s) in each nostril 2 times a day  ·	hydroCHLOROthiazide 12.5 mg oral tablet: 1 tab(s) orally once a day  ·	Insulin Glargine: 28 unit(s) subcutaneous once a day (at bedtime)  ·	Insulin Lispro: 14 unit(s) subcutaneous 3 times a day  ·	LamISIL 250 mg oral tablet: 1 tab(s) orally every other day - terbinafine  ·	Linzess 290 mcg oral capsule: 1 cap(s) orally once a day - linaclotide  ·	lisinopril 2.5 mg oral tablet: 1 tab(s) orally once a day  ·	metFORMIN 1000 mg oral tablet: 1 tab(s) orally 2 times a day  ·	metoprolol succinate 50 mg oral tablet, extended release: 1 tab(s) orally 2 times a day  ·	oxyCODONE 30 mg oral tablet: 1 tab(s) orally 4 times a day  ·	Plavix 75 mg oral tablet: 1 tab(s) orally once a day  ·	semaglutide: last dose 3/11  ·	tiZANidine 2 mg oral capsule: 2 cap(s) orally once a day  ·	Toprol-XL 50 mg oral tablet, extended release: 1 tab(s) orally 2 times a day    PHYSICAL EXAMINATION  T(C): 36.8 (25 @ 05:06), Max: 36.8 (25 @ 05:06) HR: 79 (25 @ 07:00) (60 - 84) BP: 119/81 (25 @ 07:00) (111/70 - 149/89) ABP: 128/64 (25 @ 07:00) (111/62 - 158/85) RR: 17 (25 @ 07:00) (10 - 22) SpO2: 96% (25 @ 07:00) (91% - 100%)  NEUROLOGIC EXAMINATION:  Patient is awake, alert, fully oriented, pupils 2-3mm equal and briskly reactive to light, EOMs intact, muscle strength 5/5 on all 4s, decreased sensation R face / arm / leg  GENERAL: not intubated, not in cardiorespiratory distress  EENT:  anicteric  CARDIOVASCULAR: (+) S1 S2, normal rate and regular rhythm  PULMONARY: clear to auscultation bilaterally  ABDOMEN: soft, nontender with normoactive bowel sounds  EXTREMITIES: no edema  SKIN: no rash    LABS:   CAPILLARY BLOOD GLUCOSE 137 102 156 - insulin glargine 10 units overnight, 2 units corrective               14.3   7.52  )-----------( 145  (147)    ( 21 Mar 2025 02:34 )             42.2     (139, 135)  132[L]  |  99  |  14  ----------------------------<  136[H]  4.0   |  23  |  0.75    Ca    8.8      21 Mar 2025 02:34  Phos  2.6       Mg     1.8      @ 07:01   @ 07:00  --------------------------------------------------------  IN: 3525 mL / OUT: 3365 mL / NET: 160 mL     @ 07:01   @ 08:01  --------------------------------------------------------  IN: 115 mL / OUT: 0 mL / NET: 115 mL    Bacteriology:  CSF studies:  EEG:  Neuroimagin2025	CT BRAIN	New high-density embolic material at skull base; unchanged bilateral subdural collections. Mild mass effect, midline shift to right.  03/15/2025	CT CERV SPINE	No acute fracture or subluxation. Multi-level degenerative changes.  03/15/2025	CT BRAIN	Bilateral acute on subacute subdural hematomas, left > right, w/5 mm midline shift.  2025	CT BRAIN	No acute hemorrhage or fracture; normal ventricles.  2024	CT CHEST	Stable 7 mm lung nodule; additional tiny lung nodules. Follow-up CT in 6-12 months.  Other imagin2022	CTA HEART	<25% stenosis of LM; probable patent stents; mild-moderate stenosis RCA. 7 mm nodule in lingula, suggest 6-month interval surveillance.    MEDICATIONS: 03-    ·	acetaminophen     Tablet .. 1000 Oral every 8 hours  ·	busPIRone 15 Oral two times a day  ·	gabapentin 300 Oral at bedtime  ·	levETIRAcetam  IVPB 500 IV Intermittent every 12 hours  ·	doxazosin 1 milliGRAM(s) Oral at bedtime  ·	metoprolol succinate ER 50 milliGRAM(s) Oral every 12 hours  ·	linaclotide 290 Oral before breakfast  ·	polyethylene glycol 3350 17 Oral daily  ·	senna 2 Oral at bedtime  ·	atorvastatin 40 Oral at bedtime  ·	insulin glargine Injectable (LANTUS) 22 SubCutaneous at bedtime  ·	insulin lispro (ADMELOG) corrective regimen sliding scale  SubCutaneous Before meals and at bedtime  ·	hydrALAZINE Injectable 10 IV Push every 1 hour PRN (3 doses given)  ·	HYDROmorphone  Injectable 0.5 IV Push every 3 hours PRN  ·	methocarbamol 500 Oral every 8 hours PRN  ·	ondansetron Injectable 4 IV Push every 6 hours PRN  ·	oxyCODONE    IR 30 Oral every 8 hours PRN (1 dose)  ·	oxyCODONE    IR 10 Oral every 8 hours PRN  (2 dose)    IV FLUIDS: NS@90cc/hr  DRIPS:  DIET: CCD  Lines: L radial jersey   Drains:    24 hours:       External Ventricular Device (mL): 90 mL    External Ventricular Device (mL): 100 mL   Wounds:    CODE STATUS:  Full Code                       GOALS OF CARE:  aggressive                      DISPOSITION:  ICU

## 2025-03-21 NOTE — CONSULT NOTE ADULT - ASSESSMENT
**NOTE INCOMPLETE**  65 YO male with significant PMHx of HTN, HLD, CAD (s/p stens, Plavix), lumbar fusion, chronic pain who presented s/p FALL with head strike w/o LOC with R sided weakness and headaches, found to have bilateral SDH L>R with MLS, admitted for surgical management, s/p b/l MMAE and subsequent kusum hole for SDH evacuation, now with functional, gait, and ADL impairments    PLAN / RECOMMENDATIONS:     # Rehab / Mobilization:   - Initial therapy assessment: [X] PT  [X] OT - pending pot-op evaluations  - Continue skilled therapy while admitted to prevent secondary complications of immobility focus on transfer training, bed mobility, progressive ambulation, and equipment evaluation.   - OOB throughout the day with staff or OOB in chair with meals   - Weight bearing status: as tolerated  - Therapy precautions: drains in place    # Mood/ Cognition  - No significant deficits, follows commands    # Bracing/Splinting:   - None indicated at this time      # Pain Management:   - Avoid/ monitor use sedating medications that may interfere with cognitive recovery   - Current pain regimen reviewed; post-op ERAS per NSGY    # Speech/ Swallow:   - Dysphagia screen [X]  - Diet:  reg + thins    # GI/ Bowel: chronic constipation   - Continue to monitor bowel patterns.   - Bowel Regimen: Senna, Miralax, home Linzess >> consider uptitrating if no BM >3-4 days    # / Bladder: voiding. DI workup unremarkable  - Screen for retention: Obtain PVR or Bladder scan q6hrs (if no void); Straight cath for residual urine >400cc  - home cardura  - Continue to monitor bladder patterns, avoid constipation.       # Disposition optimization:  - Disposition recommendation - Acute Inpatient Rehabilitation  - Barriers to discharge: medically active, SD drains in place, needs post-op PT/OT evaluations when appropriate, off IV pain meds    Patient has significant functional impairments and would benefit from continued rehabilitation in the ACUTE inpatient rehab setting. The patient has both medical and functional needs appropriate for acute inpatient rehabilitation and would benefit from comprehensive interdisciplinary care, including a physiatrist, rehabilitation nursing, PT, OT, SLT and case management/social work. We anticipate that the patient would be able to tolerate 3 hours of PT/OT/SLT per day for 5 to 6 days per week to focus on mobility, transfers, gait training with assistive devices, ADL training, speech, swallow, cognition, and patient education/safety.

## 2025-03-21 NOTE — PROGRESS NOTE ADULT - ATTENDING COMMENTS
Patient is a 63 yo Male with Pmhx of CAD s/p PCI HTN, HLD, BPH, DM II, EDMUND on CPAP, presents to [facility] ED c/o headache, gait disturbance, and right sided weakness for several days after a mechanical fall, found to have bilateral subdural hematomas now s/p MMA embolization 3/17 and B/L Kusum Holes for SDH evacuation (3/20/25). Cardiology was originally consulted for CV comanagement and Preoperative risk assessment and optimization.    Review of Studies:  - TTE 03/17/2025:  Left ventricular cavity is normal in size. Left ventricular wall thickness is normal. Left ventricular systolic function is normal with an   ejection fraction of 68 % by Bailey's method of disks. There are no regional wall motion abnormalities seen. Normal left ventricular diastolic function, with normal left ventricular filling pressure. Normal right ventricular cavity size, with normal wall thickness, and normal right ventricular systolic function.  - Tele 3/16/25: SR HR 80-90s  - ECG 3/15/25: SR HR 69, nonspecific ST&T wave changes, poor R wave progression  - [facility] 04/2024:  There is single-vessel coronary artery disease . Successful IVUS-guided Intervention of OM1 80% calcified stenosis with a 2.5 Scoring balloon (Scoreflex), a 2.75 x 12 mmDES Synergy, post dilated with a 2.75 NC balloon. 0% Residual stenosis post intervention with excellent angiographic results and CORNEL 3 flow.     Outpatients Providers:  Will Hurtado MD (IC)    Home Medications (from cardiology admission): ASA 81mg QD, Plavix 75mg QD, Atorvastatin 40mg qhs, Toprol- XL 50mg BID, Lisinopril 2.5mg QD, HCTZ 12.5mg QD    # Post-operative CV risk Assessment and Optimization.  # HTN  # CAD  - Patient has known CAD. He underwent [facility] in 04/2024. At that time he was founf to have 1VCAD for which he underwent revascularization of OM1. He had Patent mLAD stent with MIld ISR, patent RPL and RPDA stent and 30% prox to mid RCA stenosis  - Since he has been well without Anginal symptoms. He has good functional status without exertional symptoms  - He is now admitted with bilateral subdural hematomas now s/p MMA embolization 3/17 and  kusum holes on 3/20 with clinical improvement  - From a CAD standpoint, it is ok to withhold DAPT for now with plan to resume ASA 81 mg po daily soon as safe from bleeding standpoint. Preferably would maintain On Plavix 75 mg po Monotherapy; However if deemed high bleeding risk, can maintain on ASA 81 mg po Qd  - Continue Toprol 50mg XL QD perioperatively and resume lisinopril 2.5mg QD if SBP persistently above 130/70.   -C/w Atorvastatin 40mg QD  - Please call Cardiology with any questions
Patient is a 63 yo Male with Pmhx of CAD s/p PCI HTN, HLD, BPH, DM II, EDMUND on CPAP, presents to [facility] ED c/o headache, gait disturbance, and right sided weakness for several days after a mechanical fall, found to have bilateral subdural hematomas now s/p MMA embolization 3/17 and pending kusum holes. Cardiology was consulted for CV comanagement and Preoperative risk assessment and optimization.    Review of Studies:  - TTE 03/17/2025:  Left ventricular cavity is normal in size. Left ventricular wall thickness is normal. Left ventricular systolic function is normal with an   ejection fraction of 68 % by Bailey's method of disks. There are no regional wall motion abnormalities seen. Normal left ventricular diastolic function, with normal left ventricular filling pressure. Normal right ventricular cavity size, with normal wall thickness, and normal right ventricular systolic function.  - Tele 3/16/25: SR HR 80-90s  - ECG 3/15/25: SR HR 69, nonspecific ST&T wave changes, poor R wave progression  - [facility] 04/2024:  There is single-vessel coronary artery disease . Successful IVUS-guided Intervention of OM1 80% calcified stenosis with a 2.5 Scoring balloon (Scoreflex), a 2.75 x 12 mmDES Synergy, post dilated with a 2.75 NC balloon. 0% Residual stenosis post intervention with excellent angiographic resultand CORNEL 3 flow.     Outpatient Providers:  Will Hurtado MD (IC)    Home Medications (from cardiology admission): ASA 81mg QD, Plavix 75mg QD, Atorvastatin 40mg qhs, Toprol- XL 50mg BID, Lisinopril 2.5mg QD, HCTZ 12.5mg QD    # Preoperative CV risk Assessment and Optimization.  # HTN  - Patient has known CAD. He underwent [facility] in 04/2024. At that time he was founf to have 1VCAD for which he underwent revascularization of OM1. He had Patent mLAD stent with MIld ISR, patent RPL and RPDA stent and 30% prox to mid RCA stenosis  - Since he has been well without Anginal symptoms. He has good functional status without exertional symptoms  - He is now admitted with bilateral subdural hematomas now s/p MMA embolization 3/17 and pending kusum holes.  - From a CAD standpoint, it is ok to withhold DAPT for now with plan to resume ASA 81 mg po daily soon as safe from bleeding standpoint  - Continue Toprol 50mg XL QD perioperatively and resume lisinopril 2.5mg QD starting 3/18 if SBP persistently above 130/70.   -C/w Atorvastatin 40mg QD  - Cardiology will cont to follow with you, please call with any questions .

## 2025-03-21 NOTE — PROGRESS NOTE ADULT - ASSESSMENT
ASSESSMENT AND PLAN  64M PMHx HTN, HLD, DM, CAD w/ 11 cardiac stents (on Plavix, lase dose 3/14), lumbar fusion 2016, chronic pain (takes oxy 30mg 4-6 times per day) presented to ED s/p fall 5 weeks ago with head strike no LOC presented with R sided weakness, HA, +WFD. CTH showed BL SDH L>R w/ 5mm MLS. Now s/p b/l MMAE (3/17/25). Now s/p BL kusum holes for SDH evacuation (3/20/25).     Neuro:  - neuro/vitals q2h  - pain control: tylenol prn, Oxy 10mg/30mg prn, gabapentin 300mg at bedtime, robaxin 500mg q8hrs prn, pain mgmt following  - SD bile bag drains x2, monitor output   - seizure ppx: Keppra 500mg BID   - Hx Depression: Buspirone 15mg BID   - Rpt CTH 3/18: stable size of collections   - postop CTH 3/21: postop changes, decreased SDH   - HOB <30 while SD drains in     Cardio:  - SBP <140  - Hx HTN: c/w Metoprolol ER 50mg bid, hold lisinopril 2.5mg daily, hold home HCTZ  - Hx CAD w/ 11 stents: HOLD home Plavix, ASA   - echo 3/17: EF 68%  - cardiology following    Pulm:   - RA  - hx EDMUND: CPAP at night    GI:  - CCD  - bowel regimen, BM 3/19  - chronic constipation: c/w linzess     Renal:  - IVL  - normonatremia goal, salt tabs 3q6   - voiding  - cont home cardura    Endo:  - ISS, A1C 7.7  - c/w  lantus 22u qhs while NPO (hold lantus 28u and lispro 14u premeal)  - Hx DM, hold home metformin    Heme:   - SCDs for DVT ppx     ID:   - afebrile     Misc:   - hold home terbinafine    Dispo: Tele, full code, pending PT/OT      Dispo: NeuroICU, Full Code      Jessica Draper MD  Neurocritical Care Attending    ASSESSMENT AND PLAN  64M PMHx HTN, HLD, DM, CAD w/ 11 cardiac stents (on Plavix, lase dose 3/14), lumbar fusion 2016, chronic pain (takes oxy 30mg 4-6 times per day) presented to ED s/p fall 5 weeks ago with head strike no LOC presented with R sided weakness, HA, +WFD. CTH showed BL SDH L>R w/ 5mm MLS. Now s/p b/l MMAE (3/17/25). Now s/p BL kusum holes for SDH evacuation (3/20/25).     Neuro:  - Neuro/vitals q2h  - pain control: Tylenol prn, Oxy 10mg/30mg prn, gabapentin 300mg at bedtime, Robaxin 500mg q8hrs prn, pain mgmt. following  - SD bile bag drains x2, monitor output   - seizure ppx: Keppra 500mg BID   - Hx Depression: Buspirone 15mg BID   - HOB <30 while SD drains in     CV:  - SBP <140  - Hx HTN: c/w Metoprolol ER 50mg bid, hold lisinopril 2.5mg daily, hold home HCTZ  - Hx CAD w/ 11 stents: HOLD home Plavix, ASA   - echo 3/17: EF 68%  - cardiology following  - telemetry monitoring     Pulm:   - RA  - hx EDMUND: CPAP at night    GI:  - CCD  - bowel regimen, BM 3/19  - chronic constipation: c/w Linzess     Renal:  - IVL  - normonatremia goal, salt tabs 3q6   - voiding  - cont home cardura    Endo:  CAPILLARY BLOOD GLUCOSE  - ISS, A1C 7.7  - c/w  Lantus 22u qhs while NPO (hold Lantus 28u and lispro 14u pre-meal)  - Hx DM, hold home metformin    Heme:   - SCDs for DVT ppx     ID:   - afebrile   - monitor WCB and fever curve       Misc:   - hold home terbinafine    Dispo: Tele, full code, pending PT/OT      Dispo: NeuroICU, Full Code      Jessica Draper MD  Neurocritical Care Attending

## 2025-03-21 NOTE — CONSULT NOTE ADULT - NSCONSULTADDITIONALINFOA_GEN_ALL_CORE
76 minutes spent on total encounter. The necessity of the time spent during the encounter on this date of service was due to:     Obtaining separately reported history including review of hospital course, relevant imaging, therapy notes, and consultant notes, performing medically appropriate examination, counseling and educating patient/ family/caregivers on rehabilitation course, care coordination, communication with primary team, documenting clinical information

## 2025-03-21 NOTE — PROGRESS NOTE ADULT - ASSESSMENT
64y/M with  acute on subacute subdural hematoma, brain compression  CAD (11 cardiac stents)  lung nodule  Hypertension dyslipidemia Diabetes Mellitus obesity  fatty liver, heartburn  BPH  constipation  EDMUND  h/o LBP    s/p Cerebral Angiogram with Embolization (03/17/2025, Shayne Church)  s/p Christa Hole Evacuation of Hematoma (03/20/2025, Remi Moore)    PLAN:   NEURO: neurochecks q1h, PRN pain meds with acetaminophen, oxycodone, gabapentin, robaxin (pain management)  post-op CT  SD Bile bag drains x2 - monitor output  depression: cont buspirone  REHAB:  physical therapy evaluation and management    EARLY MOB:  HOB <30 while SD drains in    PULM:  PRN O2 support to keep sats >/=92%, incentive spirometry, CPAP HS  CARDIO:  SBP goal 100-140mm Hg, continue metoprolol, off lisonopril / HCTZ; dual antiplatelet therapy on hold; EF 68, cardiology following  ENDO:  Blood sugar goals 140-180 mg/dL, continue insulin sliding scale; insulin glargine 22 units  GI:  bowel regimen, chronic constipation on linzess  DIET: ADAT CCD  RENAL:  IVF until good PO intake, cont cardura  HEM/ONC: check post-op Hb  VTE Prophylaxis: SCDs, no DVT chemoprophylaxis for now as patient is high risk for bleed (fresh post-op)  ID: afebrile, no leukocytosis  Social: will update family    Active issues:  What's keeping patient in the ICU? close neurochecks  What is this patient's dispo plan?    ATTENDING ATTESTATION:  Patient at high risk for neurological deterioration or death due to:  ICU delirium, aspiration PNA, DVT / PE.  Critical care time:  I have personally provided 60 minutes of critical care time, excluding time spent on separate procedures.      Plan discussed with RN, house staff. 64y/M with  acute on subacute subdural hematoma, brain compression  CAD (11 cardiac stents)  lung nodule  Hypertension dyslipidemia Diabetes Mellitus obesity  fatty liver, heartburn  BPH  constipation  EDMUND  h/o LBP    s/p Cerebral Angiogram with Embolization (03/17/2025, Shayne Church)  s/p Christa Hole Evacuation of Hematoma (03/20/2025, Remi Moore)    PLAN:   NEURO: neurochecks q2h, PRN pain meds with acetaminophen, oxycodone, gabapentin, robaxin (pain management)  SD Bile bag drains x2 - monitor output, keep for now  depression: cont buspirone  seizure prophylaxis:  levetiracetam 500mg switch to PO PO BID, as per neurosurgery   REHAB:  physical therapy evaluation and management    EARLY MOB:  HOB <30 while SD drains in    PULM:  PRN O2 support to keep sats >/=92%, incentive spirometry, CPAP HS  CARDIO:  SBP goal 100-140mm Hg, continue metoprolol, off lisonopril / HCTZ; dual antiplatelet therapy on hold (stents placed 04/2024); EF 68, cardiology following; plan to resume ASA once cleared by neurosurgery  ENDO:  Blood sugar goals 140-180 mg/dL, continue insulin sliding scale; insulin glargine 22 units (home dose 28 units)  GI:  bowel regimen, chronic constipation on linzess  DIET: CCD  RENAL:  IVL, start salt tabs, Na goal 135-145, cont cardura  HEM/ONC: Hb stable  VTE Prophylaxis: SCDs, no DVT chemoprophylaxis for now as patient is high risk for bleed (SDH - will re-evaluate tomorrow)  ID: afebrile, no leukocytosis  Social: will update family    Active issues:  What's keeping patient in the ICU? SD drains  What is this patient's dispo plan?    ATTENDING ATTESTATION:  Patient at high risk for neurological deterioration or death due to:  ICU delirium, aspiration PNA, DVT / PE.  Critical care time:  I have personally provided 60 minutes of critical care time, excluding time spent on separate procedures.      Plan discussed with RN, house staff.

## 2025-03-22 LAB
ANION GAP SERPL CALC-SCNC: 10 MMOL/L — SIGNIFICANT CHANGE UP (ref 5–17)
ANION GAP SERPL CALC-SCNC: 9 MMOL/L — SIGNIFICANT CHANGE UP (ref 5–17)
BUN SERPL-MCNC: 16 MG/DL — SIGNIFICANT CHANGE UP (ref 7–23)
BUN SERPL-MCNC: 17 MG/DL — SIGNIFICANT CHANGE UP (ref 7–23)
CALCIUM SERPL-MCNC: 8.4 MG/DL — SIGNIFICANT CHANGE UP (ref 8.4–10.5)
CALCIUM SERPL-MCNC: 8.7 MG/DL — SIGNIFICANT CHANGE UP (ref 8.4–10.5)
CHLORIDE SERPL-SCNC: 102 MMOL/L — SIGNIFICANT CHANGE UP (ref 96–108)
CHLORIDE SERPL-SCNC: 98 MMOL/L — SIGNIFICANT CHANGE UP (ref 96–108)
CO2 SERPL-SCNC: 24 MMOL/L — SIGNIFICANT CHANGE UP (ref 22–31)
CO2 SERPL-SCNC: 26 MMOL/L — SIGNIFICANT CHANGE UP (ref 22–31)
CREAT SERPL-MCNC: 0.78 MG/DL — SIGNIFICANT CHANGE UP (ref 0.5–1.3)
CREAT SERPL-MCNC: 0.94 MG/DL — SIGNIFICANT CHANGE UP (ref 0.5–1.3)
EGFR: 100 ML/MIN/1.73M2 — SIGNIFICANT CHANGE UP
EGFR: 100 ML/MIN/1.73M2 — SIGNIFICANT CHANGE UP
EGFR: 91 ML/MIN/1.73M2 — SIGNIFICANT CHANGE UP
EGFR: 91 ML/MIN/1.73M2 — SIGNIFICANT CHANGE UP
GLUCOSE SERPL-MCNC: 152 MG/DL — HIGH (ref 70–99)
GLUCOSE SERPL-MCNC: 167 MG/DL — HIGH (ref 70–99)
HCT VFR BLD CALC: 42.3 % — SIGNIFICANT CHANGE UP (ref 39–50)
HGB BLD-MCNC: 14.3 G/DL — SIGNIFICANT CHANGE UP (ref 13–17)
MAGNESIUM SERPL-MCNC: 2.1 MG/DL — SIGNIFICANT CHANGE UP (ref 1.6–2.6)
MCHC RBC-ENTMCNC: 31.2 PG — SIGNIFICANT CHANGE UP (ref 27–34)
MCHC RBC-ENTMCNC: 33.8 G/DL — SIGNIFICANT CHANGE UP (ref 32–36)
MCV RBC AUTO: 92.2 FL — SIGNIFICANT CHANGE UP (ref 80–100)
NRBC BLD AUTO-RTO: 0 /100 WBCS — SIGNIFICANT CHANGE UP (ref 0–0)
OSMOLALITY SERPL: 290 MOSM/KG — SIGNIFICANT CHANGE UP (ref 280–301)
OSMOLALITY UR: 508 MOSM/KG — SIGNIFICANT CHANGE UP (ref 300–900)
PHOSPHATE SERPL-MCNC: 1.9 MG/DL — LOW (ref 2.5–4.5)
PLATELET # BLD AUTO: 154 K/UL — SIGNIFICANT CHANGE UP (ref 150–400)
POTASSIUM SERPL-MCNC: 3.9 MMOL/L — SIGNIFICANT CHANGE UP (ref 3.5–5.3)
POTASSIUM SERPL-MCNC: 4.3 MMOL/L — SIGNIFICANT CHANGE UP (ref 3.5–5.3)
POTASSIUM SERPL-SCNC: 3.9 MMOL/L — SIGNIFICANT CHANGE UP (ref 3.5–5.3)
POTASSIUM SERPL-SCNC: 4.3 MMOL/L — SIGNIFICANT CHANGE UP (ref 3.5–5.3)
RBC # BLD: 4.59 M/UL — SIGNIFICANT CHANGE UP (ref 4.2–5.8)
RBC # FLD: 13.2 % — SIGNIFICANT CHANGE UP (ref 10.3–14.5)
SODIUM SERPL-SCNC: 132 MMOL/L — LOW (ref 135–145)
SODIUM SERPL-SCNC: 137 MMOL/L — SIGNIFICANT CHANGE UP (ref 135–145)
SODIUM UR-SCNC: 76 MMOL/L — SIGNIFICANT CHANGE UP
WBC # BLD: 7.46 K/UL — SIGNIFICANT CHANGE UP (ref 3.8–10.5)
WBC # FLD AUTO: 7.46 K/UL — SIGNIFICANT CHANGE UP (ref 3.8–10.5)

## 2025-03-22 PROCEDURE — 99233 SBSQ HOSP IP/OBS HIGH 50: CPT

## 2025-03-22 PROCEDURE — 70450 CT HEAD/BRAIN W/O DYE: CPT | Mod: 26

## 2025-03-22 RX ORDER — SODIUM CHLORIDE 3 G/100ML
100 INJECTION, SOLUTION INTRAVENOUS ONCE
Refills: 0 | Status: COMPLETED | OUTPATIENT
Start: 2025-03-22 | End: 2025-03-22

## 2025-03-22 RX ORDER — INSULIN LISPRO 100 U/ML
4 INJECTION, SOLUTION INTRAVENOUS; SUBCUTANEOUS
Refills: 0 | Status: DISCONTINUED | OUTPATIENT
Start: 2025-03-22 | End: 2025-03-23

## 2025-03-22 RX ORDER — HEPARIN SODIUM 1000 [USP'U]/ML
5000 INJECTION INTRAVENOUS; SUBCUTANEOUS EVERY 8 HOURS
Refills: 0 | Status: DISCONTINUED | OUTPATIENT
Start: 2025-03-23 | End: 2025-03-28

## 2025-03-22 RX ORDER — POTASSIUM PHOSPHATE, MONOBASIC POTASSIUM PHOSPHATE, DIBASIC INJECTION, 236; 224 MG/ML; MG/ML
30 SOLUTION, CONCENTRATE INTRAVENOUS ONCE
Refills: 0 | Status: COMPLETED | OUTPATIENT
Start: 2025-03-22 | End: 2025-03-22

## 2025-03-22 RX ADMIN — DOXAZOSIN MESYLATE 1 MILLIGRAM(S): 8 TABLET ORAL at 22:11

## 2025-03-22 RX ADMIN — METOPROLOL SUCCINATE 50 MILLIGRAM(S): 50 TABLET, EXTENDED RELEASE ORAL at 17:46

## 2025-03-22 RX ADMIN — INSULIN LISPRO 2: 100 INJECTION, SOLUTION INTRAVENOUS; SUBCUTANEOUS at 06:16

## 2025-03-22 RX ADMIN — LEVETIRACETAM 500 MILLIGRAM(S): 10 INJECTION, SOLUTION INTRAVENOUS at 05:47

## 2025-03-22 RX ADMIN — LINACLOTIDE 290 MICROGRAM(S): 290 CAPSULE, GELATIN COATED ORAL at 06:11

## 2025-03-22 RX ADMIN — BUSPIRONE HYDROCHLORIDE 15 MILLIGRAM(S): 15 TABLET ORAL at 05:48

## 2025-03-22 RX ADMIN — INSULIN LISPRO 4 UNIT(S): 100 INJECTION, SOLUTION INTRAVENOUS; SUBCUTANEOUS at 11:40

## 2025-03-22 RX ADMIN — OXYCODONE HYDROCHLORIDE 10 MILLIGRAM(S): 30 TABLET ORAL at 22:30

## 2025-03-22 RX ADMIN — Medication 500 MILLILITER(S): at 10:26

## 2025-03-22 RX ADMIN — Medication 1 APPLICATION(S): at 11:40

## 2025-03-22 RX ADMIN — Medication 3 GRAM(S): at 11:39

## 2025-03-22 RX ADMIN — ATORVASTATIN CALCIUM 40 MILLIGRAM(S): 80 TABLET, FILM COATED ORAL at 22:11

## 2025-03-22 RX ADMIN — POLYETHYLENE GLYCOL 3350 17 GRAM(S): 17 POWDER, FOR SOLUTION ORAL at 11:39

## 2025-03-22 RX ADMIN — SODIUM CHLORIDE 100 MILLILITER(S): 3 INJECTION, SOLUTION INTRAVENOUS at 10:26

## 2025-03-22 RX ADMIN — Medication 1000 MILLIGRAM(S): at 05:47

## 2025-03-22 RX ADMIN — OXYCODONE HYDROCHLORIDE 30 MILLIGRAM(S): 30 TABLET ORAL at 13:20

## 2025-03-22 RX ADMIN — Medication 3 GRAM(S): at 00:05

## 2025-03-22 RX ADMIN — Medication 3 GRAM(S): at 17:46

## 2025-03-22 RX ADMIN — LEVETIRACETAM 500 MILLIGRAM(S): 10 INJECTION, SOLUTION INTRAVENOUS at 17:47

## 2025-03-22 RX ADMIN — POTASSIUM PHOSPHATE, MONOBASIC POTASSIUM PHOSPHATE, DIBASIC INJECTION, 83.33 MILLIMOLE(S): 236; 224 SOLUTION, CONCENTRATE INTRAVENOUS at 11:04

## 2025-03-22 RX ADMIN — Medication 1000 MILLIGRAM(S): at 06:30

## 2025-03-22 RX ADMIN — INSULIN GLARGINE-YFGN 22 UNIT(S): 100 INJECTION, SOLUTION SUBCUTANEOUS at 22:11

## 2025-03-22 RX ADMIN — OXYCODONE HYDROCHLORIDE 30 MILLIGRAM(S): 30 TABLET ORAL at 12:25

## 2025-03-22 RX ADMIN — INSULIN LISPRO 2: 100 INJECTION, SOLUTION INTRAVENOUS; SUBCUTANEOUS at 22:12

## 2025-03-22 RX ADMIN — METOPROLOL SUCCINATE 50 MILLIGRAM(S): 50 TABLET, EXTENDED RELEASE ORAL at 05:48

## 2025-03-22 RX ADMIN — Medication 2 TABLET(S): at 22:11

## 2025-03-22 RX ADMIN — GABAPENTIN 300 MILLIGRAM(S): 400 CAPSULE ORAL at 22:11

## 2025-03-22 RX ADMIN — INSULIN LISPRO 4 UNIT(S): 100 INJECTION, SOLUTION INTRAVENOUS; SUBCUTANEOUS at 16:41

## 2025-03-22 RX ADMIN — Medication 3 GRAM(S): at 23:58

## 2025-03-22 RX ADMIN — BUSPIRONE HYDROCHLORIDE 15 MILLIGRAM(S): 15 TABLET ORAL at 17:59

## 2025-03-22 RX ADMIN — Medication 3 GRAM(S): at 05:49

## 2025-03-22 NOTE — PROGRESS NOTE ADULT - SUBJECTIVE AND OBJECTIVE BOX
=================================  NEUROCRITICAL CARE ATTENDING NOTE  =================================    VIJAY COLE   MRN-[id number]  Summary:  64y/M  with HTN, HLD, DM, CAD w/  cardiac stents (on Plavix, lase dose 3/14), lumbar fusion , chronic pain (takes oxy 30mg 4-6 times per day) presented to ED s/p fall 5 weeks ago with head strike no LOC. PT states he visited an ED when he fell and had a negative CTH at the time. He now c/o R sided weakness, dysnomia, and HA rated at 8/10. He takes his home oxy for chronic LBP prescribed by PCP, which helps with the headache. Denies recent falls, dizziness, LOC, seizure, vision changes, SOB, chest pain.  (15 Mar 2025 14:07)    COURSE IN THE HOSPITAL:  64M with Hypertension dyslipidemia Diabetes Mellitus CAD x11 stents p/w fall 5wks ago, head strike, subsequent R weakness dysnomia HA. CT acute/subacute SDH  03/15 admitted to [facility]   POD0 angio - Endovascular embolization of bilateral MMA; stable post-op.   POD0 - Bilateral kusum hole for subdural hematoma, stable condition post-op.   POD2/1 increased UO x 2 hours   POD3/2     Past Medical History: Sleep apnea Hypertension Diabetes mellitus High cholesterol Obesity (BMI 30.0-34.9) CAD (coronary artery disease) Heart burn Hiatal hernia Fatty liver BPH (benign prostatic hyperplasia) Constipation CAD (coronary artery disease) H/O low back pain  Allergies:  IV Contrast (Other; Rash) ibuprofen (Other)  Home meds:   ·	Aspirin EC 81 mg oral delayed release tablet: 1 tab(s) orally once a day  ·	atorvastatin 40 mg oral tablet: 1 tab(s) orally once a day (at bedtime)  ·	busPIRone 15 mg oral tablet: 1 tab(s) orally 2 times a day  ·	celecoxib 100 mg oral capsule: 1 cap(s) orally 2 times a day celecoxib 200 mg oral capsule: 1 cap(s) orally 2 times a day  ·	clopidogrel 75 mg oral tablet: 1 tab(s) orally once a day  ·	diclofenac potassium 50 mg oral tablet: 1 tab(s) orally once a day  ·	doxazosin 1 mg oral tablet: 1 tab(s) orally once a day (at bedtime)  ·	Flonase 50 mcg/inh nasal spray: 1 spray(s) in each nostril 2 times a day  ·	hydroCHLOROthiazide 12.5 mg oral tablet: 1 tab(s) orally once a day  ·	Insulin Glargine: 28 unit(s) subcutaneous once a day (at bedtime)  ·	Insulin Lispro: 14 unit(s) subcutaneous 3 times a day  ·	LamISIL 250 mg oral tablet: 1 tab(s) orally every other day - terbinafine  ·	Linzess 290 mcg oral capsule: 1 cap(s) orally once a day - linaclotide  ·	lisinopril 2.5 mg oral tablet: 1 tab(s) orally once a day  ·	metFORMIN 1000 mg oral tablet: 1 tab(s) orally 2 times a day  ·	metoprolol succinate 50 mg oral tablet, extended release: 1 tab(s) orally 2 times a day  ·	oxyCODONE 30 mg oral tablet: 1 tab(s) orally 4 times a day  ·	Plavix 75 mg oral tablet: 1 tab(s) orally once a day  ·	semaglutide: last dose 3/11  ·	tiZANidine 2 mg oral capsule: 2 cap(s) orally once a day  ·	Toprol-XL 50 mg oral tablet, extended release: 1 tab(s) orally 2 times a day    PHYSICAL EXAMINATION  T(C): 36.8 (25 @ 04:50), Max: 37.5 (25 @ 09:25) HR: 72 (25 @ 08:00) (72 - 90) BP: 106/68 (25 @ 08:00) (106/68 - 142/76) ABP: 102/58 (25 @ 07:00) (95/66 - 144/95) RR: 17 (25 @ 08:00) (10 - 24) SpO2: 96% (25 @ 08:00) (94% - 100%)  NEUROLOGIC EXAMINATION:  Patient is awake, alert, fully oriented, pupils 2-3mm equal and briskly reactive to light, EOMs intact, muscle strength 5/5 on all 4s, decreased sensation R face / arm / leg  GENERAL: not intubated, not in cardiorespiratory distress  EENT:  anicteric  CARDIOVASCULAR: (+) S1 S2, normal rate and regular rhythm  PULMONARY: clear to auscultation bilaterally  ABDOMEN: soft, nontender with normoactive bowel sounds  EXTREMITIES: no edema  SKIN: no rash    LABS:   CAPILLARY BLOOD GLUCOSE 151 239 177 178              (7.52)  14.3 (14.3)  7.46  )-----------( 154   (145)   ( 22 Mar 2025 05:21 )             42.3    (132, 139)  132[L]  |  98  |  16  ----------------------------<  152[H]  3.9   |  24  |  0.78    Ca    8.7      22 Mar 2025 05:21  Phos  1.9       Mg     2.1      @ 07:01  -   @ 07:00  --------------------------------------------------------  IN: 545 mL / OUT: 3050 mL / NET: -2505 mL     Bacteriology:  CSF studies:  EEG:  [id number]	CT BRAIN	New high-density embolic material at skull base; unchanged bilateral subdural collections. Mild mass effect, midline shift to right.  03/15/2025	CT CERV SPINE	No acute fracture or subluxation. Multi-level degenerative changes.  03/15/2025	CT BRAIN	Bilateral acute on subacute subdural hematomas, left > right, w/5 mm midline shift.  2025	CT BRAIN	No acute hemorrhage or fracture; normal ventricles.  2024	CT CHEST	Stable 7 mm lung nodule; additional tiny lung nodules. Follow-up CT in 6-12 months.  Other imagin2022	CTA HEART	<25% stenosis of LM; probable patent stents; mild-moderate stenosis RCA. 7 mm nodule in lingula, suggest 6-month interval surveillance.    MEDICATIONS:     ·	busPIRone 15 Oral two times a day  ·	gabapentin 300 Oral at bedtime  ·	levETIRAcetam 500 Oral every 12 hours  ·	doxazosin 1 milliGRAM(s) Oral at bedtime  ·	metoprolol succinate ER 50 milliGRAM(s) Oral every 12 hours  ·	linaclotide 290 Oral before breakfast  ·	polyethylene glycol 3350 17 Oral daily  ·	senna 2 Oral at bedtime  ·	atorvastatin 40 Oral at bedtime  ·	insulin glargine Injectable (LANTUS) 22 SubCutaneous at bedtime  ·	insulin lispro (ADMELOG) corrective regimen sliding scale  SubCutaneous Before meals and at bedtime  ·	sodium chloride 3 Oral every 6 hours  ·	hydrALAZINE Injectable 10 IV Push every 1 hour PRN  ·	methocarbamol 500 Oral every 8 hours PRN  ·	ondansetron Injectable 4 IV Push every 6 hours PRN  ·	oxyCODONE    IR 30 Oral every 8 hours PRN  ·	oxyCODONE    IR 10 Oral every 8 hours PRN    IV FLUIDS: NS@90cc/hr  DRIPS:  DIET: CCD  Lines: L radial jersey   Drains:    24 hours:       External Ventricular Device (mL): 90 mL    External Ventricular Device (mL): 100 mL   Wounds:    CODE STATUS:  Full Code                       GOALS OF CARE:  aggressive                      DISPOSITION:  ICU =================================  NEUROCRITICAL CARE ATTENDING NOTE  =================================    VIJAY COLE   MRN-[id number]  Summary:  64y/M  with HTN, HLD, DM, CAD w/  cardiac stents (on Plavix, lase dose 3/14), lumbar fusion , chronic pain (takes oxy 30mg 4-6 times per day) presented to ED s/p fall 5 weeks ago with head strike no LOC. PT states he visited an ED when he fell and had a negative CTH at the time. He now c/o R sided weakness, dysnomia, and HA rated at 8/10. He takes his home oxy for chronic LBP prescribed by PCP, which helps with the headache. Denies recent falls, dizziness, LOC, seizure, vision changes, SOB, chest pain.  (15 Mar 2025 14:07)    COURSE IN THE HOSPITAL:  64M with Hypertension dyslipidemia Diabetes Mellitus CAD x11 stents p/w fall 5wks ago, head strike, subsequent R weakness dysnomia HA. CT acute/subacute SDH  03/15 admitted to [facility]   POD0 angio - Endovascular embolization of bilateral MMA; stable post-op.   POD0 - Bilateral kusum hole for subdural hematoma, stable condition post-op.   POD2/1 increased UO x 2 hours   POD3/2     Past Medical History: Sleep apnea Hypertension Diabetes mellitus High cholesterol Obesity (BMI 30.0-34.9) CAD (coronary artery disease) Heart burn Hiatal hernia Fatty liver BPH (benign prostatic hyperplasia) Constipation CAD (coronary artery disease) H/O low back pain  Allergies:  IV Contrast (Other; Rash) ibuprofen (Other)  Home meds:   ·	Aspirin EC 81 mg oral delayed release tablet: 1 tab(s) orally once a day  ·	atorvastatin 40 mg oral tablet: 1 tab(s) orally once a day (at bedtime)  ·	busPIRone 15 mg oral tablet: 1 tab(s) orally 2 times a day  ·	celecoxib 100 mg oral capsule: 1 cap(s) orally 2 times a day celecoxib 200 mg oral capsule: 1 cap(s) orally 2 times a day  ·	clopidogrel 75 mg oral tablet: 1 tab(s) orally once a day  ·	diclofenac potassium 50 mg oral tablet: 1 tab(s) orally once a day  ·	doxazosin 1 mg oral tablet: 1 tab(s) orally once a day (at bedtime)  ·	Flonase 50 mcg/inh nasal spray: 1 spray(s) in each nostril 2 times a day  ·	hydroCHLOROthiazide 12.5 mg oral tablet: 1 tab(s) orally once a day  ·	Insulin Glargine: 28 unit(s) subcutaneous once a day (at bedtime)  ·	Insulin Lispro: 14 unit(s) subcutaneous 3 times a day  ·	LamISIL 250 mg oral tablet: 1 tab(s) orally every other day - terbinafine  ·	Linzess 290 mcg oral capsule: 1 cap(s) orally once a day - linaclotide  ·	lisinopril 2.5 mg oral tablet: 1 tab(s) orally once a day  ·	metFORMIN 1000 mg oral tablet: 1 tab(s) orally 2 times a day  ·	metoprolol succinate 50 mg oral tablet, extended release: 1 tab(s) orally 2 times a day  ·	oxyCODONE 30 mg oral tablet: 1 tab(s) orally 4 times a day  ·	Plavix 75 mg oral tablet: 1 tab(s) orally once a day  ·	semaglutide: last dose 3/11  ·	tiZANidine 2 mg oral capsule: 2 cap(s) orally once a day  ·	Toprol-XL 50 mg oral tablet, extended release: 1 tab(s) orally 2 times a day    PHYSICAL EXAMINATION  T(C): 36.8 (25 @ 04:50), Max: 37.5 (25 @ 09:25) HR: 72 (25 @ 08:00) (72 - 90) BP: 106/68 (25 @ 08:00) (106/68 - 142/76) ABP: 102/58 (25 @ 07:00) (95/66 - 144/95) RR: 17 (25 @ 08:00) (10 - 24) SpO2: 96% (25 @ 08:00) (94% - 100%)  NEUROLOGIC EXAMINATION:  Patient is awake, alert, fully oriented, pupils 2-3mm equal and briskly reactive to light, EOMs intact, muscle strength 5/5 on all 4s, decreased sensation R face / arm / leg  GENERAL: not intubated, not in cardiorespiratory distress  EENT:  anicteric  CARDIOVASCULAR: (+) S1 S2, normal rate and regular rhythm  PULMONARY: clear to auscultation bilaterally  ABDOMEN: soft, nontender with normoactive bowel sounds  EXTREMITIES: no edema  SKIN: no rash    LABS:   CAPILLARY BLOOD GLUCOSE 151 239 177 178  - insulin glargine 22 units - 8 units coverage over 24h             (7.52)  14.3 (14.3)  7.46  )-----------( 154   (145)   ( 22 Mar 2025 05:21 )             42.3    (132, 139)  132[L]  |  98  |  16  ----------------------------<  152[H]  3.9   |  24  |  0.78    Ca    8.7      22 Mar 2025 05:21  Phos  1.9       Mg     2.1      @ 07:01  -   @ 07:00  --------------------------------------------------------  IN: 545 mL / OUT: 3050 mL / NET: -2505 mL     Bacteriology:  CSF studies:  EEG:  [id number]	CT BRAIN	New high-density embolic material at skull base; unchanged bilateral subdural collections. Mild mass effect, midline shift to right.  03/15/2025	CT CERV SPINE	No acute fracture or subluxation. Multi-level degenerative changes.  03/15/2025	CT BRAIN	Bilateral acute on subacute subdural hematomas, left > right, w/5 mm midline shift.  2025	CT BRAIN	No acute hemorrhage or fracture; normal ventricles.  2024	CT CHEST	Stable 7 mm lung nodule; additional tiny lung nodules. Follow-up CT in 6-12 months.  Other imagin2022	CTA HEART	<25% stenosis of LM; probable patent stents; mild-moderate stenosis RCA. 7 mm nodule in lingula, suggest 6-month interval surveillance.    MEDICATIONS:     ·	busPIRone 15 Oral two times a day  ·	gabapentin 300 Oral at bedtime  ·	levETIRAcetam 500 Oral every 12 hours  ·	doxazosin 1 milliGRAM(s) Oral at bedtime  ·	metoprolol succinate ER 50 milliGRAM(s) Oral every 12 hours  ·	linaclotide 290 Oral before breakfast  ·	polyethylene glycol 3350 17 Oral daily  ·	senna 2 Oral at bedtime  ·	atorvastatin 40 Oral at bedtime  ·	insulin glargine Injectable (LANTUS) 22 SubCutaneous at bedtime  ·	insulin lispro (ADMELOG) corrective regimen sliding scale  SubCutaneous Before meals and at bedtime  ·	sodium chloride 3 Oral every 6 hours  ·	hydrALAZINE Injectable 10 IV Push every 1 hour PRN  ·	methocarbamol 500 Oral every 8 hours PRN  ·	ondansetron Injectable 4 IV Push every 6 hours PRN  ·	oxyCODONE    IR 30 Oral every 8 hours PRN  ·	oxyCODONE    IR 10 Oral every 8 hours PRN    IV FLUIDS: NS@90cc/hr  DRIPS:  DIET: CCD  Lines: L radial jersey   Drains:    24 hours:       External Ventricular Device (mL): 90 mL    External Ventricular Device (mL): 100 mL   Wounds:    CODE STATUS:  Full Code                       GOALS OF CARE:  aggressive                      DISPOSITION:  ICU

## 2025-03-22 NOTE — PROGRESS NOTE ADULT - SUBJECTIVE AND OBJECTIVE BOX
NEUROCRITICAL CARE PROGRESS NOTE    VIJAY COLE   MRN-[id number]      S/Overnight events: POD 5 angio, POD 2 b/l kusum holes. VALERIANO o/n, neuro stable. Feeling well, pain controlled.     Hospital Course:  3/15: Admit to [facility] for further mgmt.   3/16: VALERIANO overnight. Neuro stable. Preop for MMAE, premedication protocol for contrast allergy. Cards consulted, cleared for MMAE, recommend echo.  3/17: VALERIANO ovn. Neuro stable. Preop for MMAE today. Echo done. Resumed home lantus 28u qhs. POD0 s/p b/l MMAE.   3/18: POD1. VALERIANO ovn. Started lispro 10u TID. Hold lisinopril given soft pressures. CTH complete. Still therapeutic on ASA/Plavix, rpt in AM.  3/19: POD2, VALERIANO overnight, lantus decreased to 22u for NPO.   3/20: POD3, VALERIANO overnight, POD0 b/l kusum hole. Lantus 10 for .   3/21: POD 4 angio for embo, POD 1 BL kusum holes. 350, 300cc urine output x 2 hrs. DI labs sent, labs not indicative of DI. CTH done.   3/22: POD 5 angio, POD 2 b/l kusum holes. VALERIANO o/n, neuro stable.       Vital Signs Last 24 Hrs  T(C): 36.4 (22 Mar 2025 01:42), Max: 37.5 (21 Mar 2025 09:25)  T(F): 97.6 (22 Mar 2025 01:42), Max: 99.5 (21 Mar 2025 09:25)  HR: 77 (22 Mar 2025 02:00) (72 - 90)  BP: 110/78 (22 Mar 2025 02:00) (107/66 - 139/76)  BP(mean): 107 (22 Mar 2025 02:00) (78 - 107)  RR: 13 (22 Mar 2025 02:00) (11 - 17)  SpO2: 96% (22 Mar 2025 02:00) (94% - 100%)    Parameters below as of 22 Mar 2025 02:00  Patient On (Oxygen Delivery Method): room air            I&O's Detail    20 Mar 2025 07:01  -  21 Mar 2025 07:00  --------------------------------------------------------  IN:    IV PiggyBack: 1335 mL    Oral Fluid: 300 mL    sodium chloride 0.9%: 1890 mL  Total IN: 3525 mL    OUT:    Blood Loss (mL): 75 mL    External Ventricular Device (mL): 90 mL    External Ventricular Device (mL): 100 mL    Indwelling Catheter - Urethral (mL): 3100 mL  Total OUT: 3365 mL    Total NET: 160 mL      21 Mar 2025 07:01  -  22 Mar 2025 03:13  --------------------------------------------------------  IN:    IV PiggyBack: 25 mL    Oral Fluid: 340 mL    sodium chloride 0.9%: 180 mL  Total IN: 545 mL    OUT:    Voided (mL): 2400 mL  Total OUT: 2400 mL    Total NET: -1855 mL    Constitutional: Lying in bed, in NAD  Respiratory: breathing non-labored on RA, symmetrical chest wall movement  Cardiovascular: RRR, no murmurs  Gastrointestinal: abdomen soft, non tender  Neurological:  AAOX3. Opens eyes spontaneously, follows commands. Speech clear.   Cranial Nerves: PERRL 3mm brisk b/l, EOMI, face symmetric, tongue midline.   Motor: 5/5 power in b/l UE and LE  Sensation: intact to light touch in all extremities  No pronator Drift.   Ext: warm and well-perfused  Wound/devices: 2 SD bile bag drains, BL kusum holes c/d/i.         LABS:                        14.3   7.52  )-----------( 145      ( 21 Mar 2025 02:34 )             42.2     03-21    132[L]  |  99  |  14  ----------------------------<  136[H]  4.0   |  23  |  0.75    Ca    8.8      21 Mar 2025 02:34  Phos  2.6     03-21  Mg     1.8     03-21      PT/INR - ( 20 Mar 2025 11:00 )   PT: 12.0 sec;   INR: 1.03          PTT - ( 20 Mar 2025 11:00 )  PTT:27.0 sec  Urinalysis Basic - ( 21 Mar 2025 02:36 )    Color: x / Appearance: x / S.015 / pH: x  Gluc: x / Ketone: x  / Bili: x / Urobili: x   Blood: x / Protein: x / Nitrite: x   Leuk Esterase: x / RBC: x / WBC x   Sq Epi: x / Non Sq Epi: x / Bacteria: x          CAPILLARY BLOOD GLUCOSE      POCT Blood Glucose.: 239 mg/dL (21 Mar 2025 21:43)  POCT Blood Glucose.: 177 mg/dL (21 Mar 2025 17:08)  POCT Blood Glucose.: 178 mg/dL (21 Mar 2025 11:53)  POCT Blood Glucose.: 137 mg/dL (21 Mar 2025 05:58)      Drug Levels: [] N/A    CSF Analysis: [] N/A      Allergies    IV Contrast (Other; Rash)  ibuprofen (Other)    Intolerances      MEDICATIONS:  Antibiotics:    Neuro:  acetaminophen     Tablet .. 1000 milliGRAM(s) Oral every 8 hours  busPIRone 15 milliGRAM(s) Oral two times a day  gabapentin 300 milliGRAM(s) Oral at bedtime  levETIRAcetam 500 milliGRAM(s) Oral every 12 hours  methocarbamol 500 milliGRAM(s) Oral every 8 hours PRN  ondansetron Injectable 4 milliGRAM(s) IV Push every 6 hours PRN  oxyCODONE    IR 30 milliGRAM(s) Oral every 8 hours PRN  oxyCODONE    IR 10 milliGRAM(s) Oral every 8 hours PRN    Anticoagulation:    OTHER:  atorvastatin 40 milliGRAM(s) Oral at bedtime  chlorhexidine 2% Cloths 1 Application(s) Topical daily  doxazosin 1 milliGRAM(s) Oral at bedtime  hydrALAZINE Injectable 10 milliGRAM(s) IV Push every 1 hour PRN  influenza   Vaccine 0.5 milliLiter(s) IntraMuscular once  insulin glargine Injectable (LANTUS) 22 Unit(s) SubCutaneous at bedtime  insulin lispro (ADMELOG) corrective regimen sliding scale   SubCutaneous Before meals and at bedtime  linaclotide 290 MICROGram(s) Oral before breakfast  metoprolol succinate ER 50 milliGRAM(s) Oral every 12 hours  polyethylene glycol 3350 17 Gram(s) Oral daily  senna 2 Tablet(s) Oral at bedtime    IVF:  sodium chloride 3 Gram(s) Oral every 6 hours    CULTURES:    Assessment: 64M PMHx HTN, HLD, DM, CAD w/  cardiac stents (on Plavix, lase dose 3/14), lumbar fusion , chronic pain (takes oxy 30mg 4-6 times per day) presented to ED s/p fall 5 weeks ago with head strike no LOC presented with R sided weakness, HA, +WFD. CTH showed BL SDH L>R w/ 5mm MLS. Now s/p b/l MMAE (3/17/25). Now s/p BL kusum holes for SDH evacuation (3/20/25).     Neuro:  - neuro/vitals q2h  - pain control: tylenol prn, Oxy 10mg/30mg prn, gabapentin 300mg at bedtime, robaxin 500mg q8hrs prn, pain mgmt following  - SD bile bag drains x2, monitor output   - seizure ppx: Keppra 500mg BID   - Hx Depression: Buspirone 15mg BID   - Rpt CTH 3/18: stable size of collections   - postop CTH 3/21: postop changes, decreased SDH   - HOB <30 while SD drains in     Cardio:  - SBP <140  - Hx HTN: c/w Metoprolol ER 50mg bid, hold lisinopril 2.5mg daily, hold home HCTZ  - Hx CAD  stents: HOLD home Plavix, ASA   - echo 3/17: EF 68%  - cardiology following    Pulm:   - RA  - hx EDMUND: CPAP at night    GI:  - CCD  - bowel regimen, BM 3/19  - chronic constipation: c/w linzess     Renal:  - IVL  - normonatremia goal, salt tabs 3q6   - voiding  - cont home cardura    Endo:  - ISS, A1C 7.7  - c/w  lantus 22u qhs while NPO (hold lantus 28u and lispro 14u premeal)  - Hx DM, hold home metformin    Heme:   - SCDs for DVT ppx     ID:   - afebrile     Misc:   - hold home terbinafine    Dispo: Tele, full code, pending PT/OT    Discussed w/ Dr. Enciso

## 2025-03-22 NOTE — PROGRESS NOTE ADULT - ASSESSMENT
64y/M with  acute on subacute subdural hematoma, brain compression  CAD (11 cardiac stents)  lung nodule  Hypertension dyslipidemia Diabetes Mellitus obesity  fatty liver, heartburn  BPH  constipation  EDMUND  h/o LBP    s/p Cerebral Angiogram with Embolization (03/17/2025, Shayne Church)  s/p Christa Hole Evacuation of Hematoma (03/20/2025, Remi Moore)    PLAN:   NEURO: neurochecks q2h, PRN pain meds with acetaminophen, oxycodone, gabapentin, robaxin (pain management)  SD Bile bag drains x2 - monitor output, keep for now  depression: cont buspirone  seizure prophylaxis:  levetiracetam 500mg switch to PO PO BID, as per neurosurgery   REHAB:  physical therapy evaluation and management    EARLY MOB:  HOB <30 while SD drains in    PULM:  PRN O2 support to keep sats >/=92%, incentive spirometry, CPAP HS  CARDIO:  SBP goal 100-140mm Hg, continue metoprolol, off lisonopril / HCTZ; dual antiplatelet therapy on hold (stents placed 04/2024); EF 68, cardiology following; plan to resume ASA once cleared by neurosurgery  ENDO:  Blood sugar goals 140-180 mg/dL, continue insulin sliding scale; insulin glargine 22 units (home dose 28 units)  GI:  bowel regimen, chronic constipation on linzess  DIET: CCD  RENAL:  IVL, start salt tabs, Na goal 135-145, cont cardura  HEM/ONC: Hb stable  VTE Prophylaxis: SCDs, no DVT chemoprophylaxis for now as patient is high risk for bleed (SDH - will re-evaluate tomorrow)  ID: afebrile, no leukocytosis  Social: will update family    Active issues:  What's keeping patient in the ICU? SD drains  What is this patient's dispo plan?    ATTENDING ATTESTATION:  Patient at high risk for neurological deterioration or death due to:  ICU delirium, aspiration PNA, DVT / PE.  Critical care time:  I have personally provided 60 minutes of critical care time, excluding time spent on separate procedures.      Plan discussed with RN, house staff. 64y/M with  acute on subacute subdural hematoma, brain compression  CAD (11 cardiac stents)  lung nodule  Hypertension dyslipidemia Diabetes Mellitus obesity  fatty liver, heartburn  BPH  constipation  EDMUND  h/o LBP    s/p Cerebral Angiogram with Embolization (03/17/2025, Shayne Church)  s/p Christa Hole Evacuation of Hematoma (03/20/2025, Remi Moore)    PLAN:   NEURO: neurochecks q2h, PRN pain meds with acetaminophen, oxycodone, gabapentin, robaxin (pain management)  SD Bile bag drains x2 - monitor output, keep for now  depression: cont buspirone  seizure prophylaxis:  levetiracetam 500mg switch to PO PO BID, as per neurosurgery   REHAB:  physical therapy evaluation and management    EARLY MOB:  HOB <30 while SD drains in    PULM:  PRN O2 support to keep sats >/=92%, incentive spirometry, CPAP HS  CARDIO:  SBP goal 100-140mm Hg, continue metoprolol, off lisonopril / HCTZ; dual antiplatelet therapy on hold (stents placed 04/2024); EF 68, cardiology following; plan to resume ASA once cleared by neurosurgery  ENDO:  Blood sugar goals 140-180 mg/dL, continue insulin sliding scale; insulin glargine 22 units, 4 units premeals  GI:  bowel regimen, chronic constipation on linzess  DIET: CCD  RENAL:  IVL, cont salt tabs, Na goal 135-145, cont cardura; f/u hyponatremia work-up  HEM/ONC: Hb stable  VTE Prophylaxis: SCDs, no DVT chemoprophylaxis for now as patient is high risk for bleed (pulling subdural drain, also chronic SDH - high risk)  ID: afebrile, no leukocytosis  Social: will update family    Active issues:  What's keeping patient in the ICU? SD drains  What is this patient's dispo plan? stepdown once SD drains removed      ATTENDING ATTESTATION:  Patient not at high risk for neurologic deterioration / death.  Time spent on this noncritically ill patient: 55 minutes spent on total encounter, more than 50% of the visit was spent counseling and/or coordinating care by the attending physician.      Plan discussed with RN, house staff.    REVIEW OF SYSTEMS:  No headaches, no nausea or vomiting; 14 -point review of systems otherwise unremarkable.        ICU stepdown Checklist:    Completed: 03-22 @ 10:02    [X] hemodynamically stable – VS WNL and stable x 24hours, UO adequate  [n/a ] if  previously on HDA - off pressors x 24h with stable neuro exam    [X] no new symptoms x 24h (i.e. new fever, new-onset nausea/vomiting)  [X] stable labs: (i.e. WBC not rising, sodium not dropping)  [X] patient not at high risk for aspiration, if high risk then:                  [ ] should have definitive plans for trach/PEG (alternative option is to discharge from ICU to facilty)                  [ ] stepdown to bed close to nurse’s station  [n/a] low suctioning requirements (i.e. q4h or less)  [X] sign-off from primary RN*  [X] drains do not require ICU level of care - SD drain removed   [X] if patient previously agitated or with behavioral issues – controlled   [X] pain controlled

## 2025-03-22 NOTE — PROCEDURE NOTE - ADDITIONAL PROCEDURE DETAILS
Drain sites were cleaned with chlorhexidine. A hemostat was placed on the drain tubing prior to removal to prevent the introduction of air. The anchoring sutures were removed. The drains were remove one by one with no resistance. There was no bleeding, drainage, or liquid emitting from the site. A 3-0 ethilon interrupted suture was placed at each drain site. bacitracin was placed on top of the prior drain sites.

## 2025-03-23 LAB
ANION GAP SERPL CALC-SCNC: 9 MMOL/L — SIGNIFICANT CHANGE UP (ref 5–17)
BUN SERPL-MCNC: 14 MG/DL — SIGNIFICANT CHANGE UP (ref 7–23)
CALCIUM SERPL-MCNC: 8.9 MG/DL — SIGNIFICANT CHANGE UP (ref 8.4–10.5)
CHLORIDE SERPL-SCNC: 98 MMOL/L — SIGNIFICANT CHANGE UP (ref 96–108)
CO2 SERPL-SCNC: 28 MMOL/L — SIGNIFICANT CHANGE UP (ref 22–31)
CREAT SERPL-MCNC: 0.94 MG/DL — SIGNIFICANT CHANGE UP (ref 0.5–1.3)
EGFR: 91 ML/MIN/1.73M2 — SIGNIFICANT CHANGE UP
EGFR: 91 ML/MIN/1.73M2 — SIGNIFICANT CHANGE UP
GLUCOSE SERPL-MCNC: 116 MG/DL — HIGH (ref 70–99)
HCT VFR BLD CALC: 42.4 % — SIGNIFICANT CHANGE UP (ref 39–50)
HGB BLD-MCNC: 13.9 G/DL — SIGNIFICANT CHANGE UP (ref 13–17)
MAGNESIUM SERPL-MCNC: 1.9 MG/DL — SIGNIFICANT CHANGE UP (ref 1.6–2.6)
MCHC RBC-ENTMCNC: 31.4 PG — SIGNIFICANT CHANGE UP (ref 27–34)
MCHC RBC-ENTMCNC: 32.8 G/DL — SIGNIFICANT CHANGE UP (ref 32–36)
MCV RBC AUTO: 95.7 FL — SIGNIFICANT CHANGE UP (ref 80–100)
NRBC BLD AUTO-RTO: 0 /100 WBCS — SIGNIFICANT CHANGE UP (ref 0–0)
PHOSPHATE SERPL-MCNC: 1.7 MG/DL — LOW (ref 2.5–4.5)
PLATELET # BLD AUTO: 175 K/UL — SIGNIFICANT CHANGE UP (ref 150–400)
POTASSIUM SERPL-MCNC: 3.9 MMOL/L — SIGNIFICANT CHANGE UP (ref 3.5–5.3)
POTASSIUM SERPL-SCNC: 3.9 MMOL/L — SIGNIFICANT CHANGE UP (ref 3.5–5.3)
RBC # BLD: 4.43 M/UL — SIGNIFICANT CHANGE UP (ref 4.2–5.8)
RBC # FLD: 13.3 % — SIGNIFICANT CHANGE UP (ref 10.3–14.5)
SODIUM SERPL-SCNC: 135 MMOL/L — SIGNIFICANT CHANGE UP (ref 135–145)
WBC # BLD: 7.54 K/UL — SIGNIFICANT CHANGE UP (ref 3.8–10.5)
WBC # FLD AUTO: 7.54 K/UL — SIGNIFICANT CHANGE UP (ref 3.8–10.5)

## 2025-03-23 PROCEDURE — 99233 SBSQ HOSP IP/OBS HIGH 50: CPT

## 2025-03-23 RX ORDER — BISACODYL 5 MG
10 TABLET, DELAYED RELEASE (ENTERIC COATED) ORAL ONCE
Refills: 0 | Status: COMPLETED | OUTPATIENT
Start: 2025-03-23 | End: 2025-03-23

## 2025-03-23 RX ORDER — POLYETHYLENE GLYCOL 3350 17 G/17G
17 POWDER, FOR SOLUTION ORAL
Refills: 0 | Status: DISCONTINUED | OUTPATIENT
Start: 2025-03-23 | End: 2025-03-28

## 2025-03-23 RX ORDER — MAGNESIUM OXIDE 400 MG
400 TABLET ORAL ONCE
Refills: 0 | Status: COMPLETED | OUTPATIENT
Start: 2025-03-23 | End: 2025-03-23

## 2025-03-23 RX ORDER — INSULIN LISPRO 100 U/ML
3 INJECTION, SOLUTION INTRAVENOUS; SUBCUTANEOUS
Refills: 0 | Status: DISCONTINUED | OUTPATIENT
Start: 2025-03-23 | End: 2025-03-23

## 2025-03-23 RX ORDER — INSULIN GLARGINE-YFGN 100 [IU]/ML
20 INJECTION, SOLUTION SUBCUTANEOUS AT BEDTIME
Refills: 0 | Status: DISCONTINUED | OUTPATIENT
Start: 2025-03-23 | End: 2025-03-28

## 2025-03-23 RX ORDER — INSULIN LISPRO 100 U/ML
3 INJECTION, SOLUTION INTRAVENOUS; SUBCUTANEOUS
Refills: 0 | Status: DISCONTINUED | OUTPATIENT
Start: 2025-03-23 | End: 2025-03-24

## 2025-03-23 RX ORDER — SOD PHOS DI, MONO/K PHOS MONO 250 MG
1 TABLET ORAL EVERY 4 HOURS
Refills: 0 | Status: COMPLETED | OUTPATIENT
Start: 2025-03-23 | End: 2025-03-23

## 2025-03-23 RX ADMIN — POLYETHYLENE GLYCOL 3350 17 GRAM(S): 17 POWDER, FOR SOLUTION ORAL at 19:03

## 2025-03-23 RX ADMIN — HEPARIN SODIUM 5000 UNIT(S): 1000 INJECTION INTRAVENOUS; SUBCUTANEOUS at 05:49

## 2025-03-23 RX ADMIN — HEPARIN SODIUM 5000 UNIT(S): 1000 INJECTION INTRAVENOUS; SUBCUTANEOUS at 21:08

## 2025-03-23 RX ADMIN — INSULIN LISPRO 4 UNIT(S): 100 INJECTION, SOLUTION INTRAVENOUS; SUBCUTANEOUS at 09:01

## 2025-03-23 RX ADMIN — METOPROLOL SUCCINATE 50 MILLIGRAM(S): 50 TABLET, EXTENDED RELEASE ORAL at 19:04

## 2025-03-23 RX ADMIN — INSULIN GLARGINE-YFGN 20 UNIT(S): 100 INJECTION, SOLUTION SUBCUTANEOUS at 21:49

## 2025-03-23 RX ADMIN — Medication 400 MILLIGRAM(S): at 10:06

## 2025-03-23 RX ADMIN — OXYCODONE HYDROCHLORIDE 10 MILLIGRAM(S): 30 TABLET ORAL at 06:55

## 2025-03-23 RX ADMIN — LINACLOTIDE 290 MICROGRAM(S): 290 CAPSULE, GELATIN COATED ORAL at 07:50

## 2025-03-23 RX ADMIN — Medication 3 GRAM(S): at 11:53

## 2025-03-23 RX ADMIN — BUSPIRONE HYDROCHLORIDE 15 MILLIGRAM(S): 15 TABLET ORAL at 05:49

## 2025-03-23 RX ADMIN — BUSPIRONE HYDROCHLORIDE 15 MILLIGRAM(S): 15 TABLET ORAL at 19:31

## 2025-03-23 RX ADMIN — Medication 1 PACKET(S): at 10:06

## 2025-03-23 RX ADMIN — ATORVASTATIN CALCIUM 40 MILLIGRAM(S): 80 TABLET, FILM COATED ORAL at 21:08

## 2025-03-23 RX ADMIN — OXYCODONE HYDROCHLORIDE 10 MILLIGRAM(S): 30 TABLET ORAL at 00:00

## 2025-03-23 RX ADMIN — HEPARIN SODIUM 5000 UNIT(S): 1000 INJECTION INTRAVENOUS; SUBCUTANEOUS at 13:27

## 2025-03-23 RX ADMIN — Medication 10 MILLIGRAM(S): at 07:50

## 2025-03-23 RX ADMIN — LEVETIRACETAM 500 MILLIGRAM(S): 10 INJECTION, SOLUTION INTRAVENOUS at 05:49

## 2025-03-23 RX ADMIN — OXYCODONE HYDROCHLORIDE 10 MILLIGRAM(S): 30 TABLET ORAL at 07:55

## 2025-03-23 RX ADMIN — INSULIN LISPRO 4: 100 INJECTION, SOLUTION INTRAVENOUS; SUBCUTANEOUS at 19:37

## 2025-03-23 RX ADMIN — OXYCODONE HYDROCHLORIDE 30 MILLIGRAM(S): 30 TABLET ORAL at 19:04

## 2025-03-23 RX ADMIN — INSULIN LISPRO 2: 100 INJECTION, SOLUTION INTRAVENOUS; SUBCUTANEOUS at 12:06

## 2025-03-23 RX ADMIN — DOXAZOSIN MESYLATE 1 MILLIGRAM(S): 8 TABLET ORAL at 21:07

## 2025-03-23 RX ADMIN — Medication 1 PACKET(S): at 19:04

## 2025-03-23 RX ADMIN — GABAPENTIN 300 MILLIGRAM(S): 400 CAPSULE ORAL at 21:08

## 2025-03-23 RX ADMIN — Medication 10 MILLIGRAM(S): at 21:46

## 2025-03-23 RX ADMIN — POLYETHYLENE GLYCOL 3350 17 GRAM(S): 17 POWDER, FOR SOLUTION ORAL at 11:54

## 2025-03-23 RX ADMIN — Medication 2 TABLET(S): at 21:08

## 2025-03-23 RX ADMIN — Medication 1 PACKET(S): at 13:27

## 2025-03-23 RX ADMIN — Medication 1 PACKET(S): at 21:08

## 2025-03-23 RX ADMIN — Medication 3 GRAM(S): at 05:49

## 2025-03-23 RX ADMIN — Medication 3 GRAM(S): at 19:04

## 2025-03-23 RX ADMIN — INSULIN LISPRO 3 UNIT(S): 100 INJECTION, SOLUTION INTRAVENOUS; SUBCUTANEOUS at 19:36

## 2025-03-23 RX ADMIN — LEVETIRACETAM 500 MILLIGRAM(S): 10 INJECTION, SOLUTION INTRAVENOUS at 19:04

## 2025-03-23 NOTE — PROGRESS NOTE ADULT - SUBJECTIVE AND OBJECTIVE BOX
Patient was seen and examined at bedside. Case discuss with the primary team. Pt with headache overnight now resolved. In addition pt with constipation last bowel movement was day prior to surgery. Pt denied abdominal pain, nausea or vomiting.     OBJECTIVE:  Vital Signs Last 24 Hrs  T(C): 36.9 (23 Mar 2025 09:00), Max: 36.9 (23 Mar 2025 09:00)  T(F): 98.5 (23 Mar 2025 09:00), Max: 98.5 (23 Mar 2025 09:00)  HR: 88 (23 Mar 2025 08:50) (72 - 88)  BP: 108/60 (23 Mar 2025 07:51) (89/54 - 133/83)  BP(mean): 80 (23 Mar 2025 07:51) (66 - 102)  RR: 17 (23 Mar 2025 08:50) (12 - 20)  SpO2: 95% (23 Mar 2025 08:50) (94% - 100%)    Parameters below as of 23 Mar 2025 08:50  Patient On (Oxygen Delivery Method): room air    PHYSICAL EXAM:  Gen: NAD laying in bed  CV: RRR, +S1/S2, no mumur  Pulm: CTA b/l no wheezing or crackles   Abd: soft, NTND + BS no rebound or guarding   Neuro: AAOX3; sensation intact; strength 5/5 b/l UE and LE     LABS:                        13.9   7.54  )-----------( 175      ( 23 Mar 2025 07:32 )             42.4       135  |  98  |  14  ----------------------------<  116[H]  3.9   |  28  |  0.94    Ca    8.9      23 Mar 2025 07:32  Phos  1.7     03-23  Mg     1.9     03-23      CAPILLARY BLOOD GLUCOSE  POCT Blood Glucose.: 122 mg/dL (23 Mar 2025 08:46)  POCT Blood Glucose.: 128 mg/dL (23 Mar 2025 06:11)  POCT Blood Glucose.: 188 mg/dL (22 Mar 2025 21:42)  POCT Blood Glucose.: 129 mg/dL (22 Mar 2025 16:10)  POCT Blood Glucose.: 136 mg/dL (22 Mar 2025 11:31)      atorvastatin 40 milliGRAM(s) Oral at bedtime  busPIRone 15 milliGRAM(s) Oral two times a day  doxazosin 1 milliGRAM(s) Oral at bedtime  gabapentin 300 milliGRAM(s) Oral at bedtime  heparin   Injectable 5000 Unit(s) SubCutaneous every 8 hours  hydrALAZINE Injectable 10 milliGRAM(s) IV Push every 1 hour PRN  influenza   Vaccine 0.5 milliLiter(s) IntraMuscular once  insulin glargine Injectable (LANTUS) 22 Unit(s) SubCutaneous at bedtime  insulin lispro (ADMELOG) corrective regimen sliding scale   SubCutaneous Before meals and at bedtime  insulin lispro Injectable (ADMELOG) 4 Unit(s) SubCutaneous three times a day before meals  levETIRAcetam 500 milliGRAM(s) Oral every 12 hours  linaclotide 290 MICROGram(s) Oral before breakfast  magnesium oxide 400 milliGRAM(s) Oral once  methocarbamol 500 milliGRAM(s) Oral every 8 hours PRN  metoprolol succinate ER 50 milliGRAM(s) Oral every 12 hours  ondansetron Injectable 4 milliGRAM(s) IV Push every 6 hours PRN  oxyCODONE    IR 10 milliGRAM(s) Oral every 8 hours PRN  oxyCODONE    IR 30 milliGRAM(s) Oral every 8 hours PRN  polyethylene glycol 3350 17 Gram(s) Oral daily  potassium phosphate / sodium phosphate Powder (PHOS-NaK) 1 Packet(s) Oral every 4 hours  senna 2 Tablet(s) Oral at bedtime  sodium chloride 3 Gram(s) Oral every 6 hours

## 2025-03-23 NOTE — PROGRESS NOTE ADULT - ASSESSMENT
64M PMHx HTN, HLD, DM, CAD w/ 11 cardiac stents (on Plavix, lase dose 3/14), lumbar fusion 2016, chronic pain (takes oxy 30mg 4-6 times per day) presented to ED s/p fall 5 weeks ago with head strike no LOC presented with R sided weakness, HA, +WFD. CTH showed BL SDH L>R w/ 5mm MLS. Now s/p b/l MMAE (3/17/25). Now s/p BL kusum holes for SDH evacuation (3/20/25).    #Subdural hematoma   #s/p B/L kusum holes for SDH evacuation (3/20/25  #s/p b/l MMAE (3/17/25)  #Headache  -Continue management as per NSGY   -Continue pain medication as per NSGY   -Seizure precautions and continue Keppra     #HTN/HLD  #Hx of CAD s/p stent placement x11   -Continue Toprol XL 50mg q12  and atorvastatin  -Plavix and ASA currently held 2/2 SDH   -Pt's home Lisinopril and HCTZ currently held     #Diabetes   -HgbA1c   -Will decrease Lantus to 20U qhs and Lispro to 3U TIDAC   -Continue ISS     #Constipation  -Last BM was 3/19   -Pt received a suppository this morning  -Continue Linaclotide    -Continue Senna and Miralax     #DVT prop  -Continue venodyne b/l   -Per NSGY team the pt is to start Heparin SQ on 3/23   #DISPO  -Pt seen by PT and pt is for Acute Rehab placement     55 minutes spent on total encounter. The necessity of the time spent during the encounter on this date of service was due to:    Review of hospital course, labs, vitals, medical records.  Bedside exam and interview of the patient  Discussed plan of care with primary team   Documenting the encounter.   64M PMHx HTN, HLD, DM, CAD w/ 11 cardiac stents (on Plavix, lase dose 3/14), lumbar fusion 2016, chronic pain (takes oxy 30mg 4-6 times per day) presented to ED s/p fall 5 weeks ago with head strike no LOC presented with R sided weakness, HA, +WFD. CTH showed BL SDH L>R w/ 5mm MLS. Now s/p b/l MMAE (3/17/25). Now s/p BL kusum holes for SDH evacuation (3/20/25).    #Subdural hematoma   #s/p B/L kusum holes for SDH evacuation (3/20/25  #s/p b/l MMAE (3/17/25)  #Headache  -Continue management as per NSGY   -Continue pain medication as per NSGY   -Seizure precautions and continue Keppra   -Goal Na is normonatremic as per NSGY; Continue salt tablets and continue to monitor Na and wean salt tablets as tolerated     #HTN/HLD  #Hx of CAD s/p stent placement x11   -Continue Toprol XL 50mg q12  and atorvastatin  -Plavix and ASA currently held 2/2 SDH   -Pt's home Lisinopril and HCTZ currently held     #Diabetes   -HgbA1c   -Will decrease Lantus to 20U qhs and Lispro to 3U TIDAC   -Continue ISS     #Constipation  -Last BM was 3/19   -Pt received a suppository this morning  -Continue Linaclotide    -Continue Senna and Miralax     #DVT prop  -Continue venodyne b/l   -Per NSGY team the pt is to start Heparin SQ on 3/23   #DISPO  -Pt seen by PT and pt is for Acute Rehab placement     55 minutes spent on total encounter. The necessity of the time spent during the encounter on this date of service was due to:    Review of hospital course, labs, vitals, medical records.  Bedside exam and interview of the patient  Discussed plan of care with primary team   Documenting the encounter.   64M PMHx HTN, HLD, DM, CAD w/ 11 cardiac stents (on Plavix, lase dose 3/14), lumbar fusion 2016, chronic pain (takes oxy 30mg 4-6 times per day) presented to ED s/p fall 5 weeks ago with head strike no LOC presented with R sided weakness, HA, +WFD. CTH showed BL SDH L>R w/ 5mm MLS. Now s/p b/l MMAE (3/17/25). Now s/p BL kusum holes for SDH evacuation (3/20/25).    #Subdural hematoma   #s/p B/L kusum holes for SDH evacuation (3/20/25  #s/p b/l MMAE (3/17/25)  #Headache  -Continue management as per NSGY   -Continue pain medication as per NSGY   -Seizure precautions and continue Keppra   -Goal Na is normonatremic as per NSGY; Continue salt tablets and continue to monitor Na and wean salt tablets as tolerated     #HTN/HLD  #Hx of CAD s/p stent placement x11   -Continue Toprol XL 50mg q12  and atorvastatin  -Plavix and ASA currently held 2/2 SDH   -Pt's home Lisinopril and HCTZ currently held     #Diabetes   -HgbA1c   -Will decrease Lantus to 20U qhs and d/c Lispro TIDAC for now given low FS; Will continue to monitor FS and adjust insulin regimen as required  -Continue ISS     #Constipation  -Last BM was 3/19   -Pt received a suppository this morning  -Continue Linaclotide    -Continue Senna and Miralax     #DVT prop  -Continue venodyne b/l   -Per NSGY team the pt is to start Heparin SQ on 3/23   #DISPO  -Pt seen by PT and pt is for Acute Rehab placement     55 minutes spent on total encounter. The necessity of the time spent during the encounter on this date of service was due to:    Review of hospital course, labs, vitals, medical records.  Bedside exam and interview of the patient  Discussed plan of care with primary team   Documenting the encounter.

## 2025-03-23 NOTE — PROGRESS NOTE ADULT - SUBJECTIVE AND OBJECTIVE BOX
HPI:  64M PMHx HTN, HLD, DM, CAD w/ 11 cardiac stents (on Plavix, lase dose 3/14), lumbar fusion 2016, chronic pain (takes oxy 30mg 4-6 times per day) presented to ED s/p fall 5 weeks ago with head strike no LOC. PT states he visited an ED when he fell and had a negative CTH at the time. He now c/o R sided weakness, WFD, and HA rated at 8/10. He takes his home oxy for chronic LBP prescribed by PCP, which helps with the headache. Denies recent falls, dizziness, LOC, seizure, vision changes, SOB, chest pain.  (15 Mar 2025 14:07)    Subjective: Pt reports mild abdominal discomfort from not having BM in several days. Denies HA, N/V, chest pain, shortness of breath, new weakness or numbness.       Vital Signs Last 24 Hrs  T(C): 36.8 (22 Mar 2025 21:40), Max: 36.8 (22 Mar 2025 04:50)  T(F): 98.3 (22 Mar 2025 21:40), Max: 98.3 (22 Mar 2025 21:40)  HR: 78 (22 Mar 2025 22:30) (72 - 95)  BP: 133/83 (22 Mar 2025 20:03) (89/54 - 142/76)  BP(mean): 102 (22 Mar 2025 20:03) (66 - 107)  RR: 16 (22 Mar 2025 22:30) (10 - 36)  SpO2: 96% (22 Mar 2025 22:30) (94% - 100%)    Parameters below as of 22 Mar 2025 22:30  Patient On (Oxygen Delivery Method): room air        I&O's Summary    21 Mar 2025 07:01  -  22 Mar 2025 07:00  --------------------------------------------------------  IN: 545 mL / OUT: 3050 mL / NET: -2505 mL    22 Mar 2025 07:01  -  23 Mar 2025 00:06  --------------------------------------------------------  IN: 1433.2 mL / OUT: 1250 mL / NET: 183.2 mL        PHYSICAL EXAM:  General: NAD, pt is comfortably sitting up in bed, on room air  HEENT: PERRL 3mm briskly reactive, EOMI b/l, face symmetric, tongue midline, neck FROM  Cardiovascular: RRR, S1, S2  Respiratory: breathing non-labored on RA, chest rise symmetric  GI: abd soft, NTND   Neuro: A&Ox3, No aphasia, speech clear, no dysmetria, no pronator drift. Follows commands.  VELASQUEZ x4 spontaneously, 5/5 strength in all extremities throughout. Sensation intact  Extremities: distal pulses 2+ x4  Wound/incision: BL cranial incisions with overlying xeroform clean dry and intact; drain sites with sutures in place, clean dry and intact with bacitracin overlying  Groin site: soft, no hematoma, no echymosis      LABS:                        14.3   7.46  )-----------( 154      ( 22 Mar 2025 05:21 )             42.3     03-22    137  |  102  |  17  ----------------------------<  167[H]  4.3   |  26  |  0.94    Ca    8.4      22 Mar 2025 18:27  Phos  1.9     03-22  Mg     2.1     03-22        Urinalysis Basic - ( 22 Mar 2025 18:27 )    Color: x / Appearance: x / SG: x / pH: x  Gluc: 167 mg/dL / Ketone: x  / Bili: x / Urobili: x   Blood: x / Protein: x / Nitrite: x   Leuk Esterase: x / RBC: x / WBC x   Sq Epi: x / Non Sq Epi: x / Bacteria: x          CAPILLARY BLOOD GLUCOSE      POCT Blood Glucose.: 188 mg/dL (22 Mar 2025 21:42)  POCT Blood Glucose.: 129 mg/dL (22 Mar 2025 16:10)  POCT Blood Glucose.: 136 mg/dL (22 Mar 2025 11:31)  POCT Blood Glucose.: 151 mg/dL (22 Mar 2025 06:10)      Drug Levels: [] N/A    CSF Analysis: [] N/A      Allergies    IV Contrast (Other; Rash)  ibuprofen (Other)    Intolerances      MEDICATIONS:  Antibiotics:    Neuro:  busPIRone 15 milliGRAM(s) Oral two times a day  gabapentin 300 milliGRAM(s) Oral at bedtime  levETIRAcetam 500 milliGRAM(s) Oral every 12 hours  methocarbamol 500 milliGRAM(s) Oral every 8 hours PRN  ondansetron Injectable 4 milliGRAM(s) IV Push every 6 hours PRN  oxyCODONE    IR 30 milliGRAM(s) Oral every 8 hours PRN  oxyCODONE    IR 10 milliGRAM(s) Oral every 8 hours PRN    Anticoagulation:  heparin   Injectable 5000 Unit(s) SubCutaneous every 8 hours    OTHER:  atorvastatin 40 milliGRAM(s) Oral at bedtime  chlorhexidine 2% Cloths 1 Application(s) Topical daily  doxazosin 1 milliGRAM(s) Oral at bedtime  hydrALAZINE Injectable 10 milliGRAM(s) IV Push every 1 hour PRN  influenza   Vaccine 0.5 milliLiter(s) IntraMuscular once  insulin glargine Injectable (LANTUS) 22 Unit(s) SubCutaneous at bedtime  insulin lispro (ADMELOG) corrective regimen sliding scale   SubCutaneous Before meals and at bedtime  insulin lispro Injectable (ADMELOG) 4 Unit(s) SubCutaneous three times a day before meals  linaclotide 290 MICROGram(s) Oral before breakfast  metoprolol succinate ER 50 milliGRAM(s) Oral every 12 hours  polyethylene glycol 3350 17 Gram(s) Oral daily  senna 2 Tablet(s) Oral at bedtime    IVF:  sodium chloride 3 Gram(s) Oral every 6 hours    CULTURES:    RADIOLOGY & ADDITIONAL TESTS:      ASSESSMENT:  64M PMHx HTN, HLD, DM, CAD w/ 11 cardiac stents (on Plavix, lase dose 3/14), lumbar fusion 2016, chronic pain (takes oxy 30mg 4-6 times per day) presented to ED s/p fall 5 weeks ago with head strike no LOC presented with R sided weakness, HA, +WFD. CTH showed BL SDH L>R w/ 5mm MLS. Now s/p b/l MMAE (3/17/25). Now s/p BL kusum holes for SDH evacuation (3/20/25).     Neuro:  - neuro/vitals q4h  - pain control: tylenol prn, Oxy 10mg/30mg prn, gabapentin 300mg at bedtime, robaxin 500mg q8hrs prn, pain mgmt following  - seizure ppx: Keppra 500mg BID   - Hx Depression: Buspirone 15mg BID   - Rpt CTH 3/18: stable size of collections; postop CTH 3/21: postop changes, decreased SDH; repeat CTH 3/22 with b/l hygromas and mild pneumo   - subdural drains removed 3/22    Cardio:  - SBP <140  - Hx HTN: c/w Metoprolol ER 50mg bid, hold lisinopril 2.5mg daily, hold home HCTZ  - Hx CAD w/ 11 stents: HOLD home Plavix, ASA   - echo 3/17: EF 68%  - cardiology following    Pulm:   - RA  - hx EDMUND: CPAP at night    GI:  - CCD  - bowel regimen, BM 3/19  - chronic constipation: c/w linzess     Renal:  - IVL  - normonatremia goal, salt tabs 3q6  - voiding  - cont home cardura    Endo:  - ISS, A1C 7.7  - c/w  lantus 22u qhs, 4u q8 lispro premeal   - Hx DM, hold home metformin    Heme:   - SCDs for DVT ppx, SQH 5000mg q8h starting 3/23     ID:   - afebrile     Misc:   - hold home terbinafine    Dispo: Tele, full code, PT/OT rec AR    Discussed w/ Dr. Enciso

## 2025-03-24 ENCOUNTER — TRANSCRIPTION ENCOUNTER (OUTPATIENT)
Age: 65
End: 2025-03-24

## 2025-03-24 LAB
ANION GAP SERPL CALC-SCNC: 6 MMOL/L — SIGNIFICANT CHANGE UP (ref 5–17)
BUN SERPL-MCNC: 17 MG/DL — SIGNIFICANT CHANGE UP (ref 7–23)
CALCIUM SERPL-MCNC: 8.5 MG/DL — SIGNIFICANT CHANGE UP (ref 8.4–10.5)
CHLORIDE SERPL-SCNC: 104 MMOL/L — SIGNIFICANT CHANGE UP (ref 96–108)
CO2 SERPL-SCNC: 30 MMOL/L — SIGNIFICANT CHANGE UP (ref 22–31)
CREAT SERPL-MCNC: 0.97 MG/DL — SIGNIFICANT CHANGE UP (ref 0.5–1.3)
EGFR: 88 ML/MIN/1.73M2 — SIGNIFICANT CHANGE UP
EGFR: 88 ML/MIN/1.73M2 — SIGNIFICANT CHANGE UP
GLUCOSE SERPL-MCNC: 150 MG/DL — HIGH (ref 70–99)
HCT VFR BLD CALC: 39 % — SIGNIFICANT CHANGE UP (ref 39–50)
HGB BLD-MCNC: 13 G/DL — SIGNIFICANT CHANGE UP (ref 13–17)
MAGNESIUM SERPL-MCNC: 2.2 MG/DL — SIGNIFICANT CHANGE UP (ref 1.6–2.6)
MCHC RBC-ENTMCNC: 31.5 PG — SIGNIFICANT CHANGE UP (ref 27–34)
MCHC RBC-ENTMCNC: 33.3 G/DL — SIGNIFICANT CHANGE UP (ref 32–36)
MCV RBC AUTO: 94.4 FL — SIGNIFICANT CHANGE UP (ref 80–100)
NRBC BLD AUTO-RTO: 0 /100 WBCS — SIGNIFICANT CHANGE UP (ref 0–0)
PHOSPHATE SERPL-MCNC: 2.3 MG/DL — LOW (ref 2.5–4.5)
PLATELET # BLD AUTO: 176 K/UL — SIGNIFICANT CHANGE UP (ref 150–400)
POTASSIUM SERPL-MCNC: 3.8 MMOL/L — SIGNIFICANT CHANGE UP (ref 3.5–5.3)
POTASSIUM SERPL-SCNC: 3.8 MMOL/L — SIGNIFICANT CHANGE UP (ref 3.5–5.3)
RBC # BLD: 4.13 M/UL — LOW (ref 4.2–5.8)
RBC # FLD: 13.3 % — SIGNIFICANT CHANGE UP (ref 10.3–14.5)
SODIUM SERPL-SCNC: 140 MMOL/L — SIGNIFICANT CHANGE UP (ref 135–145)
WBC # BLD: 6.15 K/UL — SIGNIFICANT CHANGE UP (ref 3.8–10.5)
WBC # FLD AUTO: 6.15 K/UL — SIGNIFICANT CHANGE UP (ref 3.8–10.5)

## 2025-03-24 PROCEDURE — 99233 SBSQ HOSP IP/OBS HIGH 50: CPT

## 2025-03-24 RX ORDER — ACETAMINOPHEN 500 MG/5ML
650 LIQUID (ML) ORAL EVERY 6 HOURS
Refills: 0 | Status: DISCONTINUED | OUTPATIENT
Start: 2025-03-24 | End: 2025-03-28

## 2025-03-24 RX ORDER — SALINE 7; 19 G/118ML; G/118ML
1 ENEMA RECTAL ONCE
Refills: 0 | Status: COMPLETED | OUTPATIENT
Start: 2025-03-24 | End: 2025-03-24

## 2025-03-24 RX ORDER — INSULIN LISPRO 100 U/ML
6 INJECTION, SOLUTION INTRAVENOUS; SUBCUTANEOUS
Refills: 0 | Status: DISCONTINUED | OUTPATIENT
Start: 2025-03-24 | End: 2025-03-28

## 2025-03-24 RX ORDER — NALOXEGOL OXALATE 12.5 MG/1
25 TABLET, FILM COATED ORAL DAILY
Refills: 0 | Status: DISCONTINUED | OUTPATIENT
Start: 2025-03-24 | End: 2025-03-24

## 2025-03-24 RX ORDER — SOD PHOS DI, MONO/K PHOS MONO 250 MG
1 TABLET ORAL ONCE
Refills: 0 | Status: COMPLETED | OUTPATIENT
Start: 2025-03-24 | End: 2025-03-24

## 2025-03-24 RX ADMIN — SALINE 1 ENEMA: 7; 19 ENEMA RECTAL at 07:03

## 2025-03-24 RX ADMIN — Medication 3 GRAM(S): at 00:23

## 2025-03-24 RX ADMIN — INSULIN LISPRO 6 UNIT(S): 100 INJECTION, SOLUTION INTRAVENOUS; SUBCUTANEOUS at 17:35

## 2025-03-24 RX ADMIN — METOPROLOL SUCCINATE 50 MILLIGRAM(S): 50 TABLET, EXTENDED RELEASE ORAL at 17:33

## 2025-03-24 RX ADMIN — DOXAZOSIN MESYLATE 1 MILLIGRAM(S): 8 TABLET ORAL at 21:40

## 2025-03-24 RX ADMIN — OXYCODONE HYDROCHLORIDE 30 MILLIGRAM(S): 30 TABLET ORAL at 18:35

## 2025-03-24 RX ADMIN — LEVETIRACETAM 500 MILLIGRAM(S): 10 INJECTION, SOLUTION INTRAVENOUS at 17:33

## 2025-03-24 RX ADMIN — POLYETHYLENE GLYCOL 3350 17 GRAM(S): 17 POWDER, FOR SOLUTION ORAL at 05:57

## 2025-03-24 RX ADMIN — INSULIN LISPRO 3 UNIT(S): 100 INJECTION, SOLUTION INTRAVENOUS; SUBCUTANEOUS at 07:11

## 2025-03-24 RX ADMIN — INSULIN LISPRO 2: 100 INJECTION, SOLUTION INTRAVENOUS; SUBCUTANEOUS at 12:13

## 2025-03-24 RX ADMIN — INSULIN LISPRO 4: 100 INJECTION, SOLUTION INTRAVENOUS; SUBCUTANEOUS at 17:35

## 2025-03-24 RX ADMIN — Medication 85 MILLIMOLE(S): at 11:39

## 2025-03-24 RX ADMIN — Medication 500 MILLILITER(S): at 08:56

## 2025-03-24 RX ADMIN — INSULIN LISPRO 2: 100 INJECTION, SOLUTION INTRAVENOUS; SUBCUTANEOUS at 22:09

## 2025-03-24 RX ADMIN — Medication 1 TABLET(S): at 13:40

## 2025-03-24 RX ADMIN — OXYCODONE HYDROCHLORIDE 30 MILLIGRAM(S): 30 TABLET ORAL at 17:35

## 2025-03-24 RX ADMIN — LEVETIRACETAM 500 MILLIGRAM(S): 10 INJECTION, SOLUTION INTRAVENOUS at 05:57

## 2025-03-24 RX ADMIN — HEPARIN SODIUM 5000 UNIT(S): 1000 INJECTION INTRAVENOUS; SUBCUTANEOUS at 05:58

## 2025-03-24 RX ADMIN — Medication 3 GRAM(S): at 17:32

## 2025-03-24 RX ADMIN — INSULIN GLARGINE-YFGN 20 UNIT(S): 100 INJECTION, SOLUTION SUBCUTANEOUS at 22:09

## 2025-03-24 RX ADMIN — BUSPIRONE HYDROCHLORIDE 15 MILLIGRAM(S): 15 TABLET ORAL at 17:32

## 2025-03-24 RX ADMIN — LINACLOTIDE 290 MICROGRAM(S): 290 CAPSULE, GELATIN COATED ORAL at 07:04

## 2025-03-24 RX ADMIN — Medication 3 GRAM(S): at 05:57

## 2025-03-24 RX ADMIN — HEPARIN SODIUM 5000 UNIT(S): 1000 INJECTION INTRAVENOUS; SUBCUTANEOUS at 21:40

## 2025-03-24 RX ADMIN — ATORVASTATIN CALCIUM 40 MILLIGRAM(S): 80 TABLET, FILM COATED ORAL at 21:40

## 2025-03-24 RX ADMIN — GABAPENTIN 300 MILLIGRAM(S): 400 CAPSULE ORAL at 21:41

## 2025-03-24 RX ADMIN — HEPARIN SODIUM 5000 UNIT(S): 1000 INJECTION INTRAVENOUS; SUBCUTANEOUS at 13:40

## 2025-03-24 RX ADMIN — OXYCODONE HYDROCHLORIDE 30 MILLIGRAM(S): 30 TABLET ORAL at 04:19

## 2025-03-24 RX ADMIN — BUSPIRONE HYDROCHLORIDE 15 MILLIGRAM(S): 15 TABLET ORAL at 05:57

## 2025-03-24 RX ADMIN — OXYCODONE HYDROCHLORIDE 30 MILLIGRAM(S): 30 TABLET ORAL at 05:19

## 2025-03-24 RX ADMIN — Medication 20 MILLIEQUIVALENT(S): at 07:46

## 2025-03-24 RX ADMIN — INSULIN LISPRO 3 UNIT(S): 100 INJECTION, SOLUTION INTRAVENOUS; SUBCUTANEOUS at 12:14

## 2025-03-24 NOTE — PROGRESS NOTE ADULT - SUBJECTIVE AND OBJECTIVE BOX
HPI:  64M PMHx HTN, HLD, DM, CAD w/ 11 cardiac stents (on Plavix, lase dose 3/14), lumbar fusion 2016, chronic pain (takes oxy 30mg 4-6 times per day) presented to ED s/p fall 5 weeks ago with head strike no LOC. PT states he visited an ED when he fell and had a negative CTH at the time. He now c/o R sided weakness, WFD, and HA rated at 8/10. He takes his home oxy for chronic LBP prescribed by PCP, which helps with the headache. Denies recent falls, dizziness, LOC, seizure, vision changes, SOB, chest pain.  (15 Mar 2025 14:07)    Subjective: Pt reports inability to have BM despite multiple medications used. Denies abdominal pain but reports mild discomfort. Denies HA, N/V, chest pain, shortness of breath, new weakness or numbness.     Vital Signs Last 24 Hrs  T(C): 37.2 (23 Mar 2025 20:35), Max: 37.2 (23 Mar 2025 20:35)  T(F): 99 (23 Mar 2025 20:35), Max: 99 (23 Mar 2025 20:35)  HR: 86 (23 Mar 2025 20:35) (72 - 88)  BP: 136/87 (23 Mar 2025 20:35) (101/65 - 136/87)  BP(mean): 95 (23 Mar 2025 15:37) (78 - 97)  RR: 18 (23 Mar 2025 20:35) (16 - 18)  SpO2: 96% (23 Mar 2025 20:35) (95% - 97%)    Parameters below as of 23 Mar 2025 20:35  Patient On (Oxygen Delivery Method): room air        I&O's Summary    22 Mar 2025 07:01  -  23 Mar 2025 07:00  --------------------------------------------------------  IN: 1833.2 mL / OUT: 2400 mL / NET: -566.8 mL    23 Mar 2025 07:01  -  24 Mar 2025 01:34  --------------------------------------------------------  IN: 720 mL / OUT: 1050 mL / NET: -330 mL        PHYSICAL EXAM:  General: NAD, pt is comfortably sitting up in bed, on room air  HEENT: PERRL 3mm briskly reactive, EOMI b/l, face symmetric, tongue midline, neck FROM  Cardiovascular: RRR, S1, S2  Respiratory: breathing non-labored on RA, chest rise symmetric  GI: abd soft, NTND   Neuro: A&Ox3, No aphasia, speech clear, no dysmetria, no pronator drift. Follows commands.  VELASQUEZ x4 spontaneously, 5/5 strength in all extremities throughout. Sensation intact  Extremities: distal pulses 2+ x4  Wound/incision: BL cranial incision and drain site incisions closed with non-absorbable suture all clean dry and intact      LABS:                        13.9   7.54  )-----------( 175      ( 23 Mar 2025 07:32 )             42.4     03-23    135  |  98  |  14  ----------------------------<  116[H]  3.9   |  28  |  0.94    Ca    8.9      23 Mar 2025 07:32  Phos  1.7     03-23  Mg     1.9     03-23        Urinalysis Basic - ( 23 Mar 2025 07:32 )    Color: x / Appearance: x / SG: x / pH: x  Gluc: 116 mg/dL / Ketone: x  / Bili: x / Urobili: x   Blood: x / Protein: x / Nitrite: x   Leuk Esterase: x / RBC: x / WBC x   Sq Epi: x / Non Sq Epi: x / Bacteria: x          CAPILLARY BLOOD GLUCOSE      POCT Blood Glucose.: 134 mg/dL (23 Mar 2025 21:40)  POCT Blood Glucose.: 244 mg/dL (23 Mar 2025 19:34)  POCT Blood Glucose.: 206 mg/dL (23 Mar 2025 16:45)  POCT Blood Glucose.: 154 mg/dL (23 Mar 2025 12:02)  POCT Blood Glucose.: 122 mg/dL (23 Mar 2025 08:46)  POCT Blood Glucose.: 128 mg/dL (23 Mar 2025 06:11)      Drug Levels: [] N/A    CSF Analysis: [] N/A      Allergies    IV Contrast (Other; Rash)  ibuprofen (Other)    Intolerances      MEDICATIONS:  Antibiotics:    Neuro:  busPIRone 15 milliGRAM(s) Oral two times a day  gabapentin 300 milliGRAM(s) Oral at bedtime  levETIRAcetam 500 milliGRAM(s) Oral every 12 hours  methocarbamol 500 milliGRAM(s) Oral every 8 hours PRN  ondansetron Injectable 4 milliGRAM(s) IV Push every 6 hours PRN  oxyCODONE    IR 30 milliGRAM(s) Oral every 8 hours PRN  oxyCODONE    IR 10 milliGRAM(s) Oral every 8 hours PRN    Anticoagulation:  heparin   Injectable 5000 Unit(s) SubCutaneous every 8 hours    OTHER:  atorvastatin 40 milliGRAM(s) Oral at bedtime  doxazosin 1 milliGRAM(s) Oral at bedtime  hydrALAZINE Injectable 10 milliGRAM(s) IV Push every 1 hour PRN  influenza   Vaccine 0.5 milliLiter(s) IntraMuscular once  insulin glargine Injectable (LANTUS) 20 Unit(s) SubCutaneous at bedtime  insulin lispro (ADMELOG) corrective regimen sliding scale   SubCutaneous Before meals and at bedtime  insulin lispro Injectable (ADMELOG) 3 Unit(s) SubCutaneous three times a day before meals  linaclotide 290 MICROGram(s) Oral before breakfast  metoprolol succinate ER 50 milliGRAM(s) Oral every 12 hours  polyethylene glycol 3350 17 Gram(s) Oral two times a day  senna 2 Tablet(s) Oral at bedtime    IVF:  sodium chloride 3 Gram(s) Oral every 6 hours    CULTURES:    RADIOLOGY & ADDITIONAL TESTS:      ASSESSMENT:  64M PMHx HTN, HLD, DM, CAD w/ 11 cardiac stents (on Plavix, lase dose 3/14), lumbar fusion 2016, chronic pain (takes oxy 30mg 4-6 times per day) presented to ED s/p fall 5 weeks ago with head strike no LOC presented with R sided weakness, HA, +WFD. CTH showed BL SDH L>R w/ 5mm MLS. Now s/p b/l MMAE (3/17/25). Now s/p BL kusum holes for SDH evacuation (3/20/25).     Neuro:  - neuro/vitals q8h  - pain control: tylenol prn, Oxy 10mg/30mg prn, gabapentin 300mg at bedtime, robaxin 500mg q8hrs prn, pain mgmt following  - seizure ppx: Keppra 500mg BID   - Hx Depression: Buspirone 15mg BID   - Rpt CTH 3/18: stable size of collections   - postop CTH 3/21: postop changes, decreased SDH; repeat CTH 3/22 with b/l hygromas and mild pneumo   - subdural drains removed 3/22    Cardio:  - SBP <140  - Hx HTN: c/w Metoprolol ER 50mg bid, hold lisinopril 2.5mg daily, hold home HCTZ  - Hx CAD w/ 11 stents: HOLD home Plavix, ASA   - echo 3/17: EF 68%  - cardiology following    Pulm:   - RA  - hx EDMUND: CPAP at night    GI:  - CCD  - bowel regimen, BM 3/19  - chronic constipation: c/w linzess     Renal:  - IVL  - normonatremia goal, salt tabs 3q6  - voiding  - cont home cardura    Endo:  - ISS, A1C 7.7  - c/w  lantus 20u qhs, lispro 3u TID  - Hx DM, hold home metformin    Heme:   - SCDs for DVT ppx, SQH 5000mg q8h starting 3/23     ID:   - afebrile     Misc:   - hold home terbinafine    Dispo: [facility], full code, PT/OT rec [facility]    Discussed w/ Dr. Enciso

## 2025-03-24 NOTE — PROGRESS NOTE ADULT - ASSESSMENT
Mr. Marroquin is a 65yo man with PMHx HTN, HLD, IDDM, CAD w/ multiple prior cardiac stents, EDMUND on CPAP, lumbar fusion 2016, chronic pain (takes oxy 30mg 4-6 times per day) presented to ED with R-sided weakness iso fall 5 weeks ago with head strike, found to have b/l SDH L>R w/ 5mm MLS, now s/p MMA embolization 3/17 and kusum holes for SDH evacuation 3/20.    #B/l traumatic SDH s/p MMAE 3/17 and kusum holes 3/20  - Management per primary  - DAPT held - resume when safe from surgical perspective    #Symptomatic hypotension - improved with IVF  - Continue to hold home anti-hypertensives  - S/p 1L IVF 3/24 with improvement in BP and dizziness  - Obtain orthostatic vitals    #Hyponatremia - now normonatremic  - Agree with weaning salt tabs from 3g q6h to 3g q12h    #Constipation - patient given enema 3/24 without effect. Abdomen distended but soft non-tender, no rebound/guarding. Pt denies n/v.  - On Movantik per primary    #CAD s/p multiple stents - most recently 04/2024  - Resume DAPT when safe from nsgy perspective  - C/w atorvastatin 40mg qhs  - C/w Toprol 50mg q12h    #IDDM - relatively well-controlled with a1c 7.7%, patient reports glargine 28U qhs + lispro 14U tid with meals and metformin  - C/w glargine 20U qhs + increase lispro to 6U tid with meals + SSI    #Hypophosphatemia  - Please replete, daily phos levels    #Thrombocytopenia (resolved)  - Daily CBC    #EDMUND  - C/w CPAP qhs    #HTN  - Hold lisinopril and hctz iso hypotension as above    DVT ppx - per primary    Dispo: PT/OT recommending [facility]

## 2025-03-24 NOTE — DISCHARGE NOTE PROVIDER - NSDCFUADDINST_GEN_ALL_CORE_FT
Neurosurgery follow up appointment date/time:  - are staples/sutures in place?  - what day should staples/sutures be removed (VUN18-14)?   - please call the office to confirm appointment:     Groin Wound Care:  - remove groin dressing tomorrow  - steri strips underneath will fall off on their own   - you may shower tomorrow  - if you received a "closure device" in your groin, no bathing or swimming for 5 days (any closure besides manual compression)    Cranial Wound Care:  - can patient shower?  - does dressing need to be changed/removed?  - no picking at incision   - pressure ulcer    Devices/Drains/Lines:   - does patient need collar or brace or helmet?  - does collar/brace need to be worn at all times or just when OOB  - RW or cane for ambulation:  - PICC in place? ID follow up? (Paper rx for: antibiotics, heparin flush, weekly labs)  - CARLITO in place? management/plastics follow?  - cormier in place? management/urology follow up?  - Tracheostomy?   - PEG tube?    Activity:  - fatigue is common after surgery, rest if you feel tired   - no bending, lifting, twisting   - walking is recommended, ambulate as tolerated  - you may shower at home/rehab, keep your groin and cranial site incision dry   - no soaking in a tub/pool/hot tub  - no driving within 24 hours of anesthesia or while taking prescription pain medications   - keep hydrated, drink plenty of water   - if your wrist was used to access, do not lift more than 5lbs for 3 days  - if groin access, do not lift more than 10lbs for 5 days    Diet:  - You may resume your regular diet.  - Drinking extra fluids (water, juice) is encouraged.  - Abstain from alcohol for 24 hours.    Inpatient consults:  - final recommendations  - you will need follow up with....    Please also follow up with your primary care doctor.     Pain Expectations:  - A mild pain at the puncture site is not unusual  - Pain at cranial site is expected   - You may take Tylenol 1-2 tabs every 4-6 hours as needed     Medications:  - changes to home meds (ex. AED's)?  *HOLD Metformin, diuretic, ARB's for 2 days after angiogram   - new meds?  - pain meds?  - when can antiplatelets or anticoagulants be restarted?  - were adverse affects of meds discussed with patient?   - pain medications can cause constipation, you should eat a high fiber diet and may take a stool softener while on pain meds     Call the office or come to the ED if:  - wound has drainage or bleeding, increased redness or pain at incision site, neurological change, fever (>101), chills, night sweats, syncope, nausea/vomiting, chest pain, shortness of breath    SPECIAL INSTRUCTIONS S/P ANGIOGRAPHY:  Signs and symptoms to look out for:    1. Kyrie bleeding from the puncture site is an emergency. Put direct pressure on the site and go directly to your local Emergency Room for treatment.    2. Bleeding under the skin may also occur and a small "black and blue" may be expected. If there appears to be an expanding mass or swelling around the puncture site, apply manual compression and go immediately to your local Emergency Room for treatment.    3. If your foot/leg becomes cool or blue and/or you are unable to move it, this must be treated as an emergency. Go directly to your local Emergency Room for treatment.    4. Excessive puncture site pain is abnormal and should be assessed.    5.  Look out for signs of infection in the puncture site: fever, red streaking of the leg or wrist, drainage, severe pain.    6.  Lack of adequate urine output, provided you are drinking enough fluids, may be cause for concern, notify us if you think this is the case.    Playback:  - see playback health for a copy of your discharge paperwork     WITHIN 24 HOURS OF DISCHARGE, PLEASE CONTACT NEURO PA  WITH ANY QUESTIONS OR CONCERNS: [phone]   OTHERWISE, PLEASE CALL THE OFFICE WITH ANY QUESTIONS OR CONCERNS: [phone] Neurosurgery follow up appointment date/time:  - follow up in the office for a wound check and suture removal   - please call the office to confirm appointment: [phone]    Groin Wound Care:  - steri strips at groin site will fall off on their own   - you may shower, clean groin site with soap and water   - pat groin site dry after showering   - no picking at groin site     Cranial Wound Care:  - shower and wash hair daily with shampoo  - gently clean incision with soapy water  - pat dry incision with a clean towel after showering  - leave incision uncovered, open to air  - no picking at incision     Devices/Drains/Lines:   -    Activity:  - fatigue is common after surgery, rest if you feel tired   - no bending, lifting, twisting   - walking is recommended, ambulate as tolerated  - you may shower at home/rehab, keep your groin and cranial site incision dry   - no soaking in a tub/pool/hot tub  - no driving within 24 hours of anesthesia or while taking prescription pain medications   - keep hydrated, drink plenty of water   - do not lift more than 10lbs for 5 days    Diet:  - You may resume your regular diet.  - Drinking extra fluids (water, juice) is encouraged.  - Abstain from alcohol     Inpatient consults:  - Cardiology:     Please also follow up with your primary care doctor.     Pain Expectations:  - A mild pain at the puncture site is not unusual  - Pain at cranial site is expected   - You may take Tylenol 1-2 tabs every 4-6 hours as needed     Medications:  - changes to home meds (ex. AED's)?  *HOLD Metformin, diuretic, ARB's for 2 days after angiogram   - new meds?  - pain meds?  - adverse affects of meds discussed with patient   - pain medications can cause constipation, you should eat a high fiber diet and may take a stool softener while on pain meds     Call the office or come to the ED if:  - wound has drainage or bleeding, increased redness or pain at incision site, neurological change, fever (>101), chills, night sweats, syncope, nausea/vomiting, chest pain, shortness of breath    SPECIAL INSTRUCTIONS S/P ANGIOGRAPHY:  Signs and symptoms to look out for:    1. Kyrie bleeding from the puncture site is an emergency. Put direct pressure on the site and go directly to your local Emergency Room for treatment.    2. Bleeding under the skin may also occur and a small "black and blue" may be expected. If there appears to be an expanding mass or swelling around the puncture site, apply manual compression and go immediately to your local Emergency Room for treatment.    3. If your foot/leg becomes cool or blue and/or you are unable to move it, this must be treated as an emergency. Go directly to your local Emergency Room for treatment.    4. Excessive puncture site pain is abnormal and should be assessed.    5.  Look out for signs of infection in the puncture site: fever, red streaking of the leg or wrist, drainage, severe pain.    6.  Lack of adequate urine output, provided you are drinking enough fluids, may be cause for concern, notify us if you think this is the case.    Playback:  - see playback health for a copy of your discharge paperwork     WITHIN 24 HOURS OF DISCHARGE, PLEASE CONTACT NEURO PA  WITH ANY QUESTIONS OR CONCERNS: [phone]   OTHERWISE, PLEASE CALL THE OFFICE WITH ANY QUESTIONS OR CONCERNS: [phone] Neurosurgery follow up appointment date/time:  - follow up in the office for a wound check and suture removal (*sutures at b/l kusum holes and at drain removal sites)   - please call the office to confirm appointment: [phone]    Groin Wound Care:  - steri strips at groin site will fall off on their own   - you may shower, clean groin site with soap and water   - pat groin site dry after showering   - no picking at groin site     Cranial Wound Care:  - shower and wash hair daily with shampoo  - gently clean incision with soapy water  - pat dry incision with a clean towel after showering  - leave incision uncovered, open to air  - no picking at incision     Activity:  - fatigue is common after surgery, rest if you feel tired   - no bending, lifting, twisting   - walking is recommended, ambulate as tolerated  - you may shower at home/rehab, keep your groin and cranial site incision dry   - no soaking in a tub/pool/hot tub  - no driving within 24 hours of anesthesia or while taking prescription pain medications   - keep hydrated, drink plenty of water   - do not lift more than 10lbs for 5 days    Diet:  - You may resume your regular diet.  - Drinking extra fluids (water, juice) is encouraged.  - Abstain from alcohol     Inpatient consults:  - Cardiology: Continue Aspirin 81mg daily. Consider switching to monotherapy with Plavix 75mg daily if okay with Neurosurgery. Resume lisinopril 2.5mg QD if SBP persistently above 130/70. Follow up with outpatient cardiologist Dr. Will Hurtado    Please also follow up with your primary care doctor.    AT REHAB:  1. monitor blood pressure, resume Lisinopril if SBP persistently above 130/70  2. monitor sodium level, wean off of sodium chloride tablets for a normal sodium goal Na 135-145    Pain Expectations:  - A mild pain at the puncture site is not unusual  - Pain at cranial site is expected   - You may take Tylenol 1-2 tabs every 4-6 hours as needed     Medications:  - HOLD/STOP home: Lisinopril, HCTZ  - otherwise, continue home meds as prescribed: Aspirin 81mg, Atorvastatin, Buspirone, Doxazosin, Flonase, Gabapentin, Linzess, Metoprolol,   - new meds:  Heparin SQ while at rehab for DVT prevention  Sodium chloride tablet for low sodium, wean off  - pain meds:  Tylenol as needed for mild pain  Oxycodone 30mg every 12 hours as needed for severe pain (home medication)  Oxycodone 10mg every 8 hours as needed for moderate pain (home medication)   - adverse affects of meds discussed with patient   - pain medications can cause constipation, you should eat a high fiber diet and may take a stool softener while on pain meds     Call the office or come to the ED if:  - wound has drainage or bleeding, increased redness or pain at incision site, neurological change, fever (>101), chills, night sweats, syncope, nausea/vomiting, chest pain, shortness of breath    SPECIAL INSTRUCTIONS S/P ANGIOGRAPHY:  Signs and symptoms to look out for:    1. Kyrie bleeding from the puncture site is an emergency. Put direct pressure on the site and go directly to your local Emergency Room for treatment.    2. Bleeding under the skin may also occur and a small "black and blue" may be expected. If there appears to be an expanding mass or swelling around the puncture site, apply manual compression and go immediately to your local Emergency Room for treatment.    3. If your foot/leg becomes cool or blue and/or you are unable to move it, this must be treated as an emergency. Go directly to your local Emergency Room for treatment.    4. Excessive puncture site pain is abnormal and should be assessed.    5.  Look out for signs of infection in the puncture site: fever, red streaking of the leg or wrist, drainage, severe pain.    6.  Lack of adequate urine output, provided you are drinking enough fluids, may be cause for concern, notify us if you think this is the case.    Playback:  - see playback health for a copy of your discharge paperwork     WITHIN 24 HOURS OF DISCHARGE, PLEASE CONTACT NEURO PA  WITH ANY QUESTIONS OR CONCERNS: [phone]   OTHERWISE, PLEASE CALL THE OFFICE WITH ANY QUESTIONS OR CONCERNS: [phone] Neurosurgery follow up appointment date/time:  - follow up in the office for a wound check and suture removal (*sutures at b/l kusum holes and at drain removal sites)   - please call the office to confirm appointment: [phone]    Groin Wound Care:  - steri strips at groin site will fall off on their own   - you may shower, clean groin site with soap and water   - pat groin site dry after showering   - no picking at groin site     Cranial Wound Care:  - shower and wash hair daily with shampoo  - gently clean incision with soapy water  - pat dry incision with a clean towel after showering  - leave incision uncovered, open to air  - no picking at incision     Activity:  - fatigue is common after surgery, rest if you feel tired   - no bending, lifting, twisting   - walking is recommended, ambulate as tolerated  - you may shower at home/rehab, keep your groin and cranial site incision dry   - no soaking in a tub/pool/hot tub  - no driving within 24 hours of anesthesia or while taking prescription pain medications   - keep hydrated, drink plenty of water   - do not lift more than 10lbs for 5 days    Diet:  - You may resume your regular diet.  - Drinking extra fluids (water, juice) is encouraged.  - Abstain from alcohol     Inpatient consults:  - Cardiology: Continue Aspirin 81mg daily. Consider switching to monotherapy with Plavix 75mg daily if okay with Neurosurgery. Resume lisinopril 2.5mg QD if SBP persistently above 130/70. Follow up with outpatient cardiologist Dr. Will Hurtado    Please also follow up with your primary care doctor.    AT REHAB:  1. monitor blood pressure, resume Lisinopril if SBP persistently above 130/70  2. monitor sodium level, wean off of sodium chloride tablets for a normal sodium goal Na 135-145  3. monitor sugars, adjust Lantus/Lispro as needed     Pain Expectations:  - A mild pain at the puncture site is not unusual  - Pain at cranial site is expected   - You may take Tylenol 1-2 tabs every 4-6 hours as needed     Medications:  - HOLD/STOP home: Lisinopril, HCTZ  - Home Lantus decreased to 20 units at bedtime (home dose = 28 units)  - Home Lispro decreased to 6 units before meals (home dose = 14 units)  - otherwise, continue home meds as prescribed: Aspirin 81mg, Atorvastatin, Buspirone, Doxazosin, Flonase, Gabapentin, Linzess, Metoprolol, Metformin, Semaglutide  - new meds:  Heparin SQ while at rehab for DVT prevention  Sodium chloride tablet for low sodium, wean off  - pain meds:  Tylenol as needed for mild pain  Oxycodone 30mg every 12 hours as needed for severe pain (home medication)  Oxycodone 10mg every 8 hours as needed for moderate pain (home medication)   - adverse affects of meds discussed with patient   - pain medications can cause constipation, you should eat a high fiber diet and may take a stool softener while on pain meds     Call the office or come to the ED if:  - wound has drainage or bleeding, increased redness or pain at incision site, neurological change, fever (>101), chills, night sweats, syncope, nausea/vomiting, chest pain, shortness of breath    SPECIAL INSTRUCTIONS S/P ANGIOGRAPHY:  Signs and symptoms to look out for:    1. Kyrie bleeding from the puncture site is an emergency. Put direct pressure on the site and go directly to your local Emergency Room for treatment.    2. Bleeding under the skin may also occur and a small "black and blue" may be expected. If there appears to be an expanding mass or swelling around the puncture site, apply manual compression and go immediately to your local Emergency Room for treatment.    3. If your foot/leg becomes cool or blue and/or you are unable to move it, this must be treated as an emergency. Go directly to your local Emergency Room for treatment.    4. Excessive puncture site pain is abnormal and should be assessed.    5.  Look out for signs of infection in the puncture site: fever, red streaking of the leg or wrist, drainage, severe pain.    6.  Lack of adequate urine output, provided you are drinking enough fluids, may be cause for concern, notify us if you think this is the case.    Playback:  - see playback health for a copy of your discharge paperwork     WITHIN 24 HOURS OF DISCHARGE, PLEASE CONTACT NEURO PA  WITH ANY QUESTIONS OR CONCERNS: [phone]   OTHERWISE, PLEASE CALL THE OFFICE WITH ANY QUESTIONS OR CONCERNS: [phone] Neurosurgery follow up appointment date/time:  - follow up in the office for a wound check and suture removal (*sutures at b/l kusum holes and at drain removal sites)   - please call the office to confirm appointment: [phone]    Groin Wound Care:  - steri strips at groin site will fall off on their own   - you may shower, clean groin site with soap and water   - pat groin site dry after showering   - no picking at groin site     Cranial Wound Care:  - shower and wash hair daily with shampoo  - gently clean incision with soapy water  - pat dry incision with a clean towel after showering  - leave incision uncovered, open to air  - no picking at incision     Activity:  - fatigue is common after surgery, rest if you feel tired   - no bending, lifting, twisting   - walking is recommended, ambulate as tolerated  - you may shower at home/rehab, keep your groin and cranial site incision dry   - no soaking in a tub/pool/hot tub  - no driving within 24 hours of anesthesia or while taking prescription pain medications   - keep hydrated, drink plenty of water   - do not lift more than 10lbs for 5 days    Diet:  - You may resume your regular diet.  - Drinking extra fluids (water, juice) is encouraged.  - Abstain from alcohol     Inpatient consults:  - Cardiology: Continue Aspirin 81mg daily. Consider switching to monotherapy with Plavix 75mg daily if okay with Neurosurgery. Resume lisinopril 2.5mg QD if SBP persistently above 130/70. Follow up with outpatient cardiologist Dr. Will Hurtado    Please also follow up with your primary care doctor.    AT REHAB:  1. monitor blood pressure, resume Lisinopril if SBP persistently above 130/70  2. monitor sodium level, wean off of sodium chloride tablets for a normal sodium goal Na 135-145  3. monitor sugars, adjust Lantus/Lispro as needed     Pain Expectations:  - A mild pain at the puncture site is not unusual  - Pain at cranial site is expected   - You may take Tylenol 1-2 tabs every 4-6 hours as needed     Medications:  - HOLD/STOP home: Lisinopril, HCTZ  - Home Lantus decreased to 20 units at bedtime (home dose = 28 units)  - Home Lispro decreased to 6 units before meals (home dose = 14 units)  - otherwise, continue home meds as prescribed: Aspirin 81mg, Atorvastatin, Buspirone, Doxazosin, Flonase, Gabapentin, Linzess, Metoprolol, Metformin, Semaglutide  - new meds:  Heparin SQ while at rehab for DVT prevention  Sodium chloride tablet for low sodium, wean off  - pain meds:  Tylenol as needed for mild pain  Gabapentin (home medication)  Oxycodone 30mg every 12 hours as needed for severe pain (home medication)  Oxycodone 10mg every 8 hours as needed for moderate pain (home medication)   Robaxin 500mg every 8 hours as needed for muscle spasm  - adverse affects of meds discussed with patient   - pain medications can cause constipation, you should eat a high fiber diet and may take a stool softener while on pain meds     Call the office or come to the ED if:  - wound has drainage or bleeding, increased redness or pain at incision site, neurological change, fever (>101), chills, night sweats, syncope, nausea/vomiting, chest pain, shortness of breath    SPECIAL INSTRUCTIONS S/P ANGIOGRAPHY:  Signs and symptoms to look out for:    1. Kyrie bleeding from the puncture site is an emergency. Put direct pressure on the site and go directly to your local Emergency Room for treatment.    2. Bleeding under the skin may also occur and a small "black and blue" may be expected. If there appears to be an expanding mass or swelling around the puncture site, apply manual compression and go immediately to your local Emergency Room for treatment.    3. If your foot/leg becomes cool or blue and/or you are unable to move it, this must be treated as an emergency. Go directly to your local Emergency Room for treatment.    4. Excessive puncture site pain is abnormal and should be assessed.    5.  Look out for signs of infection in the puncture site: fever, red streaking of the leg or wrist, drainage, severe pain.    6.  Lack of adequate urine output, provided you are drinking enough fluids, may be cause for concern, notify us if you think this is the case.    Playback:  - see playback health for a copy of your discharge paperwork     WITHIN 24 HOURS OF DISCHARGE, PLEASE CONTACT NEURO PA  WITH ANY QUESTIONS OR CONCERNS: [phone]   OTHERWISE, PLEASE CALL THE OFFICE WITH ANY QUESTIONS OR CONCERNS: [phone] Neurosurgery follow up appointment date/time:  - follow up in the office for a wound check and suture removal (*sutures at b/l kusum holes and at drain removal sites)   - please call the office to confirm appointment: [phone]    Groin Wound Care:  - steri strips at groin site will fall off on their own   - you may shower, clean groin site with soap and water   - pat groin site dry after showering   - no picking at groin site     Cranial Wound Care:  - shower and wash hair daily with shampoo  - gently clean incision with soapy water  - pat dry incision with a clean towel after showering  - leave incision uncovered, open to air  - no picking at incision     Activity:  - fatigue is common after surgery, rest if you feel tired   - no bending, lifting, twisting   - walking is recommended, ambulate as tolerated  - you may shower at home/rehab, keep your groin and cranial site incision dry   - no soaking in a tub/pool/hot tub  - no driving within 24 hours of anesthesia or while taking prescription pain medications   - keep hydrated, drink plenty of water   - do not lift more than 10lbs for 5 days    Diet:  - You may resume your regular diet.  - Drinking extra fluids (water, juice) is encouraged.  - Abstain from alcohol     Inpatient consults:  - Cardiology: Continue Aspirin 81mg daily. Consider switching to monotherapy with Plavix 75mg daily if okay with Neurosurgery. Resume lisinopril 2.5mg QD if SBP persistently above 130/70. Follow up with outpatient cardiologist Dr. Will Hurtado    Please also follow up with your primary care doctor.    AT REHAB:  1. monitor blood pressure, resume Lisinopril if SBP persistently above 130/70  2. monitor sodium level, wean off of sodium chloride tablets for a normal sodium goal Na 135-145  3. monitor sugars, adjust Lantus/Lispro as needed     Pain Expectations:  - A mild pain at the puncture site is not unusual  - Pain at cranial site is expected   - You may take Tylenol 1-2 tabs every 4-6 hours as needed     Medications:  - HOLD/STOP home: Lisinopril, HCTZ, Plavix  - Home Lantus decreased to 20 units at bedtime (home dose = 28 units)  - Home Lispro decreased to 6 units before meals (home dose = 14 units)  - otherwise, continue home meds as prescribed: Aspirin 81mg, Atorvastatin, Buspirone, Doxazosin, Flonase, Gabapentin, Linzess, Metoprolol, Metformin, Semaglutide  - new meds:  Heparin SQ while at rehab for DVT prevention  Sodium chloride tablet for low sodium, wean off  - pain meds:  Tylenol as needed for mild pain  Gabapentin (home medication)  Oxycodone 30mg every 12 hours as needed for severe pain (home medication)  Oxycodone 10mg every 8 hours as needed for moderate pain (home medication)   Robaxin 500mg every 8 hours as needed for muscle spasm  - adverse affects of meds discussed with patient   - pain medications can cause constipation, you should eat a high fiber diet and may take a stool softener while on pain meds     Call the office or come to the ED if:  - wound has drainage or bleeding, increased redness or pain at incision site, neurological change, fever (>101), chills, night sweats, syncope, nausea/vomiting, chest pain, shortness of breath    SPECIAL INSTRUCTIONS S/P ANGIOGRAPHY:  Signs and symptoms to look out for:    1. Kyrie bleeding from the puncture site is an emergency. Put direct pressure on the site and go directly to your local Emergency Room for treatment.    2. Bleeding under the skin may also occur and a small "black and blue" may be expected. If there appears to be an expanding mass or swelling around the puncture site, apply manual compression and go immediately to your local Emergency Room for treatment.    3. If your foot/leg becomes cool or blue and/or you are unable to move it, this must be treated as an emergency. Go directly to your local Emergency Room for treatment.    4. Excessive puncture site pain is abnormal and should be assessed.    5.  Look out for signs of infection in the puncture site: fever, red streaking of the leg or wrist, drainage, severe pain.    6.  Lack of adequate urine output, provided you are drinking enough fluids, may be cause for concern, notify us if you think this is the case.    Playback:  - see playback health for a copy of your discharge paperwork     WITHIN 24 HOURS OF DISCHARGE, PLEASE CONTACT NEURO PA  WITH ANY QUESTIONS OR CONCERNS: [phone]   OTHERWISE, PLEASE CALL THE OFFICE WITH ANY QUESTIONS OR CONCERNS: [phone]

## 2025-03-24 NOTE — DISCHARGE NOTE PROVIDER - NSDCFUADDAPPT_GEN_ALL_CORE_FT
Please follow up with Dr. Aleix Fall, call the office to make/confirm appointment at [phone]    Please follow up with Dr. Church, call the office to make/confirm appointment at [phone]    Please follow up with your primary care doctor  Phone call with PATTIE Mendez 4/14 at 10AM (she will call you)    Please follow up with Dr. Alexi Fall, call the office to make/confirm appointment at [phone]    Please follow up with Dr. Church, call the office to make/confirm appointment at [phone]    Please follow up with your primary care doctor  Phone call with PATTIE Mendez 4/14 at 10AM (she will call you)    Please follow up with Dr. Alexi Fall, call the office to make/confirm appointment at [phone]    Please follow up with Dr. Church, call the office to make/confirm appointment at [phone]    Please follow up with your primary care doctor and cardiologist

## 2025-03-24 NOTE — DISCHARGE NOTE PROVIDER - NSDCCPTREATMENT_GEN_ALL_CORE_FT
PRINCIPAL PROCEDURE  Procedure: Flintstone hole with evacuation of hematoma  Findings and Treatment: bilateral

## 2025-03-24 NOTE — DISCHARGE NOTE PROVIDER - NSDCCPCAREPLAN_GEN_ALL_CORE_FT
PRINCIPAL DISCHARGE DIAGNOSIS  Diagnosis: Traumatic subdural hemorrhage  Assessment and Plan of Treatment: you underwent a cerebral angiogram for bilateral middle meningeal artery embolization on 3/17  you underwent bilateral kusum holes for evacuation of subdural hemorrhage on 3/20  Continue Keppra for seizure prevention  Follow up with Dr. Alexi Fall      SECONDARY DISCHARGE DIAGNOSES  Diagnosis: HTN (hypertension)  Assessment and Plan of Treatment:     Diagnosis: HLD (hyperlipidemia)  Assessment and Plan of Treatment:     Diagnosis: DM (diabetes mellitus)  Assessment and Plan of Treatment:     Diagnosis: CAD (coronary artery disease)  Assessment and Plan of Treatment:     Diagnosis: Chronic back pain  Assessment and Plan of Treatment:     Diagnosis: History of lumbar surgery  Assessment and Plan of Treatment:      PRINCIPAL DISCHARGE DIAGNOSIS  Diagnosis: Traumatic subdural hemorrhage  Assessment and Plan of Treatment: you underwent a cerebral angiogram for bilateral middle meningeal artery embolization on 3/17  you underwent bilateral kusum holes for evacuation of subdural hemorrhage on 3/20  Continue Keppra for seizure prevention  Follow up with Dr. Alexi Fall      SECONDARY DISCHARGE DIAGNOSES  Diagnosis: HTN (hypertension)  Assessment and Plan of Treatment:     Diagnosis: HLD (hyperlipidemia)  Assessment and Plan of Treatment:     Diagnosis: DM (diabetes mellitus)  Assessment and Plan of Treatment:     Diagnosis: CAD (coronary artery disease)  Assessment and Plan of Treatment:     Diagnosis: Chronic back pain  Assessment and Plan of Treatment:     Diagnosis: History of lumbar surgery  Assessment and Plan of Treatment:     Diagnosis: History of depression  Assessment and Plan of Treatment:     Diagnosis: EDMUND on CPAP  Assessment and Plan of Treatment:     Diagnosis: Chronic constipation  Assessment and Plan of Treatment:     Diagnosis: Urinary retention  Assessment and Plan of Treatment:      PRINCIPAL DISCHARGE DIAGNOSIS  Diagnosis: Traumatic subdural hemorrhage  Assessment and Plan of Treatment: you underwent a cerebral angiogram for bilateral middle meningeal artery embolization on 3/17  you underwent bilateral kusum holes for evacuation of subdural hemorrhage on 3/20  Follow up with Dr. Alexi Fall      SECONDARY DISCHARGE DIAGNOSES  Diagnosis: HTN (hypertension)  Assessment and Plan of Treatment:     Diagnosis: HLD (hyperlipidemia)  Assessment and Plan of Treatment:     Diagnosis: DM (diabetes mellitus)  Assessment and Plan of Treatment:     Diagnosis: CAD (coronary artery disease)  Assessment and Plan of Treatment:     Diagnosis: Chronic back pain  Assessment and Plan of Treatment:     Diagnosis: History of lumbar surgery  Assessment and Plan of Treatment:     Diagnosis: History of depression  Assessment and Plan of Treatment:     Diagnosis: EDMUND on CPAP  Assessment and Plan of Treatment:     Diagnosis: Chronic constipation  Assessment and Plan of Treatment:     Diagnosis: Urinary retention  Assessment and Plan of Treatment:      PRINCIPAL DISCHARGE DIAGNOSIS  Diagnosis: Traumatic subdural hemorrhage  Assessment and Plan of Treatment: you underwent a cerebral angiogram for bilateral middle meningeal artery embolization on 3/17  you underwent bilateral kusum holes for evacuation of subdural hemorrhage on 3/20  Follow up with Dr. Alexi Fall      SECONDARY DISCHARGE DIAGNOSES  Diagnosis: HTN (hypertension)  Assessment and Plan of Treatment: continue home Metoprolol  HOLD home Lisinopril, HCTZ    Diagnosis: HLD (hyperlipidemia)  Assessment and Plan of Treatment: continue home Atorvastatin    Diagnosis: DM (diabetes mellitus)  Assessment and Plan of Treatment: Lantus  Lispro  Continue home Metformin    Diagnosis: CAD (coronary artery disease)  Assessment and Plan of Treatment: continue home Aspirin  STOP home Plavix    Diagnosis: Chronic back pain  Assessment and Plan of Treatment:     Diagnosis: History of lumbar surgery  Assessment and Plan of Treatment:     Diagnosis: History of depression  Assessment and Plan of Treatment: continue home Buspirone    Diagnosis: EDMUND on CPAP  Assessment and Plan of Treatment: CPAP at night    Diagnosis: Chronic constipation  Assessment and Plan of Treatment: continue home Linzess   continue Miralax, Senna    Diagnosis: Urinary retention  Assessment and Plan of Treatment: continue home Doxazosin    Diagnosis: Hyponatremia  Assessment and Plan of Treatment: continue sodium chloride tablets  monitor Na at rehab  wean off of salt tabs for a normal sodium goal Na 135-145     PRINCIPAL DISCHARGE DIAGNOSIS  Diagnosis: Traumatic subdural hemorrhage  Assessment and Plan of Treatment: you underwent a cerebral angiogram for bilateral middle meningeal artery embolization on 3/17  you underwent bilateral kusum holes for evacuation of subdural hemorrhage on 3/20  Follow up with Dr. Alexi Fall      SECONDARY DISCHARGE DIAGNOSES  Diagnosis: HTN (hypertension)  Assessment and Plan of Treatment: continue home Metoprolol  HOLD home Lisinopril, HCTZ  Monitor SBP at rehab and resume Lisinopril first if needed for sustained SBP >130    Diagnosis: HLD (hyperlipidemia)  Assessment and Plan of Treatment: continue home Atorvastatin    Diagnosis: DM (diabetes mellitus)  Assessment and Plan of Treatment: continue Lantus 20 units at bedtime (home dose is 28 units)  continue Lispro 6 units before meals (home dose is 14 units)  Continue home Metformin and Semaglutide   Monitor sugars and adjust Lantus/Lispro at rehab as needed    Diagnosis: CAD (coronary artery disease)  Assessment and Plan of Treatment: continue home Aspirin  STOP home Plavix    Diagnosis: Chronic back pain  Assessment and Plan of Treatment: continue home Gabapentin, home Oxycodone    Diagnosis: History of lumbar surgery  Assessment and Plan of Treatment:     Diagnosis: History of depression  Assessment and Plan of Treatment: continue home Buspirone    Diagnosis: EDMUND on CPAP  Assessment and Plan of Treatment: CPAP at night    Diagnosis: Chronic constipation  Assessment and Plan of Treatment: continue home Linzess   continue Miralax, Senna    Diagnosis: Urinary retention  Assessment and Plan of Treatment: continue home Doxazosin    Diagnosis: Hyponatremia  Assessment and Plan of Treatment: continue sodium chloride tablets  monitor Na at rehab  wean off of salt tabs for a normal sodium goal Na 135-145     PRINCIPAL DISCHARGE DIAGNOSIS  Diagnosis: Traumatic subdural hemorrhage  Assessment and Plan of Treatment: you underwent a cerebral angiogram for bilateral middle meningeal artery embolization on 3/17  you underwent bilateral kusum holes for evacuation of subdural hemorrhage on 3/20  Follow up with Dr. Alexi Fall      SECONDARY DISCHARGE DIAGNOSES  Diagnosis: HTN (hypertension)  Assessment and Plan of Treatment: continue home Metoprolol  HOLD home Lisinopril, HCTZ  Monitor SBP at rehab and resume Lisinopril first if needed for sustained SBP >130  Follow up with your Cardiologist    Diagnosis: HLD (hyperlipidemia)  Assessment and Plan of Treatment: continue home Atorvastatin    Diagnosis: DM (diabetes mellitus)  Assessment and Plan of Treatment: continue Lantus 20 units at bedtime (home dose is 28 units)  continue Lispro 6 units before meals (home dose is 14 units)  Continue home Metformin and Semaglutide   Monitor sugars and adjust Lantus/Lispro at rehab as needed  Follow up with your PCP/endocrinologist    Diagnosis: CAD (coronary artery disease)  Assessment and Plan of Treatment: continue home Aspirin  STOP home Plavix  Follow up with your Cardiologist    Diagnosis: Chronic back pain  Assessment and Plan of Treatment: continue home Gabapentin, home Oxycodone    Diagnosis: History of lumbar surgery  Assessment and Plan of Treatment:     Diagnosis: History of depression  Assessment and Plan of Treatment: continue home Buspirone    Diagnosis: EDMUND on CPAP  Assessment and Plan of Treatment: CPAP at night    Diagnosis: Chronic constipation  Assessment and Plan of Treatment: continue home Linzess   continue Miralax, Senna    Diagnosis: Urinary retention  Assessment and Plan of Treatment: continue home Doxazosin    Diagnosis: Hyponatremia  Assessment and Plan of Treatment: continue sodium chloride tablets  monitor Na at rehab  wean off of salt tabs for a normal sodium goal Na 135-145

## 2025-03-24 NOTE — DISCHARGE NOTE PROVIDER - NSDCFUSCHEDAPPT_GEN_ALL_CORE_FT
Elise Mendez  [facility]  NEUROSURG [address]  Scheduled Appointment: 04/14/2025

## 2025-03-24 NOTE — DISCHARGE NOTE PROVIDER - HOSPITAL COURSE
HPI:  64M PMHx HTN, HLD, DM, CAD w/ 11 cardiac stents (on Plavix, lase dose 3/14), lumbar fusion 2016, chronic pain (takes oxy 30mg 4-6 times per day) presented to ED s/p fall 5 weeks ago with head strike no LOC. PT states he visited an ED when he fell and had a negative CTH at the time. He now c/o R sided weakness, WFD, and HA rated at 8/10. He takes his home oxy for chronic LBP prescribed by PCP, which helps with the headache. Denies recent falls, dizziness, LOC, seizure, vision changes, SOB, chest pain.    Hospital Course:  3/15: Admit to [facility] for further mgmt.   3/16: VALERIANO overnight. Neuro stable. Preop for MMAE, premedication protocol for contrast allergy. Cards consulted, cleared for MMAE, recommend echo.  3/17: VALERIANO ovn. Neuro stable. Preop for MMAE today. Echo done. Resumed home lantus 28u qhs. POD0 s/p b/l MMAE.   3/18: POD1. VALERIANO ovn. Started lispro 10u TID. Hold lisinopril given soft pressures. CTH complete. Still therapeutic on ASA/Plavix, rpt in AM.  3/19: POD2, VALERIANO overnight, lantus decreased to 22u for NPO.   3/20: POD3, VALERIANO overnight, POD0 b/l kusum hole. Lantus 10 for .   3/21: POD 4 angio for embo, POD 1 BL kusum holes. 350, 300cc urine output x 2 hrs. DI labs sent, labs not indicative of DI. CTH done.   3/22: POD 5 angio, POD 2 b/l kusum holes. VALERIANO o/n, neuro stable.       Patient evaluated by PT/OT who recommended: acute rehab   Patient is going home? rehab? hospice? Facility Name:     Hospital course uncomplicated     Exam on day of discharge:    Checklist:   - Obtained follow up appointment from NP  - Reviewed final recommendations of inpatient consults  - review discharge planning on provider handoff  - post op imaging completed  - Neurologically stable for discharge  - Vitals stable for discharge   - Afebrile for discharge  - WBC is stable  - Sodium level is normal  - Pain is adequately controlled  - Pt has PICC/walker/brace/collar   - LACE score (10 or > needs PCP apt)   - Needs PCP Follow up?   - stroke patient? Discharge NIHSS score     Given the ongoing treatment plan, please note that the patient has a high potential for further admissions to deliver ongoing treatment and/or procedures as part of the normal course of neurosurgical care    HPI:  64M PMHx HTN, HLD, DM, CAD w/ 11 cardiac stents (on Plavix, lase dose 3/14), lumbar fusion 2016, chronic pain (takes oxy 30mg 4-6 times per day) presented to ED s/p fall 5 weeks ago with head strike no LOC. PT states he visited an ED when he fell and had a negative CTH at the time. He now c/o R sided weakness, WFD, and HA rated at 8/10. He takes his home oxy for chronic LBP prescribed by PCP, which helps with the headache. Denies recent falls, dizziness, LOC, seizure, vision changes, SOB, chest pain.    Hospital Course:  3/15: Admit to [facility] for further mgmt.   3/16: VALERIANO overnight. Neuro stable. Preop for MMAE, premedication protocol for contrast allergy. Cards consulted, cleared for MMAE, recommend echo.  3/17: VALERIANO ovn. Neuro stable. Preop for MMAE today. Echo done. Resumed home lantus 28u qhs. POD0 s/p b/l MMAE.   3/18: POD1. VALERIANO ovn. Started lispro 10u TID. Hold lisinopril given soft pressures. CTH complete. Still therapeutic on ASA/Plavix, rpt in AM.  3/19: POD2, VALERIANO overnight, lantus decreased to 22u for NPO.   3/20: POD3, VALERIANO overnight, POD0 b/l kusum hole. Lantus 10 for .   3/21: POD 4 angio for embo, POD 1 BL kusum holes. 350, 300cc urine output x 2 hrs. DI labs sent, labs not indicative of DI. CTH done.   3/22: POD 5 angio, POD 2 b/l kusum holes. VALERIANO o/n, neuro stable. 500 cc bolus given for orthostatic HoTN with PT. Lispro premeal 4u started. 100 cc 3% bolus given for Na 132. Subdural drains dc'ed, nylon sutures placed. s/p drain removals, c/o seeing shadows when he closes his eyes, CTH with pnemocephalus/hygromas - no acute hemorrhage. Na 137.   3/23. POD6 angio, POD3 b/l burrholes. Suppository given in AM. Regional status. Lantus decreased to 20u and lispro held. Lispro later resumed at 3u TID. Bowel regimen increased.  3/24: POD7 angio, POD4 b/l burrholes. VALERIANO on. SBP 90s/50s, given 500cc NS. Had BM. Tapered salt tabs to 3 BID.   3/25: POD 8/POD 5. VALERIANO overnight. Neuro stable.  3/26: POD 9/POD 6. VALERIANO overnight. Neuro stable. Na tabs weaned to 2 BID.   3/27: POD 10/POD 7. VALERIANO overnight, neuro stable, denies pain, pend auth for AR (has acceptance). Resumed ASA today. Suppository x1 for constipation.   Decreased Oxy 30mg to BID PRN for severe pain, 1 time 10mg oxy given for breakthrough headache, o/w pain controlled.   3/28: POD 11/ 8. VALERIANO overnight, neuro stable. F/u Na this am and consider d/c'ing salt tabs. Recommend for AR.     Patient evaluated by PT/OT who recommended: acute rehab   Patient is going to [facility]    Hospital course uncomplicated     Exam on day of discharge:  General: NAD, pt is comfortably sitting up in bed, A&O x3, on RA  HEENT: CN II-XII grossly intact, PERRL 3mm, EOMI b/l, face symmetric, tongue midline, neck FROM  Cardiovascular: RRR, normal S1 and S2   Respiratory: lungs CTAB, no wheezing, rhonchi, or crackles   GI: normoactive BS to auscultation, abd soft, NTND   Neuro: no aphasia, speech clear, no dysmetria, no pronator drift  strength 5/5 throughout all 4 extremities  sensation intact to light touch throughout   Extremities: distal pulses 2+ x4  Wound/incision: b/l kusum holes with sutures C/D/I      Patient is neuro stable, vitals stable, afebrile, medically ready for discharge      Given the ongoing treatment plan, please note that the patient has a high potential for further admissions to deliver ongoing treatment and/or procedures as part of the normal course of neurosurgical care

## 2025-03-24 NOTE — PROGRESS NOTE ADULT - SUBJECTIVE AND OBJECTIVE BOX
Patient is a 64y old  Male who presents with a chief complaint of Traumatic BL SDH (24 Mar 2025 11:43)    INTERVAL EVENTS:  - Patient was seen and examined at bedside.  - BP 90/59 while patient sitting in chair, endorsed feeling dizzy. Patient moved to bed and given gatorade and 1L bolus with improvement in BP and symptoms.    Diet, Consistent Carbohydrate/No Snacks (03-20-25 @ 21:48) [Active]      MEDICATIONS:  MEDICATIONS  (STANDING):  atorvastatin 40 milliGRAM(s) Oral at bedtime  busPIRone 15 milliGRAM(s) Oral two times a day  doxazosin 1 milliGRAM(s) Oral at bedtime  gabapentin 300 milliGRAM(s) Oral at bedtime  heparin   Injectable 5000 Unit(s) SubCutaneous every 8 hours  influenza   Vaccine 0.5 milliLiter(s) IntraMuscular once  insulin glargine Injectable (LANTUS) 20 Unit(s) SubCutaneous at bedtime  insulin lispro (ADMELOG) corrective regimen sliding scale   SubCutaneous Before meals and at bedtime  insulin lispro Injectable (ADMELOG) 3 Unit(s) SubCutaneous three times a day before meals  levETIRAcetam 500 milliGRAM(s) Oral every 12 hours  linaclotide 290 MICROGram(s) Oral before breakfast  metoprolol succinate ER 50 milliGRAM(s) Oral every 12 hours  naloxegol 25 milliGRAM(s) Oral daily  polyethylene glycol 3350 17 Gram(s) Oral two times a day  potassium phosphate / sodium phosphate Tablet (K-PHOS No. 2) 1 Tablet(s) Oral once  senna 2 Tablet(s) Oral at bedtime  sodium chloride 3 Gram(s) Oral every 12 hours    MEDICATIONS  (PRN):  methocarbamol 500 milliGRAM(s) Oral every 8 hours PRN Muscle Spasm  oxyCODONE    IR 30 milliGRAM(s) Oral every 8 hours PRN Severe Pain (7 - 10)  oxyCODONE    IR 10 milliGRAM(s) Oral every 8 hours PRN Moderate Pain (4 - 6)      Allergies    IV Contrast (Other; Rash)  ibuprofen (Other)    Intolerances        OBJECTIVE:  Vital Signs Last 24 Hrs  T(C): 36.7 (24 Mar 2025 08:30), Max: 37.2 (23 Mar 2025 20:35)  T(F): 98.1 (24 Mar 2025 08:30), Max: 99 (23 Mar 2025 20:35)  HR: 72 (24 Mar 2025 09:56) (67 - 86)  BP: 110/74 (24 Mar 2025 11:02) (90/59 - 136/87)  BP(mean): 81 (24 Mar 2025 11:02) (81 - 97)  RR: 18 (24 Mar 2025 08:45) (16 - 18)  SpO2: 100% (24 Mar 2025 08:45) (92% - 100%)    Parameters below as of 24 Mar 2025 08:45  Patient On (Oxygen Delivery Method): room air      I&O's Summary    23 Mar 2025 07:01  -  24 Mar 2025 07:00  --------------------------------------------------------  IN: 720 mL / OUT: 1050 mL / NET: -330 mL    24 Mar 2025 07:01  -  24 Mar 2025 11:54  --------------------------------------------------------  IN: 720 mL / OUT: 460 mL / NET: 260 mL        PHYSICAL EXAM:  Gen: Reclining in bed at time of exam, appears stated age  HEENT: NCAT, MMM, clear OP  Neck: supple, trachea at midline  CV: RRR, +S1/S2  Pulm: adequate respiratory effort, no increase in work of breathing  Abd: soft, +distended, non-tender  Skin: warm and dry,   Ext: no edema  Neuro: Alert, speaking in full sentences  Psych: affect and behavior appropriate, pleasant at time of interview    LABS:                        13.0   6.15  )-----------( 176      ( 24 Mar 2025 05:30 )             39.0     03-24    140  |  104  |  17  ----------------------------<  150[H]  3.8   |  30  |  0.97    Ca    8.5      24 Mar 2025 05:30  Phos  2.3     03-24  Mg     2.2     03-24          CAPILLARY BLOOD GLUCOSE      POCT Blood Glucose.: 200 mg/dL (24 Mar 2025 11:44)  POCT Blood Glucose.: 133 mg/dL (24 Mar 2025 06:55)  POCT Blood Glucose.: 134 mg/dL (23 Mar 2025 21:40)  POCT Blood Glucose.: 244 mg/dL (23 Mar 2025 19:34)  POCT Blood Glucose.: 206 mg/dL (23 Mar 2025 16:45)  POCT Blood Glucose.: 154 mg/dL (23 Mar 2025 12:02)    Urinalysis Basic - ( 24 Mar 2025 05:30 )    Color: x / Appearance: x / SG: x / pH: x  Gluc: 150 mg/dL / Ketone: x  / Bili: x / Urobili: x   Blood: x / Protein: x / Nitrite: x   Leuk Esterase: x / RBC: x / WBC x   Sq Epi: x / Non Sq Epi: x / Bacteria: x        MICRODATA:      RADIOLOGY/OTHER STUDIES:

## 2025-03-24 NOTE — DISCHARGE NOTE PROVIDER - CARE PROVIDERS DIRECT ADDRESSES
[email or web address]

## 2025-03-24 NOTE — DISCHARGE NOTE PROVIDER - CARE PROVIDER_API CALL
Remi Moore  Neurosurgery  [address] [facility] [place] [address]  Phone: [phone]  Fax: [phone]  Follow Up Time:     Shayne Church  Interventional Neuroradiology  [address] [place] [id number]  Phone: [phone]  Fax: [phone]  Follow Up Time:

## 2025-03-24 NOTE — DISCHARGE NOTE PROVIDER - NSDCMRMEDTOKEN_GEN_ALL_CORE_FT
aspirin 81 mg oral tablet: orally once a day  Aspirin EC 81 mg oral delayed release tablet: 1 tab(s) orally once a day  atorvastatin 40 mg oral tablet: 1 tab(s) orally once a day (at bedtime)  busPIRone 15 mg oral tablet: 1 tab(s) orally 2 times a day  celecoxib 100 mg oral capsule: 1 cap(s) orally 2 times a day  celecoxib 200 mg oral capsule: 1 cap(s) orally 2 times a day  clopidogrel 75 mg oral tablet: 1 tab(s) orally once a day  diclofenac potassium 50 mg oral tablet: 1 tab(s) orally once a day  doxazosin 1 mg oral tablet: 1 tab(s) orally once a day  doxazosin 1 mg oral tablet: 1 tab(s) orally once a day (at bedtime)  Flonase 50 mcg/inh nasal spray: 1 spray(s) in each nostril 2 times a day  hydroCHLOROthiazide 12.5 mg oral tablet: 1 tab(s) orally once a day  hydroCHLOROthiazide 12.5 mg oral tablet: 1 tab(s) orally once a day  Insulin Glargine: 28 unit(s) subcutaneous once a day (at bedtime)  Insulin Lispro: 14 unit(s) subcutaneous 3 times a day  LamISIL 250 mg oral tablet: 1 tab(s) orally every other day  Linzess 290 mcg oral capsule: 1 cap(s) orally once a day  Linzess 290 mcg oral capsule: 1 cap(s) orally once a day  lisinopril 2.5 mg oral tablet: 1 tab(s) orally once a day  lisinopril 2.5 mg oral tablet: 1 tab(s) orally once a day  metFORMIN 1000 mg oral tablet: 1 tab(s) orally 2 times a day  metoprolol succinate 50 mg oral tablet, extended release: 1 tab(s) orally 2 times a day  oxyCODONE 30 mg oral tablet: 1 tab(s) orally 4 times a day  oxyCODONE 30 mg oral tablet: 1 tab(s) orally 4 times a day  Plavix 75 mg oral tablet: 1 tab(s) orally once a day  semaglutide: last dose 3/11  tiZANidine 2 mg oral capsule: 2 cap(s) orally once a day  Toprol-XL 50 mg oral tablet, extended release: 1 tab(s) orally 2 times a day   acetaminophen 325 mg oral tablet: 2 tab(s) orally every 6 hours As needed Temp greater or equal to 38C (100.4F), Mild Pain (1 - 3)  aspirin 81 mg oral delayed release tablet: 1 tab(s) orally once a day  aspirin 81 mg oral tablet: orally once a day  Aspirin EC 81 mg oral delayed release tablet: 1 tab(s) orally once a day  atorvastatin 40 mg oral tablet: 1 tab(s) orally once a day (at bedtime)  atorvastatin 40 mg oral tablet: 1 tab(s) orally once a day (at bedtime)  busPIRone 15 mg oral tablet: 1 tab(s) orally 2 times a day  doxazosin 1 mg oral tablet: 1 tab(s) orally once a day  doxazosin 1 mg oral tablet: 1 tab(s) orally once a day (at bedtime)  doxazosin 1 mg oral tablet: 1 tab(s) orally once a day (at bedtime)  Flonase 50 mcg/inh nasal spray: 1 spray(s) in each nostril 2 times a day  gabapentin 300 mg oral capsule: 1 cap(s) orally once a day (at bedtime)  heparin: 5,000 unit(s) subcutaneous every 8 hours for DVT prevention while at rehab  Insulin Glargine: 28 unit(s) subcutaneous once a day (at bedtime)  Insulin Lispro: 14 unit(s) subcutaneous 3 times a day  linaclotide 290 mcg oral capsule: 1 cap(s) orally once a day (before a meal)  Linzess 290 mcg oral capsule: 1 cap(s) orally once a day  Linzess 290 mcg oral capsule: 1 cap(s) orally once a day  metFORMIN 1000 mg oral tablet: 1 tab(s) orally 2 times a day  methocarbamol 500 mg oral tablet: 1 tab(s) orally every 8 hours As needed Muscle Spasm  metoprolol succinate 50 mg oral tablet, extended release: 1 tab(s) orally every 12 hours  metoprolol succinate 50 mg oral tablet, extended release: 1 tab(s) orally 2 times a day  oxyCODONE 10 mg oral tablet: 1 tab(s) orally every 8 hours As needed Moderate Pain (4 - 6)  oxyCODONE 30 mg oral tablet: 1 tab(s) orally every 12 hours As needed Severe Pain (7 - 10)  oxyCODONE 30 mg oral tablet: 1 tab(s) orally 4 times a day  oxyCODONE 30 mg oral tablet: 1 tab(s) orally 4 times a day  polyethylene glycol 3350 oral powder for reconstitution: 17 gram(s) orally 2 times a day  semaglutide: last dose 3/11  senna leaf extract oral tablet: 2 tab(s) orally once a day (at bedtime)  sodium chloride 1 g oral tablet: 1 tab(s) orally every 12 hours wean off as tolerated, normal sodium goal 135-145  Toprol-XL 50 mg oral tablet, extended release: 1 tab(s) orally 2 times a day   acetaminophen 325 mg oral tablet: 2 tab(s) orally every 6 hours As needed Temp greater or equal to 38C (100.4F), Mild Pain (1 - 3)  aspirin 81 mg oral delayed release tablet: 1 tab(s) orally once a day  atorvastatin 40 mg oral tablet: 1 tab(s) orally once a day (at bedtime)  busPIRone 15 mg oral tablet: 1 tab(s) orally 2 times a day  doxazosin 1 mg oral tablet: 1 tab(s) orally once a day (at bedtime)  Flonase 50 mcg/inh nasal spray: 1 spray(s) in each nostril 2 times a day  gabapentin 300 mg oral capsule: 1 cap(s) orally once a day (at bedtime)  heparin: 5,000 unit(s) subcutaneous every 8 hours for DVT prevention while at rehab  insulin glargine 100 units/mL subcutaneous solution: 20 unit(s) subcutaneous once a day (at bedtime)  insulin lispro 100 units/mL injectable solution: 6 unit(s) subcutaneous 3 times a day (before meals)  linaclotide 290 mcg oral capsule: 1 cap(s) orally once a day (before a meal)  metFORMIN 1000 mg oral tablet: 1 tab(s) orally 2 times a day  methocarbamol 500 mg oral tablet: 1 tab(s) orally every 8 hours As needed Muscle Spasm  metoprolol succinate 50 mg oral tablet, extended release: 1 tab(s) orally every 12 hours  oxyCODONE 10 mg oral tablet: 1 tab(s) orally every 8 hours As needed Moderate Pain (4 - 6)  oxyCODONE 30 mg oral tablet: 1 tab(s) orally every 12 hours As needed Severe Pain (7 - 10)  polyethylene glycol 3350 oral powder for reconstitution: 17 gram(s) orally 2 times a day  semaglutide: last dose 3/11  senna leaf extract oral tablet: 2 tab(s) orally once a day (at bedtime)  sodium chloride 1 g oral tablet: 1 tab(s) orally every 12 hours wean off as tolerated, normal sodium goal 135-145

## 2025-03-25 LAB
ANION GAP SERPL CALC-SCNC: 7 MMOL/L — SIGNIFICANT CHANGE UP (ref 5–17)
BUN SERPL-MCNC: 16 MG/DL — SIGNIFICANT CHANGE UP (ref 7–23)
CALCIUM SERPL-MCNC: 8.6 MG/DL — SIGNIFICANT CHANGE UP (ref 8.4–10.5)
CHLORIDE SERPL-SCNC: 100 MMOL/L — SIGNIFICANT CHANGE UP (ref 96–108)
CO2 SERPL-SCNC: 29 MMOL/L — SIGNIFICANT CHANGE UP (ref 22–31)
CREAT SERPL-MCNC: 0.99 MG/DL — SIGNIFICANT CHANGE UP (ref 0.5–1.3)
EGFR: 86 ML/MIN/1.73M2 — SIGNIFICANT CHANGE UP
EGFR: 86 ML/MIN/1.73M2 — SIGNIFICANT CHANGE UP
GLUCOSE SERPL-MCNC: 134 MG/DL — HIGH (ref 70–99)
HCT VFR BLD CALC: 39.2 % — SIGNIFICANT CHANGE UP (ref 39–50)
HGB BLD-MCNC: 13.2 G/DL — SIGNIFICANT CHANGE UP (ref 13–17)
MAGNESIUM SERPL-MCNC: 2 MG/DL — SIGNIFICANT CHANGE UP (ref 1.6–2.6)
MCHC RBC-ENTMCNC: 31.4 PG — SIGNIFICANT CHANGE UP (ref 27–34)
MCHC RBC-ENTMCNC: 33.7 G/DL — SIGNIFICANT CHANGE UP (ref 32–36)
MCV RBC AUTO: 93.3 FL — SIGNIFICANT CHANGE UP (ref 80–100)
NRBC BLD AUTO-RTO: 0 /100 WBCS — SIGNIFICANT CHANGE UP (ref 0–0)
PHOSPHATE SERPL-MCNC: 2.6 MG/DL — SIGNIFICANT CHANGE UP (ref 2.5–4.5)
PLATELET # BLD AUTO: 187 K/UL — SIGNIFICANT CHANGE UP (ref 150–400)
POTASSIUM SERPL-MCNC: 3.7 MMOL/L — SIGNIFICANT CHANGE UP (ref 3.5–5.3)
POTASSIUM SERPL-SCNC: 3.7 MMOL/L — SIGNIFICANT CHANGE UP (ref 3.5–5.3)
RBC # BLD: 4.2 M/UL — SIGNIFICANT CHANGE UP (ref 4.2–5.8)
RBC # FLD: 13.1 % — SIGNIFICANT CHANGE UP (ref 10.3–14.5)
SODIUM SERPL-SCNC: 136 MMOL/L — SIGNIFICANT CHANGE UP (ref 135–145)
WBC # BLD: 5.07 K/UL — SIGNIFICANT CHANGE UP (ref 3.8–10.5)
WBC # FLD AUTO: 5.07 K/UL — SIGNIFICANT CHANGE UP (ref 3.8–10.5)

## 2025-03-25 PROCEDURE — 99233 SBSQ HOSP IP/OBS HIGH 50: CPT

## 2025-03-25 PROCEDURE — 99024 POSTOP FOLLOW-UP VISIT: CPT

## 2025-03-25 RX ORDER — SOD PHOS DI, MONO/K PHOS MONO 250 MG
1 TABLET ORAL ONCE
Refills: 0 | Status: COMPLETED | OUTPATIENT
Start: 2025-03-25 | End: 2025-03-25

## 2025-03-25 RX ADMIN — LEVETIRACETAM 500 MILLIGRAM(S): 10 INJECTION, SOLUTION INTRAVENOUS at 05:21

## 2025-03-25 RX ADMIN — METOPROLOL SUCCINATE 50 MILLIGRAM(S): 50 TABLET, EXTENDED RELEASE ORAL at 05:22

## 2025-03-25 RX ADMIN — Medication 2 TABLET(S): at 22:31

## 2025-03-25 RX ADMIN — GABAPENTIN 300 MILLIGRAM(S): 400 CAPSULE ORAL at 22:31

## 2025-03-25 RX ADMIN — HEPARIN SODIUM 5000 UNIT(S): 1000 INJECTION INTRAVENOUS; SUBCUTANEOUS at 13:22

## 2025-03-25 RX ADMIN — OXYCODONE HYDROCHLORIDE 30 MILLIGRAM(S): 30 TABLET ORAL at 07:25

## 2025-03-25 RX ADMIN — INSULIN GLARGINE-YFGN 20 UNIT(S): 100 INJECTION, SOLUTION SUBCUTANEOUS at 22:34

## 2025-03-25 RX ADMIN — OXYCODONE HYDROCHLORIDE 30 MILLIGRAM(S): 30 TABLET ORAL at 17:09

## 2025-03-25 RX ADMIN — INSULIN LISPRO 6 UNIT(S): 100 INJECTION, SOLUTION INTRAVENOUS; SUBCUTANEOUS at 12:30

## 2025-03-25 RX ADMIN — INSULIN LISPRO 4: 100 INJECTION, SOLUTION INTRAVENOUS; SUBCUTANEOUS at 22:33

## 2025-03-25 RX ADMIN — INSULIN LISPRO 6 UNIT(S): 100 INJECTION, SOLUTION INTRAVENOUS; SUBCUTANEOUS at 07:25

## 2025-03-25 RX ADMIN — Medication 3 GRAM(S): at 05:22

## 2025-03-25 RX ADMIN — HEPARIN SODIUM 5000 UNIT(S): 1000 INJECTION INTRAVENOUS; SUBCUTANEOUS at 05:22

## 2025-03-25 RX ADMIN — Medication 40 MILLIEQUIVALENT(S): at 07:44

## 2025-03-25 RX ADMIN — Medication 3 GRAM(S): at 17:26

## 2025-03-25 RX ADMIN — POLYETHYLENE GLYCOL 3350 17 GRAM(S): 17 POWDER, FOR SOLUTION ORAL at 05:22

## 2025-03-25 RX ADMIN — INSULIN LISPRO 6 UNIT(S): 100 INJECTION, SOLUTION INTRAVENOUS; SUBCUTANEOUS at 17:28

## 2025-03-25 RX ADMIN — BUSPIRONE HYDROCHLORIDE 15 MILLIGRAM(S): 15 TABLET ORAL at 05:22

## 2025-03-25 RX ADMIN — BUSPIRONE HYDROCHLORIDE 15 MILLIGRAM(S): 15 TABLET ORAL at 17:26

## 2025-03-25 RX ADMIN — LINACLOTIDE 290 MICROGRAM(S): 290 CAPSULE, GELATIN COATED ORAL at 07:25

## 2025-03-25 RX ADMIN — Medication 1 PACKET(S): at 07:44

## 2025-03-25 RX ADMIN — OXYCODONE HYDROCHLORIDE 30 MILLIGRAM(S): 30 TABLET ORAL at 08:25

## 2025-03-25 RX ADMIN — OXYCODONE HYDROCHLORIDE 30 MILLIGRAM(S): 30 TABLET ORAL at 16:09

## 2025-03-25 RX ADMIN — ATORVASTATIN CALCIUM 40 MILLIGRAM(S): 80 TABLET, FILM COATED ORAL at 22:30

## 2025-03-25 RX ADMIN — LEVETIRACETAM 500 MILLIGRAM(S): 10 INJECTION, SOLUTION INTRAVENOUS at 17:25

## 2025-03-25 RX ADMIN — DOXAZOSIN MESYLATE 1 MILLIGRAM(S): 8 TABLET ORAL at 22:30

## 2025-03-25 RX ADMIN — METOPROLOL SUCCINATE 50 MILLIGRAM(S): 50 TABLET, EXTENDED RELEASE ORAL at 17:26

## 2025-03-25 RX ADMIN — HEPARIN SODIUM 5000 UNIT(S): 1000 INJECTION INTRAVENOUS; SUBCUTANEOUS at 22:31

## 2025-03-25 RX ADMIN — POLYETHYLENE GLYCOL 3350 17 GRAM(S): 17 POWDER, FOR SOLUTION ORAL at 17:25

## 2025-03-25 NOTE — CHART NOTE - NSCHARTNOTEFT_GEN_A_CORE
Admitting Diagnosis:   Patient is a 64y old  Male who presents with a chief complaint of Traumatic BL SDH (25 Mar 2025 12:32)  PAST MEDICAL & SURGICAL HISTORY:  Sleep apnea  on CPAP  Hypertension  Diabetes mellitus  High cholesterol  Obesity (BMI 30.0-34.9)  CAD (coronary artery disease)  Hiatal hernia with possible GERD  Fatty liver  BPH (benign prostatic hyperplasia)  Constipation  H/O low back pain  S/P angioplasty with stent  2008, 2014, 03/2019  Finger injury  Left Ring Finger & had surgery ( 1996 )  S/P foot surgery  Right  ( 2013 )  H/O spinal fusion    Current Nutrition Order: consistent carbohydrate diet with no snacks     PO Intake: Good (%) [   ]  Fair (50-75%) [   ] Poor (<25%) [   ] *fair to good overall PO intakes*    GI Issues: no major Gi upset noted; BM on 3/24/25; no documented abdominal distention    Pain: moderate (level 4) pain    Skin Integrity: right groin, left head, right head surgical incisions; no pressure ulcers noted; no documented edema; Shalom: 18       03-24-25 @ 07:01  -  03-25-25 @ 07:00  --------------------------------------------------------  IN: 2030 mL / OUT: 2070 mL / NET: -40 mL        Labs:   03-25    136  |  100  |  16  ----------------------------<  134[H]  3.7   |  29  |  0.99    Ca    8.6      25 Mar 2025 05:30  Phos  2.6     03-25  Mg     2.0     03-25    CAPILLARY BLOOD GLUCOSE    POCT Blood Glucose.: 134 mg/dL (25 Mar 2025 12:21)  POCT Blood Glucose.: 125 mg/dL (25 Mar 2025 06:48)  POCT Blood Glucose.: 151 mg/dL (24 Mar 2025 22:00)  POCT Blood Glucose.: 213 mg/dL (24 Mar 2025 17:23)    Medications:  MEDICATIONS  (STANDING):  atorvastatin 40 milliGRAM(s) Oral at bedtime  busPIRone 15 milliGRAM(s) Oral two times a day  doxazosin 1 milliGRAM(s) Oral at bedtime  gabapentin 300 milliGRAM(s) Oral at bedtime  heparin   Injectable 5000 Unit(s) SubCutaneous every 8 hours  influenza   Vaccine 0.5 milliLiter(s) IntraMuscular once  insulin glargine Injectable (LANTUS) 20 Unit(s) SubCutaneous at bedtime  insulin lispro (ADMELOG) corrective regimen sliding scale   SubCutaneous Before meals and at bedtime  insulin lispro Injectable (ADMELOG) 6 Unit(s) SubCutaneous three times a day before meals  levETIRAcetam 500 milliGRAM(s) Oral every 12 hours  linaclotide 290 MICROGram(s) Oral before breakfast  metoprolol succinate ER 50 milliGRAM(s) Oral every 12 hours  polyethylene glycol 3350 17 Gram(s) Oral two times a day  senna 2 Tablet(s) Oral at bedtime  sodium chloride 3 Gram(s) Oral every 12 hours    MEDICATIONS  (PRN):  acetaminophen     Tablet .. 650 milliGRAM(s) Oral every 6 hours PRN Temp greater or equal to 38C (100.4F), Mild Pain (1 - 3)  methocarbamol 500 milliGRAM(s) Oral every 8 hours PRN Muscle Spasm  oxyCODONE    IR 30 milliGRAM(s) Oral every 8 hours PRN Severe Pain (7 - 10)  oxyCODONE    IR 10 milliGRAM(s) Oral every 8 hours PRN Moderate Pain (4 - 6)    Dosing Anthropometrics:   Height for BMI (FEET)	5 Feet  Height for BMI (INCHES)	8 Inch(s)  Height for BMI (CM)	172.72 Centimeter(s)  Weight for BMI (lbs)	203 lb  Weight for BMI (kg)	92.1 kg  Body Mass Index	              30.8  ideal body weight:               154 pounds, pt is ~132% of ideal body weight    Weight Change: no new weights noted in electronic medical records; recommend that nursing obtain updated weights weekly. RD to monitor trends.     Estimated energy needs:  Weight used for calculations	ideal body weight  Estimated Energy Needs Weight (lbs)	154 lb  Estimated Energy Needs Weight (kg)	69.8 kg  Estimated Energy Needs From (elen/kg)	25  Estimated Energy Needs To (elen/kg)	30  Estimated Energy Needs Calculated From (elen/kg)	1745  Estimated Energy Needs Calculated To (elen/kg)	2094  Weight used for calculations	ideal body weight  Estimated Protein Needs Weight (lbs)	154 lb  Estimated Protein Needs Weight (kg)	69.8 kg  Estimated Protein Needs From (g/kg)	1.2  Estimated Protein Needs To (g/kg)	1.4  Estimated Protein Needs Calculated From (g/kg)	83.76  Estimated Protein Needs Calculated To (g/kg)	97.72  Estimated Fluid Needs Weight (lbs)	154 lb  Estimated Fluid Needs Weight (kg)	69.8 kg  Estimated Fluid Needs From (ml/kg)	25  Estimated Fluid Needs To (ml/kg)	30  Estimated Fluid Needs Calculated From (ml/kg)	1745  Estimated Fluid Needs Calculated To (ml/kg)	2094  Other Calculations	Pt is >% ideal body weight, thus ideal body weight used for all calculations. Needs adjusted for age, physiological demands, preoperative status.    Subjective: This is a 64 year old male past medical history significant for HTN, HLD, DM, CAD with 11 cardiac stents (on Plavix, lase dose 3/14), lumbar fusion 2016, chronic pain (takes oxy 30mg 4-6 times per day) presented to ED s/p fall 5 weeks ago with head strike no LOC. PT states he visited an ED when he fell and had a negative CTH at the time. He now c/o R sided weakness, WFD, and HA rated at 8/10. Pt is status post bilateral MMAE (3/17/25). Plan for BL craniotomies for SDH evacuation (3/20/25).    RD spoke to pt at bedside for nutrition follow up evaluation; pt appeared pleasant in bed. Pt noted with no major GI upset. No issues reported chewing or swallowing. Pt noted with fair to good ~55-75% overall PO intakes of his meals/snacks. Labs reviewed: POCT glucose was elevated, now within normal limits. RD reviewed pt's diet with him, promoted nutrient-dense foods, protein-rich choices postoperatively, pt appeared overall receptive, verbalized understanding. RD to remain available. Please see additional nutrition recommendations below.     Previous Nutrition Diagnosis: Increased Nutrient Needs related to physiological demands, preoperative status as evidenced by plan for bilateral craniotomies for SDH evacuation    Active [ x ]  Resolved [   ]    If resolved, new PES:     Goal: Pt to consistently meet at least 75% of estimated energy expenditure via tolerated route that is consistent with goals of care during hospital stay    Nutrition Recommendations:  1. Continue with current diet order (consistent carbohydrate diet)  **Provide additional nourishments and/or oral nutrition supplements per pt preferences and/or if pt's PO intakes are consistently <50%**  2. Encourage pt to meet nutritional needs as able  3. Monitor PO intakes, trend weights (weekly), monitor skin integrity, monitor labs (electrolytes, CMP), monitor GI function  4. Encourage adherence to diet education (reinforce as able)  5. Continue insulin regimen prn to promote euglycemia  6. Pain and bowel regimen per team  7. Will continue to assess/honor preferences as able  8. Align nutrition interventions with goals of care at all times    Education: RD reviewed pt's diet with him, promoted nutrient-dense foods, protein-rich choices postoperatively, pt appeared overall receptive, verbalized understanding.     Risk Level: High [   ] Moderate [ x ] Low [   ]

## 2025-03-25 NOTE — PROGRESS NOTE ADULT - ASSESSMENT
Mr. Marroquin is a 65yo man with PMHx HTN, HLD, IDDM, CAD w/ multiple prior cardiac stents, EDMUND on CPAP, lumbar fusion 2016, chronic pain (takes oxy 30mg 4-6 times per day) presented to ED with R-sided weakness iso fall 5 weeks ago with head strike, found to have b/l SDH L>R w/ 5mm MLS, now s/p MMA embolization 3/17 and kusum holes for SDH evacuation 3/20.    #B/l traumatic SDH s/p MMAE 3/17 and kusum holes 3/20  - Management per primary    #Symptomatic hypotension (resolved) - improved with IVF, orthostatics negative  - Continue to hold home anti-hypertensives    #Hyponatremia - now normonatremic  - C/w salt tabs 3g q12h - will plan to wean further tomorrow if still normonatremic    #Constipation - patient given enema 3/24 without effect. Abdomen distended but soft non-tender, no rebound/guarding. Pt denies n/v.  - C/w home Linzess    #CAD s/p multiple stents - most recently 04/2024  - Resume aspirin when safe from nsgy perspective - 7-days post-op  - C/w atorvastatin 40mg qhs  - C/w Toprol 50mg q12h    #IDDM - relatively well-controlled with a1c 7.7%, patient reports glargine 28U qhs + lispro 14U tid with meals and metformin  - C/w glargine 20U qhs + lispro to 6U tid with meals + SSI    #Hypophosphatemia  - Repleted, daily phos levels    #Thrombocytopenia (resolved)  - Daily CBC    #EDMUND  - C/w CPAP qhs    #HTN  - Hold lisinopril and hctz iso hypotension as above    DVT ppx - sqh per primary    Dispo: PT/OT recommending [facility]

## 2025-03-25 NOTE — PROGRESS NOTE ADULT - SUBJECTIVE AND OBJECTIVE BOX
HPI:  64M PMHx HTN, HLD, DM, CAD w/ 11 cardiac stents (on Plavix, lase dose 3/14), lumbar fusion 2016, chronic pain (takes oxy 30mg 4-6 times per day) presented to ED s/p fall 5 weeks ago with head strike no LOC. PT states he visited an ED when he fell and had a negative CTH at the time. He now c/o R sided weakness, WFD, and HA rated at 8/10. He takes his home oxy for chronic LBP prescribed by PCP, which helps with the headache. Denies recent falls, dizziness, LOC, seizure, vision changes, SOB, chest pain.  (15 Mar 2025 14:07)    OVERNIGHT EVENTS: POD 8/POD 5. VALERIANO overnight. Neuro stable.    Hospital Course:  3/15: Admit to [facility] for further mgmt.   3/16: VALERIANO overnight. Neuro stable. Preop for MMAE, premedication protocol for contrast allergy. Cards consulted, cleared for MMAE, recommend echo.  3/17: VALERIANO ovn. Neuro stable. Preop for MMAE today. Echo done. Resumed home lantus 28u qhs. POD0 s/p b/l MMAE.   3/18: POD1. VALERIANO ovn. Started lispro 10u TID. Hold lisinopril given soft pressures. CTH complete. Still therapeutic on ASA/Plavix, rpt in AM.  3/19: POD2, VALERIANO overnight, lantus decreased to 22u for NPO.   3/20: POD3, VALERIANO overnight, POD0 b/l kusum hole. Lantus 10 for .   3/21: POD 4 angio for embo, POD 1 BL kusum holes. 350, 300cc urine output x 2 hrs. DI labs sent, labs not indicative of DI. CTH done.   3/22: POD 5 angio, POD 2 b/l kusum holes. VALERIANO o/n, neuro stable. 500 cc bolus given for orthostatic HoTN with PT. Lispro premeal 4u started. 100 cc 3% bolus given for Na 132. Subdural drains dc'ed, nylon sutures placed. s/p drain removals, c/o seeing shadows when he closes his eyes, CTH with pnemocephalus/hygromas - no acute hemorrhage. Na 137.   3/23. POD6 angio, POD3 b/l burrholes. Suppository given in AM. Regional status. Lantus decreased to 20u and lispro held. Lispro later resumed at 3u TID. Bowel regimen increased.  3/24: POD7 angio, POD4 b/l burrholes. VALERIANO on. SBP 90s/50s, given 500cc NS. Had BM. Tapered salt tabs to 3 BID.   3/25: POD 8/POD 5. VALERIANO overnight. Neuro stable.    Vital Signs Last 24 Hrs  T(C): 36.9 (24 Mar 2025 20:26), Max: 36.9 (24 Mar 2025 20:26)  T(F): 98.4 (24 Mar 2025 20:26), Max: 98.4 (24 Mar 2025 20:26)  HR: 73 (24 Mar 2025 20:26) (67 - 89)  BP: 110/75 (24 Mar 2025 20:26) (90/59 - 144/73)  BP(mean): 81 (24 Mar 2025 11:02) (81 - 81)  RR: 16 (24 Mar 2025 20:26) (16 - 18)  SpO2: 94% (24 Mar 2025 20:26) (92% - 100%)    Parameters below as of 24 Mar 2025 20:26  Patient On (Oxygen Delivery Method): room air        I&O's Summary    23 Mar 2025 07:01  -  24 Mar 2025 07:00  --------------------------------------------------------  IN: 720 mL / OUT: 1050 mL / NET: -330 mL    24 Mar 2025 07:01  -  25 Mar 2025 00:13  --------------------------------------------------------  IN: 2030 mL / OUT: 1370 mL / NET: 660 mL        PHYSICAL EXAM:  General: NAD, pt is comfortably sitting up in bed, on room air  HEENT: PERRL 3mm briskly reactive, EOMI b/l, face symmetric, tongue midline, neck FROM  Cardiovascular: RRR, S1, S2  Respiratory: breathing non-labored on RA, chest rise symmetric  GI: abd soft, NTND   Neuro: A&Ox3, No aphasia, speech clear, no dysmetria, no pronator drift. Follows commands.  VELASQUEZ x4 spontaneously, 5/5 strength in all extremities throughout. Sensation intact  Extremities: distal pulses 2+ x4  Wound/incision: BL cranial incision and drain site incisions closed with non-absorbable suture all clean dry and intact      LABS:                        13.0   6.15  )-----------( 176      ( 24 Mar 2025 05:30 )             39.0     03-24    140  |  104  |  17  ----------------------------<  150[H]  3.8   |  30  |  0.97    Ca    8.5      24 Mar 2025 05:30  Phos  2.3     03-24  Mg     2.2     03-24        Urinalysis Basic - ( 24 Mar 2025 05:30 )    Color: x / Appearance: x / SG: x / pH: x  Gluc: 150 mg/dL / Ketone: x  / Bili: x / Urobili: x   Blood: x / Protein: x / Nitrite: x   Leuk Esterase: x / RBC: x / WBC x   Sq Epi: x / Non Sq Epi: x / Bacteria: x          CAPILLARY BLOOD GLUCOSE      POCT Blood Glucose.: 151 mg/dL (24 Mar 2025 22:00)  POCT Blood Glucose.: 213 mg/dL (24 Mar 2025 17:23)  POCT Blood Glucose.: 200 mg/dL (24 Mar 2025 11:44)  POCT Blood Glucose.: 133 mg/dL (24 Mar 2025 06:55)      Drug Levels: [] N/A    CSF Analysis: [] N/A      Allergies    IV Contrast (Other; Rash)  ibuprofen (Other)    Intolerances      MEDICATIONS:  Antibiotics:    Neuro:  acetaminophen     Tablet .. 650 milliGRAM(s) Oral every 6 hours PRN  busPIRone 15 milliGRAM(s) Oral two times a day  gabapentin 300 milliGRAM(s) Oral at bedtime  levETIRAcetam 500 milliGRAM(s) Oral every 12 hours  methocarbamol 500 milliGRAM(s) Oral every 8 hours PRN  oxyCODONE    IR 30 milliGRAM(s) Oral every 8 hours PRN  oxyCODONE    IR 10 milliGRAM(s) Oral every 8 hours PRN    Anticoagulation:  heparin   Injectable 5000 Unit(s) SubCutaneous every 8 hours    OTHER:  atorvastatin 40 milliGRAM(s) Oral at bedtime  doxazosin 1 milliGRAM(s) Oral at bedtime  influenza   Vaccine 0.5 milliLiter(s) IntraMuscular once  insulin glargine Injectable (LANTUS) 20 Unit(s) SubCutaneous at bedtime  insulin lispro (ADMELOG) corrective regimen sliding scale   SubCutaneous Before meals and at bedtime  insulin lispro Injectable (ADMELOG) 6 Unit(s) SubCutaneous three times a day before meals  linaclotide 290 MICROGram(s) Oral before breakfast  metoprolol succinate ER 50 milliGRAM(s) Oral every 12 hours  polyethylene glycol 3350 17 Gram(s) Oral two times a day  senna 2 Tablet(s) Oral at bedtime    IVF:  sodium chloride 3 Gram(s) Oral every 12 hours    CULTURES:    RADIOLOGY & ADDITIONAL TESTS:      ASSESSMENT:  64M PMHx HTN, HLD, DM, CAD w/ 11 cardiac stents (on Plavix, lase dose 3/14), lumbar fusion 2016, chronic pain (takes oxy 30mg 4-6 times per day) presented to ED s/p fall 5 weeks ago with head strike no LOC presented with R sided weakness, HA, +WFD. CTH showed BL SDH L>R w/ 5mm MLS. Now s/p b/l MMAE (3/17/25). Now s/p BL kusum holes for SDH evacuation (3/20/25).     Neuro:  - neuro/vitals q8h  - pain control: tylenol prn, Oxy 10mg/30mg prn, gabapentin 300mg at bedtime, robaxin 500mg q8hrs prn, pain mgmt following  - seizure ppx: Keppra 500mg BID   - Hx Depression: Buspirone 15mg BID   - Rpt CTH 3/18: stable size of collections   - postop CTH 3/21: postop changes, decreased SDH; repeat CTH 3/22 with b/l hygromas and mild pneumo     Cardio:  - SBP <140  - Hx HTN: c/w Metoprolol ER 50mg bid, hold lisinopril 2.5mg daily, hold home HCTZ  - Hx CAD w/ 11 stents: HOLD home Plavix, ASA restart POD 7  - echo 3/17: EF 68%  - cardiology following    Pulm:   - RA  - hx EDMUND: CPAP at night    GI:  - CCD  - bowel regimen, BM 3/24  - chronic constipation: c/w linzess    Renal:  - IVL  - voiding  - normonatremia goal, salt tabs 3 BID   - hx urinary retention: cont home cardura    Endo:  - ISS, A1C 7.7  - Hx DM: c/w  lantus 20u qhs, lispro 6u TID, hold home metformin    Heme:   - SCDs for DVT ppx, SQH 5000mg q8h      ID:   - afebrile     Misc:   - hold home terbinafine    Dispo: Regional, full code, PT/OT rec [facility]    Discussed w/ Dr. Enciso

## 2025-03-25 NOTE — PROGRESS NOTE ADULT - SUBJECTIVE AND OBJECTIVE BOX
Patient is a 64y old  Male who presents with a chief complaint of Traumatic BL SDH (25 Mar 2025 00:13)    INTERVAL EVENTS:  - Patient was seen and examined at bedside. Had BM yesterday. Denies dizziness this morning.     Diet, Consistent Carbohydrate/No Snacks (03-20-25 @ 21:48) [Active]      MEDICATIONS:  MEDICATIONS  (STANDING):  atorvastatin 40 milliGRAM(s) Oral at bedtime  busPIRone 15 milliGRAM(s) Oral two times a day  doxazosin 1 milliGRAM(s) Oral at bedtime  gabapentin 300 milliGRAM(s) Oral at bedtime  heparin   Injectable 5000 Unit(s) SubCutaneous every 8 hours  influenza   Vaccine 0.5 milliLiter(s) IntraMuscular once  insulin glargine Injectable (LANTUS) 20 Unit(s) SubCutaneous at bedtime  insulin lispro (ADMELOG) corrective regimen sliding scale   SubCutaneous Before meals and at bedtime  insulin lispro Injectable (ADMELOG) 6 Unit(s) SubCutaneous three times a day before meals  levETIRAcetam 500 milliGRAM(s) Oral every 12 hours  linaclotide 290 MICROGram(s) Oral before breakfast  metoprolol succinate ER 50 milliGRAM(s) Oral every 12 hours  polyethylene glycol 3350 17 Gram(s) Oral two times a day  senna 2 Tablet(s) Oral at bedtime  sodium chloride 3 Gram(s) Oral every 12 hours    MEDICATIONS  (PRN):  acetaminophen     Tablet .. 650 milliGRAM(s) Oral every 6 hours PRN Temp greater or equal to 38C (100.4F), Mild Pain (1 - 3)  methocarbamol 500 milliGRAM(s) Oral every 8 hours PRN Muscle Spasm  oxyCODONE    IR 30 milliGRAM(s) Oral every 8 hours PRN Severe Pain (7 - 10)  oxyCODONE    IR 10 milliGRAM(s) Oral every 8 hours PRN Moderate Pain (4 - 6)      Allergies    IV Contrast (Other; Rash)  ibuprofen (Other)    Intolerances        OBJECTIVE:  Vital Signs Last 24 Hrs  T(C): 36.6 (25 Mar 2025 08:30), Max: 36.9 (24 Mar 2025 20:26)  T(F): 97.9 (25 Mar 2025 08:30), Max: 98.4 (24 Mar 2025 20:26)  HR: 68 (25 Mar 2025 08:30) (65 - 89)  BP: 116/75 (25 Mar 2025 08:30) (110/75 - 144/73)  BP(mean): --  RR: 17 (25 Mar 2025 08:30) (16 - 17)  SpO2: 93% (25 Mar 2025 08:30) (93% - 96%)    Parameters below as of 25 Mar 2025 08:30  Patient On (Oxygen Delivery Method): room air      I&O's Summary    24 Mar 2025 07:01  -  25 Mar 2025 07:00  --------------------------------------------------------  IN: 2030 mL / OUT: 2070 mL / NET: -40 mL        PHYSICAL EXAM:  Gen: Reclining in bed at time of exam, appears stated age  HEENT: NCAT, MMM, clear OP  Neck: supple, trachea at midline  CV: RRR, +S1/S2  Pulm: adequate respiratory effort, no increase in work of breathing  Abd: soft, mildly distended  Skin: warm and dry,   Ext: no edema  Neuro: Alert, speaking in full sentences  Psych: affect and behavior appropriate, pleasant at time of interview    LABS:                        13.2   5.07  )-----------( 187      ( 25 Mar 2025 05:30 )             39.2     03-25    136  |  100  |  16  ----------------------------<  134[H]  3.7   |  29  |  0.99    Ca    8.6      25 Mar 2025 05:30  Phos  2.6     03-25  Mg     2.0     03-25          CAPILLARY BLOOD GLUCOSE      POCT Blood Glucose.: 134 mg/dL (25 Mar 2025 12:21)  POCT Blood Glucose.: 125 mg/dL (25 Mar 2025 06:48)  POCT Blood Glucose.: 151 mg/dL (24 Mar 2025 22:00)  POCT Blood Glucose.: 213 mg/dL (24 Mar 2025 17:23)    Urinalysis Basic - ( 25 Mar 2025 05:30 )    Color: x / Appearance: x / SG: x / pH: x  Gluc: 134 mg/dL / Ketone: x  / Bili: x / Urobili: x   Blood: x / Protein: x / Nitrite: x   Leuk Esterase: x / RBC: x / WBC x   Sq Epi: x / Non Sq Epi: x / Bacteria: x        MICRODATA:      RADIOLOGY/OTHER STUDIES:

## 2025-03-26 LAB
ANION GAP SERPL CALC-SCNC: 8 MMOL/L — SIGNIFICANT CHANGE UP (ref 5–17)
BUN SERPL-MCNC: 18 MG/DL — SIGNIFICANT CHANGE UP (ref 7–23)
CALCIUM SERPL-MCNC: 8.9 MG/DL — SIGNIFICANT CHANGE UP (ref 8.4–10.5)
CHLORIDE SERPL-SCNC: 103 MMOL/L — SIGNIFICANT CHANGE UP (ref 96–108)
CO2 SERPL-SCNC: 25 MMOL/L — SIGNIFICANT CHANGE UP (ref 22–31)
CREAT SERPL-MCNC: 0.95 MG/DL — SIGNIFICANT CHANGE UP (ref 0.5–1.3)
EGFR: 90 ML/MIN/1.73M2 — SIGNIFICANT CHANGE UP
EGFR: 90 ML/MIN/1.73M2 — SIGNIFICANT CHANGE UP
GLUCOSE SERPL-MCNC: 178 MG/DL — HIGH (ref 70–99)
HCT VFR BLD CALC: 39.7 % — SIGNIFICANT CHANGE UP (ref 39–50)
HGB BLD-MCNC: 13.1 G/DL — SIGNIFICANT CHANGE UP (ref 13–17)
MAGNESIUM SERPL-MCNC: 2.2 MG/DL — SIGNIFICANT CHANGE UP (ref 1.6–2.6)
MCHC RBC-ENTMCNC: 30.6 PG — SIGNIFICANT CHANGE UP (ref 27–34)
MCHC RBC-ENTMCNC: 33 G/DL — SIGNIFICANT CHANGE UP (ref 32–36)
MCV RBC AUTO: 92.8 FL — SIGNIFICANT CHANGE UP (ref 80–100)
NRBC BLD AUTO-RTO: 0 /100 WBCS — SIGNIFICANT CHANGE UP (ref 0–0)
PHOSPHATE SERPL-MCNC: 2.5 MG/DL — SIGNIFICANT CHANGE UP (ref 2.5–4.5)
PLATELET # BLD AUTO: 209 K/UL — SIGNIFICANT CHANGE UP (ref 150–400)
POTASSIUM SERPL-MCNC: 4.2 MMOL/L — SIGNIFICANT CHANGE UP (ref 3.5–5.3)
POTASSIUM SERPL-SCNC: 4.2 MMOL/L — SIGNIFICANT CHANGE UP (ref 3.5–5.3)
RBC # BLD: 4.28 M/UL — SIGNIFICANT CHANGE UP (ref 4.2–5.8)
RBC # FLD: 13 % — SIGNIFICANT CHANGE UP (ref 10.3–14.5)
SODIUM SERPL-SCNC: 136 MMOL/L — SIGNIFICANT CHANGE UP (ref 135–145)
WBC # BLD: 5.4 K/UL — SIGNIFICANT CHANGE UP (ref 3.8–10.5)
WBC # FLD AUTO: 5.4 K/UL — SIGNIFICANT CHANGE UP (ref 3.8–10.5)

## 2025-03-26 PROCEDURE — 99232 SBSQ HOSP IP/OBS MODERATE 35: CPT | Mod: GC

## 2025-03-26 PROCEDURE — 99232 SBSQ HOSP IP/OBS MODERATE 35: CPT

## 2025-03-26 PROCEDURE — 99024 POSTOP FOLLOW-UP VISIT: CPT

## 2025-03-26 RX ORDER — OXYCODONE HYDROCHLORIDE 30 MG/1
10 TABLET ORAL EVERY 8 HOURS
Refills: 0 | Status: DISCONTINUED | OUTPATIENT
Start: 2025-03-26 | End: 2025-03-27

## 2025-03-26 RX ORDER — SOD PHOS DI, MONO/K PHOS MONO 250 MG
1 TABLET ORAL EVERY 4 HOURS
Refills: 0 | Status: COMPLETED | OUTPATIENT
Start: 2025-03-26 | End: 2025-03-26

## 2025-03-26 RX ORDER — BISACODYL 5 MG
10 TABLET, DELAYED RELEASE (ENTERIC COATED) ORAL ONCE
Refills: 0 | Status: COMPLETED | OUTPATIENT
Start: 2025-03-26 | End: 2025-03-26

## 2025-03-26 RX ORDER — OXYCODONE HYDROCHLORIDE 30 MG/1
30 TABLET ORAL EVERY 8 HOURS
Refills: 0 | Status: DISCONTINUED | OUTPATIENT
Start: 2025-03-26 | End: 2025-03-27

## 2025-03-26 RX ADMIN — Medication 1 PACKET(S): at 12:44

## 2025-03-26 RX ADMIN — INSULIN LISPRO 2: 100 INJECTION, SOLUTION INTRAVENOUS; SUBCUTANEOUS at 07:43

## 2025-03-26 RX ADMIN — Medication 1 PACKET(S): at 17:51

## 2025-03-26 RX ADMIN — METOPROLOL SUCCINATE 50 MILLIGRAM(S): 50 TABLET, EXTENDED RELEASE ORAL at 17:51

## 2025-03-26 RX ADMIN — DOXAZOSIN MESYLATE 1 MILLIGRAM(S): 8 TABLET ORAL at 21:23

## 2025-03-26 RX ADMIN — Medication 3 GRAM(S): at 06:53

## 2025-03-26 RX ADMIN — LEVETIRACETAM 500 MILLIGRAM(S): 10 INJECTION, SOLUTION INTRAVENOUS at 06:49

## 2025-03-26 RX ADMIN — Medication 2 TABLET(S): at 21:23

## 2025-03-26 RX ADMIN — OXYCODONE HYDROCHLORIDE 30 MILLIGRAM(S): 30 TABLET ORAL at 17:31

## 2025-03-26 RX ADMIN — ATORVASTATIN CALCIUM 40 MILLIGRAM(S): 80 TABLET, FILM COATED ORAL at 21:23

## 2025-03-26 RX ADMIN — HEPARIN SODIUM 5000 UNIT(S): 1000 INJECTION INTRAVENOUS; SUBCUTANEOUS at 06:53

## 2025-03-26 RX ADMIN — HEPARIN SODIUM 5000 UNIT(S): 1000 INJECTION INTRAVENOUS; SUBCUTANEOUS at 13:43

## 2025-03-26 RX ADMIN — OXYCODONE HYDROCHLORIDE 30 MILLIGRAM(S): 30 TABLET ORAL at 16:31

## 2025-03-26 RX ADMIN — INSULIN LISPRO 2: 100 INJECTION, SOLUTION INTRAVENOUS; SUBCUTANEOUS at 22:50

## 2025-03-26 RX ADMIN — OXYCODONE HYDROCHLORIDE 30 MILLIGRAM(S): 30 TABLET ORAL at 06:48

## 2025-03-26 RX ADMIN — HEPARIN SODIUM 5000 UNIT(S): 1000 INJECTION INTRAVENOUS; SUBCUTANEOUS at 21:23

## 2025-03-26 RX ADMIN — INSULIN LISPRO 6 UNIT(S): 100 INJECTION, SOLUTION INTRAVENOUS; SUBCUTANEOUS at 17:50

## 2025-03-26 RX ADMIN — LEVETIRACETAM 500 MILLIGRAM(S): 10 INJECTION, SOLUTION INTRAVENOUS at 17:50

## 2025-03-26 RX ADMIN — POLYETHYLENE GLYCOL 3350 17 GRAM(S): 17 POWDER, FOR SOLUTION ORAL at 17:50

## 2025-03-26 RX ADMIN — LINACLOTIDE 290 MICROGRAM(S): 290 CAPSULE, GELATIN COATED ORAL at 06:54

## 2025-03-26 RX ADMIN — POLYETHYLENE GLYCOL 3350 17 GRAM(S): 17 POWDER, FOR SOLUTION ORAL at 06:53

## 2025-03-26 RX ADMIN — Medication 10 MILLIGRAM(S): at 19:14

## 2025-03-26 RX ADMIN — METOPROLOL SUCCINATE 50 MILLIGRAM(S): 50 TABLET, EXTENDED RELEASE ORAL at 06:49

## 2025-03-26 RX ADMIN — Medication 2 GRAM(S): at 17:51

## 2025-03-26 RX ADMIN — INSULIN LISPRO 2: 100 INJECTION, SOLUTION INTRAVENOUS; SUBCUTANEOUS at 11:52

## 2025-03-26 RX ADMIN — BUSPIRONE HYDROCHLORIDE 15 MILLIGRAM(S): 15 TABLET ORAL at 18:09

## 2025-03-26 RX ADMIN — INSULIN GLARGINE-YFGN 20 UNIT(S): 100 INJECTION, SOLUTION SUBCUTANEOUS at 22:51

## 2025-03-26 RX ADMIN — OXYCODONE HYDROCHLORIDE 30 MILLIGRAM(S): 30 TABLET ORAL at 07:48

## 2025-03-26 RX ADMIN — INSULIN LISPRO 2: 100 INJECTION, SOLUTION INTRAVENOUS; SUBCUTANEOUS at 17:49

## 2025-03-26 RX ADMIN — INSULIN LISPRO 6 UNIT(S): 100 INJECTION, SOLUTION INTRAVENOUS; SUBCUTANEOUS at 07:44

## 2025-03-26 RX ADMIN — INSULIN LISPRO 6 UNIT(S): 100 INJECTION, SOLUTION INTRAVENOUS; SUBCUTANEOUS at 11:52

## 2025-03-26 RX ADMIN — BUSPIRONE HYDROCHLORIDE 15 MILLIGRAM(S): 15 TABLET ORAL at 06:50

## 2025-03-26 RX ADMIN — GABAPENTIN 300 MILLIGRAM(S): 400 CAPSULE ORAL at 21:24

## 2025-03-26 NOTE — PROGRESS NOTE ADULT - SUBJECTIVE AND OBJECTIVE BOX
Patient is a 64y old  Male who presents with a chief complaint of Traumatic BL SDH (26 Mar 2025 08:35)    INTERVAL EVENTS:  - Patient was seen and examined at bedside with primary team. Patient reports having BMs. Seen wearing nasal CPAP as patient reports he was planning to take a nap.    Diet, Consistent Carbohydrate/No Snacks (03-20-25 @ 21:48) [Active]      MEDICATIONS:  MEDICATIONS  (STANDING):  atorvastatin 40 milliGRAM(s) Oral at bedtime  busPIRone 15 milliGRAM(s) Oral two times a day  doxazosin 1 milliGRAM(s) Oral at bedtime  gabapentin 300 milliGRAM(s) Oral at bedtime  heparin   Injectable 5000 Unit(s) SubCutaneous every 8 hours  influenza   Vaccine 0.5 milliLiter(s) IntraMuscular once  insulin glargine Injectable (LANTUS) 20 Unit(s) SubCutaneous at bedtime  insulin lispro (ADMELOG) corrective regimen sliding scale   SubCutaneous Before meals and at bedtime  insulin lispro Injectable (ADMELOG) 6 Unit(s) SubCutaneous three times a day before meals  levETIRAcetam 500 milliGRAM(s) Oral every 12 hours  linaclotide 290 MICROGram(s) Oral before breakfast  metoprolol succinate ER 50 milliGRAM(s) Oral every 12 hours  polyethylene glycol 3350 17 Gram(s) Oral two times a day  senna 2 Tablet(s) Oral at bedtime  sodium chloride 3 Gram(s) Oral every 12 hours    MEDICATIONS  (PRN):  acetaminophen     Tablet .. 650 milliGRAM(s) Oral every 6 hours PRN Temp greater or equal to 38C (100.4F), Mild Pain (1 - 3)  methocarbamol 500 milliGRAM(s) Oral every 8 hours PRN Muscle Spasm  oxyCODONE    IR 30 milliGRAM(s) Oral every 8 hours PRN Severe Pain (7 - 10)  oxyCODONE    IR 10 milliGRAM(s) Oral every 8 hours PRN Moderate Pain (4 - 6)      Allergies    IV Contrast (Other; Rash)  ibuprofen (Other)    Intolerances        OBJECTIVE:  Vital Signs Last 24 Hrs  T(C): 36.9 (26 Mar 2025 08:20), Max: 36.9 (25 Mar 2025 20:45)  T(F): 98.5 (26 Mar 2025 08:20), Max: 98.5 (25 Mar 2025 20:45)  HR: 66 (26 Mar 2025 08:20) (64 - 70)  BP: 115/72 (26 Mar 2025 08:20) (114/75 - 116/70)  BP(mean): --  RR: 17 (26 Mar 2025 08:20) (16 - 18)  SpO2: 95% (26 Mar 2025 08:20) (95% - 97%)    Parameters below as of 26 Mar 2025 08:20  Patient On (Oxygen Delivery Method): room air      I&O's Summary    26 Mar 2025 07:01  -  26 Mar 2025 11:36  --------------------------------------------------------  IN: 240 mL / OUT: 500 mL / NET: -260 mL        PHYSICAL EXAM:  Gen: Reclining in bed at time of exam, appears stated age  HEENT: NCAT, MMM, clear OP  Neck: supple, trachea at midline  CV: RRR, +S1/S2  Pulm: adequate respiratory effort, no increase in work of breathing  Abd: soft, mildly distended  Skin: warm and dry,   Ext: no edema  Neuro: Alert, speaking in full sentences  Psych: affect and behavior appropriate, pleasant at time of interview    LABS:                        13.1   5.40  )-----------( 209      ( 26 Mar 2025 07:35 )             39.7     03-26    136  |  103  |  18  ----------------------------<  178[H]  4.2   |  25  |  0.95    Ca    8.9      26 Mar 2025 07:35  Phos  2.5     03-26  Mg     2.2     03-26          CAPILLARY BLOOD GLUCOSE      POCT Blood Glucose.: 164 mg/dL (26 Mar 2025 06:49)  POCT Blood Glucose.: 207 mg/dL (25 Mar 2025 22:12)  POCT Blood Glucose.: 134 mg/dL (25 Mar 2025 17:12)  POCT Blood Glucose.: 134 mg/dL (25 Mar 2025 12:21)    Urinalysis Basic - ( 26 Mar 2025 07:35 )    Color: x / Appearance: x / SG: x / pH: x  Gluc: 178 mg/dL / Ketone: x  / Bili: x / Urobili: x   Blood: x / Protein: x / Nitrite: x   Leuk Esterase: x / RBC: x / WBC x   Sq Epi: x / Non Sq Epi: x / Bacteria: x        MICRODATA:      RADIOLOGY/OTHER STUDIES:

## 2025-03-26 NOTE — PROGRESS NOTE ADULT - ASSESSMENT
63 YO male with significant PMHx of HTN, HLD, CAD (s/p stens, Plavix), lumbar fusion, chronic pain who presented s/p FALL with head strike w/o LOC with R sided weakness and headaches, found to have bilateral SDH L>R with MLS, admitted for surgical management, s/p b/l MMAE and subsequent kusum hole for SDH evacuation, now with functional, gait, and ADL impairments    PLAN / RECOMMENDATIONS:     # Rehab / Mobilization:   - Initial therapy assessment: [X] PT  [X] OT - pending pot-op evaluations  - Continue skilled therapy while admitted to prevent secondary complications of immobility focus on transfer training, bed mobility, progressive ambulation, and equipment evaluation.   - OOB throughout the day with staff or OOB in chair with meals   - Weight bearing status: as tolerated  - Therapy precautions: drains in place    # Mood/ Cognition  - No significant deficits, follows commands    # Bracing/Splinting:   - None indicated at this time      # Pain Management:   - Avoid/ monitor use sedating medications that may interfere with cognitive recovery   - Current pain regimen reviewed; post-op ERAS per NSGY    # Speech/ Swallow:   - Dysphagia screen [X]  - Diet:  reg + thins    # GI/ Bowel: chronic constipation   - Continue to monitor bowel patterns.   - Bowel Regimen: Senna, Miralax, home Linzess     # / Bladder:  retention  - Bladder scan q6hrs (if no void); Straight cath for residual urine >400cc  - home cardura  - Continue to monitor bladder patterns, avoid constipation.       # Disposition optimization:  - Disposition recommendation - Acute Inpatient Rehabilitation  - Barriers to discharge: pending auth    Patient has significant functional impairments and would benefit from continued rehabilitation in the ACUTE inpatient rehab setting. The patient has both medical and functional needs appropriate for acute inpatient rehabilitation and would benefit from comprehensive interdisciplinary care, including a physiatrist, rehabilitation nursing, PT, OT, SLT and case management/social work. We anticipate that the patient would be able to tolerate 3 hours of PT/OT/SLT per day for 5 to 6 days per week to focus on mobility, transfers, gait training with assistive devices, ADL training, speech, swallow, cognition, and patient education/safety.          65 YO male with significant PMHx of HTN, HLD, CAD (s/p stens, Plavix), lumbar fusion, chronic pain who presented s/p FALL with head strike w/o LOC with R sided weakness and headaches, found to have bilateral SDH L>R with MLS, admitted for surgical management, s/p b/l MMAE and subsequent kusum hole for SDH evacuation, now with functional, gait, and ADL impairments    PLAN / RECOMMENDATIONS:     # Rehab / Mobilization:   - Initial therapy assessment: [X] PT  [X] OT - pending pot-op evaluations  - Continue skilled therapy while admitted to prevent secondary complications of immobility focus on transfer training, bed mobility, progressive ambulation, and equipment evaluation.   - OOB throughout the day with staff or OOB in chair with meals   - Weight bearing status: as tolerated  - Therapy precautions: falls    # Mood/ Cognition  - No significant deficits, follows commands    # Bracing/Splinting:   - None indicated at this time      # Pain Management:   - Avoid/ monitor use sedating medications that may interfere with cognitive recovery   - Current pain regimen reviewed; post-op ERAS per NSGY    # Speech/ Swallow:   - Dysphagia screen [X]  - Diet:  reg + thins    # GI/ Bowel: chronic constipation   - Continue to monitor bowel patterns.   - Bowel Regimen: Senna, Miralax, home Linzess     # / Bladder:  retention  - Bladder scan q6hrs (if no void); Straight cath for residual urine >400cc  - home cardura  - Continue to monitor bladder patterns, avoid constipation.       # Disposition optimization:  - Disposition recommendation - Acute Inpatient Rehabilitation  - Barriers to discharge: pending auth    Patient has significant functional impairments and would benefit from continued rehabilitation in the ACUTE inpatient rehab setting. The patient has both medical and functional needs appropriate for acute inpatient rehabilitation and would benefit from comprehensive interdisciplinary care, including a physiatrist, rehabilitation nursing, PT, OT, SLT and case management/social work. We anticipate that the patient would be able to tolerate 3 hours of PT/OT/SLT per day for 5 to 6 days per week to focus on mobility, transfers, gait training with assistive devices, ADL training, speech, swallow, cognition, and patient education/safety.

## 2025-03-26 NOTE — PROGRESS NOTE ADULT - SUBJECTIVE AND OBJECTIVE BOX
INTERVAL COURSE / SUBJECTIVE:  - pending auth for AR  Working with PT/OT without issues  repeat CTH 3/22 with b/l hygromas and mild pneumo   -----------------------------------------------------------------------  REVIEW OF SYSTEMS:  Constitutional - No fever,  No fatigue  Neurological -   Pain -   Bowel - 3/25  Bladder - retention  -----------------------------------------------------------------------  FUNCTIONAL PROGRESS:    Physical Therapy: 3/25      Cognitive/Neuro/Behavioral  Cognitive/Neuro/Behavioral [WDL Definition: Alert; opens eyes spontaneously; arouses to voice or touch; oriented x 4; follows commands; speech spontaneous, logical; purposeful motor response; behavior appropriate to situation]: WDL    Language Assistance  Preferred Language to Address Healthcare Preferred Language to Address Healthcare: English    Therapeutic Interventions      Sit-Stand Transfer Training  Transfer Training Sit-to-Stand Transfer: contact guard;  1 person assist;  verbal cues;  full weight-bearing   rolling walker  Transfer Training Stand-to-Sit Transfer: contact guard;  1 person assist;  verbal cues;  full weight-bearing   rolling walker  Sit-to-Stand Transfer Training Transfer Safety Analysis: impaired balance;  dec aerobic capacity/endurance ;  rolling walker    Gait Training  Gait Traininft x 2 with use of RW and CGA of 1, 50ft x 2 with use of SC and CGA with occasional Yash for steadying ;  full weight-bearing   50ft x 4  Gait Analysis: decreased arm swing;  decreased hayden;  increased time in double stance;  decreased hip/knee flexion;  decreased step length;  decreased weight-shifting ability;  mildly unsteady with use of straight cane, generally steady with use of RW, no overt LOB or knee buckling, sway back posture ;  impaired balance;  dec aerobic capacity/endurance ;  50ft x 4  Gait Number of Times:: x 4  Type of Rest Type of Rest: standing  Duration of Rest Duration of Rest: 1 min     Stair Training  Physical Assist/Nonphysical Assist: 1 person assist;  verbal cues;  CGA  Weight-Bearing Restrictions: full weight-bearing  Assistive Device: straight cane  Number of Stairs: 3         Occupational Therapy: 3/25    Cognitive/Neuro/Behavioral  Cognitive/Neuro/Behavioral [WDL Definition: Alert; opens eyes spontaneously; arouses to voice or touch; oriented x 4; follows commands; speech spontaneous, logical; purposeful motor response; behavior appropriate to situation]: WDL  Level of Consciousness: alert  Arousal Level: arouses to voice  Orientation: oriented x 4  Speech: clear;  spontaneous  Mood/Behavior: calm;  cooperative    Language Assistance  Preferred Language to Address Healthcare Preferred Language to Address Healthcare: English    Therapeutic Interventions      Sit-Stand Transfer Training  Transfer Training Sit-to-Stand Transfer: contact guard;  verbal cues;  weight-bearing as tolerated   rolling walker  Transfer Training Stand-to-Sit Transfer: contact guard;  verbal cues;  weight-bearing as tolerated   rolling walker  Sit-to-Stand Transfer Training Transfer Safety Analysis: decreased balance;  decreased weight-shifting ability;  decreased flexibility;  decreased strength;  impaired balance;  impaired postural control;  rolling walker    Toilet Transfer Training  Transfer Training Toilet Transfer: contact guard;  verbal cues;  weight-bearing as tolerated   rolling walker  Toilet Transfer Training Transfer Safety Analysis: decreased balance;  decreased weight-shifting ability;  decreased flexibility;  decreased strength;  impaired balance;  impaired postural control;  rolling walker    Therapeutic Exercise  Therapeutic Exercise Detail: Patient functionally ambulated in the hallway with SC and CGA, RW with supervision.     Lower Body Dressing Training  Lower Body Dressing Training Assistance: contact guard;  donning/doffing b/l socks while seated at EOB;  decreased flexibility;  decreased strength;  impaired balance;  impaired postural control    Upper Body Dressing Training  Upper Body Dressing Training Assistance: supervsion;  donning/doffing back and front gown ;  decreased flexibility;  decreased strength;  impaired balance;  impaired postural control    OT Cognitive Treatment  OT Treatment: Cognitive Charges: Patient able to follow 100% single step commands           Speech Therapy:    -----------------------------------------------------------------------  VITALS  T(C): 36.9 (03-26-25 @ 08:20), Max: 36.9 (25 @ 20:45)  HR: 66 (25 @ 08:20) (64 - 70)  BP: 115/72 (25 @ 08:20) (114/75 - 116/70)  RR: 17 (25 @ 08:20) (16 - 18)  SpO2: 95% (25 @ 08:20) (95% - 97%)  Wt(kg): --      -----------------------------------------------------------------------  RECENT LABS  CBC Full  -  ( 26 Mar 2025 07:35 )  WBC Count : 5.40 K/uL  RBC Count : 4.28 M/uL  Hemoglobin : 13.1 g/dL  Hematocrit : 39.7 %  Platelet Count - Automated : 209 K/uL  Mean Cell Volume : 92.8 fl  Mean Cell Hemoglobin : 30.6 pg  Mean Cell Hemoglobin Concentration : 33.0 g/dL  Auto Neutrophil # : x  Auto Lymphocyte # : x  Auto Monocyte # : x  Auto Eosinophil # : x  Auto Basophil # : x  Auto Neutrophil % : x  Auto Lymphocyte % : x  Auto Monocyte % : x  Auto Eosinophil % : x  Auto Basophil % : x        136  |  103  |  18  ----------------------------<  178[H]  4.2   |  25  |  0.95    Ca    8.9      26 Mar 2025 07:35  Phos  2.5       Mg     2.2           Urinalysis Basic - ( 26 Mar 2025 07:35 )    Color: x / Appearance: x / SG: x / pH: x  Gluc: 178 mg/dL / Ketone: x  / Bili: x / Urobili: x   Blood: x / Protein: x / Nitrite: x   Leuk Esterase: x / RBC: x / WBC x   Sq Epi: x / Non Sq Epi: x / Bacteria: x        -----------------------------------------------------------------------  IMAGING      -----------------------------------------------------------------------  MEDICATIONS   acetaminophen     Tablet .. 650 milliGRAM(s) every 6 hours PRN  atorvastatin 40 milliGRAM(s) at bedtime  busPIRone 15 milliGRAM(s) two times a day  doxazosin 1 milliGRAM(s) at bedtime  gabapentin 300 milliGRAM(s) at bedtime  heparin   Injectable 5000 Unit(s) every 8 hours  influenza   Vaccine 0.5 milliLiter(s) once  insulin glargine Injectable (LANTUS) 20 Unit(s) at bedtime  insulin lispro (ADMELOG) corrective regimen sliding scale   Before meals and at bedtime  insulin lispro Injectable (ADMELOG) 6 Unit(s) three times a day before meals  levETIRAcetam 500 milliGRAM(s) every 12 hours  linaclotide 290 MICROGram(s) before breakfast  methocarbamol 500 milliGRAM(s) every 8 hours PRN  metoprolol succinate ER 50 milliGRAM(s) every 12 hours  oxyCODONE    IR 30 milliGRAM(s) every 8 hours PRN  oxyCODONE    IR 10 milliGRAM(s) every 8 hours PRN  polyethylene glycol 3350 17 Gram(s) two times a day  senna 2 Tablet(s) at bedtime  sodium chloride 3 Gram(s) every 12 hours               INTERVAL COURSE / SUBJECTIVE:  - pending auth for AR  Working with PT/OT without issues  repeat CTH 3/22 with b/l hygromas and mild pneumo     Seen at bedside this AM. Overall doing ok. States his wife works during the day and is concerned about being home alone and prefers to go to rehab.  -----------------------------------------------------------------------  REVIEW OF SYSTEMS:  Constitutional - No fever,  No fatigue  Neurological - stable  Pain - denies  Bowel - 3/25  Bladder - retention  -----------------------------------------------------------------------  FUNCTIONAL PROGRESS:    Physical Therapy: 3/25      Cognitive/Neuro/Behavioral  Cognitive/Neuro/Behavioral [WDL Definition: Alert; opens eyes spontaneously; arouses to voice or touch; oriented x 4; follows commands; speech spontaneous, logical; purposeful motor response; behavior appropriate to situation]: WDL    Language Assistance  Preferred Language to Address Healthcare Preferred Language to Address Healthcare: English    Therapeutic Interventions      Sit-Stand Transfer Training  Transfer Training Sit-to-Stand Transfer: contact guard;  1 person assist;  verbal cues;  full weight-bearing   rolling walker  Transfer Training Stand-to-Sit Transfer: contact guard;  1 person assist;  verbal cues;  full weight-bearing   rolling walker  Sit-to-Stand Transfer Training Transfer Safety Analysis: impaired balance;  dec aerobic capacity/endurance ;  rolling walker    Gait Training  Gait Traininft x 2 with use of RW and CGA of 1, 50ft x 2 with use of SC and CGA with occasional Yash for steadying ;  full weight-bearing   50ft x 4  Gait Analysis: decreased arm swing;  decreased hayden;  increased time in double stance;  decreased hip/knee flexion;  decreased step length;  decreased weight-shifting ability;  mildly unsteady with use of straight cane, generally steady with use of RW, no overt LOB or knee buckling, sway back posture ;  impaired balance;  dec aerobic capacity/endurance ;  50ft x 4  Gait Number of Times:: x 4  Type of Rest Type of Rest: standing  Duration of Rest Duration of Rest: 1 min     Stair Training  Physical Assist/Nonphysical Assist: 1 person assist;  verbal cues;  CGA  Weight-Bearing Restrictions: full weight-bearing  Assistive Device: straight cane  Number of Stairs: 3         Occupational Therapy: 3/25    Cognitive/Neuro/Behavioral  Cognitive/Neuro/Behavioral [WDL Definition: Alert; opens eyes spontaneously; arouses to voice or touch; oriented x 4; follows commands; speech spontaneous, logical; purposeful motor response; behavior appropriate to situation]: WDL  Level of Consciousness: alert  Arousal Level: arouses to voice  Orientation: oriented x 4  Speech: clear;  spontaneous  Mood/Behavior: calm;  cooperative    Language Assistance  Preferred Language to Address Healthcare Preferred Language to Address Healthcare: English    Therapeutic Interventions      Sit-Stand Transfer Training  Transfer Training Sit-to-Stand Transfer: contact guard;  verbal cues;  weight-bearing as tolerated   rolling walker  Transfer Training Stand-to-Sit Transfer: contact guard;  verbal cues;  weight-bearing as tolerated   rolling walker  Sit-to-Stand Transfer Training Transfer Safety Analysis: decreased balance;  decreased weight-shifting ability;  decreased flexibility;  decreased strength;  impaired balance;  impaired postural control;  rolling walker    Toilet Transfer Training  Transfer Training Toilet Transfer: contact guard;  verbal cues;  weight-bearing as tolerated   rolling walker  Toilet Transfer Training Transfer Safety Analysis: decreased balance;  decreased weight-shifting ability;  decreased flexibility;  decreased strength;  impaired balance;  impaired postural control;  rolling walker    Therapeutic Exercise  Therapeutic Exercise Detail: Patient functionally ambulated in the hallway with SC and CGA, RW with supervision.     Lower Body Dressing Training  Lower Body Dressing Training Assistance: contact guard;  donning/doffing b/l socks while seated at EOB;  decreased flexibility;  decreased strength;  impaired balance;  impaired postural control    Upper Body Dressing Training  Upper Body Dressing Training Assistance: supervsion;  donning/doffing back and front gown ;  decreased flexibility;  decreased strength;  impaired balance;  impaired postural control    OT Cognitive Treatment  OT Treatment: Cognitive Charges: Patient able to follow 100% single step commands           Speech Therapy:    -----------------------------------------------------------------------  VITALS  T(C): 36.9 (25 @ 08:20), Max: 36.9 (25 @ 20:45)  HR: 66 (25 @ 08:20) (64 - 70)  BP: 115/72 (25 @ 08:20) (114/75 - 116/70)  RR: 17 (25 @ 08:20) (16 - 18)  SpO2: 95% (25 @ 08:20) (95% - 97%)  Wt(kg): --    PHYSICAL EXAM  General - NAD, Comfortable, attila wrap in place, +SD drains  Chest - Breathing comfortably, No Respiratory distress  Cardiovascular - Regular pulse  Abdomen - No abdominal distension  Extremities - No deformities of upper or lower limbs.  MSK - ROM within functional limits  Neurologic Exam -                    Cognitive - Awake, Alert, AAO to self, place, date, year, situation; follows commands     Communication - Fluent, No dysarthria, repetition intact, attention/concentration intact     Cranial Nerves -  EOMI, No facial asymmetry     Motor - RUE drift                    LEFT    UE - ShAB 5/5, EF 5/5, EE 5/5, WE 5/5,  5/5                    LEFT    LE - HF 5/5, KE 5/5, DF 5/5, PF 5/5                    RIGHT UE - ShAB 5/5, EF 5/5, EE 5/5, WE 5/5,  5/5                    RIGHT LE - HF 5/5, KE 5/5, DF 5/5, PF 5/5        Tone - normal     Sensory - grossly intact to LT     Reflexes - no clonus  Psychiatric - Mood stable, Affect WNL       -----------------------------------------------------------------------  RECENT LABS  CBC Full  -  ( 26 Mar 2025 07:35 )  WBC Count : 5.40 K/uL  RBC Count : 4.28 M/uL  Hemoglobin : 13.1 g/dL  Hematocrit : 39.7 %  Platelet Count - Automated : 209 K/uL  Mean Cell Volume : 92.8 fl  Mean Cell Hemoglobin : 30.6 pg  Mean Cell Hemoglobin Concentration : 33.0 g/dL  Auto Neutrophil # : x  Auto Lymphocyte # : x  Auto Monocyte # : x  Auto Eosinophil # : x  Auto Basophil # : x  Auto Neutrophil % : x  Auto Lymphocyte % : x  Auto Monocyte % : x  Auto Eosinophil % : x  Auto Basophil % : x        136  |  103  |  18  ----------------------------<  178[H]  4.2   |  25  |  0.95    Ca    8.9      26 Mar 2025 07:35  Phos  2.5       Mg     2.2           Urinalysis Basic - ( 26 Mar 2025 07:35 )    Color: x / Appearance: x / SG: x / pH: x  Gluc: 178 mg/dL / Ketone: x  / Bili: x / Urobili: x   Blood: x / Protein: x / Nitrite: x   Leuk Esterase: x / RBC: x / WBC x   Sq Epi: x / Non Sq Epi: x / Bacteria: x        -----------------------------------------------------------------------  IMAGING      -----------------------------------------------------------------------  MEDICATIONS   acetaminophen     Tablet .. 650 milliGRAM(s) every 6 hours PRN  atorvastatin 40 milliGRAM(s) at bedtime  busPIRone 15 milliGRAM(s) two times a day  doxazosin 1 milliGRAM(s) at bedtime  gabapentin 300 milliGRAM(s) at bedtime  heparin   Injectable 5000 Unit(s) every 8 hours  influenza   Vaccine 0.5 milliLiter(s) once  insulin glargine Injectable (LANTUS) 20 Unit(s) at bedtime  insulin lispro (ADMELOG) corrective regimen sliding scale   Before meals and at bedtime  insulin lispro Injectable (ADMELOG) 6 Unit(s) three times a day before meals  levETIRAcetam 500 milliGRAM(s) every 12 hours  linaclotide 290 MICROGram(s) before breakfast  methocarbamol 500 milliGRAM(s) every 8 hours PRN  metoprolol succinate ER 50 milliGRAM(s) every 12 hours  oxyCODONE    IR 30 milliGRAM(s) every 8 hours PRN  oxyCODONE    IR 10 milliGRAM(s) every 8 hours PRN  polyethylene glycol 3350 17 Gram(s) two times a day  senna 2 Tablet(s) at bedtime  sodium chloride 3 Gram(s) every 12 hours

## 2025-03-26 NOTE — PROGRESS NOTE ADULT - SUBJECTIVE AND OBJECTIVE BOX
HPI:  64M PMHx HTN, HLD, DM, CAD w/ 11 cardiac stents (on Plavix, lase dose 3/14), lumbar fusion 2016, chronic pain (takes oxy 30mg 4-6 times per day) presented to ED s/p fall 5 weeks ago with head strike no LOC. PT states he visited an ED when he fell and had a negative CTH at the time. He now c/o R sided weakness, WFD, and HA rated at 8/10. He takes his home oxy for chronic LBP prescribed by PCP, which helps with the headache. Denies recent falls, dizziness, LOC, seizure, vision changes, SOB, chest pain.  (15 Mar 2025 14:07)    OVERNIGHT EVENTS: VALERIANO overnight, neuro stable.    Hospital Course:  3/15: Admit to [facility] for further mgmt.   3/16: VALERIANO overnight. Neuro stable. Preop for MMAE, premedication protocol for contrast allergy. Cards consulted, cleared for MMAE, recommend echo.  3/17: VALERIANO ovn. Neuro stable. Preop for MMAE today. Echo done. Resumed home lantus 28u qhs. POD0 s/p b/l MMAE.   3/18: POD1. VALERIANO ovn. Started lispro 10u TID. Hold lisinopril given soft pressures. CTH complete. Still therapeutic on ASA/Plavix, rpt in AM.  3/19: POD2, VALERIANO overnight, lantus decreased to 22u for NPO.   3/20: POD3, VALERIANO overnight, POD0 b/l kusum hole. Lantus 10 for .   3/21: POD 4 angio for embo, POD 1 BL kusum holes. 350, 300cc urine output x 2 hrs. DI labs sent, labs not indicative of DI. CTH done.   3/22: POD 5 angio, POD 2 b/l kusum holes. VALERIANO o/n, neuro stable. 500 cc bolus given for orthostatic HoTN with PT. Lispro premeal 4u started. 100 cc 3% bolus given for Na 132. Subdural drains dc'ed, nylon sutures placed. s/p drain removals, c/o seeing shadows when he closes his eyes, CTH with pnemocephalus/hygromas - no acute hemorrhage. Na 137.   3/23. POD6 angio, POD3 b/l bur[place]oles. Suppository given in AM. Regional status. Lantus decreased to 20u and lispro held. Lispro later resumed at 3u TID. Bowel regimen increased.  3/24: POD7 angio, POD4 b/l burrholes. VALERIANO on. SBP 90s/50s, given 500cc NS. Had BM. Tapered salt tabs to 3 BID.   3/25: POD 8/POD 5. VALERIANO overnight. Neuro stable.  3/26: POD 9/POD 6. VALERIANO overnight. Neuro stable.     Vital Signs Last 24 Hrs  T(C): 36.9 (25 Mar 2025 20:45), Max: 36.9 (25 Mar 2025 20:45)  T(F): 98.5 (25 Mar 2025 20:45), Max: 98.5 (25 Mar 2025 20:45)  HR: 64 (25 Mar 2025 20:45) (64 - 70)  BP: 115/70 (25 Mar 2025 20:45) (115/70 - 129/80)  BP(mean): --  RR: 17 (25 Mar 2025 20:45) (16 - 17)  SpO2: 95% (25 Mar 2025 20:45) (93% - 97%)    Parameters below as of 25 Mar 2025 20:45  Patient On (Oxygen Delivery Method): room air        I&O's Summary    24 Mar 2025 07:01  -  25 Mar 2025 07:00  --------------------------------------------------------  IN: 2030 mL / OUT: 2070 mL / NET: -40 mL          PHYSICAL EXAM:  Constitutional: awake, alert, sitting up in bed. NAD.   Respiratory: non-labored breathing. Normal chest rise.   Cardiovascular: Regular rate and rhythm  Gastrointestinal:  Soft, nontender, nondistended.  .  Vascular: Extremities warm, no ulcers, no discoloration of skin.   Neurological: Gen: AA&O x 3, conversant, appropriate.      CN II-XII grossly intact.    Motor: VELASQUEZ x 4, 5/5 throughout UE/LE.    Sens: Sensation intact to light touch throughout.    Extremities: distal pulses 2+ x 4 DPPT symmetric throughout.     No pronator drift  Wound/incision: b/l cranial incisions c/d/i opened to air. R oliva incision c/d/i with steri strips in place, no hematoma     TUBES/LINES:  [] [place]  [] Lumbar Drain  [] Wound Drains  [] Others      DIET:  [] NPO  [x] Mechanical  [] Tube feeds    LABS:                        13.2   5.07  )-----------( 187      ( 25 Mar 2025 05:30 )             39.2     03-25    136  |  100  |  16  ----------------------------<  134[H]  3.7   |  29  |  0.99    Ca    8.6      25 Mar 2025 05:30  Phos  2.6     03-25  Mg     2.0     03-25        Urinalysis Basic - ( 25 Mar 2025 05:30 )    Color: x / Appearance: x / SG: x / pH: x  Gluc: 134 mg/dL / Ketone: x  / Bili: x / Urobili: x   Blood: x / Protein: x / Nitrite: x   Leuk Esterase: x / RBC: x / WBC x   Sq Epi: x / Non Sq Epi: x / Bacteria: x          CAPILLARY BLOOD GLUCOSE      POCT Blood Glucose.: 207 mg/dL (25 Mar 2025 22:12)  POCT Blood Glucose.: 134 mg/dL (25 Mar 2025 17:12)  POCT Blood Glucose.: 134 mg/dL (25 Mar 2025 12:21)  POCT Blood Glucose.: 125 mg/dL (25 Mar 2025 06:48)      Drug Levels: [] N/A    CSF Analysis: [] N/A      Allergies    IV Contrast (Other; Rash)  ibuprofen (Other)    Intolerances      MEDICATIONS:  Antibiotics:    Neuro:  acetaminophen     Tablet .. 650 milliGRAM(s) Oral every 6 hours PRN  busPIRone 15 milliGRAM(s) Oral two times a day  gabapentin 300 milliGRAM(s) Oral at bedtime  levETIRAcetam 500 milliGRAM(s) Oral every 12 hours  methocarbamol 500 milliGRAM(s) Oral every 8 hours PRN  oxyCODONE    IR 30 milliGRAM(s) Oral every 8 hours PRN  oxyCODONE    IR 10 milliGRAM(s) Oral every 8 hours PRN    Anticoagulation:  heparin   Injectable 5000 Unit(s) SubCutaneous every 8 hours    OTHER:  atorvastatin 40 milliGRAM(s) Oral at bedtime  doxazosin 1 milliGRAM(s) Oral at bedtime  influenza   Vaccine 0.5 milliLiter(s) IntraMuscular once  insulin glargine Injectable (LANTUS) 20 Unit(s) SubCutaneous at bedtime  insulin lispro (ADMELOG) corrective regimen sliding scale   SubCutaneous Before meals and at bedtime  insulin lispro Injectable (ADMELOG) 6 Unit(s) SubCutaneous three times a day before meals  linaclotide 290 MICROGram(s) Oral before breakfast  metoprolol succinate ER 50 milliGRAM(s) Oral every 12 hours  polyethylene glycol 3350 17 Gram(s) Oral two times a day  senna 2 Tablet(s) Oral at bedtime    IVF:  sodium chloride 3 Gram(s) Oral every 12 hours    CULTURES:    RADIOLOGY & ADDITIONAL TESTS:      ASSESSMENT:  64M PMHx HTN, HLD, DM, CAD w/ 11 cardiac stents (on Plavix, lase dose 3/14), lumbar fusion 2016, chronic pain (takes oxy 30mg 4-6 times per day) presented to ED s/p fall 5 weeks ago with head strike no LOC presented with R sided weakness, HA, +WFD. CTH showed BL SDH L>R w/ 5mm MLS. Now s/p b/l MMAE (3/17/25). Now s/p BL kusum holes for SDH evacuation (3/20/25).       SUBDURAL HEMORRHAGE    No pertinent family history    FHx: myocardial infarction    FH: coronary artery disease    FH: type 2 diabetes    Handoff    MEWS Score    Sleep apnea    Hypertension    Diabetes mellitus    High cholesterol    Obesity (BMI 30.0-34.9)    CAD (coronary artery disease)    Heart burn    Hiatal hernia    Fatty liver    BPH (benign prostatic hyperplasia)    Constipation    CAD (coronary artery disease)    H/O low back pain    SDH (subdural hematoma)    SDH (subdural hematoma)    Kusum hole with evacuation of hematoma    Headache    Subdural hemorrhage    Traumatic subdural hemorrhage    Intracranial subdural hemorrhage    Angiogram, cerebral, with embolization    [place] hole with evacuation of hematoma    S/P angioplasty with stent    Finger injury    S/P foot surgery    H/O spinal fusion    MED EVAL    37    HTN (hypertension)    HLD (hyperlipidemia)    DM (diabetes mellitus)    CAD (coronary artery disease)    Chronic back pain    History of lumbar surgery    History of depression    EDMUND on CPAP    Chronic constipation    Urinary retention    [email or web address]        PLAN:    Neuro:  - neuro/vitals q8h  - pain control: tylenol prn, Oxy 10mg/30mg prn, gabapentin 300mg at bedtime, robaxin 500mg q8hrs prn, pain mgmt following  - seizure ppx: Keppra 500mg BID   - Hx Depression: Buspirone 15mg BID   - Rpt CTH 3/18: stable size of collections   - postop CTH 3/21: postop changes, decreased SDH; repeat CTH 3/22 with b/l hygromas and mild pneumo     Cardio:  - SBP <140  - Hx HTN: c/w Metoprolol ER 50mg bid, hold lisinopril 2.5mg daily, hold home HCTZ  - Hx CAD w/ 11 stents: HOLD home Plavix, ASA restart POD 7  - echo 3/17: EF 68%  - cardiology following    Pulm:   - RA  - hx EDMUND: CPAP at night    GI:  - CCD  - bowel regimen, BM 3/24  - chronic constipation: c/w linzess    Renal:  - IVL  - voiding  - normonatremia goal, salt tabs 3 BID   - hx urinary retention: cont home cardura    Endo:  - ISS, A1C 7.7  - Hx DM: c/w  lantus 20u qhs, lispro 6u TID, hold home metformin    Heme:   - SCDs for DVT ppx, SQH 5000mg q8h      ID:   - afebrile     Misc:   - hold home terbinafine    Dispo: [facility], full code, PT/OT rec AR (pending auth)     Discussed w/ Dr. Enciso

## 2025-03-26 NOTE — PROGRESS NOTE ADULT - ASSESSMENT
Mr. Marroquin is a 65yo man with PMHx HTN, HLD, IDDM, CAD w/ multiple prior cardiac stents, EDMUND on CPAP, lumbar fusion 2016, chronic pain (takes oxy 30mg 4-6 times per day) presented to ED with R-sided weakness iso fall 5 weeks ago with head strike, found to have b/l SDH L>R w/ 5mm MLS, now s/p MMA embolization 3/17 and kusum holes for SDH evacuation 3/20.    #B/l traumatic SDH s/p MMAE 3/17 and kusum holes 3/20  - Management per primary    #Symptomatic hypotension (resolved) - improved with IVF, orthostatics negative  - Continue to hold home anti-hypertensives    #Hyponatremia - now normonatremic  - On salt tabs 3g q12h - recommend weaning to 2g q12h    #Constipation - patient given enema 3/24 without effect. Abdomen distended but soft non-tender, no rebound/guarding. Pt denies n/v.  - C/w home Linzess    #CAD s/p multiple stents - most recently 04/2024  - Resume aspirin when safe from nsgy perspective - 7-days post-op (3/27)  - C/w atorvastatin 40mg qhs  - C/w Toprol 50mg q12h    #IDDM - relatively well-controlled with a1c 7.7%, patient reports glargine 28U qhs + lispro 14U tid with meals and metformin  - C/w glargine 20U qhs + lispro to 6U tid with meals + SSI    #Hypophosphatemia  - Repleted, daily phos levels    #Thrombocytopenia (resolved)  - Daily CBC    #EDMUND  - C/w CPAP qhs    #HTN  - Hold lisinopril and hctz iso normotension    DVT ppx - sqh per primary    Dispo: PT/OT recommending AR

## 2025-03-26 NOTE — PROGRESS NOTE ADULT - NSPROGADDITIONALINFOA_GEN_ALL_CORE
35 minutes spent on total encounter. The necessity of the time spent during the encounter on this date of service was due to: Obtaining separately reported history including review of hospital course, relevant imaging, therapy notes, and consultant notes, performing medically appropriate examination, counseling and educating patient/ family/caregivers on rehabilitation course, care coordination, communication with primary team, documenting clinical information
I have personally reviewed the above clinical note and all of its components and:  [ ] agree with the above assessment and plan without modifications.  [x] agree with the above with modifications made to the assessment and/or plan of care.  [ ] disagree with the above with significant modifications as listed below:

## 2025-03-27 LAB
ANION GAP SERPL CALC-SCNC: 7 MMOL/L — SIGNIFICANT CHANGE UP (ref 5–17)
BUN SERPL-MCNC: 17 MG/DL — SIGNIFICANT CHANGE UP (ref 7–23)
CALCIUM SERPL-MCNC: 9.1 MG/DL — SIGNIFICANT CHANGE UP (ref 8.4–10.5)
CHLORIDE SERPL-SCNC: 100 MMOL/L — SIGNIFICANT CHANGE UP (ref 96–108)
CO2 SERPL-SCNC: 28 MMOL/L — SIGNIFICANT CHANGE UP (ref 22–31)
CREAT SERPL-MCNC: 0.97 MG/DL — SIGNIFICANT CHANGE UP (ref 0.5–1.3)
EGFR: 88 ML/MIN/1.73M2 — SIGNIFICANT CHANGE UP
EGFR: 88 ML/MIN/1.73M2 — SIGNIFICANT CHANGE UP
GLUCOSE SERPL-MCNC: 188 MG/DL — HIGH (ref 70–99)
HCT VFR BLD CALC: 39.5 % — SIGNIFICANT CHANGE UP (ref 39–50)
HGB BLD-MCNC: 13.3 G/DL — SIGNIFICANT CHANGE UP (ref 13–17)
MAGNESIUM SERPL-MCNC: 2 MG/DL — SIGNIFICANT CHANGE UP (ref 1.6–2.6)
MCHC RBC-ENTMCNC: 31.3 PG — SIGNIFICANT CHANGE UP (ref 27–34)
MCHC RBC-ENTMCNC: 33.7 G/DL — SIGNIFICANT CHANGE UP (ref 32–36)
MCV RBC AUTO: 92.9 FL — SIGNIFICANT CHANGE UP (ref 80–100)
NRBC BLD AUTO-RTO: 0 /100 WBCS — SIGNIFICANT CHANGE UP (ref 0–0)
PHOSPHATE SERPL-MCNC: 2.9 MG/DL — SIGNIFICANT CHANGE UP (ref 2.5–4.5)
PLATELET # BLD AUTO: 223 K/UL — SIGNIFICANT CHANGE UP (ref 150–400)
POTASSIUM SERPL-MCNC: 3.9 MMOL/L — SIGNIFICANT CHANGE UP (ref 3.5–5.3)
POTASSIUM SERPL-SCNC: 3.9 MMOL/L — SIGNIFICANT CHANGE UP (ref 3.5–5.3)
RBC # BLD: 4.25 M/UL — SIGNIFICANT CHANGE UP (ref 4.2–5.8)
RBC # FLD: 13 % — SIGNIFICANT CHANGE UP (ref 10.3–14.5)
SODIUM SERPL-SCNC: 135 MMOL/L — SIGNIFICANT CHANGE UP (ref 135–145)
WBC # BLD: 5.99 K/UL — SIGNIFICANT CHANGE UP (ref 3.8–10.5)
WBC # FLD AUTO: 5.99 K/UL — SIGNIFICANT CHANGE UP (ref 3.8–10.5)

## 2025-03-27 PROCEDURE — 99024 POSTOP FOLLOW-UP VISIT: CPT

## 2025-03-27 PROCEDURE — 99232 SBSQ HOSP IP/OBS MODERATE 35: CPT

## 2025-03-27 RX ORDER — BISACODYL 5 MG
10 TABLET, DELAYED RELEASE (ENTERIC COATED) ORAL ONCE
Refills: 0 | Status: COMPLETED | OUTPATIENT
Start: 2025-03-27 | End: 2025-03-27

## 2025-03-27 RX ORDER — ASPIRIN 325 MG
81 TABLET ORAL DAILY
Refills: 0 | Status: DISCONTINUED | OUTPATIENT
Start: 2025-03-27 | End: 2025-03-28

## 2025-03-27 RX ORDER — OXYCODONE HYDROCHLORIDE 30 MG/1
30 TABLET ORAL EVERY 12 HOURS
Refills: 0 | Status: DISCONTINUED | OUTPATIENT
Start: 2025-03-28 | End: 2025-03-28

## 2025-03-27 RX ORDER — OXYCODONE HYDROCHLORIDE 30 MG/1
10 TABLET ORAL ONCE
Refills: 0 | Status: DISCONTINUED | OUTPATIENT
Start: 2025-03-27 | End: 2025-03-27

## 2025-03-27 RX ORDER — OXYCODONE HYDROCHLORIDE 30 MG/1
10 TABLET ORAL EVERY 8 HOURS
Refills: 0 | Status: DISCONTINUED | OUTPATIENT
Start: 2025-03-28 | End: 2025-03-28

## 2025-03-27 RX ADMIN — OXYCODONE HYDROCHLORIDE 30 MILLIGRAM(S): 30 TABLET ORAL at 05:38

## 2025-03-27 RX ADMIN — INSULIN LISPRO 2: 100 INJECTION, SOLUTION INTRAVENOUS; SUBCUTANEOUS at 21:45

## 2025-03-27 RX ADMIN — INSULIN LISPRO 6 UNIT(S): 100 INJECTION, SOLUTION INTRAVENOUS; SUBCUTANEOUS at 07:45

## 2025-03-27 RX ADMIN — INSULIN GLARGINE-YFGN 20 UNIT(S): 100 INJECTION, SOLUTION SUBCUTANEOUS at 21:45

## 2025-03-27 RX ADMIN — INSULIN LISPRO 6 UNIT(S): 100 INJECTION, SOLUTION INTRAVENOUS; SUBCUTANEOUS at 12:21

## 2025-03-27 RX ADMIN — BUSPIRONE HYDROCHLORIDE 15 MILLIGRAM(S): 15 TABLET ORAL at 05:40

## 2025-03-27 RX ADMIN — POLYETHYLENE GLYCOL 3350 17 GRAM(S): 17 POWDER, FOR SOLUTION ORAL at 05:41

## 2025-03-27 RX ADMIN — Medication 2 GRAM(S): at 17:33

## 2025-03-27 RX ADMIN — LEVETIRACETAM 500 MILLIGRAM(S): 10 INJECTION, SOLUTION INTRAVENOUS at 17:35

## 2025-03-27 RX ADMIN — METOPROLOL SUCCINATE 50 MILLIGRAM(S): 50 TABLET, EXTENDED RELEASE ORAL at 17:36

## 2025-03-27 RX ADMIN — DOXAZOSIN MESYLATE 1 MILLIGRAM(S): 8 TABLET ORAL at 21:45

## 2025-03-27 RX ADMIN — BUSPIRONE HYDROCHLORIDE 15 MILLIGRAM(S): 15 TABLET ORAL at 17:33

## 2025-03-27 RX ADMIN — HEPARIN SODIUM 5000 UNIT(S): 1000 INJECTION INTRAVENOUS; SUBCUTANEOUS at 13:27

## 2025-03-27 RX ADMIN — Medication 10 MILLIGRAM(S): at 12:20

## 2025-03-27 RX ADMIN — OXYCODONE HYDROCHLORIDE 10 MILLIGRAM(S): 30 TABLET ORAL at 17:37

## 2025-03-27 RX ADMIN — INSULIN LISPRO 2: 100 INJECTION, SOLUTION INTRAVENOUS; SUBCUTANEOUS at 07:30

## 2025-03-27 RX ADMIN — INSULIN LISPRO 6 UNIT(S): 100 INJECTION, SOLUTION INTRAVENOUS; SUBCUTANEOUS at 17:34

## 2025-03-27 RX ADMIN — Medication 81 MILLIGRAM(S): at 12:21

## 2025-03-27 RX ADMIN — LINACLOTIDE 290 MICROGRAM(S): 290 CAPSULE, GELATIN COATED ORAL at 07:29

## 2025-03-27 RX ADMIN — GABAPENTIN 300 MILLIGRAM(S): 400 CAPSULE ORAL at 21:46

## 2025-03-27 RX ADMIN — LEVETIRACETAM 500 MILLIGRAM(S): 10 INJECTION, SOLUTION INTRAVENOUS at 05:40

## 2025-03-27 RX ADMIN — INSULIN LISPRO 6: 100 INJECTION, SOLUTION INTRAVENOUS; SUBCUTANEOUS at 17:34

## 2025-03-27 RX ADMIN — OXYCODONE HYDROCHLORIDE 30 MILLIGRAM(S): 30 TABLET ORAL at 06:38

## 2025-03-27 RX ADMIN — HEPARIN SODIUM 5000 UNIT(S): 1000 INJECTION INTRAVENOUS; SUBCUTANEOUS at 21:46

## 2025-03-27 RX ADMIN — Medication 2 GRAM(S): at 05:40

## 2025-03-27 RX ADMIN — HEPARIN SODIUM 5000 UNIT(S): 1000 INJECTION INTRAVENOUS; SUBCUTANEOUS at 05:41

## 2025-03-27 RX ADMIN — OXYCODONE HYDROCHLORIDE 10 MILLIGRAM(S): 30 TABLET ORAL at 23:01

## 2025-03-27 RX ADMIN — Medication 2 TABLET(S): at 21:45

## 2025-03-27 RX ADMIN — OXYCODONE HYDROCHLORIDE 10 MILLIGRAM(S): 30 TABLET ORAL at 18:37

## 2025-03-27 RX ADMIN — ATORVASTATIN CALCIUM 40 MILLIGRAM(S): 80 TABLET, FILM COATED ORAL at 21:46

## 2025-03-27 NOTE — PROGRESS NOTE ADULT - SUBJECTIVE AND OBJECTIVE BOX
Patient is a 64y old  Male who presents with a chief complaint of Traumatic BL SDH (27 Mar 2025 03:25)    INTERVAL EVENTS:  - Patient was seen and examined sitting up in chair. Reports had a BM this morning. Aspirin resumed this morning. No new complaints.    Diet, Consistent Carbohydrate/No Snacks (03-20-25 @ 21:48) [Active]      MEDICATIONS:  MEDICATIONS  (STANDING):  aspirin enteric coated 81 milliGRAM(s) Oral daily  atorvastatin 40 milliGRAM(s) Oral at bedtime  busPIRone 15 milliGRAM(s) Oral two times a day  doxazosin 1 milliGRAM(s) Oral at bedtime  gabapentin 300 milliGRAM(s) Oral at bedtime  heparin   Injectable 5000 Unit(s) SubCutaneous every 8 hours  influenza   Vaccine 0.5 milliLiter(s) IntraMuscular once  insulin glargine Injectable (LANTUS) 20 Unit(s) SubCutaneous at bedtime  insulin lispro (ADMELOG) corrective regimen sliding scale   SubCutaneous Before meals and at bedtime  insulin lispro Injectable (ADMELOG) 6 Unit(s) SubCutaneous three times a day before meals  levETIRAcetam 500 milliGRAM(s) Oral every 12 hours  linaclotide 290 MICROGram(s) Oral before breakfast  metoprolol succinate ER 50 milliGRAM(s) Oral every 12 hours  polyethylene glycol 3350 17 Gram(s) Oral two times a day  senna 2 Tablet(s) Oral at bedtime  sodium chloride 2 Gram(s) Oral every 12 hours    MEDICATIONS  (PRN):  acetaminophen     Tablet .. 650 milliGRAM(s) Oral every 6 hours PRN Temp greater or equal to 38C (100.4F), Mild Pain (1 - 3)  methocarbamol 500 milliGRAM(s) Oral every 8 hours PRN Muscle Spasm  oxyCODONE    IR 30 milliGRAM(s) Oral every 8 hours PRN Severe Pain (7 - 10)  oxyCODONE    IR 10 milliGRAM(s) Oral every 8 hours PRN Moderate Pain (4 - 6)      Allergies    IV Contrast (Other; Rash)  ibuprofen (Other)    Intolerances        OBJECTIVE:  Vital Signs Last 24 Hrs  T(C): 36.6 (27 Mar 2025 09:23), Max: 36.8 (27 Mar 2025 05:03)  T(F): 97.9 (27 Mar 2025 09:23), Max: 98.3 (27 Mar 2025 05:03)  HR: 71 (27 Mar 2025 09:23) (70 - 80)  BP: 100/64 (27 Mar 2025 09:23) (100/64 - 149/86)  BP(mean): --  RR: 18 (27 Mar 2025 09:23) (16 - 18)  SpO2: 95% (27 Mar 2025 09:23) (94% - 96%)    Parameters below as of 27 Mar 2025 09:23  Patient On (Oxygen Delivery Method): room air      I&O's Summary    26 Mar 2025 07:01  -  27 Mar 2025 07:00  --------------------------------------------------------  IN: 240 mL / OUT: 500 mL / NET: -260 mL    27 Mar 2025 07:01  -  27 Mar 2025 13:25  --------------------------------------------------------  IN: 480 mL / OUT: 760 mL / NET: -280 mL        PHYSICAL EXAM:  Gen: Reclining in bed at time of exam, appears stated age  HEENT: NCAT, MMM, clear OP  Neck: supple, trachea at midline  CV: RRR, +S1/S2  Pulm: adequate respiratory effort, no increase in work of breathing  Abd: soft, ND  Skin: warm and dry,   Ext: no edema  Neuro: Alert, speaking in full sentences  Psych: affect and behavior appropriate, pleasant at time of interview    LABS:                        13.3   5.99  )-----------( 223      ( 27 Mar 2025 05:30 )             39.5     03-27    135  |  100  |  17  ----------------------------<  188[H]  3.9   |  28  |  0.97    Ca    9.1      27 Mar 2025 05:30  Phos  2.9     03-27  Mg     2.0     03-27          CAPILLARY BLOOD GLUCOSE      POCT Blood Glucose.: 139 mg/dL (27 Mar 2025 11:45)  POCT Blood Glucose.: 164 mg/dL (27 Mar 2025 07:44)  POCT Blood Glucose.: 154 mg/dL (27 Mar 2025 06:31)  POCT Blood Glucose.: 170 mg/dL (26 Mar 2025 21:53)  POCT Blood Glucose.: 199 mg/dL (26 Mar 2025 17:25)    Urinalysis Basic - ( 27 Mar 2025 05:30 )    Color: x / Appearance: x / SG: x / pH: x  Gluc: 188 mg/dL / Ketone: x  / Bili: x / Urobili: x   Blood: x / Protein: x / Nitrite: x   Leuk Esterase: x / RBC: x / WBC x   Sq Epi: x / Non Sq Epi: x / Bacteria: x        MICRODATA:      RADIOLOGY/OTHER STUDIES:

## 2025-03-27 NOTE — PROGRESS NOTE ADULT - SUBJECTIVE AND OBJECTIVE BOX
HPI:  64M PMHx HTN, HLD, DM, CAD w/ 11 cardiac stents (on Plavix, lase dose 3/14), lumbar fusion 2016, chronic pain (takes oxy 30mg 4-6 times per day) presented to ED s/p fall 5 weeks ago with head strike no LOC. PT states he visited an ED when he fell and had a negative CTH at the time. He now c/o R sided weakness, WFD, and HA rated at 8/10. He takes his home oxy for chronic LBP prescribed by PCP, which helps with the headache. Denies recent falls, dizziness, LOC, seizure, vision changes, SOB, chest pain.  (15 Mar 2025 14:07)    HOSPITAL COURSE:   3/15: Admit to [facility] for further mgmt.   3/16: VALERIANO overnight. Neuro stable. Preop for MMAE, premedication protocol for contrast allergy. Cards consulted, cleared for MMAE, recommend echo.  3/17: VALERIANO ovn. Neuro stable. Preop for MMAE today. Echo done. Resumed home lantus 28u qhs. POD0 s/p b/l MMAE.   3/18: POD1. VALERIANO ovn. Started lispro 10u TID. Hold lisinopril given soft pressures. CTH complete. Still therapeutic on ASA/Plavix, rpt in AM.  3/19: POD2, VALERIANO overnight, lantus decreased to 22u for NPO.   3/20: POD3, VALERIANO overnight, POD0 b/l kusum hole. Lantus 10 for .   3/21: POD 4 angio for embo, POD 1 BL kusum holes. 350, 300cc urine output x 2 hrs. DI labs sent, labs not indicative of DI. CTH done.   3/22: POD 5 angio, POD 2 b/l kusum holes. VALERIANO o/n, neuro stable. 500 cc bolus given for orthostatic HoTN with PT. Lispro premeal 4u started. 100 cc 3% bolus given for Na 132. Subdural drains dc'ed, nylon sutures placed. s/p drain removals, c/o seeing shadows when he closes his eyes, CTH with pnemocephalus/hygromas - no acute hemorrhage. Na 137.   3/23. POD6 angio, POD3 b/l burrholes. Suppository given in AM. Regional status. Lantus decreased to 20u and lispro held. Lispro later resumed at 3u TID. Bowel regimen increased.  3/24: POD7 angio, POD4 b/l burrholes. VALERIANO on. SBP 90s/50s, given 500cc NS. Had BM. Tapered salt tabs to 3 BID.   3/25: POD 8/POD 5. VALERIANO overnight. Neuro stable.  3/26: POD 9/POD 6. VALERIANO overnight. Neuro stable. Na tabs weaned to 2 BID.   3/27: POD 10/POD 7. VALERIANO overnight, neuro stable, denies pain, pend auth for AR (has acceptance). Resume ASA today?     OVERNIGHT EVENTS:  Vital Signs Last 24 Hrs  T(C): 36.7 (26 Mar 2025 21:00), Max: 36.9 (26 Mar 2025 08:20)  T(F): 98.1 (26 Mar 2025 21:00), Max: 98.5 (26 Mar 2025 08:20)  HR: 70 (27 Mar 2025 00:40) (66 - 80)  BP: 125/72 (26 Mar 2025 21:00) (114/75 - 149/86)  BP(mean): --  RR: 16 (27 Mar 2025 00:40) (16 - 17)  SpO2: 94% (27 Mar 2025 00:40) (94% - 97%)    Parameters below as of 27 Mar 2025 00:40  Patient On (Oxygen Delivery Method): HOMECPAP        I&O's Summary    26 Mar 2025 07:01  -  27 Mar 2025 03:25  --------------------------------------------------------  IN: 240 mL / OUT: 500 mL / NET: -260 mL        PHYSICAL EXAM:  General: NAD, pt is comfortably sitting up in bed, A&O x3, on RA  HEENT: CN II-XII grossly intact, PERRL 3mm, EOMI b/l, face symmetric, tongue midline, neck FROM  Cardiovascular: RRR, normal S1 and S2   Respiratory: lungs CTAB, no wheezing, rhonchi, or crackles   GI: normoactive BS to auscultation, abd soft, NTND   Neuro: no aphasia, speech clear, no dysmetria, no pronator drift  strength 5/5 throughout all 4 extremities  sensation intact to light touch throughout   Extremities: distal pulses 2+ x4  Wound/incision: b/l kusum holes with sutures C/D/I      TUBES/LINES:  [] [place]  [] Lumbar Drain  [] Wound Drains  [] Others    DIET:  [] NPO  [X] Mechanical  [] Tube feeds    LABS:                        13.1   5.40  )-----------( 209      ( 26 Mar 2025 07:35 )             39.7     03-26    136  |  103  |  18  ----------------------------<  178[H]  4.2   |  25  |  0.95    Ca    8.9      26 Mar 2025 07:35  Phos  2.5     03-26  Mg     2.2     03-26        Urinalysis Basic - ( 26 Mar 2025 07:35 )    Color: x / Appearance: x / SG: x / pH: x  Gluc: 178 mg/dL / Ketone: x  / Bili: x / Urobili: x   Blood: x / Protein: x / Nitrite: x   Leuk Esterase: x / RBC: x / WBC x   Sq Epi: x / Non Sq Epi: x / Bacteria: x          CAPILLARY BLOOD GLUCOSE      POCT Blood Glucose.: 170 mg/dL (26 Mar 2025 21:53)  POCT Blood Glucose.: 199 mg/dL (26 Mar 2025 17:25)  POCT Blood Glucose.: 172 mg/dL (26 Mar 2025 11:34)  POCT Blood Glucose.: 164 mg/dL (26 Mar 2025 06:49)      Drug Levels: [] N/A    CSF Analysis: [] N/A      Allergies    IV Contrast (Other; Rash)  ibuprofen (Other)    Intolerances      MEDICATIONS:  Antibiotics:    Neuro:  acetaminophen     Tablet .. 650 milliGRAM(s) Oral every 6 hours PRN  busPIRone 15 milliGRAM(s) Oral two times a day  gabapentin 300 milliGRAM(s) Oral at bedtime  levETIRAcetam 500 milliGRAM(s) Oral every 12 hours  methocarbamol 500 milliGRAM(s) Oral every 8 hours PRN  oxyCODONE    IR 30 milliGRAM(s) Oral every 8 hours PRN  oxyCODONE    IR 10 milliGRAM(s) Oral every 8 hours PRN    Anticoagulation:  heparin   Injectable 5000 Unit(s) SubCutaneous every 8 hours    OTHER:  atorvastatin 40 milliGRAM(s) Oral at bedtime  doxazosin 1 milliGRAM(s) Oral at bedtime  influenza   Vaccine 0.5 milliLiter(s) IntraMuscular once  insulin glargine Injectable (LANTUS) 20 Unit(s) SubCutaneous at bedtime  insulin lispro (ADMELOG) corrective regimen sliding scale   SubCutaneous Before meals and at bedtime  insulin lispro Injectable (ADMELOG) 6 Unit(s) SubCutaneous three times a day before meals  linaclotide 290 MICROGram(s) Oral before breakfast  metoprolol succinate ER 50 milliGRAM(s) Oral every 12 hours  polyethylene glycol 3350 17 Gram(s) Oral two times a day  senna 2 Tablet(s) Oral at bedtime    IVF:  sodium chloride 2 Gram(s) Oral every 12 hours    CULTURES:    RADIOLOGY & ADDITIONAL TESTS:      ASSESSMENT:  64M PMHx HTN, HLD, DM, CAD w/ 11 cardiac stents (on Plavix, lase dose 3/14), lumbar fusion 2016, chronic pain (takes oxy 30mg 4-6 times per day) presented to ED s/p fall 5 weeks ago with head strike no LOC presented with R sided weakness, HA, +WFD. CTH showed BL SDH L>R w/ 5mm MLS. Now s/p b/l MMAE (3/17/25). Now s/p BL kusum holes for SDH evacuation (3/20/25).       SUBDURAL HEMORRHAGE    No pertinent family history    FHx: myocardial infarction    FH: coronary artery disease    FH: type 2 diabetes    Handoff    MEWS Score    Sleep apnea    Hypertension    Diabetes mellitus    High cholesterol    Obesity (BMI 30.0-34.9)    CAD (coronary artery disease)    Heart burn    Hiatal hernia    Fatty liver    BPH (benign prostatic hyperplasia)    Constipation    CAD (coronary artery disease)    H/O low back pain    SDH (subdural hematoma)    SDH (subdural hematoma)    [place] hole with evacuation of hematoma    Headache    Subdural hemorrhage    Traumatic subdural hemorrhage    Intracranial subdural hemorrhage    Angiogram, cerebral, with embolization    [place] hole with evacuation of hematoma    S/P angioplasty with stent    Finger injury    S/P foot surgery    H/O spinal fusion    MED EVAL    37    HTN (hypertension)    HLD (hyperlipidemia)    DM (diabetes mellitus)    CAD (coronary artery disease)    Chronic back pain    History of lumbar surgery    History of depression    EDMUND on CPAP    Chronic constipation    Urinary retention    SysAdmin_[email or web address]        PLAN:  Neuro:  - neuro/vitals q8h  - pain control: tylenol prn, Oxy 10mg/30mg prn, gabapentin 300mg at bedtime, robaxin 500mg q8hrs prn, pain mgmt following  - seizure ppx: Keppra 500mg BID   - Hx Depression: Buspirone 15mg BID   - Rpt CTH 3/18: stable size of collections   - postop CTH 3/21: postop changes, decreased SDH; repeat CTH 3/22 with b/l hygromas and mild pneumo     Cardio:  - SBP <140  - Hx HTN: c/w Metoprolol ER 50mg bid, hold lisinopril 2.5mg daily, hold home HCTZ  - Hx CAD w/ 11 stents: HOLD home Plavix, ASA restart POD 7  - echo 3/17: EF 68%  - cardiology following    Pulm:   - RA  - hx EDMUND: CPAP at night    GI:  - CCD  - bowel regimen, BM 3/25  - chronic constipation: c/w linzess    Renal:  - IVL  - voiding  - normonatremia goal, salt tabs 2 BID, goal to wean to off   - hx urinary retention: cont home cardura    Endo:  - ISS, A1C 7.7  - Hx DM: c/w  lantus 20u qhs, lispro 6u TID, hold home metformin    Heme:   - SCDs for DVT ppx, SQH 5000mg q8h      ID:   - afebrile     Misc:   - hold home terbinafine    Dispo: Regional, full code, PT/OT rec AR (pending auth, has acceptance)     Discussed w/ Dr. Moore

## 2025-03-27 NOTE — PROGRESS NOTE ADULT - ASSESSMENT
Mr. Marroquin is a 63yo man with PMHx HTN, HLD, IDDM, CAD w/ multiple prior cardiac stents, EDMUND on CPAP, lumbar fusion 2016, chronic pain (takes oxy 30mg 4-6 times per day) presented to ED with R-sided weakness iso fall 5 weeks ago with head strike, found to have b/l SDH L>R w/ 5mm MLS, now s/p MMA embolization 3/17 and kusum holes for SDH evacuation 3/20.    #B/l traumatic SDH s/p MMAE 3/17 and kusum holes 3/20  - Management per primary    #Symptomatic hypotension (resolved) - improved with IVF, orthostatics negative  - Continue to hold home anti-hypertensives    #Hyponatremia - now normonatremic  - On salt tabs 2g q12h - will continue to wean pending Na    #Constipation - patient given enema 3/24 without effect. Abdomen distended but soft non-tender, no rebound/guarding. Pt denies n/v.  - C/w home Linzess    #CAD s/p multiple stents - most recently 04/2024  - C/w aspirin - resumed 3/27  - C/w atorvastatin 40mg qhs  - C/w Toprol 50mg q12h    #IDDM - relatively well-controlled with a1c 7.7%, patient reports glargine 28U qhs + lispro 14U tid with meals and metformin  - C/w glargine 20U qhs + lispro to 6U tid with meals + SSI    #Hypophosphatemia  - Repleted, daily phos levels    #Thrombocytopenia (resolved)  - Daily CBC    #EDMUND  - C/w CPAP qhs    #HTN  - Hold lisinopril and hctz iso normotension    DVT ppx - sqh per primary    Dispo: PT/OT recommending AR

## 2025-03-28 ENCOUNTER — TRANSCRIPTION ENCOUNTER (OUTPATIENT)
Age: 65
End: 2025-03-28

## 2025-03-28 VITALS
DIASTOLIC BLOOD PRESSURE: 67 MMHG | RESPIRATION RATE: 18 BRPM | SYSTOLIC BLOOD PRESSURE: 97 MMHG | HEART RATE: 89 BPM | OXYGEN SATURATION: 96 % | TEMPERATURE: 98 F

## 2025-03-28 DIAGNOSIS — S06.5XAA TRAUMATIC SUBDURAL HEMORRHAGE WITH LOSS OF CONSCIOUSNESS STATUS UNKNOWN, INITIAL ENCOUNTER: ICD-10-CM

## 2025-03-28 LAB
ANION GAP SERPL CALC-SCNC: 9 MMOL/L — SIGNIFICANT CHANGE UP (ref 5–17)
BUN SERPL-MCNC: 12 MG/DL — SIGNIFICANT CHANGE UP (ref 7–23)
CALCIUM SERPL-MCNC: 9.4 MG/DL — SIGNIFICANT CHANGE UP (ref 8.4–10.5)
CHLORIDE SERPL-SCNC: 104 MMOL/L — SIGNIFICANT CHANGE UP (ref 96–108)
CO2 SERPL-SCNC: 26 MMOL/L — SIGNIFICANT CHANGE UP (ref 22–31)
CREAT SERPL-MCNC: 0.98 MG/DL — SIGNIFICANT CHANGE UP (ref 0.5–1.3)
EGFR: 87 ML/MIN/1.73M2 — SIGNIFICANT CHANGE UP
EGFR: 87 ML/MIN/1.73M2 — SIGNIFICANT CHANGE UP
GLUCOSE SERPL-MCNC: 193 MG/DL — HIGH (ref 70–99)
HCT VFR BLD CALC: 39.6 % — SIGNIFICANT CHANGE UP (ref 39–50)
HGB BLD-MCNC: 13.2 G/DL — SIGNIFICANT CHANGE UP (ref 13–17)
MAGNESIUM SERPL-MCNC: 2 MG/DL — SIGNIFICANT CHANGE UP (ref 1.6–2.6)
MCHC RBC-ENTMCNC: 30.9 PG — SIGNIFICANT CHANGE UP (ref 27–34)
MCHC RBC-ENTMCNC: 33.3 G/DL — SIGNIFICANT CHANGE UP (ref 32–36)
MCV RBC AUTO: 92.7 FL — SIGNIFICANT CHANGE UP (ref 80–100)
NRBC BLD AUTO-RTO: 0 /100 WBCS — SIGNIFICANT CHANGE UP (ref 0–0)
PHOSPHATE SERPL-MCNC: 2.3 MG/DL — LOW (ref 2.5–4.5)
PLATELET # BLD AUTO: 220 K/UL — SIGNIFICANT CHANGE UP (ref 150–400)
POTASSIUM SERPL-MCNC: 3.9 MMOL/L — SIGNIFICANT CHANGE UP (ref 3.5–5.3)
POTASSIUM SERPL-SCNC: 3.9 MMOL/L — SIGNIFICANT CHANGE UP (ref 3.5–5.3)
RBC # BLD: 4.27 M/UL — SIGNIFICANT CHANGE UP (ref 4.2–5.8)
RBC # FLD: 13 % — SIGNIFICANT CHANGE UP (ref 10.3–14.5)
SODIUM SERPL-SCNC: 139 MMOL/L — SIGNIFICANT CHANGE UP (ref 135–145)
WBC # BLD: 5.64 K/UL — SIGNIFICANT CHANGE UP (ref 3.8–10.5)
WBC # FLD AUTO: 5.64 K/UL — SIGNIFICANT CHANGE UP (ref 3.8–10.5)

## 2025-03-28 PROCEDURE — C9399: CPT

## 2025-03-28 PROCEDURE — 97535 SELF CARE MNGMENT TRAINING: CPT

## 2025-03-28 PROCEDURE — 86900 BLOOD TYPING SEROLOGIC ABO: CPT

## 2025-03-28 PROCEDURE — 84300 ASSAY OF URINE SODIUM: CPT

## 2025-03-28 PROCEDURE — 84100 ASSAY OF PHOSPHORUS: CPT

## 2025-03-28 PROCEDURE — 70450 CT HEAD/BRAIN W/O DYE: CPT | Mod: MC

## 2025-03-28 PROCEDURE — C1894: CPT

## 2025-03-28 PROCEDURE — 99024 POSTOP FOLLOW-UP VISIT: CPT

## 2025-03-28 PROCEDURE — 84295 ASSAY OF SERUM SODIUM: CPT

## 2025-03-28 PROCEDURE — C1889: CPT

## 2025-03-28 PROCEDURE — 83930 ASSAY OF BLOOD OSMOLALITY: CPT

## 2025-03-28 PROCEDURE — 82805 BLOOD GASES W/O2 SATURATION: CPT

## 2025-03-28 PROCEDURE — 97161 PT EVAL LOW COMPLEX 20 MIN: CPT

## 2025-03-28 PROCEDURE — 81003 URINALYSIS AUTO W/O SCOPE: CPT

## 2025-03-28 PROCEDURE — 97110 THERAPEUTIC EXERCISES: CPT

## 2025-03-28 PROCEDURE — 94660 CPAP INITIATION&MGMT: CPT

## 2025-03-28 PROCEDURE — 82330 ASSAY OF CALCIUM: CPT

## 2025-03-28 PROCEDURE — 80048 BASIC METABOLIC PNL TOTAL CA: CPT

## 2025-03-28 PROCEDURE — 72125 CT NECK SPINE W/O DYE: CPT | Mod: MC

## 2025-03-28 PROCEDURE — 83036 HEMOGLOBIN GLYCOSYLATED A1C: CPT

## 2025-03-28 PROCEDURE — C1887: CPT

## 2025-03-28 PROCEDURE — C1769: CPT

## 2025-03-28 PROCEDURE — 96374 THER/PROPH/DIAG INJ IV PUSH: CPT

## 2025-03-28 PROCEDURE — 82962 GLUCOSE BLOOD TEST: CPT

## 2025-03-28 PROCEDURE — 93306 TTE W/DOPPLER COMPLETE: CPT

## 2025-03-28 PROCEDURE — 86901 BLOOD TYPING SEROLOGIC RH(D): CPT

## 2025-03-28 PROCEDURE — 97116 GAIT TRAINING THERAPY: CPT

## 2025-03-28 PROCEDURE — 97530 THERAPEUTIC ACTIVITIES: CPT

## 2025-03-28 PROCEDURE — 36415 COLL VENOUS BLD VENIPUNCTURE: CPT

## 2025-03-28 PROCEDURE — 83935 ASSAY OF URINE OSMOLALITY: CPT

## 2025-03-28 PROCEDURE — 85730 THROMBOPLASTIN TIME PARTIAL: CPT

## 2025-03-28 PROCEDURE — 82947 ASSAY GLUCOSE BLOOD QUANT: CPT

## 2025-03-28 PROCEDURE — 85025 COMPLETE CBC W/AUTO DIFF WBC: CPT

## 2025-03-28 PROCEDURE — 84132 ASSAY OF SERUM POTASSIUM: CPT

## 2025-03-28 PROCEDURE — 83735 ASSAY OF MAGNESIUM: CPT

## 2025-03-28 PROCEDURE — 84484 ASSAY OF TROPONIN QUANT: CPT

## 2025-03-28 PROCEDURE — 86850 RBC ANTIBODY SCREEN: CPT

## 2025-03-28 PROCEDURE — C1760: CPT

## 2025-03-28 PROCEDURE — 97164 PT RE-EVAL EST PLAN CARE: CPT

## 2025-03-28 PROCEDURE — 85576 BLOOD PLATELET AGGREGATION: CPT

## 2025-03-28 PROCEDURE — 97165 OT EVAL LOW COMPLEX 30 MIN: CPT

## 2025-03-28 PROCEDURE — 99291 CRITICAL CARE FIRST HOUR: CPT | Mod: 25

## 2025-03-28 PROCEDURE — 71045 X-RAY EXAM CHEST 1 VIEW: CPT

## 2025-03-28 PROCEDURE — 99232 SBSQ HOSP IP/OBS MODERATE 35: CPT

## 2025-03-28 PROCEDURE — 85610 PROTHROMBIN TIME: CPT

## 2025-03-28 PROCEDURE — 85027 COMPLETE CBC AUTOMATED: CPT

## 2025-03-28 PROCEDURE — 96375 TX/PRO/DX INJ NEW DRUG ADDON: CPT

## 2025-03-28 RX ORDER — METOPROLOL SUCCINATE 50 MG/1
1 TABLET, EXTENDED RELEASE ORAL
Refills: 0 | DISCHARGE

## 2025-03-28 RX ORDER — DOXAZOSIN MESYLATE 8 MG/1
1 TABLET ORAL
Qty: 0 | Refills: 0 | DISCHARGE
Start: 2025-03-28

## 2025-03-28 RX ORDER — ASPIRIN 325 MG
1 TABLET ORAL
Qty: 0 | Refills: 0 | DISCHARGE
Start: 2025-03-28

## 2025-03-28 RX ORDER — ATORVASTATIN CALCIUM 80 MG/1
1 TABLET, FILM COATED ORAL
Qty: 0 | Refills: 0 | DISCHARGE
Start: 2025-03-28

## 2025-03-28 RX ORDER — SOD PHOS DI, MONO/K PHOS MONO 250 MG
2 TABLET ORAL ONCE
Refills: 0 | Status: COMPLETED | OUTPATIENT
Start: 2025-03-28 | End: 2025-03-28

## 2025-03-28 RX ORDER — SENNA 187 MG
2 TABLET ORAL
Qty: 0 | Refills: 0 | DISCHARGE
Start: 2025-03-28

## 2025-03-28 RX ORDER — INSULIN GLARGINE-YFGN 100 [IU]/ML
20 INJECTION, SOLUTION SUBCUTANEOUS
Qty: 0 | Refills: 0 | DISCHARGE
Start: 2025-03-28

## 2025-03-28 RX ORDER — CELECOXIB 50 MG/1
1 CAPSULE ORAL
Refills: 0 | DISCHARGE

## 2025-03-28 RX ORDER — HEPARIN SODIUM 1000 [USP'U]/ML
5000 INJECTION INTRAVENOUS; SUBCUTANEOUS
Qty: 0 | Refills: 0 | DISCHARGE
Start: 2025-03-28

## 2025-03-28 RX ORDER — POLYETHYLENE GLYCOL 3350 17 G/17G
17 POWDER, FOR SOLUTION ORAL
Qty: 0 | Refills: 0 | DISCHARGE
Start: 2025-03-28

## 2025-03-28 RX ORDER — INSULIN GLARGINE-YFGN 100 [IU]/ML
28 INJECTION, SOLUTION SUBCUTANEOUS
Refills: 0 | DISCHARGE

## 2025-03-28 RX ORDER — ACETAMINOPHEN 500 MG/5ML
2 LIQUID (ML) ORAL
Qty: 0 | Refills: 0 | DISCHARGE
Start: 2025-03-28

## 2025-03-28 RX ORDER — CLOPIDOGREL BISULFATE 75 MG/1
1 TABLET, FILM COATED ORAL
Refills: 0 | DISCHARGE

## 2025-03-28 RX ORDER — TERBINAFINE HYDROCHLORIDE 250 MG/1
1 TABLET ORAL
Refills: 0 | DISCHARGE

## 2025-03-28 RX ORDER — METHOCARBAMOL 500 MG/1
1 TABLET, FILM COATED ORAL
Qty: 0 | Refills: 0 | DISCHARGE
Start: 2025-03-28

## 2025-03-28 RX ORDER — LINACLOTIDE 290 UG/1
1 CAPSULE, GELATIN COATED ORAL
Refills: 0 | DISCHARGE

## 2025-03-28 RX ORDER — GABAPENTIN 400 MG/1
1 CAPSULE ORAL
Qty: 0 | Refills: 0 | DISCHARGE
Start: 2025-03-28

## 2025-03-28 RX ORDER — DOXAZOSIN MESYLATE 8 MG/1
1 TABLET ORAL
Refills: 0 | DISCHARGE

## 2025-03-28 RX ORDER — LISINOPRIL 5 MG/1
1 TABLET ORAL
Refills: 0 | DISCHARGE

## 2025-03-28 RX ORDER — DICLOFENAC SODIUM 75 MG/1
1 TABLET, DELAYED RELEASE ORAL
Refills: 0 | DISCHARGE

## 2025-03-28 RX ORDER — LINACLOTIDE 290 UG/1
1 CAPSULE, GELATIN COATED ORAL
Qty: 0 | Refills: 0 | DISCHARGE
Start: 2025-03-28

## 2025-03-28 RX ORDER — ASPIRIN 325 MG
0 TABLET ORAL
Refills: 0 | DISCHARGE

## 2025-03-28 RX ORDER — OXYCODONE HYDROCHLORIDE 30 MG/1
1 TABLET ORAL
Qty: 0 | Refills: 0 | DISCHARGE
Start: 2025-03-28

## 2025-03-28 RX ORDER — INSULIN LISPRO 100 U/ML
14 INJECTION, SOLUTION INTRAVENOUS; SUBCUTANEOUS
Refills: 0 | DISCHARGE

## 2025-03-28 RX ORDER — HYDROCHLOROTHIAZIDE 50 MG/1
1 TABLET ORAL
Refills: 0 | DISCHARGE

## 2025-03-28 RX ORDER — OXYCODONE HYDROCHLORIDE 30 MG/1
1 TABLET ORAL
Refills: 0 | DISCHARGE

## 2025-03-28 RX ORDER — TIZANIDINE 4 MG/1
2 TABLET ORAL
Refills: 0 | DISCHARGE

## 2025-03-28 RX ORDER — METOPROLOL SUCCINATE 50 MG/1
1 TABLET, EXTENDED RELEASE ORAL
Qty: 0 | Refills: 0 | DISCHARGE
Start: 2025-03-28

## 2025-03-28 RX ORDER — INSULIN LISPRO 100 U/ML
6 INJECTION, SOLUTION INTRAVENOUS; SUBCUTANEOUS
Qty: 0 | Refills: 0 | DISCHARGE
Start: 2025-03-28

## 2025-03-28 RX ADMIN — OXYCODONE HYDROCHLORIDE 10 MILLIGRAM(S): 30 TABLET ORAL at 00:01

## 2025-03-28 RX ADMIN — INSULIN LISPRO 6 UNIT(S): 100 INJECTION, SOLUTION INTRAVENOUS; SUBCUTANEOUS at 07:38

## 2025-03-28 RX ADMIN — LINACLOTIDE 290 MICROGRAM(S): 290 CAPSULE, GELATIN COATED ORAL at 06:07

## 2025-03-28 RX ADMIN — Medication 2 GRAM(S): at 06:06

## 2025-03-28 RX ADMIN — INSULIN LISPRO 2: 100 INJECTION, SOLUTION INTRAVENOUS; SUBCUTANEOUS at 12:14

## 2025-03-28 RX ADMIN — HEPARIN SODIUM 5000 UNIT(S): 1000 INJECTION INTRAVENOUS; SUBCUTANEOUS at 06:07

## 2025-03-28 RX ADMIN — Medication 2 PACKET(S): at 10:27

## 2025-03-28 RX ADMIN — INSULIN LISPRO 2: 100 INJECTION, SOLUTION INTRAVENOUS; SUBCUTANEOUS at 07:37

## 2025-03-28 RX ADMIN — Medication 650 MILLIGRAM(S): at 11:29

## 2025-03-28 RX ADMIN — OXYCODONE HYDROCHLORIDE 10 MILLIGRAM(S): 30 TABLET ORAL at 07:29

## 2025-03-28 RX ADMIN — Medication 81 MILLIGRAM(S): at 10:28

## 2025-03-28 RX ADMIN — Medication 650 MILLIGRAM(S): at 10:29

## 2025-03-28 RX ADMIN — BUSPIRONE HYDROCHLORIDE 15 MILLIGRAM(S): 15 TABLET ORAL at 06:06

## 2025-03-28 RX ADMIN — OXYCODONE HYDROCHLORIDE 10 MILLIGRAM(S): 30 TABLET ORAL at 06:29

## 2025-03-28 RX ADMIN — INSULIN LISPRO 6 UNIT(S): 100 INJECTION, SOLUTION INTRAVENOUS; SUBCUTANEOUS at 12:15

## 2025-03-28 RX ADMIN — METHOCARBAMOL 500 MILLIGRAM(S): 500 TABLET, FILM COATED ORAL at 10:29

## 2025-03-28 RX ADMIN — POLYETHYLENE GLYCOL 3350 17 GRAM(S): 17 POWDER, FOR SOLUTION ORAL at 06:06

## 2025-03-28 NOTE — PROGRESS NOTE ADULT - PROVIDER SPECIALTY LIST ADULT
Hospitalist
Internal Medicine
NSICU
NSICU
Neurosurgery
Cardiology
NSICU
NSICU
Neurosurgery
Neurosurgery
Internal Medicine
Internal Medicine
Neurosurgery
Physiatry
Cardiology
Neurosurgery
Internal Medicine
NSICU

## 2025-03-28 NOTE — DISCHARGE NOTE NURSING/CASE MANAGEMENT/SOCIAL WORK - NSDCPEFALRISK_GEN_ALL_CORE
For information on Fall & Injury Prevention, visit: [email or web address] OR  [email or web address] OR  https://www.cdc.gov/steadi/patient.html

## 2025-03-28 NOTE — DISCHARGE NOTE NURSING/CASE MANAGEMENT/SOCIAL WORK - NSDCFUADDAPPT_GEN_ALL_CORE_FT
Phone call with PATTIE Mendez 4/14 at 10AM (she will call you)    Please follow up with Dr. Alexi Fall, call the office to make/confirm appointment at [phone]    Please follow up with Dr. Church, call the office to make/confirm appointment at [phone]    Please follow up with your primary care doctor and cardiologist

## 2025-03-28 NOTE — PROGRESS NOTE ADULT - REASON FOR ADMISSION
Traumatic BL SDH

## 2025-03-28 NOTE — PROGRESS NOTE ADULT - TIME BILLING
Review of hospital course, labs, vitals, medical records.   Bedside exam and interview   Discussed plan of care with primary team ACP and housestaff   Documenting the encounter  Excludes teaching time and/or separately reported services
Bedside exam and interview   Reviewed vitals, labs   Discussed patient's plan of care with housestaff   Documentation of encounter  Excludes teaching and separately reported services
Review of hospital course, labs, vitals, medical records.   Bedside exam and interview   Discussed plan of care with primary team ACP and housestaff   Documenting the encounter  Excludes teaching time and/or separately reported services
As above
Bedside exam and interview   Reviewed vitals, labs   Discussed patient's plan of care with housestaff   Documentation of encounter  Excludes teaching and separately reported services
Review of hospital course, labs, vitals, medical records.   Bedside exam and interview   Discussed plan of care with primary team ACP and housestaff   Documenting the encounter  Excludes teaching time and/or separately reported services
Review of hospital course, labs, vitals, medical records.   Bedside exam and interview   Discussed plan of care with primary team ACP and housestaff   Documenting the encounter  Excludes teaching time and/or separately reported services
As above

## 2025-03-28 NOTE — DISCHARGE NOTE NURSING/CASE MANAGEMENT/SOCIAL WORK - PATIENT PORTAL LINK FT
You can access the FollowMyHealth Patient Portal offered by [facility] by registering at the following website: [email or web address]. By joining [employer]’s FollowMyHealth portal, you will also be able to view your health information using other applications (apps) compatible with our system.

## 2025-03-28 NOTE — PROGRESS NOTE ADULT - SUBJECTIVE AND OBJECTIVE BOX
HPI:  64M PMHx HTN, HLD, DM, CAD w/ 11 cardiac stents (on Plavix, lase dose 3/14), lumbar fusion 2016, chronic pain (takes oxy 30mg 4-6 times per day) presented to ED s/p fall 5 weeks ago with head strike no LOC. PT states he visited an ED when he fell and had a negative CTH at the time. He now c/o R sided weakness, WFD, and HA rated at 8/10. He takes his home oxy for chronic LBP prescribed by PCP, which helps with the headache. Denies recent falls, dizziness, LOC, seizure, vision changes, SOB, chest pain.  (15 Mar 2025 14:07)    HOSPITAL COURSE:   3/15: Admit to [facility] for further mgmt.   3/16: VALERIANO overnight. Neuro stable. Preop for MMAE, premedication protocol for contrast allergy. Cards consulted, cleared for MMAE, recommend echo.  3/17: VALERIANO ovn. Neuro stable. Preop for MMAE today. Echo done. Resumed home lantus 28u qhs. POD0 s/p b/l MMAE.   3/18: POD1. VALERIANO ovn. Started lispro 10u TID. Hold lisinopril given soft pressures. CTH complete. Still therapeutic on ASA/Plavix, rpt in AM.  3/19: POD2, VALERIANO overnight, lantus decreased to 22u for NPO.   3/20: POD3, VALERIANO overnight, POD0 b/l kusum hole. Lantus 10 for .   3/21: POD 4 angio for embo, POD 1 BL kusum holes. 350, 300cc urine output x 2 hrs. DI labs sent, labs not indicative of DI. CTH done.   3/22: POD 5 angio, POD 2 b/l kusum holes. VALERIANO o/n, neuro stable. 500 cc bolus given for orthostatic HoTN with PT. Lispro premeal 4u started. 100 cc 3% bolus given for Na 132. Subdural drains dc'ed, nylon sutures placed. s/p drain removals, c/o seeing shadows when he closes his eyes, CTH with pnemocephalus/hygromas - no acute hemorrhage. Na 137.   3/23. POD6 angio, POD3 b/l burrholes. Suppository given in AM. Regional status. Lantus decreased to 20u and lispro held. Lispro later resumed at 3u TID. Bowel regimen increased.  3/24: POD7 angio, POD4 b/l burrholes. VALERIANO on. SBP 90s/50s, given 500cc NS. Had BM. Tapered salt tabs to 3 BID.   3/25: POD 8/POD 5. VALERIANO overnight. Neuro stable.  3/26: POD 9/POD 6. VALERIANO overnight. Neuro stable. Na tabs weaned to 2 BID.   3/27: POD 10/POD 7. VALERIANO overnight, neuro stable, denies pain, pend auth for AR (has acceptance). Resumed ASA today. Suppository x1 for constipation.   Decreased Oxy 30mg to BID PRN for severe pain, 1 time 10mg oxy given for breakthrough headache, o/w pain controlled.   3/28: POD 11/ 8. VALERIANO overnight, neuro stable. F/u Na this am and consider d/c'ing salt tabs. Recommend for AR.       OVERNIGHT EVENTS:  Vital Signs Last 24 Hrs  T(C): 36.8 (27 Mar 2025 20:45), Max: 37.1 (27 Mar 2025 16:23)  T(F): 98.3 (27 Mar 2025 20:45), Max: 98.7 (27 Mar 2025 16:23)  HR: 68 (27 Mar 2025 23:24) (68 - 77)  BP: 120/79 (27 Mar 2025 20:45) (100/64 - 121/76)  BP(mean): --  RR: 18 (27 Mar 2025 23:24) (16 - 18)  SpO2: 98% (27 Mar 2025 23:24) (94% - 98%)    Parameters below as of 27 Mar 2025 23:24  Patient On (Oxygen Delivery Method): BiPAP/CPAP        I&O's Summary    26 Mar 2025 07:01  -  27 Mar 2025 07:00  --------------------------------------------------------  IN: 240 mL / OUT: 500 mL / NET: -260 mL    27 Mar 2025 07:01  -  28 Mar 2025 04:30  --------------------------------------------------------  IN: 840 mL / OUT: 2460 mL / NET: -1620 mL        PHYSICAL EXAM:  General: NAD, pt is comfortably sitting up in bed, A&O x3, on RA  HEENT: CN II-XII grossly intact, PERRL 3mm, EOMI b/l, face symmetric, tongue midline, neck FROM  Cardiovascular: RRR, normal S1 and S2   Respiratory: lungs CTAB, no wheezing, rhonchi, or crackles   GI: normoactive BS to auscultation, abd soft, NTND   Neuro: no aphasia, speech clear, no dysmetria, no pronator drift  strength 5/5 throughout all 4 extremities  sensation intact to light touch throughout   Extremities: distal pulses 2+ x4  Wound/incision: b/l kusum holes with sutures C/D/I      TUBES/LINES:  [] [place]  [] Lumbar Drain  [] Wound Drains  [] Others    DIET:  [] NPO  [X] Mechanical  [] Tube feeds          LABS:                        13.3   5.99  )-----------( 223      ( 27 Mar 2025 05:30 )             39.5     03-27    135  |  100  |  17  ----------------------------<  188[H]  3.9   |  28  |  0.97    Ca    9.1      27 Mar 2025 05:30  Phos  2.9     03-27  Mg     2.0     03-27        Urinalysis Basic - ( 27 Mar 2025 05:30 )    Color: x / Appearance: x / SG: x / pH: x  Gluc: 188 mg/dL / Ketone: x  / Bili: x / Urobili: x   Blood: x / Protein: x / Nitrite: x   Leuk Esterase: x / RBC: x / WBC x   Sq Epi: x / Non Sq Epi: x / Bacteria: x          CAPILLARY BLOOD GLUCOSE      POCT Blood Glucose.: 176 mg/dL (27 Mar 2025 21:20)  POCT Blood Glucose.: 290 mg/dL (27 Mar 2025 17:26)  POCT Blood Glucose.: 139 mg/dL (27 Mar 2025 11:45)  POCT Blood Glucose.: 164 mg/dL (27 Mar 2025 07:44)  POCT Blood Glucose.: 154 mg/dL (27 Mar 2025 06:31)      Drug Levels: [] N/A    CSF Analysis: [] N/A      Allergies    IV Contrast (Other; Rash)  ibuprofen (Other)    Intolerances      MEDICATIONS:  Antibiotics:    Neuro:  acetaminophen     Tablet .. 650 milliGRAM(s) Oral every 6 hours PRN  busPIRone 15 milliGRAM(s) Oral two times a day  gabapentin 300 milliGRAM(s) Oral at bedtime  methocarbamol 500 milliGRAM(s) Oral every 8 hours PRN  oxyCODONE    IR 30 milliGRAM(s) Oral every 12 hours PRN  oxyCODONE    IR 10 milliGRAM(s) Oral every 8 hours PRN    Anticoagulation:  aspirin enteric coated 81 milliGRAM(s) Oral daily  heparin   Injectable 5000 Unit(s) SubCutaneous every 8 hours    OTHER:  atorvastatin 40 milliGRAM(s) Oral at bedtime  doxazosin 1 milliGRAM(s) Oral at bedtime  insulin glargine Injectable (LANTUS) 20 Unit(s) SubCutaneous at bedtime  insulin lispro (ADMELOG) corrective regimen sliding scale   SubCutaneous Before meals and at bedtime  insulin lispro Injectable (ADMELOG) 6 Unit(s) SubCutaneous three times a day before meals  linaclotide 290 MICROGram(s) Oral before breakfast  metoprolol succinate ER 50 milliGRAM(s) Oral every 12 hours  polyethylene glycol 3350 17 Gram(s) Oral two times a day  senna 2 Tablet(s) Oral at bedtime    IVF:  sodium chloride 2 Gram(s) Oral every 12 hours    CULTURES:    RADIOLOGY & ADDITIONAL TESTS:      ASSESSMENT:  64M PMHx HTN, HLD, DM, CAD w/ 11 cardiac stents (on Plavix, lase dose 3/14), lumbar fusion 2016, chronic pain (takes oxy 30mg 4-6 times per day) presented to ED s/p fall 5 weeks ago with head strike no LOC presented with R sided weakness, HA, +WFD. CTH showed BL SDH L>R w/ 5mm MLS. Now s/p b/l MMAE (3/17/25). Now s/p BL kusum holes for SDH evacuation (3/20/25).         SUBDURAL HEMORRHAGE    No pertinent family history    FHx: myocardial infarction    FH: coronary artery disease    FH: type 2 diabetes    Handoff    MEWS Score    Sleep apnea    Hypertension    Diabetes mellitus    High cholesterol    Obesity (BMI 30.0-34.9)    CAD (coronary artery disease)    Heart burn    Hiatal hernia    Fatty liver    BPH (benign prostatic hyperplasia)    Constipation    CAD (coronary artery disease)    H/O low back pain    SDH (subdural hematoma)    SDH (subdural hematoma)    Kusum hole with evacuation of hematoma    Headache    Subdural hemorrhage    Traumatic subdural hemorrhage    Intracranial subdural hemorrhage    Angiogram, cerebral, with embolization    Shiro hole with evacuation of hematoma    S/P angioplasty with stent    Finger injury    S/P foot surgery    H/O spinal fusion    MED EVAL    38    HTN (hypertension)    HLD (hyperlipidemia)    DM (diabetes mellitus)    CAD (coronary artery disease)    Chronic back pain    History of lumbar surgery    History of depression    EDMUND on CPAP    Chronic constipation    Urinary retention    SysAdmin_VstLnk        PLAN:  Neuro:  - neuro/vitals q8h  - pain control: tylenol prn, Oxy 10mg/30mg prn, gabapentin 300mg at bedtime, robaxin 500mg q8hrs prn, pain mgmt following  - seizure ppx: Keppra 500mg BID   - Hx Depression: Buspirone 15mg BID   - Rpt CTH 3/18: stable size of collections   - postop CTH 3/21: postop changes, decreased SDH; repeat CTH 3/22 with b/l hygromas and mild pneumo     Cardio:  - SBP <140  - Hx HTN: c/w Metoprolol ER 50mg bid, hold lisinopril 2.5mg daily, hold home HCTZ  - Hx CAD w/ 11 stents: ASA 81mg resumed 3/27 HOLD home Plavix,   - echo 3/17: EF 68%  - cardiology following    Pulm:   - RA  - hx EDMUND: CPAP at night    GI:  - CCD  - bowel regimen, BM 3/25  - chronic constipation: c/w linzess    Renal:  - IVL  - voiding  - normonatremia goal, salt tabs 2 BID, goal to wean to off   - hx urinary retention: cont home cardura    Endo:  - ISS, A1C 7.7  - Hx DM: c/w  lantus 20u qhs, lispro 6u TID, hold home metformin    Heme:   - SCDs for DVT ppx, SQH 5000mg q8h      ID:   - afebrile     Misc:   - hold home terbinafine    Dispo: [facility], full code, PT/OT rec AR (pending auth, has acceptance)     Discussed w/ Dr. Moore

## 2025-03-28 NOTE — DISCHARGE NOTE NURSING/CASE MANAGEMENT/SOCIAL WORK - FINANCIAL ASSISTANCE
[facility] provides services at a reduced cost to those who are determined to be eligible through [facility]’s financial assistance program. Information regarding [facility]’s financial assistance program can be found by going to [email or web address] or by calling [phone].

## 2025-03-28 NOTE — PROGRESS NOTE ADULT - SUBJECTIVE AND OBJECTIVE BOX
Patient is a 64y old  Male who presents with a chief complaint of Traumatic BL SDH (28 Mar 2025 04:29)    INTERVAL EVENTS:  - Patient was seen and examined at bedside. Feels ready to go to rehab. No new complaints.    Diet, Consistent Carbohydrate/No Snacks (03-20-25 @ 21:48) [Active]      MEDICATIONS:  MEDICATIONS  (STANDING):  aspirin enteric coated 81 milliGRAM(s) Oral daily  atorvastatin 40 milliGRAM(s) Oral at bedtime  busPIRone 15 milliGRAM(s) Oral two times a day  doxazosin 1 milliGRAM(s) Oral at bedtime  gabapentin 300 milliGRAM(s) Oral at bedtime  heparin   Injectable 5000 Unit(s) SubCutaneous every 8 hours  insulin glargine Injectable (LANTUS) 20 Unit(s) SubCutaneous at bedtime  insulin lispro (ADMELOG) corrective regimen sliding scale   SubCutaneous Before meals and at bedtime  insulin lispro Injectable (ADMELOG) 6 Unit(s) SubCutaneous three times a day before meals  linaclotide 290 MICROGram(s) Oral before breakfast  metoprolol succinate ER 50 milliGRAM(s) Oral every 12 hours  polyethylene glycol 3350 17 Gram(s) Oral two times a day  senna 2 Tablet(s) Oral at bedtime  sodium chloride 2 Gram(s) Oral every 12 hours    MEDICATIONS  (PRN):  acetaminophen     Tablet .. 650 milliGRAM(s) Oral every 6 hours PRN Temp greater or equal to 38C (100.4F), Mild Pain (1 - 3)  methocarbamol 500 milliGRAM(s) Oral every 8 hours PRN Muscle Spasm  oxyCODONE    IR 30 milliGRAM(s) Oral every 12 hours PRN Severe Pain (7 - 10)  oxyCODONE    IR 10 milliGRAM(s) Oral every 8 hours PRN Moderate Pain (4 - 6)      Allergies    IV Contrast (Other; Rash)  ibuprofen (Other)    Intolerances        OBJECTIVE:  Vital Signs Last 24 Hrs  T(C): 36.9 (28 Mar 2025 09:00), Max: 37.1 (27 Mar 2025 16:23)  T(F): 98.5 (28 Mar 2025 09:00), Max: 98.7 (27 Mar 2025 16:23)  HR: 89 (28 Mar 2025 09:00) (68 - 89)  BP: 97/67 (28 Mar 2025 09:00) (97/67 - 121/76)  BP(mean): 77 (28 Mar 2025 09:00) (77 - 77)  RR: 18 (28 Mar 2025 09:00) (17 - 18)  SpO2: 96% (28 Mar 2025 09:00) (96% - 98%)    Parameters below as of 28 Mar 2025 09:00  Patient On (Oxygen Delivery Method): room air      I&O's Summary    27 Mar 2025 07:01  -  28 Mar 2025 07:00  --------------------------------------------------------  IN: 840 mL / OUT: 2460 mL / NET: -1620 mL        PHYSICAL EXAM:  Gen: Reclining in bed at time of exam, appears stated age  HEENT: NCAT, MMM, clear OP  Neck: supple, trachea at midline  CV: RRR, +S1/S2  Pulm: adequate respiratory effort, no increase in work of breathing  Abd: soft, ND  Skin: warm and dry,   Ext: no edema  Neuro: Alert, speaking in full sentences  Psych: affect and behavior appropriate, pleasant at time of interview    LABS:                        13.2   5.64  )-----------( 220      ( 28 Mar 2025 07:21 )             39.6     03-28    139  |  104  |  12  ----------------------------<  193[H]  3.9   |  26  |  0.98    Ca    9.4      28 Mar 2025 07:21  Phos  2.3     03-28  Mg     2.0     03-28          CAPILLARY BLOOD GLUCOSE      POCT Blood Glucose.: 179 mg/dL (28 Mar 2025 11:45)  POCT Blood Glucose.: 181 mg/dL (28 Mar 2025 06:43)  POCT Blood Glucose.: 176 mg/dL (27 Mar 2025 21:20)  POCT Blood Glucose.: 290 mg/dL (27 Mar 2025 17:26)    Urinalysis Basic - ( 28 Mar 2025 07:21 )    Color: x / Appearance: x / SG: x / pH: x  Gluc: 193 mg/dL / Ketone: x  / Bili: x / Urobili: x   Blood: x / Protein: x / Nitrite: x   Leuk Esterase: x / RBC: x / WBC x   Sq Epi: x / Non Sq Epi: x / Bacteria: x        MICRODATA:      RADIOLOGY/OTHER STUDIES:

## 2025-03-28 NOTE — DISCHARGE NOTE NURSING/CASE MANAGEMENT/SOCIAL WORK - NSDCPEPT PROEDMA_GEN_ALL_CORE
Yes
[FreeTextEntry8] : 38m c/o left testicular pain x5 days. 4/10 intensity sharp/burning pain radiating to groin. constant in nature, worse when sitting for prolonged time. +polyuria 10-12 times a day w/ sensation of incomplete void. +polydypsia, +foul odor of urine. +bubbles in AM urine. Denies blood in urine, penile discharge, trauma to the area, pain on defecation,n/v/d, abdomen pain, fever, chills or night sweats,denies hx of STD. Patient is sexually active w/ 1 partner (wife), does not engage in anal sex, does not use barrier protection. Patient had similar pain in the past (4-5 years ago, presumed to be prostatitis however never completed 6 week course of abx, symptoms presumed to resolve after UTI course of abx), hx 7 years prior UTI.

## 2025-03-31 DIAGNOSIS — E83.39 OTHER DISORDERS OF PHOSPHORUS METABOLISM: ICD-10-CM

## 2025-03-31 DIAGNOSIS — Z79.85 LONG-TERM (CURRENT) USE OF INJECTABLE NON-INSULIN ANTIDIABETIC DRUGS: ICD-10-CM

## 2025-03-31 DIAGNOSIS — S06.5X0A TRAUMATIC SUBDURAL HEMORRHAGE WITHOUT LOSS OF CONSCIOUSNESS, INITIAL ENCOUNTER: ICD-10-CM

## 2025-03-31 DIAGNOSIS — Z98.1 ARTHRODESIS STATUS: ICD-10-CM

## 2025-03-31 DIAGNOSIS — G47.33 OBSTRUCTIVE SLEEP APNEA (ADULT) (PEDIATRIC): ICD-10-CM

## 2025-03-31 DIAGNOSIS — D69.6 THROMBOCYTOPENIA, UNSPECIFIED: ICD-10-CM

## 2025-03-31 DIAGNOSIS — G81.91 HEMIPLEGIA, UNSPECIFIED AFFECTING RIGHT DOMINANT SIDE: ICD-10-CM

## 2025-03-31 DIAGNOSIS — F32.A DEPRESSION, UNSPECIFIED: ICD-10-CM
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
## 2025-03-31 DIAGNOSIS — Z91.041 RADIOGRAPHIC DYE ALLERGY STATUS: ICD-10-CM

## 2025-03-31 DIAGNOSIS — Z88.8 ALLERGY STATUS TO OTHER DRUGS, MEDICAMENTS AND BIOLOGICAL SUBSTANCES: ICD-10-CM

## 2025-03-31 DIAGNOSIS — G96.08 OTHER CRANIAL CEREBROSPINAL FLUID LEAK: ICD-10-CM

## 2025-03-31 DIAGNOSIS — E78.00 PURE HYPERCHOLESTEROLEMIA, UNSPECIFIED: ICD-10-CM

## 2025-03-31 DIAGNOSIS — S06.A0XA TRAUMATIC BRAIN COMPRESSION WITHOUT HERNIATION, INITIAL ENCOUNTER: ICD-10-CM

## 2025-03-31 DIAGNOSIS — G93.89 OTHER SPECIFIED DISORDERS OF BRAIN: ICD-10-CM
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
## 2025-03-31 DIAGNOSIS — I25.10 ATHEROSCLEROTIC HEART DISEASE OF NATIVE CORONARY ARTERY WITHOUT ANGINA PECTORIS: ICD-10-CM

## 2025-03-31 DIAGNOSIS — I95.89 OTHER HYPOTENSION: ICD-10-CM

## 2025-03-31 DIAGNOSIS — R33.8 OTHER RETENTION OF URINE: ICD-10-CM

## 2025-03-31 DIAGNOSIS — I10 ESSENTIAL (PRIMARY) HYPERTENSION: ICD-10-CM

## 2025-03-31 DIAGNOSIS — R91.1 SOLITARY PULMONARY NODULE: ICD-10-CM

## 2025-03-31 DIAGNOSIS — S06.890A OTHER SPECIFIED INTRACRANIAL INJURY WITHOUT LOSS OF CONSCIOUSNESS, INITIAL ENCOUNTER: ICD-10-CM

## 2025-03-31 DIAGNOSIS — Z95.5 PRESENCE OF CORONARY ANGIOPLASTY IMPLANT AND GRAFT: ICD-10-CM

## 2025-03-31 DIAGNOSIS — E11.9 TYPE 2 DIABETES MELLITUS WITHOUT COMPLICATIONS: ICD-10-CM

## 2025-03-31 DIAGNOSIS — K59.09 OTHER CONSTIPATION: ICD-10-CM

## 2025-03-31 DIAGNOSIS — E87.1 HYPO-OSMOLALITY AND HYPONATREMIA: ICD-10-CM

## 2025-03-31 DIAGNOSIS — E66.9 OBESITY, UNSPECIFIED: ICD-10-CM

## 2025-03-31 DIAGNOSIS — Z79.02 LONG TERM (CURRENT) USE OF ANTITHROMBOTICS/ANTIPLATELETS: ICD-10-CM

## 2025-03-31 DIAGNOSIS — Z79.4 LONG TERM (CURRENT) USE OF INSULIN: ICD-10-CM

## 2025-03-31 DIAGNOSIS — K76.0 FATTY (CHANGE OF) LIVER, NOT ELSEWHERE CLASSIFIED: ICD-10-CM

## 2025-03-31 DIAGNOSIS — S06.5XAA TRAUMATIC SUBDURAL HEMORRHAGE WITH LOSS OF CONSCIOUSNESS STATUS UNKNOWN, INITIAL ENCOUNTER: ICD-10-CM

## 2025-03-31 DIAGNOSIS — G89.29 OTHER CHRONIC PAIN: ICD-10-CM

## 2025-03-31 DIAGNOSIS — N40.1 BENIGN PROSTATIC HYPERPLASIA WITH LOWER URINARY TRACT SYMPTOMS: ICD-10-CM

## 2025-03-31 DIAGNOSIS — Z79.82 LONG TERM (CURRENT) USE OF ASPIRIN: ICD-10-CM

## 2025-04-08 NOTE — PATIENT PROFILE ADULT - NSPROMUTINFOINDIVIDFT_GEN_A_NUR
70-year-old male with cardiogenic shock, on IABP 1: 2, presenting for [place] catheter insertion to guide wean from support, and determine need for LVAD evaluation.  Medications, allergies, labs reviewed.  Signed consent on chart.    Chad Oakes MD  Advanced Heart Failure & Transplant Cardiology  [facility]  Pager: [phone]       na

## 2025-04-10 ENCOUNTER — OUTPATIENT (OUTPATIENT)
Dept: OUTPATIENT SERVICES | Facility: HOSPITAL | Age: 65
LOS: 1 days | End: 2025-04-10
Payer: MEDICAID

## 2025-04-10 ENCOUNTER — APPOINTMENT (OUTPATIENT)
Dept: CT IMAGING | Facility: HOSPITAL | Age: 65
End: 2025-04-10

## 2025-04-10 DIAGNOSIS — S69.90XA UNSPECIFIED INJURY OF UNSPECIFIED WRIST, HAND AND FINGER(S), INITIAL ENCOUNTER: Chronic | ICD-10-CM

## 2025-04-10 DIAGNOSIS — Z98.1 ARTHRODESIS STATUS: Chronic | ICD-10-CM

## 2025-04-10 DIAGNOSIS — Z98.89 OTHER SPECIFIED POSTPROCEDURAL STATES: Chronic | ICD-10-CM

## 2025-04-10 PROCEDURE — 70450 CT HEAD/BRAIN W/O DYE: CPT

## 2025-04-10 PROCEDURE — 70450 CT HEAD/BRAIN W/O DYE: CPT | Mod: 26

## 2025-04-14 ENCOUNTER — APPOINTMENT (OUTPATIENT)
Dept: NEUROSURGERY | Facility: CLINIC | Age: 65
End: 2025-04-14
Payer: MEDICAID

## 2025-04-14 VITALS
SYSTOLIC BLOOD PRESSURE: 119 MMHG | WEIGHT: 198 LBS | HEIGHT: 68 IN | TEMPERATURE: 98 F | RESPIRATION RATE: 18 BRPM | DIASTOLIC BLOOD PRESSURE: 76 MMHG | HEART RATE: 80 BPM | OXYGEN SATURATION: 98 % | BODY MASS INDEX: 30.01 KG/M2

## 2025-04-14 DIAGNOSIS — Z98.890 OTHER SPECIFIED POSTPROCEDURAL STATES: ICD-10-CM

## 2025-04-14 DIAGNOSIS — S06.5XAA TRAUMATIC SUBDURAL HEMORRHAGE WITH LOSS OF CONSCIOUSNESS STATUS UNKNOWN, INITIAL ENCOUNTER: ICD-10-CM

## 2025-04-14 DIAGNOSIS — Z48.02 ENCOUNTER FOR REMOVAL OF SUTURES: ICD-10-CM

## 2025-04-14 PROCEDURE — 99024 POSTOP FOLLOW-UP VISIT: CPT

## 2025-04-16 ENCOUNTER — EMERGENCY (EMERGENCY)
Facility: HOSPITAL | Age: 65
LOS: 1 days | End: 2025-04-16
Attending: EMERGENCY MEDICINE | Admitting: EMERGENCY MEDICINE
Payer: COMMERCIAL

## 2025-04-16 VITALS
HEART RATE: 69 BPM | OXYGEN SATURATION: 96 % | RESPIRATION RATE: 16 BRPM | DIASTOLIC BLOOD PRESSURE: 72 MMHG | TEMPERATURE: 98 F | SYSTOLIC BLOOD PRESSURE: 109 MMHG

## 2025-04-16 VITALS
SYSTOLIC BLOOD PRESSURE: 126 MMHG | HEIGHT: 68 IN | RESPIRATION RATE: 16 BRPM | HEART RATE: 80 BPM | WEIGHT: 197.98 LBS | DIASTOLIC BLOOD PRESSURE: 89 MMHG | OXYGEN SATURATION: 96 % | TEMPERATURE: 98 F

## 2025-04-16 DIAGNOSIS — Z98.89 OTHER SPECIFIED POSTPROCEDURAL STATES: Chronic | ICD-10-CM

## 2025-04-16 DIAGNOSIS — S69.90XA UNSPECIFIED INJURY OF UNSPECIFIED WRIST, HAND AND FINGER(S), INITIAL ENCOUNTER: Chronic | ICD-10-CM

## 2025-04-16 DIAGNOSIS — Z98.1 ARTHRODESIS STATUS: Chronic | ICD-10-CM

## 2025-04-16 PROCEDURE — 70450 CT HEAD/BRAIN W/O DYE: CPT | Mod: MC

## 2025-04-16 PROCEDURE — 99024 POSTOP FOLLOW-UP VISIT: CPT

## 2025-04-16 PROCEDURE — 99284 EMERGENCY DEPT VISIT MOD MDM: CPT | Mod: 25

## 2025-04-16 PROCEDURE — 70450 CT HEAD/BRAIN W/O DYE: CPT | Mod: 26

## 2025-04-16 PROCEDURE — 99284 EMERGENCY DEPT VISIT MOD MDM: CPT

## 2025-04-16 NOTE — ED PROVIDER NOTE - PATIENT PORTAL LINK FT
You can access the FollowMyHealth Patient Portal offered by [facility] by registering at the following website: [email or web address]. By joining [employer]’s FollowMyHealth portal, you will also be able to view your health information using other applications (apps) compatible with our system.

## 2025-04-16 NOTE — ED PROVIDER NOTE - PHYSICAL EXAMINATION
VITAL SIGNS: I have reviewed nursing notes and confirm.  CONSTITUTIONAL: Well-developed; well-nourished; in no acute distress.  SKIN: Agree with RN documentation regarding decubitus evaluation. Remainder of skin exam is warm and dry, no acute rash.  HEAD: Normocephalic; atraumatic.  EYES: PERRL, EOM intact; conjunctiva and sclera clear.  ENT: No nasal discharge; airway clear.  NECK: Supple; non tender.  CARD: S1, S2 normal; no murmurs, gallops, or rubs. Regular rate and rhythm.  RESP: No wheezes, rales or rhonchi.  ABD: Normal bowel sounds; soft; non-distended; non-tender; no hepatosplenomegaly.  EXT: Normal ROM. No clubbing, cyanosis or edema.  LYMPH: No acute cervical adenopathy.  NEURO: Alert & Oriented x 3. CN II-XII intact. No facial droop. Clear speech. VELASQUEZ w/ 5/5 strength x 4 ext. Normal sensation. No pronator drift. No dysdidokinesia nor dysmetria. Normal heel-to-shin.  PSYCH: Cooperative, appropriate.

## 2025-04-16 NOTE — ED PROVIDER NOTE - CLINICAL SUMMARY MEDICAL DECISION MAKING FREE TEXT BOX
64M PMHx HTN, HLD, DM, CAD w/ 11 cardiac stents (on Plavix, lase dose 3/14), lumbar fusion 2016, chronic pain (takes oxy 30mg 4-6 times per day) presented to ED s/p fall in March 2025 with CTH showing bilateral acute on subacute subdural hematomas, left greater than right with 5 mm left-to-right midline shift.s/p b/l MMAE on 3/17/25 with Dr. Arteaga/p b/l kusum holes for sdh evacuation on 3/20/25 with Dr. Moore.  Discharged to [facility] acute rehab on 3/28/25. 4/14/25:Returns today for routine post op follow up. Reports some incision pain on left side. Did restart his plavix for a few days post rehab but then stopped once instructed to continue hold until next scan. Resumed his aspirin. Repeat CTH showing post bilateral kusum holes and middle meningeal artery following material there are small low density subdural collections unchanged in size with resolution of postoperative air since we 20/2/2025. Mild equal and opposite mass effect. No midline shift on 4/10/25.  Pt presents to ED today with concerns for left sided head pressure.  Denies recent falls or trauma.  Currently not on plavix.  No focal extremity weakness or numbness.      VSS  PE neuro intact  CT head stable - discussed with NS who eval pt and feels pt can be discharged home with outpt follow up

## 2025-04-16 NOTE — ED ADULT NURSE NOTE - NSFALLHARMRISKINTERV_ED_ALL_ED

## 2025-04-16 NOTE — ED PROVIDER NOTE - NSFOLLOWUPINSTRUCTIONS_ED_ALL_ED_FT
Please follow up with Dr. Moore this week.  Call office for appointment (they are expecting your call).  Return to ED with worsening symptoms or other concerns.  Fell better soon.      Subdural Hematoma  Cross-section of the brain with a close-up showing a collection of blood between the brain and its outer covering, or dura.  A subdural hematoma is a collection of blood between the brain and its outer covering (dura). As the amount of blood increases, pressure builds on the brain.    There are two types of subdural hematomas:  Acute. This type develops shortly after a hard, direct hit to the head and causes blood to collect very quickly. This is a medical emergency. If it is not diagnosed and treated quickly, it can lead to severe brain injury or death.  Chronic. This is when bleeding develops more slowly, over weeks or months. In some cases, this type does not cause symptoms.  What are the causes?  This condition is caused by bleeding from a broken blood vessel (hemorrhage). In most cases, this is because of a head injury, such as from a hard, direct hit.  Head injuries can be caused by:  Motor vehicle accidents.  Falls.  Assaults.  Sports injuries.  In rare cases, a hemorrhage can happen without a known cause, especially if you take blood thinners (anticoagulants).    What increases the risk?  This condition is more likely to develop in:  Older people.  Infants.  People who take blood thinners.  People who have head injuries.  People who abuse alcohol.  What are the signs or symptoms?  Symptoms of this condition can be mild, severe, or life-threatening, depending on the size of the hematoma.    Symptoms of this condition include:  Headache.  Nausea or vomiting.  Changes in vision, such as double vision or loss of vision.  Changes in speech or trouble understanding what people say.  Loss of balance or trouble walking.  Weakness, numbness, or tingling in the arms or legs, especially on one side of the body.  Seizures.  Change in personality.  Increased sleepiness.  Memory loss.  Loss of consciousness.  Coma.  Symptoms of acute subdural hematoma can develop over minutes or hours. Symptoms of chronic subdural hematoma may develop over weeks or months.    How is this diagnosed?  This condition is diagnosed based on:  A physical exam.  Tests of strength, reflexes, coordination, senses, manner of walking, and facial and eye movements (neurological exam).  Imaging tests, such as an MRI or CT scan.  How is this treated?  Treatment for this condition depends on the type of hematoma and how severe it is.    Treatment for acute hematoma may include:  Emergency surgery to drain blood or remove a blood clot.  Medicines that help the body get rid of excess fluids (diuretics). These may help reduce pressure in the brain.  Assisted breathing (ventilation).  Treatment for chronic hematoma may include:  Observation and bed rest at the hospital.  Surgery.  If you take blood thinners, you may need to stop taking them for a short time. You may also be given anti-seizure medicine.    Sometimes, no treatment is needed for chronic hematoma.    Follow these instructions at home:  Activity    Avoid situations where you could injure your head again, such as competitive sports, downhill snow sports, and horseback riding. Do not do these activities until your health care provider approves.  Wear protective gear, such as a helmet, when participating in activities such as biking or contact sports.  Always wear your seat belt when you are in a motor vehicle.  Rest as told by your health care provider. Rest helps the brain heal.  You may have to avoid lifting. Ask your health care provider how much you can safely lift.  Do not drive, ride a bike, or use machinery until your health care provider says that it is safe.  Avoid too much visual stimulation while recovering. This includes working on the computer, watching TV, and reading.  Try to avoid activities that cause physical or mental stress.  Return to your normal activities as told by your health care provider. Ask your health care provider what activities are safe for you.  Alcohol use    Do not drink alcohol if:  Your health care provider tells you not to drink.  You are pregnant, may be pregnant, or are planning to become pregnant.  If you drink alcohol:  Limit how much you have to:  0–1 drink a day for women.  0–2 drinks a day for men.  Know how much alcohol is in your drink. In the [place] one drink equals one 12 oz bottle of beer (355 mL), one 5 oz glass of wine (148 mL), or one 1½ oz glass of hard liquor (44 mL).  General instructions    Watch your symptoms and ask others to do the same. Recovery from brain injuries varies. Talk with your health care provider about what to expect.  Take over-the-counter and prescription medicines only as told by your health care provider. Do not take blood thinners or NSAIDs unless your health care provider approves. These include aspirin, ibuprofen, naproxen, and warfarin.  Keep your home environment safe to reduce the risk of falling.  Keep all follow-up visits. Your health care team may need to watch you over time to check for serious changes in your condition.  Where to find more information  [employer] [place]: www.biausa.org  Brain Trauma Foundation: www.braintrauma.org  Get help right away if:  You are taking blood thinners or have a bleeding disorder and fall or experience minor trauma to the head. If you take blood thinners, even a small injury can cause a subdural hematoma.  You develop any of these symptoms after a head injury:  Clear fluid draining from your nose or ears.  Nausea or vomiting.  Changes in speech or trouble understanding what people say.  Seizures.  Sleepiness or a decrease in alertness.  Double vision.  Numbness or inability to move in any part of your body.  Difficulty walking or poor coordination.  Difficulty thinking.  Confusion or forgetfulness.  Personality changes.  Irrational or aggressive behavior.  These symptoms may be an emergency. Get help right away. Call 911.  Do not wait to see if the symptoms will go away.  Do not drive yourself to the hospital.  Summary  A subdural hematoma is a collection of blood between the brain and its outer covering (dura).  Treatment for this condition depends on the type of subdural hematoma and how severe it is.  Symptoms can vary from mild to severe to life-threatening.  Watch your symptoms and ask others to do the same.  This information is not intended to replace advice given to you by your health care provider. Make sure you discuss any questions you have with your health care provider.

## 2025-04-16 NOTE — ED ADULT TRIAGE NOTE - CHIEF COMPLAINT QUOTE
c/o intermittent headaches over last several days. pt dc'ed from, [facility] last month for fall, found to have bilat SDH now s/p kusum holes and evacuation. had rpt CT on 4/10  pt in nad, a&ox4. speaking in full and complete sentences, befast neg

## 2025-04-16 NOTE — ED ADULT NURSE NOTE - CHIEF COMPLAINT QUOTE
c/o intermittent headaches over last several days. pt dc'ed from, [facility] last month for fall, found to have bilat SDH now s/p kusum holes and evacuation. had rpt CT on 4/10  pt in nad, a&ox4. speaking in full and complete sentences, befast neg

## 2025-04-16 NOTE — ED PROVIDER NOTE - OBJECTIVE STATEMENT
64M PMHx HTN, HLD, DM, CAD w/ 11 cardiac stents (on Plavix, lase dose 3/14), lumbar fusion 2016, chronic pain (takes oxy 30mg 4-6 times per day) presented to ED s/p fall in March 2025 with CTH showing bilateral acute on subacute subdural hematomas, left greater than right with 5 mm left-to-right midline shift.s/p b/l MMAE on 3/17/25 with Dr. Arteaga/p b/l kusum holes for sdh evacuation on 3/20/25 with Dr. Moore.  Discharged to [facility] acute rehab on 3/28/25. 4/14/25:Returns today for routine post op follow up. Reports some incision pain on left side. Did restart his plavix for a few days post rehab but then stopped once instructed to continue hold until next scan. Resumed his aspirin. Repeat CTH showing post bilateral kusum holes and middle meningeal artery following material there are small low density subdural collections unchanged in size with resolution of postoperative air since we 20/2/2025. Mild equal and opposite mass effect. No midline shift on 4/10/25.  Pt presents to ED today with concerns for left sided head pressure.  Denies recent falls or trauma.  Currently not on plavix.  No focal extremity weakness or numbness.

## 2025-04-16 NOTE — CONSULT NOTE ADULT - SUBJECTIVE AND OBJECTIVE BOX
HISTORY OF PRESENT ILLNESS:   64M PMHx HTN, HLD, DM, CAD w/ 11 cardiac stents, lumbar fusion 2016, chronic pain, b/l SDH w/p fall with headstrike s/p b/l MMAE 3/17/25 and  BL kusum holes for SDH evacuation (3/20/25) presents to ER c/o left sided headache worsened x 1 day. Headache ranges 6-7/10 in severity. Patient denies nausea, vomiting, dizziness, and vision changes. Patient denies having taken medication for pain. Patient last had a CTH on 4/10 and was seen in Dr. Moore's office on 4/14. Neurosurgery consulted for headache in setting of known SDH.      PAST MEDICAL & SURGICAL HISTORY:  Sleep apnea  on CPAP      Hypertension      Diabetes mellitus      High cholesterol      Obesity (BMI 30.0-34.9)      CAD (coronary artery disease)      Hiatal hernia  with possible GERD      Fatty liver      BPH (benign prostatic hyperplasia)      Constipation      H/O low back pain      S/P angioplasty with stent  2008, 2014, 03/2019      Finger injury  Left Ring Finger & had surgery ( 1996 )      S/P foot surgery  Right  ( 2013 )      H/O spinal fusion        FAMILY HISTORY:  FHx: myocardial infarction  mother    FH: coronary artery disease  father s/p CABG    FH: type 2 diabetes  father        SOCIAL HISTORY:  unknown    Allergies    ibuprofen (Other)  IV Contrast (Other; Rash)    Intolerances        REVIEW OF SYSTEMS      General:	no recent illnesses, no recent wt gain/loss    Skin/Breast:  intact  	  Ophthalmologic:  negative  	  ENMT:	negative    Respiratory and Thorax: no coughing, wheezing, recent URI  	  Cardiovascular: no chest pain, GRAHAM    Gastrointestinal:	soft, non tender    Genitourinary: no frequency, dysuria    Musculoskeletal:	negative    Neurological:	see HPI    Psychiatric:	negative    Hematology/Lymphatics:	negative    Endocrine:  	negative    Allergic/Immunologic:  Negative      MEDICATIONS:  Antibiotics:    Neuro:    Anticoagulation:    OTHER:    IVF:      Vital Signs Last 24 Hrs  T(C): 36.4 (16 Apr 2025 10:05), Max: 36.8 (16 Apr 2025 08:10)  T(F): 97.6 (16 Apr 2025 10:05), Max: 98.2 (16 Apr 2025 08:10)  HR: 64 (16 Apr 2025 10:05) (64 - 80)  BP: 106/71 (16 Apr 2025 10:05) (106/71 - 126/89)  BP(mean): --  RR: 16 (16 Apr 2025 10:05) (16 - 16)  SpO2: 97% (16 Apr 2025 10:05) (96% - 97%)    Parameters below as of 16 Apr 2025 10:05  Patient On (Oxygen Delivery Method): room air        PHYSICAL EXAM:  General: patient seen laying supine in bed in NAD  Neuro: AAOx3, follows commands, opens eyes spontaneously, speech clear and fluent,  face symmetric, no pronator drift, , strength 5/5 b/l upper extremities and lower extremities, mild decreased sensation to light touch to right arm  HEENT: PERRL, EOMI  Neck: supple  Cardiac: RRR, S1S2  Pulmonary: chest rise symmetric  Abdomen: soft, nontender, nondistended  Ext: perfusing well  Skin: warm, dry  Wound: b/l frontal cranial incisions well healed, no erythema, fluctuance or tenderness to palpation      RADIOLOGY & ADDITIONAL STUDIES:    < from: CT Head No Cont (04.16.25 @ 09:43) >    Impression: Status post bilateral frontal kusum holes and bilateral   embolization of the middle meningeal arteries. Small bilateral frontal   parietal   low density fluid subdural collections, unchanged. No new   hemorrhage is seen.    < end of copied text >    Assessment:  64M PMHx HTN, HLD, DM, CAD w/ 11 cardiac stents, lumbar fusion 2016, chronic pain, b/l SDH w/p fall with headstrike s/p b/l MMAE 3/17/25 and  BL kusum holes for SDH evacuation (3/20/25) presents to ER c/o left sided headache worsened x 1 day. Neurosurgery consulted for headache in setting of known SDH.     Plan:  - CTH today stable from prior CTH on 4/10. No urgent neurosurgical intervention recommended.   - Please have patient follow up outpatient with Dr. Moore. Call [phone] for an appointment.     D/w Dr. Moore   HISTORY OF PRESENT ILLNESS:   64M PMHx HTN, HLD, DM, CAD w/ 11 cardiac stents, lumbar fusion 2016, chronic pain, b/l SDH w/p fall with headstrike s/p b/l MMAE 3/17/25 and  BL kusum holes for SDH evacuation (3/20/25) presents to ER c/o left sided headache worsened x 1 day. Headache ranges 6-7/10 in severity. Patient denies nausea, vomiting, dizziness, and vision changes. Patient denies having taken medication for pain. Patient last had a CTH on 4/10 and was seen in Dr. Moore's office on 4/14. Neurosurgery consulted for headache in setting of known SDH.      PAST MEDICAL & SURGICAL HISTORY:  Sleep apnea  on CPAP      Hypertension      Diabetes mellitus      High cholesterol      Obesity (BMI 30.0-34.9)      CAD (coronary artery disease)      Hiatal hernia  with possible GERD      Fatty liver      BPH (benign prostatic hyperplasia)      Constipation      H/O low back pain      S/P angioplasty with stent  2008, 2014, 03/2019      Finger injury  Left Ring Finger & had surgery ( 1996 )      S/P foot surgery  Right  ( 2013 )      H/O spinal fusion        FAMILY HISTORY:  FHx: myocardial infarction  mother    FH: coronary artery disease  father s/p CABG    FH: type 2 diabetes  father        SOCIAL HISTORY:  unknown    Allergies    ibuprofen (Other)  IV Contrast (Other; Rash)    Intolerances        REVIEW OF SYSTEMS      General:	no recent illnesses, no recent wt gain/loss    Skin/Breast:  intact  	  Ophthalmologic:  negative  	  ENMT:	negative    Respiratory and Thorax: no coughing, wheezing, recent URI  	  Cardiovascular: no chest pain, GRAHAM    Gastrointestinal:	soft, non tender    Genitourinary: no frequency, dysuria    Musculoskeletal:	negative    Neurological:	see HPI    Psychiatric:	negative    Hematology/Lymphatics:	negative    Endocrine:  	negative    Allergic/Immunologic:  Negative      MEDICATIONS:  Antibiotics:    Neuro:    Anticoagulation:    OTHER:    IVF:      Vital Signs Last 24 Hrs  T(C): 36.4 (16 Apr 2025 10:05), Max: 36.8 (16 Apr 2025 08:10)  T(F): 97.6 (16 Apr 2025 10:05), Max: 98.2 (16 Apr 2025 08:10)  HR: 64 (16 Apr 2025 10:05) (64 - 80)  BP: 106/71 (16 Apr 2025 10:05) (106/71 - 126/89)  BP(mean): --  RR: 16 (16 Apr 2025 10:05) (16 - 16)  SpO2: 97% (16 Apr 2025 10:05) (96% - 97%)    Parameters below as of 16 Apr 2025 10:05  Patient On (Oxygen Delivery Method): room air        PHYSICAL EXAM:  General: patient seen laying supine in bed in NAD  Neuro: AAOx3, follows commands, opens eyes spontaneously, speech clear and fluent,  face symmetric, no pronator drift, , strength 5/5 b/l upper extremities and lower extremities, mild decreased sensation to light touch to right arm  HEENT: PERRL, EOMI  Neck: supple  Cardiac: RRR, S1S2  Pulmonary: chest rise symmetric  Abdomen: soft, nontender, nondistended  Ext: perfusing well  Skin: warm, dry  Wound: b/l frontal cranial incisions well healed, no erythema, fluctuance or tenderness to palpation      RADIOLOGY & ADDITIONAL STUDIES:    < from: CT Head No Cont (04.16.25 @ 09:43) >    Impression: Status post bilateral frontal kusum holes and bilateral   embolization of the middle meningeal arteries. Small bilateral frontal   parietal   low density fluid subdural collections, unchanged. No new   hemorrhage is seen.    < end of copied text >    Assessment:  64M PMHx HTN, HLD, DM, CAD w/ 11 cardiac stents, lumbar fusion 2016, chronic pain, b/l SDH w/p fall with headstrike s/p b/l MMAE 3/17/25 and  BL kusum holes for SDH evacuation (3/20/25) presents to ER c/o left sided headache worsened x 1 day. Neurosurgery consulted for headache in setting of known SDH.     Plan:  - CTH today stable from prior CTH on 4/10. No urgent neurosurgical intervention recommended.   - Please have patient follow up outpatient with Dr. Moore on 5/6 at 1:00PM. Call [phone] to confirm or make changes to the appointment.     D/w Dr. Moore

## 2025-04-16 NOTE — ED ADULT NURSE NOTE - OBJECTIVE STATEMENT
Pt is a 65yo male PMHx b/a SDH presenting to ED c/o headache. Pt states, "I had a bleed on both sides of my brain last month and they did a procedure to remove the blood but I have the same headache now that I had prior to the procedure. They did a CT scan on 4/10 that showed some blood left and I don't know if that means the procedure was unsuccessful." Pt reports having intermittent headaches to left side of head, states, "This is the side that had more blood." Pt noted to have decreased sensation to RUE, RLE d/t SDH. Pt is A&Ox4, breathing even and unlabored speaking in clear full sentences, ambulatory with cane, denies vision changes, lightheadedness, dizziness, c/p, sob, f/c.

## 2025-04-16 NOTE — ED ADULT TRIAGE NOTE - BANDS:
Problem: Thermoregulation  Goal: # Body temperature maintained within normal range  Outcome: Outcome Not Met, Continue to Monitor   Pt febrile x2 overnight. Temps came down with tylenol  Problem: Nausea/Vomiting  Goal: # Diminished episodes of nausea and vomiting  Outcome: Outcome Not Met, Continue to Monitor   Pt with one episode of emesis overnight   Allergy;

## 2025-04-18 DIAGNOSIS — Z91.041 RADIOGRAPHIC DYE ALLERGY STATUS: ICD-10-CM

## 2025-04-18 DIAGNOSIS — Z79.891 LONG TERM (CURRENT) USE OF OPIATE ANALGESIC: ICD-10-CM
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
## 2025-04-18 DIAGNOSIS — S09.90XA UNSPECIFIED INJURY OF HEAD, INITIAL ENCOUNTER: ICD-10-CM

## 2025-04-18 DIAGNOSIS — R51.9 HEADACHE, UNSPECIFIED: ICD-10-CM
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
## 2025-04-18 DIAGNOSIS — Z98.1 ARTHRODESIS STATUS: ICD-10-CM

## 2025-04-18 DIAGNOSIS — I10 ESSENTIAL (PRIMARY) HYPERTENSION: ICD-10-CM

## 2025-04-18 DIAGNOSIS — G89.29 OTHER CHRONIC PAIN: ICD-10-CM

## 2025-04-18 DIAGNOSIS — Z87.820 PERSONAL HISTORY OF TRAUMATIC BRAIN INJURY: ICD-10-CM

## 2025-04-18 DIAGNOSIS — Z79.02 LONG TERM (CURRENT) USE OF ANTITHROMBOTICS/ANTIPLATELETS: ICD-10-CM

## 2025-04-18 DIAGNOSIS — I25.10 ATHEROSCLEROTIC HEART DISEASE OF NATIVE CORONARY ARTERY WITHOUT ANGINA PECTORIS: ICD-10-CM

## 2025-04-18 DIAGNOSIS — E11.9 TYPE 2 DIABETES MELLITUS WITHOUT COMPLICATIONS: ICD-10-CM

## 2025-04-18 DIAGNOSIS — E78.5 HYPERLIPIDEMIA, UNSPECIFIED: ICD-10-CM

## 2025-04-18 DIAGNOSIS — Z88.6 ALLERGY STATUS TO ANALGESIC AGENT: ICD-10-CM

## 2025-04-18 DIAGNOSIS — Z95.5 PRESENCE OF CORONARY ANGIOPLASTY IMPLANT AND GRAFT: ICD-10-CM

## 2025-04-22 ENCOUNTER — APPOINTMENT (OUTPATIENT)
Dept: NEUROSURGERY | Facility: CLINIC | Age: 65
End: 2025-04-22

## 2025-05-05 ENCOUNTER — OUTPATIENT (OUTPATIENT)
Dept: OUTPATIENT SERVICES | Facility: HOSPITAL | Age: 65
LOS: 1 days | End: 2025-05-05
Payer: COMMERCIAL

## 2025-05-05 ENCOUNTER — APPOINTMENT (OUTPATIENT)
Dept: CT IMAGING | Facility: HOSPITAL | Age: 65
End: 2025-05-05

## 2025-05-05 DIAGNOSIS — Z98.1 ARTHRODESIS STATUS: Chronic | ICD-10-CM

## 2025-05-05 DIAGNOSIS — Z98.89 OTHER SPECIFIED POSTPROCEDURAL STATES: Chronic | ICD-10-CM

## 2025-05-05 DIAGNOSIS — S69.90XA UNSPECIFIED INJURY OF UNSPECIFIED WRIST, HAND AND FINGER(S), INITIAL ENCOUNTER: Chronic | ICD-10-CM

## 2025-05-05 PROCEDURE — 70450 CT HEAD/BRAIN W/O DYE: CPT | Mod: 26

## 2025-05-05 PROCEDURE — 70450 CT HEAD/BRAIN W/O DYE: CPT

## 2025-05-07 ENCOUNTER — APPOINTMENT (OUTPATIENT)
Dept: NEUROSURGERY | Facility: CLINIC | Age: 65
End: 2025-05-07
Payer: MEDICAID

## 2025-05-07 VITALS
HEART RATE: 65 BPM | WEIGHT: 198 LBS | BODY MASS INDEX: 30.01 KG/M2 | SYSTOLIC BLOOD PRESSURE: 117 MMHG | RESPIRATION RATE: 18 BRPM | OXYGEN SATURATION: 98 % | HEIGHT: 68 IN | TEMPERATURE: 97.7 F | DIASTOLIC BLOOD PRESSURE: 78 MMHG

## 2025-05-07 DIAGNOSIS — Z98.890 OTHER SPECIFIED POSTPROCEDURAL STATES: ICD-10-CM

## 2025-05-07 DIAGNOSIS — S06.5XAA TRAUMATIC SUBDURAL HEMORRHAGE WITH LOSS OF CONSCIOUSNESS STATUS UNKNOWN, INITIAL ENCOUNTER: ICD-10-CM

## 2025-05-07 PROCEDURE — 99024 POSTOP FOLLOW-UP VISIT: CPT

## 2025-05-19 NOTE — ED PROVIDER NOTE - TEMPLATE, MLM
5/19/2025   No changes in H&P.    Vital signs reviewed in pre-op & per EMR  CV:  RRR  Chest:  CTA bilateral    Assessment  Right Hip primary arthritis    Plan   Right Total hip arthroplasty    he has failed conservative treatment with NSAIDS, activity modification and injection therapy.  Risks/Benefits discussed with patient       General

## 2025-07-25 ENCOUNTER — APPOINTMENT (OUTPATIENT)
Dept: CT IMAGING | Facility: HOSPITAL | Age: 65
End: 2025-07-25
Payer: MEDICAID

## 2025-07-25 ENCOUNTER — OUTPATIENT (OUTPATIENT)
Dept: OUTPATIENT SERVICES | Facility: HOSPITAL | Age: 65
LOS: 1 days | End: 2025-07-25

## 2025-07-25 DIAGNOSIS — Z98.89 OTHER SPECIFIED POSTPROCEDURAL STATES: Chronic | ICD-10-CM

## 2025-07-25 DIAGNOSIS — S69.90XA UNSPECIFIED INJURY OF UNSPECIFIED WRIST, HAND AND FINGER(S), INITIAL ENCOUNTER: Chronic | ICD-10-CM

## 2025-07-25 DIAGNOSIS — Z98.1 ARTHRODESIS STATUS: Chronic | ICD-10-CM

## 2025-07-25 PROCEDURE — 70450 CT HEAD/BRAIN W/O DYE: CPT | Mod: 26

## 2025-08-05 ENCOUNTER — APPOINTMENT (OUTPATIENT)
Dept: NEUROSURGERY | Facility: CLINIC | Age: 65
End: 2025-08-05

## 2025-08-05 VITALS
OXYGEN SATURATION: 99 % | TEMPERATURE: 97.4 F | BODY MASS INDEX: 30.77 KG/M2 | HEART RATE: 65 BPM | RESPIRATION RATE: 18 BRPM | DIASTOLIC BLOOD PRESSURE: 77 MMHG | HEIGHT: 68 IN | WEIGHT: 203 LBS | SYSTOLIC BLOOD PRESSURE: 113 MMHG

## 2025-08-05 DIAGNOSIS — Z98.890 OTHER SPECIFIED POSTPROCEDURAL STATES: ICD-10-CM

## 2025-08-05 DIAGNOSIS — S06.5XAA TRAUMATIC SUBDURAL HEMORRHAGE WITH LOSS OF CONSCIOUSNESS STATUS UNKNOWN, INITIAL ENCOUNTER: ICD-10-CM

## 2025-08-05 PROCEDURE — 99213 OFFICE O/P EST LOW 20 MIN: CPT

## (undated) DEVICE — DRAPE INSTRUMENT POUCH 6.75" X 11"

## (undated) DEVICE — WARMING BLANKET LOWER ADULT

## (undated) DEVICE — SUT ETHILON 2-0 18" PS

## (undated) DEVICE — STAPLER SKIN PROXIMATE

## (undated) DEVICE — Device

## (undated) DEVICE — SOL ANTI FOG (FRED)

## (undated) DEVICE — VENODYNE/SCD SLEEVE CALF MEDIUM

## (undated) DEVICE — ADAPTER LUER STUB 15G

## (undated) DEVICE — SUT PDS II 3-0 27" SH UNDYED

## (undated) DEVICE — FOLEY TRAY 16FR 5CC LF UMETER CLOSED

## (undated) DEVICE — BURR ELITE CARBIDE RND 5MM STRL

## (undated) DEVICE — MARKING PEN W RULER

## (undated) DEVICE — TAPE SILK 3"

## (undated) DEVICE — CLIPPER BLADE GENERAL USE

## (undated) DEVICE — CRANIAL MASK TRACKER

## (undated) DEVICE — ELCTR STRYKER NEPTUNE SMOKE EVACUATION PENCIL (GREEN)

## (undated) DEVICE — BIPOLAR FORCEP KIRWAN JEWELERS STR 4" X 0.4MM W 12FT CORD (GREEN)

## (undated) DEVICE — BUR STRYKER MULTI FLUTE ROUND 5MM

## (undated) DEVICE — SUT ETHILON 3-0 18" PS-1

## (undated) DEVICE — SUT SILK 2-0 12-18"

## (undated) DEVICE — NEPTUNE 4-PORT MANIFOLD STANDARD

## (undated) DEVICE — POSITIONER FOAM EGG CRATE ULNAR 2PCS (PINK)

## (undated) DEVICE — PACK CRANIOTOMY LNX SURGICOUNT

## (undated) DEVICE — STRYKER COLORADO N-SERIES 3CM STRAIGHT

## (undated) DEVICE — PREP DURAPREP 26CC

## (undated) DEVICE — FRAZIER CONNECTING TUBE 2FT 5MM

## (undated) DEVICE — DRAPE MAGNETIC INSTRUMENT MEDIUM

## (undated) DEVICE — BAG BILE

## (undated) DEVICE — SYR LUER LOK 50CC

## (undated) DEVICE — PREP BETADINE KIT

## (undated) DEVICE — ROUTER TAPR 2.3MM BLU RED

## (undated) DEVICE — SUT VICRYL PLUS 3-0 27" RB-1 UNDYED